# Patient Record
Sex: MALE | Race: WHITE | Employment: OTHER | RURAL
[De-identification: names, ages, dates, MRNs, and addresses within clinical notes are randomized per-mention and may not be internally consistent; named-entity substitution may affect disease eponyms.]

---

## 2017-01-03 ENCOUNTER — CLINICAL SUPPORT (OUTPATIENT)
Dept: FAMILY MEDICINE CLINIC | Age: 77
End: 2017-01-03

## 2017-01-03 DIAGNOSIS — I49.9 CARDIAC ARRHYTHMIA, UNSPECIFIED CARDIAC ARRHYTHMIA TYPE: Primary | ICD-10-CM

## 2017-01-03 LAB
INR BLD: 2
PT POC: 24.9 SEC
VALID INTERNAL CONTROL?: YES

## 2017-01-03 NOTE — PROGRESS NOTES
This is a 77y.o. year old male who is being seen today for protime. Any missed doses? NO  Any bleeding? NO  Current dose? 2.5mg qd    Results for orders placed or performed in visit on 01/03/17   AMB POC PT/INR   Result Value Ref Range    VALID INTERNAL CONTROL POC Yes     Prothrombin time (POC) 24.9 sec    INR POC 2.0

## 2017-01-03 NOTE — MR AVS SNAPSHOT
Visit Information Date & Time Provider Department Dept. Phone Encounter #  
 1/3/2017  9:45 AM [facility] [phone] [id number] Your Appointments 1/12/2017 10:00 AM  
Nurse Visit with [facility] ([address]) Appt Note: P.T./INR  
 [address] [place] [address]  
[phone]  
  
   
 [address] [facility] [address]  
  
    
 1/31/2017 10:30 AM  
ESTABLISHED PATIENT with Myah Woods MD  
[address] ([address]) Appt Note: 4 mth  
 [address] [facility] [address]  
[address] [facility] [address] [facility] [address] Upcoming Health Maintenance Date Due DTaP/Tdap/Td series (1 - Tdap) 1/19/1962 ZOSTER VACCINE AGE 60> 1/19/2001 GLAUCOMA SCREENING Q2Y 1/19/2006 Pneumococcal 65+ High/Highest Risk (1 of 2 - PCV13) 1/19/2006 INFLUENZA AGE 9 TO ADULT 8/1/2016 MEDICARE YEARLY EXAM 2/18/2017 Allergies as of 1/3/2017  Review Complete On: 1/3/2017 By: Oz Fearing Severity Noted Reaction Type Reactions Ketamine  10/15/2010    Other (comments)  
 confusion Current Immunizations  Never Reviewed No immunizations on file. Not reviewed this visit Vitals Smoking Status Former Smoker Preferred Pharmacy Pharmacy Name Phone [facility] [address] [place] [phone] Your Updated Medication List  
  
   
This list is accurate as of: 1/3/17 10:11 AM.  Always use your most recent med list.  
  
  
  
  
 amiodarone 200 mg tablet Commonly known as:  Gilberto Luna Take 1 Tab by mouth daily. atorvastatin 20 mg tablet Commonly known as:  LIPITOR Take 1 Tab by mouth daily. dilTIAZem 60 mg tablet Commonly known as:  CARDIZEM  
 Take 1 Tab by mouth two (2) times a day. furosemide 40 mg tablet Commonly known as:  LASIX TAKE ONE TABLET BY MOUTH ONCE DAILY HYDROcodone-acetaminophen 5-325 mg per tablet Commonly known as:  Juanetta Rasp Take 1 Tab by mouth every six (6) hours as needed for Pain. Max Daily Amount: 4 Tabs. levothyroxine 100 mcg tablet Commonly known as:  SYNTHROID Take 1 Tab by mouth Daily (before breakfast). losartan 25 mg tablet Commonly known as:  COZAAR Take 1 Tab by mouth daily. potassium chloride SR 10 mEq tablet Commonly known as:  KLOR-CON 10  
TAKE TWO TABLETS BY MOUTH TWICE DAILY predniSONE 10 mg tablet Commonly known as:  DELTASONE  
3 tabs bid for 2 days then 2 tabs bid for 3 days, then 1 tab bid for 3 days  
  
 sildenafil citrate 100 mg tablet Commonly known as:  VIAGRA Take 1 Tab by mouth as needed. traMADol 50 mg tablet Commonly known as:  ULTRAM  
TAKE ONE TABLET BY MOUTH EVERY 6 HOURS AS NEEDED FOR PAIN MAX  DAILY  AMOUNT  200  MG  (4  TABS)  
  
 warfarin 5 mg tablet Commonly known as:  COUMADIN Take as directed Introducing [facility] & HEALTH SERVICES! Inez Atkinson introduces [employer] patient portal. Now you can access parts of your medical record, email your doctor's office, and request medication refills online. 1. In your internet browser, go to [email or web address] 2. Click on the First Time User? Click Here link in the Sign In box. You will see the New Member Sign Up page. 3. Enter your [employer] Access Code exactly as it appears below. You will not need to use this code after youve completed the sign-up process. If you do not sign up before the expiration date, you must request a new code. · [employer] Access Code: [email or web address] Expires: 3/12/2017  9:59 AM 
 
4. Enter the last four digits of your Social Security Number (xxxx) and Date of Birth (mm/dd/yyyy) as indicated and click Submit.  You will be taken to the next sign-up page. 5. Create a [facility] ID. This will be your [facility] login ID and cannot be changed, so think of one that is secure and easy to remember. 6. Create a [facility] password. You can change your password at any time. 7. Enter your Password Reset Question and Answer. This can be used at a later time if you forget your password. 8. Enter your e-mail address. You will receive e-mail notification when new information is available in [address] 9. Click Sign Up. You can now view and download portions of your medical record. 10. Click the Download Summary menu link to download a portable copy of your medical information. If you have questions, please visit the Frequently Asked Questions section of the [facility] website. Remember, [facility] is NOT to be used for urgent needs. For medical emergencies, dial 911. Now available from your iPhone and Android! Please provide this summary of care documentation to your next provider. Your primary care clinician is listed as Geoff Hopper. If you have any questions after today's visit, please call [phone].

## 2017-01-31 ENCOUNTER — OFFICE VISIT (OUTPATIENT)
Dept: FAMILY MEDICINE CLINIC | Age: 77
End: 2017-01-31

## 2017-01-31 VITALS
HEIGHT: 70 IN | HEART RATE: 70 BPM | DIASTOLIC BLOOD PRESSURE: 54 MMHG | WEIGHT: 198.4 LBS | OXYGEN SATURATION: 95 % | BODY MASS INDEX: 28.4 KG/M2 | RESPIRATION RATE: 20 BRPM | SYSTOLIC BLOOD PRESSURE: 109 MMHG | TEMPERATURE: 98.8 F

## 2017-01-31 DIAGNOSIS — N18.30 CKD (CHRONIC KIDNEY DISEASE) STAGE 3, GFR 30-59 ML/MIN (HCC): ICD-10-CM

## 2017-01-31 DIAGNOSIS — I48.0 PAF (PAROXYSMAL ATRIAL FIBRILLATION) (HCC): ICD-10-CM

## 2017-01-31 DIAGNOSIS — E78.00 PURE HYPERCHOLESTEROLEMIA: ICD-10-CM

## 2017-01-31 DIAGNOSIS — I10 ESSENTIAL HYPERTENSION: ICD-10-CM

## 2017-01-31 DIAGNOSIS — I73.9 PVD (PERIPHERAL VASCULAR DISEASE) (HCC): ICD-10-CM

## 2017-01-31 DIAGNOSIS — E07.9 THYROID DISEASE: ICD-10-CM

## 2017-01-31 DIAGNOSIS — I49.9 VENTRICULAR ARRHYTHMIA: Primary | ICD-10-CM

## 2017-01-31 DIAGNOSIS — I49.9 CARDIAC ARRHYTHMIA, UNSPECIFIED CARDIAC ARRHYTHMIA TYPE: ICD-10-CM

## 2017-01-31 LAB
INR BLD: 14.8
PT POC: 1.2 SEC
VALID INTERNAL CONTROL?: YES

## 2017-01-31 RX ORDER — WARFARIN SODIUM 5 MG/1
TABLET ORAL
Qty: 90 TAB | Refills: 1
Start: 2017-01-31 | End: 2017-02-28 | Stop reason: SDUPTHER

## 2017-01-31 NOTE — PROGRESS NOTES
Subjective:  Trinity Raymundo presents for follow-up doing well no interval problems. No chest pain, no angina no shortness of breath. No orthopnea or PND. No dependent edema. No abdominal pain, change in bowel habits, no blood in stool or black stools. No urinary frequency, urgency, dysuria. No change in voiding pattern or stream, no significant nocturia. No problems with medications and is compliant. Seen recently by nephrology creatinine has improved no changes in medications  Follows with Dr. Lizzy Bateman has not seen him this year encouraged to make an appointment which he states he will do. Low back pain is improved ambulating without difficulty  Blood pressure has been well controlled normotensive on checks which tend to be infrequent      Problem list reviewed as part of this encounter. Past Medical History   Diagnosis Date    Arrhythmia      atrial fibrillation, cardioversion 2010    CKD (chronic kidney disease) stage 3, GFR 30-59 ml/min     Hyperlipemia     Hypertension     PVD (peripheral vascular disease) ([facility] [address]     Thyroid disease       Medication list reviewed and updated. Review of Systems -as per the above in previous documentation    Objective:  Visit Vitals    /54 (BP 1 Location: Left arm, BP Patient Position: Sitting)    Pulse 70    Temp 98.8 °F (37.1 °C) (Temporal)    Resp 20    Ht 5' 10\" (1.778 m)    Wt 198 lb 6.4 oz (90 kg)    SpO2 95%    BMI 28.47 kg/m2     Alert and oriented. No acute distress. HEENT Ears TMs are normal.  Canals are clear. Eyes pupils equal, round, react to light and accommodation. Extraocular movements intact. Nose patent. Mouth and throat is clear. Neck supple full range of motion no thyromegaly. No carotid bruits. No significant lymphadenopathy  Lungs clear to auscultation without wheezes, rales, or rhonchi. Heart  RRR,  S1 & S2 are normal intensity. No murmur; no gallop  Abdomen bowel sounds active.   No tenderness, guarding, rebound, masses, organomegaly. Back no CVA tenderness. Extremities without clubbing, cyanosis, or edema  Neuro HMF intact. Motor is 5 over 5 and symmetrical.    Results for orders placed or performed in visit on 17   AMB POC PT/INR   Result Value Ref Range    VALID INTERNAL CONTROL POC Yes     Prothrombin time (POC) 1.2 sec    INR POC 14.8          Assessment:  Encounter Diagnoses   Name Primary?  Ventricular arrhythmia Yes    PVD (peripheral vascular disease) ([facility] [address]     CKD (chronic kidney disease) stage 3, GFR 30-59 ml/min     Essential hypertension     Thyroid disease     Cardiac arrhythmia, unspecified cardiac arrhythmia type     Pure hypercholesterolemia     PAF (paroxysmal atrial fibrillation) ([facility] [address]            Plan:  See orders. Orders Placed This Encounter    AMB POC PT/INR    warfarin (COUMADIN) 5 mg tablet     Si/2 tab each day     Dispense:  90 Tab     Refill:  1    Coumadin adjustment made he is presently taking a half a tablet on  whole tablet on Saturday but none throughout the rest the week not sure how he arrived at this regimen, but will simplify it by taking one half tablet daily check INR in 2 weeks  Rest of medications will stay the same  Follow-up 4 months or as needed we will repeat lab at that time        There are no Patient Instructions on file for this visit.     (Please note that portions of this note were completed with a voice recognition program. Efforts were made to edit the dictations but occasionally words are mis-transcribed.)

## 2017-01-31 NOTE — MR AVS SNAPSHOT
Visit Information Date & Time Provider Department Dept. Phone Encounter #  
 1/31/2017 10:30 AM Mica D[facility] [phone] [id number] Follow-up Instructions Return in about 4 months (around 5/31/2017) for Follow up. Upcoming Health Maintenance Date Due DTaP/Tdap/Td series (1 - Tdap) 1/19/1962 ZOSTER VACCINE AGE 60> 1/19/2001 GLAUCOMA SCREENING Q2Y 1/19/2006 Pneumococcal 65+ High/Highest Risk (1 of 2 - PCV13) 1/19/2006 INFLUENZA AGE 9 TO ADULT 8/1/2016 MEDICARE YEARLY EXAM 2/18/2017 Allergies as of 1/31/2017  Review Complete On: 1/31/2017 By: Mica Duckworth MD  
  
 Severity Noted Reaction Type Reactions Ketamine  10/15/2010    Other (comments)  
 confusion Current Immunizations  Never Reviewed No immunizations on file. Not reviewed this visit You Were Diagnosed With   
  
 Codes Comments Ventricular arrhythmia    -  Primary ICD-10-CM: I49.9 ICD-9-CM: 427.9 PVD (peripheral vascular disease) ([facility] [address]     ICD-10-CM: I73.9 ICD-9-CM: 443.9 CKD (chronic kidney disease) stage 3, GFR 30-59 ml/min     ICD-10-CM: N18.3 ICD-9-CM: 400. 3 Essential hypertension     ICD-10-CM: I10 
ICD-9-CM: 401.9 Thyroid disease     ICD-10-CM: E07.9 ICD-9-CM: 246. 9 Cardiac arrhythmia, unspecified cardiac arrhythmia type     ICD-10-CM: I49.9 ICD-9-CM: 427.9 Pure hypercholesterolemia     ICD-10-CM: E78.00 ICD-9-CM: 272.0   
 PAF (paroxysmal atrial fibrillation) (HCC)     ICD-10-CM: I48.0 ICD-9-CM: 427.31 Vitals BP Pulse Temp Resp Height(growth percentile) 109/54 (BP 1 Location: Left arm, BP Patient Position: Sitting) 70 98.8 °F (37.1 °C) (Temporal) 20 5' 10\" (1.778 m) Weight(growth percentile) SpO2 BMI Smoking Status 198 lb 6.4 oz (90 kg) 95% 28.47 kg/m2 Former Smoker BMI and BSA Data Body Mass Index Body Surface Area  
 28.47 kg/m 2 2.11 m 2 Preferred Pharmacy Pharmacy Name Phone [facility] [address], [place] - [address] [phone] Your Updated Medication List  
  
   
This list is accurate as of: 1/31/17 10:59 AM.  Always use your most recent med list.  
  
  
  
  
 amiodarone 200 mg tablet Commonly known as:  Gilberto Luna Take 1 Tab by mouth daily. atorvastatin 20 mg tablet Commonly known as:  LIPITOR Take 1 Tab by mouth daily. dilTIAZem 60 mg tablet Commonly known as:  CARDIZEM Take 1 Tab by mouth two (2) times a day. furosemide 40 mg tablet Commonly known as:  LASIX TAKE ONE TABLET BY MOUTH ONCE DAILY  
  
 levothyroxine 100 mcg tablet Commonly known as:  SYNTHROID  
TAKE ONE TABLET BY MOUTH ONCE DAILY BEFORE  BREAKFAST  
  
 losartan 25 mg tablet Commonly known as:  COZAAR Take 1 Tab by mouth daily. potassium chloride SR 10 mEq tablet Commonly known as:  KLOR-CON 10  
TAKE TWO TABLETS BY MOUTH TWICE DAILY  
  
 sildenafil citrate 100 mg tablet Commonly known as:  VIAGRA Take 1 Tab by mouth as needed. traMADol 50 mg tablet Commonly known as:  ULTRAM  
TAKE ONE TABLET BY MOUTH EVERY 6 HOURS AS NEEDED FOR PAIN MAX  DAILY  AMOUNT  200  MG  (4  TABS)  
  
 warfarin 5 mg tablet Commonly known as:  COUMADIN  
1/2 tab each day We Performed the Following AMB POC PT/INR [45978 CPT(R)] Follow-up Instructions Return in about 4 months (around 5/31/2017) for Follow up. Introducing [facility]! Nino Ware introduces [employer] patient portal. Now you can access parts of your medical record, email your doctor's office, and request medication refills online. 1. In your internet browser, go to [email or web address] 2. Click on the First Time User? Click Here link in the Sign In box. You will see the New Member Sign Up page. 3. Enter your [employer] Access Code exactly as it appears below.  You will not need to use this code after youve completed the sign-up process. If you do not sign up before the expiration date, you must request a new code. · [employer] Access Code: [email or web address] Expires: 3/12/2017  9:59 AM 
 
4. Enter the last four digits of your Social Security Number (xxxx) and Date of Birth (mm/dd/yyyy) as indicated and click Submit. You will be taken to the next sign-up page. 5. Create a [employer] ID. This will be your [employer] login ID and cannot be changed, so think of one that is secure and easy to remember. 6. Create a [employer] password. You can change your password at any time. 7. Enter your Password Reset Question and Answer. This can be used at a later time if you forget your password. 8. Enter your e-mail address. You will receive e-mail notification when new information is available in [address] 9. Click Sign Up. You can now view and download portions of your medical record. 10. Click the Download Summary menu link to download a portable copy of your medical information. If you have questions, please visit the Frequently Asked Questions section of the [employer] website. Remember, [employer] is NOT to be used for urgent needs. For medical emergencies, dial 911. Now available from your iPhone and Android! Please provide this summary of care documentation to your next provider. Your primary care clinician is listed as Bonifacio Fernandez. If you have any questions after today's visit, please call [phone].

## 2017-01-31 NOTE — PROGRESS NOTES
Read with patient at the time of office visit Coumadin level was adjusted please see medications repeat INR 2 weeks

## 2017-02-28 ENCOUNTER — OFFICE VISIT (OUTPATIENT)
Dept: FAMILY MEDICINE CLINIC | Age: 77
End: 2017-02-28

## 2017-02-28 VITALS
TEMPERATURE: 98.9 F | RESPIRATION RATE: 20 BRPM | HEART RATE: 71 BPM | OXYGEN SATURATION: 96 % | HEIGHT: 70 IN | WEIGHT: 203.6 LBS | DIASTOLIC BLOOD PRESSURE: 80 MMHG | BODY MASS INDEX: 29.15 KG/M2 | SYSTOLIC BLOOD PRESSURE: 140 MMHG

## 2017-02-28 DIAGNOSIS — Z00.00 MEDICARE ANNUAL WELLNESS VISIT, SUBSEQUENT: Primary | ICD-10-CM

## 2017-02-28 DIAGNOSIS — Z79.01 ANTICOAGULANT LONG-TERM USE: ICD-10-CM

## 2017-02-28 DIAGNOSIS — I49.9 CARDIAC ARRHYTHMIA, UNSPECIFIED CARDIAC ARRHYTHMIA TYPE: ICD-10-CM

## 2017-02-28 DIAGNOSIS — I48.20 CHRONIC ATRIAL FIBRILLATION (HCC): ICD-10-CM

## 2017-02-28 LAB
INR BLD: 1.5
PT POC: 17.7 SEC
VALID INTERNAL CONTROL?: YES

## 2017-02-28 RX ORDER — WARFARIN SODIUM 5 MG/1
TABLET ORAL
Qty: 90 TAB | Refills: 1
Start: 2017-02-28 | End: 2017-09-18 | Stop reason: SDUPTHER

## 2017-02-28 NOTE — PROGRESS NOTES
Horacio Carrasquillo is a 69 y.o. male and presents for annual Medicare Wellness Visit. Problem List: Reviewed with patient and discussed risk factors. Patient Active Problem List   Diagnosis Code    Hypertension I10    Thyroid disease E07.9    Arrhythmia I49.9    Hyperlipemia E78.5    PVD (peripheral vascular disease) (HCC) I73.9    CKD (chronic kidney disease) stage 3, GFR 30-59 ml/min N18.3    Anticoagulant long-term use Z79.01       Current medical providers:  Patient Care Team:  Carlyn Harris NP as PCP - General (Nurse Practitioner)    PMH, , Medications/Allergies: reviewed, on chart. Male Alcohol Screening: On any occasion during the past 3 months, have you had more than 4 drinks containing alcohol? No    Do you average more than 14 drinks per week? No    ROS:  Constitutional: No fever, chills or weight loss  Respiratory: No cough, SOB   CV: No chest pain or Palpitations    Objective:  Visit Vitals    /80 (BP 1 Location: Left arm, BP Patient Position: Sitting)    Pulse 71    Temp 98.9 °F (37.2 °C) (Temporal)    Resp 20    Ht 5' 10\" (1.778 m)    Wt 203 lb 9.6 oz (92.4 kg)    SpO2 96%    BMI 29.21 kg/m2    Body mass index is 29.21 kg/(m^2). Assessment of cognitive impairment: Alert and oriented x 3    Depression Screen:   PHQ 2 / 9, over the last two weeks 2/28/2017   Little interest or pleasure in doing things Not at all   Feeling down, depressed or hopeless Not at all   Total Score PHQ 2 0       Fall Risk Assessment:    Fall Risk Assessment, last 12 mths 2/28/2017   Able to walk? Yes   Fall in past 12 months? Yes   Fall with injury? Yes   Number of falls in past 12 months 1   Fall Risk Score 2       Functional Ability:   Does the patient exhibit a steady gait? yes   How long did it take the patient to get up and walk from a sitting position?  3s   Is the patient self reliant?  (ie can do own laundry, meals, household chores)  yes     Does the patient handle his/her own medications? yes     Does the patient handle his/her own money? yes     Is the patients home safe (ie good lighting, handrails on stairs and bath, etc.)? yes     Did you notice or did patient express any hearing difficulties? no     Did you notice or did patient express any vision difficulties? no       Advance Care Planning:   Patient was offered the opportunity to discuss advance care planning:  yes     Does patient have an Advance Directive:  no   If no, did you provide information on Caring Connections? yes     Plan:      Orders Placed This Encounter    COLLECTION CAPILLARY BLOOD SPECIMEN    AMB POC PT/INR       Health Maintenance   Topic Date Due    GLAUCOMA SCREENING Q2Y  01/19/2006    Pneumococcal 65+ High/Highest Risk (2 of 2 - PPSV23) 04/25/2017    MEDICARE YEARLY EXAM  03/01/2018    DTaP/Tdap/Td series (2 - Td) 02/28/2027    ZOSTER VACCINE AGE 60>  Addressed    INFLUENZA AGE 9 TO ADULT  Addressed       *Patient verbalized understanding and agreement with the plan. A copy of the After Visit Summary with personalized health plan was given to the patient today.

## 2017-02-28 NOTE — PROGRESS NOTES
Follow up for anticoagulation. Indication: Irregular heat beat. Current medication:  warfarin 2.5 mg daily. Current symptoms: none  Current control agent: amiodarone  Current anticoagulant: warfarin2.5 mg daily.   History of bleeding problems: NO  Results for orders placed or performed in visit on 02/28/17   AMB POC PT/INR   Result Value Ref Range    VALID INTERNAL CONTROL POC Yes     Prothrombin time (POC) 17.7 sec    INR POC 1.5      A/P:  Anticoag/Afib, not in goal  Change warfarin to 5mg Mon and Thurs, 2.5 mg all other days  Recheck 1mo

## 2017-02-28 NOTE — MR AVS SNAPSHOT
Visit Information Date & Time Provider Department Dept. Phone Encounter #  
 2/28/2017 10:10 AM MD Marco Yo [phone] [id number] Follow-up Instructions Return in about 4 weeks (around 3/28/2017). Your Appointments 5/30/2017 11:00 AM  
ESTABLISHED PATIENT with [facility] Appt Note: 4 mo F/U  
 [address] [address]  
[address] [facility] [address] [facility] [address] Upcoming Health Maintenance Date Due  
 GLAUCOMA SCREENING Q2Y 1/19/2006 Pneumococcal 65+ High/Highest Risk (2 of 2 - PPSV23) 4/25/2017 MEDICARE YEARLY EXAM 3/1/2018 DTaP/Tdap/Td series (2 - Td) 2/28/2027 Allergies as of 2/28/2017  Review Complete On: 2/28/2017 By: Anshul Powers LPN Severity Noted Reaction Type Reactions Ketamine  10/15/2010    Other (comments)  
 confusion Current Immunizations  Never Reviewed No immunizations on file. Not reviewed this visit You Were Diagnosed With   
  
 Codes Comments Medicare annual wellness visit, subsequent    -  Primary ICD-10-CM: Z00.00 ICD-9-CM: V70.0 Cardiac arrhythmia, unspecified cardiac arrhythmia type     ICD-10-CM: I49.9 ICD-9-CM: 427.9 Chronic atrial fibrillation (HCC)     ICD-10-CM: J47.6 ICD-9-CM: 427.31 Anticoagulant long-term use     ICD-10-CM: Z79.01 
ICD-9-CM: V58.61 Vitals BP  
  
  
  
  
  
 140/80 (BP 1 Location: Left arm, BP Patient Position: Sitting) BMI and BSA Data Body Mass Index Body Surface Area  
 29.21 kg/m 2 2.14 m 2 Preferred Pharmacy Pharmacy Name Phone [facility] [place] [address] [place] [id number] [phone] Your Updated Medication List  
  
   
This list is accurate as of: 2/28/17 11:06 AM.  Always use your most recent med list.  
  
 amiodarone 200 mg tablet Commonly known as:  Kathy Mems Take 1 Tab by mouth daily. atorvastatin 20 mg tablet Commonly known as:  LIPITOR Take 1 Tab by mouth daily. dilTIAZem 60 mg tablet Commonly known as:  CARDIZEM Take 1 Tab by mouth two (2) times a day. furosemide 40 mg tablet Commonly known as:  LASIX TAKE ONE TABLET BY MOUTH ONCE DAILY  
  
 levothyroxine 100 mcg tablet Commonly known as:  SYNTHROID  
TAKE ONE TABLET BY MOUTH ONCE DAILY BEFORE  BREAKFAST  
  
 losartan 25 mg tablet Commonly known as:  COZAAR Take 1 Tab by mouth daily. potassium chloride SR 10 mEq tablet Commonly known as:  KLOR-CON 10  
TAKE TWO TABLETS BY MOUTH TWICE DAILY  
  
 sildenafil citrate 100 mg tablet Commonly known as:  VIAGRA Take 1 Tab by mouth as needed. warfarin 5 mg tablet Commonly known as:  COUMADIN  
1 tab Mon and Thurs, 1/2 tab all other days. We Performed the Following AMB POC PT/INR [51246 CPT(R)] COLLECTION CAPILLARY BLOOD SPECIMEN [15582 CPT(R)] Follow-up Instructions Return in about 4 weeks (around 3/28/2017). Patient Instructions Change warfarin to 5mg pills, whole pill Mon and Thurs, half pill all others Introducing [facility] & HEALTH SERVICES! [facility] introduces [employer] patient portal. Now you can access parts of your medical record, email your doctor's office, and request medication refills online. 1. In your internet browser, go to [email or web address] 2. Click on the First Time User? Click Here link in the Sign In box. You will see the New Member Sign Up page. 3. Enter your [employer] Access Code exactly as it appears below. You will not need to use this code after youve completed the sign-up process. If you do not sign up before the expiration date, you must request a new code. · [employer] Access Code: [email or web address] Expires: 3/12/2017  9:59 AM 
 
 4. Enter the last four digits of your Social Security Number (xxxx) and Date of Birth (mm/dd/yyyy) as indicated and click Submit. You will be taken to the next sign-up page. 5. Create a Imagin[employer] ID. This will be your [employer] login ID and cannot be changed, so think of one that is secure and easy to remember. 6. Create a [employer] password. You can change your password at any time. 7. Enter your Password Reset Question and Answer. This can be used at a later time if you forget your password. 8. Enter your e-mail address. You will receive e-mail notification when new information is available in [address] 9. Click Sign Up. You can now view and download portions of your medical record. 10. Click the Download Summary menu link to download a portable copy of your medical information. If you have questions, please visit the Frequently Asked Questions section of the [employer] website. Remember, [employer] is NOT to be used for urgent needs. For medical emergencies, dial 911. Now available from your iPhone and Android! Please provide this summary of care documentation to your next provider. Your primary care clinician is listed as Raz Bob. If you have any questions after today's visit, please call [phone].

## 2017-03-28 ENCOUNTER — OFFICE VISIT (OUTPATIENT)
Dept: FAMILY MEDICINE CLINIC | Age: 77
End: 2017-03-28

## 2017-03-28 VITALS
RESPIRATION RATE: 22 BRPM | HEIGHT: 70 IN | HEART RATE: 64 BPM | DIASTOLIC BLOOD PRESSURE: 70 MMHG | WEIGHT: 204.2 LBS | BODY MASS INDEX: 29.23 KG/M2 | SYSTOLIC BLOOD PRESSURE: 128 MMHG | OXYGEN SATURATION: 93 % | TEMPERATURE: 98.2 F

## 2017-03-28 DIAGNOSIS — J98.4 PULMONARY SCARRING: ICD-10-CM

## 2017-03-28 DIAGNOSIS — Z79.01 ANTICOAGULANT LONG-TERM USE: Primary | ICD-10-CM

## 2017-03-28 DIAGNOSIS — I48.20 CHRONIC ATRIAL FIBRILLATION (HCC): ICD-10-CM

## 2017-03-28 LAB
INR BLD: 1.7 (ref 2–3)
PT POC: 20.2 SEC (ref 10.4–14)
VALID INTERNAL CONTROL?: YES

## 2017-03-28 RX ORDER — FLUTICASONE PROPIONATE AND SALMETEROL 250; 50 UG/1; UG/1
1 POWDER RESPIRATORY (INHALATION) EVERY 12 HOURS
Qty: 2 INHALER | Refills: 0 | Status: SHIPPED | COMMUNITY
Start: 2017-03-28 | End: 2017-05-30

## 2017-03-28 NOTE — MR AVS SNAPSHOT
Visit Information Date & Time Provider Department Dept. Phone Encounter #  
 3/28/2017 10:10 AM Marco Gross Amelia [phone] [id number] Follow-up Instructions Return in about 4 weeks (around 4/25/2017). Follow-up and Disposition History Your Appointments 4/27/2017 11:00 AM  
ESTABLISHED PATIENT with Carl Jacobs, SHARRON [address] ([address]) Appt Note: 1 MTH PTINR; .  
 [address] [address]  
[address] [facility] [address] [address] 5/30/2017 11:00 AM  
ESTABLISHED PATIENT with Carl Jacobs, SHARRON  
[address] ([address]) Appt Note: 4 mo F/U  
 [address] [employer] [address]  
[phone] Upcoming Health Maintenance Date Due  
 GLAUCOMA SCREENING Q2Y 1/19/2006 Pneumococcal 65+ High/Highest Risk (2 of 2 - PPSV23) 4/25/2017 MEDICARE YEARLY EXAM 3/1/2018 DTaP/Tdap/Td series (2 - Td) 2/28/2027 Allergies as of 3/28/2017  Review Complete On: 3/28/2017 By: Pete Liz MD  
  
 Severity Noted Reaction Type Reactions Ketamine  10/15/2010    Other (comments)  
 confusion Current Immunizations  Never Reviewed No immunizations on file. Not reviewed this visit You Were Diagnosed With   
  
 Codes Comments Anticoagulant long-term use    -  Primary ICD-10-CM: Z79.01 
ICD-9-CM: V58.61 Chronic atrial fibrillation (HCC)     ICD-10-CM: E23.5 ICD-9-CM: 427.31 Pulmonary scarring (HCC)     ICD-10-CM: J98.4 ICD-9-CM: 518.89 Vitals BP Pulse Temp Resp Height(growth percentile) 128/70 (BP 1 Location: Left arm, BP Patient Position: Sitting) 64 98.2 °F (36.8 °C) (Temporal) 22 5' 10\" (1.778 m) Weight(growth percentile) SpO2 BMI Smoking Status 204 lb 3.2 oz (92.6 kg) 93% 29.3 kg/m2 Former Smoker Vitals History BMI and BSA Data Body Mass Index Body Surface Area  
 29.3 kg/m 2 2.14 m 2 Preferred Pharmacy Pharmacy Name Phone [facility] [place] [address], [place] - [address] [phone] Your Updated Medication List  
  
   
This list is accurate as of: 3/28/17 11:25 AM.  Always use your most recent med list.  
  
  
  
  
 amiodarone 200 mg tablet Commonly known as:  Alfred [place] Take 1 Tab by mouth daily. atorvastatin 20 mg tablet Commonly known as:  LIPITOR Take 1 Tab by mouth daily. dilTIAZem 60 mg tablet Commonly known as:  CARDIZEM Take 1 Tab by mouth two (2) times a day. fluticasone-salmeterol 250-50 mcg/dose diskus inhaler Commonly known as:  ADVAIR Take 1 Puff by inhalation every twelve (12) hours. furosemide 40 mg tablet Commonly known as:  LASIX TAKE ONE TABLET BY MOUTH ONCE DAILY  
  
 levothyroxine 100 mcg tablet Commonly known as:  SYNTHROID  
TAKE ONE TABLET BY MOUTH ONCE DAILY BEFORE  BREAKFAST  
  
 losartan 25 mg tablet Commonly known as:  COZAAR Take 1 Tab by mouth daily. potassium chloride SR 10 mEq tablet Commonly known as:  KLOR-CON 10  
TAKE TWO TABLETS BY MOUTH TWICE DAILY  
  
 sildenafil citrate 100 mg tablet Commonly known as:  VIAGRA Take 1 Tab by mouth as needed. warfarin 5 mg tablet Commonly known as:  COUMADIN  
1 tab Mon and Thurs, 1/2 tab all other days. We Performed the Following AMB POC PT/INR [53784 CPT(R)] COLLECTION CAPILLARY BLOOD SPECIMEN [79374 CPT(R)] Follow-up Instructions Return in about 4 weeks (around 4/25/2017). Patient Instructions Warfarin 5mg pills,  Half pill (2.5 mg) x4d and whole pill x3d (5 mg)- M-W-F. Patient Instructions History Introducing [facility] HE[facility] SERVICES!    
 Essence Haas introduces [employer] patient portal. Now you can access parts of your medical record, email your doctor's office, and request medication refills online. 1. In your internet browser, go to [email or web address] 2. Click on the First Time User? Click Here link in the Sign In box. You will see the New Member Sign Up page. 3. Enter your TradeSync Access Code exactly as it appears below. You will not need to use this code after youve completed the sign-up process. If you do not sign up before the expiration date, you must request a new code. · TradeSync Access Code: [email or web address] Expires: 6/26/2017 11:25 AM 
 
4. Enter the last four digits of your Social Security Number (xxxx) and Date of Birth (mm/dd/yyyy) as indicated and click Submit. You will be taken to the next sign-up page. 5. Create a TradeSync ID. This will be your TradeSync login ID and cannot be changed, so think of one that is secure and easy to remember. 6. Create a TradeSync password. You can change your password at any time. 7. Enter your Password Reset Question and Answer. This can be used at a later time if you forget your password. 8. Enter your e-mail address. You will receive e-mail notification when new information is available in [address] 9. Click Sign Up. You can now view and download portions of your medical record. 10. Click the Download Summary menu link to download a portable copy of your medical information. If you have questions, please visit the Frequently Asked Questions section of the TradeSync website. Remember, TradeSync is NOT to be used for urgent needs. For medical emergencies, dial 911. Now available from your iPhone and Android! Please provide this summary of care documentation to your next provider. Your primary care clinician is listed as Darrion Craig. If you have any questions after today's visit, please call [phone].

## 2017-03-28 NOTE — PROGRESS NOTES
Kadie Newell is a 69 y.o. male presenting for/with:    Anticoagulation      HPI:    Follow up for anticoagulation. Indication: Atrial fibrillation  Current medication:  Warfarin 5mg pills,  Half pill (2.5 mg) x5d and whole pill monday and thurs (5 mg)  Current symptoms: dyspnea  Current control agent: amiodarone  Current anticoagulant: Warfarin   History of bleeding problems: NO  Results for orders placed or performed in visit on 03/28/17   AMB POC PT/INR   Result Value Ref Range    VALID INTERNAL CONTROL POC Yes     Prothrombin time (POC) 20.2 (A) 10.4 - 14 sec    INR POC 1.7 (A) 2 - 3     PMH, SH, Medications/Allergies: reviewed, on chart.     ROS:  Constitutional: No fever, chills or weight loss  Respiratory: No cough, SOB   CV: No chest pain or Palpitations    Visit Vitals    /70 (BP 1 Location: Left arm, BP Patient Position: Sitting)    Pulse 64    Temp 98.2 °F (36.8 °C) (Temporal)    Resp 22    Ht 5' 10\" (1.778 m)    Wt 204 lb 3.2 oz (92.6 kg)    SpO2 93%    BMI 29.3 kg/m2     Wt Readings from Last 3 Encounters:   03/28/17 204 lb 3.2 oz (92.6 kg)   02/28/17 203 lb 9.6 oz (92.4 kg)   01/31/17 198 lb 6.4 oz (90 kg)     Physical Examination: General appearance - alert, well appearing, and in no distress  Mental status - alert, oriented to person, place, and time  Eyes - pupils equal and reactive, extraocular eye movements intact  ENT - bilateral external ears and nose normal. Normal lips  Neck - supple, no significant adenopathy, no thyromegaly or mass  Lymphatics - no palpable lymphadenopathy, no hepatosplenomegaly  Chest - L upper lung field with markedly diminished breath sounds on auscultation, no wheezes, rales or rhonchi, symmetric air entry  Heart - normal rate, regular rhythm, normal S1, S2, no murmurs, rubs, clicks or gallops  Extremities - peripheral pulses normal, no pedal edema, no clubbing or cyanosis    A/P:  Afib/anticoag, not in goal  Warfarin 5mg pills,  Half pill (2.5 mg) x4d and whole pill x3d (5 mg)    Cough and abn breath sounds  Had xrays at [facility] last mo or so, get those. Will give sample for advair 250/50 1pf BID PRN.     F/U 1mo

## 2017-04-27 ENCOUNTER — OFFICE VISIT (OUTPATIENT)
Dept: FAMILY MEDICINE CLINIC | Age: 77
End: 2017-04-27

## 2017-04-27 VITALS
BODY MASS INDEX: 29.63 KG/M2 | TEMPERATURE: 98.4 F | WEIGHT: 207 LBS | SYSTOLIC BLOOD PRESSURE: 136 MMHG | HEIGHT: 70 IN | OXYGEN SATURATION: 94 % | HEART RATE: 67 BPM | DIASTOLIC BLOOD PRESSURE: 60 MMHG | RESPIRATION RATE: 22 BRPM

## 2017-04-27 DIAGNOSIS — Z79.01 ANTICOAGULANT LONG-TERM USE: Primary | ICD-10-CM

## 2017-04-27 LAB
INR BLD: 1.8
PT POC: 21.2 SEC
VALID INTERNAL CONTROL?: YES

## 2017-04-27 RX ORDER — AMLODIPINE BESYLATE 5 MG/1
TABLET ORAL
COMMUNITY
Start: 2017-03-21 | End: 2017-06-16 | Stop reason: SDUPTHER

## 2017-04-27 RX ORDER — LOSARTAN POTASSIUM 100 MG/1
TABLET ORAL
COMMUNITY
Start: 2017-01-25 | End: 2017-04-27 | Stop reason: SDUPTHER

## 2017-04-27 RX ORDER — LOSARTAN POTASSIUM 100 MG/1
100 TABLET ORAL DAILY
Qty: 90 TAB | Refills: 3 | Status: SHIPPED | OUTPATIENT
Start: 2017-04-27 | End: 2018-04-23 | Stop reason: SDUPTHER

## 2017-04-27 RX ORDER — LOSARTAN POTASSIUM 25 MG/1
25 TABLET ORAL DAILY
Qty: 30 TAB | Refills: 11 | OUTPATIENT
Start: 2017-04-27

## 2017-04-27 NOTE — PROGRESS NOTES
Kim Helm is a 70 y/o male. Retired over 30 years. Enjoys visiting his son in [place], [place]. Anticoagulation. Dx:  Atrial fibrillation. Current symptoms: none  Current control agent: none  Current anticoagulant: warfarin5 mg on Monday,Weds, Thursday and Fiday and 2.5 mg Tuesday, Saturday and Sunday. History of bleeding problems: none  Lab Results   Component Value Date/Time    INR 1.6 10/04/2010 04:33 PM    INR 1.4 11/14/2009 05:10 AM    INR 1.4 11/13/2009 04:30 AM    INR (POC) 2.9 10/15/2010 07:37 AM    INR POC 1.8 04/27/2017 11:13 AM    INR POC 1.7 03/28/2017 10:25 AM    INR POC 1.5 02/28/2017 10:12 AM    Prothrombin time 16.3 10/04/2010 04:33 PM    Prothrombin time 13.8 11/14/2009 05:10 AM    Prothrombin time 13.8 11/13/2009 04:30 AM   Recommended 1 week f/u. Patient declined stated \"See you in a month. \"    Ashok FIERRO

## 2017-04-27 NOTE — MR AVS SNAPSHOT
Visit Information Date & Time Provider Department Dept. Phone Encounter #  
 4/27/2017 11:00 AM Demetrio Yun NP [facility] [address] [facility] [phone] [id number] Your Appointments 5/30/2017 10:00 AM  
ESTABLISHED PATIENT with Demetrio Yun NP  
[address] ([facility]) Appt Note: 4 mo F/U; OV for P.T./INR and 4 mo F/U  
 [address] [address]  
[address] [facility] [address] [address] Upcoming Health Maintenance Date Due  
 GLAUCOMA SCREENING Q2Y 1/19/2006 Pneumococcal 65+ Low/Medium Risk (1 of 2 - PCV13) 1/19/2006 MEDICARE YEARLY EXAM 3/1/2018 DTaP/Tdap/Td series (2 - Td) 2/28/2027 Allergies as of 4/27/2017  Review Complete On: 3/28/2017 By: Royer Reyes MD  
  
 Severity Noted Reaction Type Reactions Ketamine  10/15/2010    Other (comments)  
 confusion Current Immunizations  Never Reviewed No immunizations on file. Not reviewed this visit You Were Diagnosed With   
  
 Codes Comments Anticoagulant long-term use    -  Primary ICD-10-CM: Z79.01 
ICD-9-CM: V58.61 Vitals BP Pulse Temp Resp Height(growth percentile) 136/60 (BP 1 Location: Left arm, BP Patient Position: Sitting) 67 98.4 °F (36.9 °C) (Temporal) 22 5' 10\" (1.778 m) Weight(growth percentile) SpO2 BMI Smoking Status 207 lb (93.9 kg) 94% 29.7 kg/m2 Former Smoker BMI and BSA Data Body Mass Index Body Surface Area  
 29.7 kg/m 2 2.15 m 2 Preferred Pharmacy Pharmacy Name Phone [facility] [place] - [address] [phone] Your Updated Medication List  
  
   
This list is accurate as of: 4/27/17 11:52 AM.  Always use your most recent med list.  
  
  
  
  
 amiodarone 200 mg tablet Commonly known as:  Ana Chowdhury Take 1 Tab by mouth daily. atorvastatin 20 mg tablet Commonly known as:  LIPITOR Take 1 Tab by mouth daily. dilTIAZem 60 mg tablet Commonly known as:  CARDIZEM Take 1 Tab by mouth two (2) times a day. fluticasone-salmeterol 250-50 mcg/dose diskus inhaler Commonly known as:  ADVAIR Take 1 Puff by inhalation every twelve (12) hours. furosemide 40 mg tablet Commonly known as:  LASIX TAKE ONE TABLET BY MOUTH ONCE DAILY  
  
 levothyroxine 100 mcg tablet Commonly known as:  SYNTHROID  
TAKE ONE TABLET BY MOUTH ONCE DAILY BEFORE  BREAKFAST  
  
 losartan 25 mg tablet Commonly known as:  COZAAR Take 1 Tab by mouth daily. potassium chloride SR 10 mEq tablet Commonly known as:  KLOR-CON 10  
TAKE TWO TABLETS BY MOUTH TWICE DAILY  
  
 sildenafil citrate 100 mg tablet Commonly known as:  VIAGRA Take 1 Tab by mouth as needed. warfarin 5 mg tablet Commonly known as:  COUMADIN  
1 tab Mon and Thurs, 1/2 tab all other days. We Performed the Following AMB POC PT/INR [86166 CPT(R)] COLLECTION CAPILLARY BLOOD SPECIMEN [60151 CPT(R)] Introducing [facility]! Cha Carrington introduces [employer] patient portal. Now you can access parts of your medical record, email your doctor's office, and request medication refills online. 1. In your internet browser, go to [email or web address] 2. Click on the First Time User? Click Here link in the Sign In box. You will see the New Member Sign Up page. 3. Enter your [employer] Access Code exactly as it appears below. You will not need to use this code after youve completed the sign-up process. If you do not sign up before the expiration date, you must request a new code. · [employer] Access Code: [email or web address] Expires: 6/26/2017 11:25 AM 
 
4. Enter the last four digits of your Social Security Number (xxxx) and Date of Birth (mm/dd/yyyy) as indicated and click Submit. You will be taken to the next sign-up page. 5. Create a Sonavation ID. This will be your Sonavation login ID and cannot be changed, so think of one that is secure and easy to remember. 6. Create a Sonavation password. You can change your password at any time. 7. Enter your Password Reset Question and Answer. This can be used at a later time if you forget your password. 8. Enter your e-mail address. You will receive e-mail notification when new information is available in [address] 9. Click Sign Up. You can now view and download portions of your medical record. 10. Click the Download Summary menu link to download a portable copy of your medical information. If you have questions, please visit the Frequently Asked Questions section of the Sonavation website. Remember, Sonavation is NOT to be used for urgent needs. For medical emergencies, dial 911. Now available from your iPhone and Android! Please provide this summary of care documentation to your next provider. Your primary care clinician is listed as Melva Huerta. If you have any questions after today's visit, please call [phone].

## 2017-05-02 RX ORDER — ATORVASTATIN CALCIUM 20 MG/1
20 TABLET, FILM COATED ORAL DAILY
Qty: 90 TAB | Refills: 3 | Status: SHIPPED | OUTPATIENT
Start: 2017-05-02 | End: 2018-04-23 | Stop reason: SDUPTHER

## 2017-05-30 ENCOUNTER — OFFICE VISIT (OUTPATIENT)
Dept: FAMILY MEDICINE CLINIC | Age: 77
End: 2017-05-30

## 2017-05-30 VITALS
TEMPERATURE: 97.6 F | WEIGHT: 205 LBS | DIASTOLIC BLOOD PRESSURE: 66 MMHG | RESPIRATION RATE: 40 BRPM | HEIGHT: 70 IN | HEART RATE: 68 BPM | OXYGEN SATURATION: 98 % | BODY MASS INDEX: 29.35 KG/M2 | SYSTOLIC BLOOD PRESSURE: 104 MMHG

## 2017-05-30 DIAGNOSIS — I10 ESSENTIAL HYPERTENSION: ICD-10-CM

## 2017-05-30 DIAGNOSIS — E07.9 THYROID DISEASE: ICD-10-CM

## 2017-05-30 DIAGNOSIS — Z00.00 HEALTH CARE MAINTENANCE: ICD-10-CM

## 2017-05-30 DIAGNOSIS — Z79.01 ANTICOAGULANT LONG-TERM USE: Primary | ICD-10-CM

## 2017-05-30 DIAGNOSIS — E78.00 PURE HYPERCHOLESTEROLEMIA: ICD-10-CM

## 2017-05-30 LAB
INR BLD: 2.9
PT POC: 34.2 SECONDS
VALID INTERNAL CONTROL?: YES

## 2017-05-30 NOTE — MR AVS SNAPSHOT
Visit Information Date & Time Provider Department Dept. Phone Encounter #  
 5/30/2017 10:00 AM Marine Li NP [address] [phone] [id number] Follow-up Instructions Return in about 1 month (around 6/30/2017). Upcoming Health Maintenance Date Due  
 GLAUCOMA SCREENING Q2Y 1/19/2006 INFLUENZA AGE 9 TO ADULT 8/1/2017 MEDICARE YEARLY EXAM 3/1/2018 Pneumococcal 65+ Low/Medium Risk (2 of 2 - PPSV23) 5/30/2018 DTaP/Tdap/Td series (2 - Td) 2/28/2027 Allergies as of 5/30/2017  Review Complete On: 5/30/2017 By: Tosha Schmid RN Severity Noted Reaction Type Reactions Ketamine  10/15/2010    Other (comments)  
 confusion Current Immunizations  Never Reviewed No immunizations on file. Not reviewed this visit You Were Diagnosed With   
  
 Codes Comments Anticoagulant long-term use    -  Primary ICD-10-CM: Z79.01 
ICD-9-CM: V58.61 Vitals BP Pulse Temp Resp Height(growth percentile) 104/66 (BP 1 Location: Left arm, BP Patient Position: Sitting) 68 97.6 °F (36.4 °C) (Temporal) (!) 40 5' 10\" (1.778 m) Weight(growth percentile) SpO2 BMI Smoking Status 205 lb (93 kg) 98% 29.41 kg/m2 Former Smoker BMI and BSA Data Body Mass Index Body Surface Area  
 29.41 kg/m 2 2.14 m 2 Preferred Pharmacy Pharmacy Name Phone [facility] [place] [facility] [address] [phone] Your Updated Medication List  
  
   
This list is accurate as of: 5/30/17 10:54 AM.  Always use your most recent med list.  
  
  
  
  
 amiodarone 200 mg tablet Commonly known as:  Marissa [place]arnett Take 1 Tab by mouth daily. amLODIPine 5 mg tablet Commonly known as:  NORVASC  
  
 atorvastatin 20 mg tablet Commonly known as:  LIPITOR Take 1 Tab by mouth daily. dilTIAZem 60 mg tablet Commonly known as:  CARDIZEM Take 1 Tab by mouth two (2) times a day. furosemide 40 mg tablet Commonly known as:  LASIX TAKE ONE TABLET BY MOUTH ONCE DAILY  
  
 levothyroxine 100 mcg tablet Commonly known as:  SYNTHROID  
TAKE ONE TABLET BY MOUTH ONCE DAILY BEFORE  BREAKFAST  
  
 losartan 100 mg tablet Commonly known as:  COZAAR Take 1 Tab by mouth daily. potassium chloride SR 10 mEq tablet Commonly known as:  KLOR-CON 10  
TAKE TWO TABLETS BY MOUTH TWICE DAILY  
  
 sildenafil citrate 100 mg tablet Commonly known as:  VIAGRA Take 1 Tab by mouth as needed. warfarin 5 mg tablet Commonly known as:  COUMADIN  
1 tab Mon and Thurs, 1/2 tab all other days. We Performed the Following AMB POC PT/INR [64918 CPT(R)] COLLECTION CAPILLARY BLOOD SPECIMEN [15885 CPT(R)] Follow-up Instructions Return in about 1 month (around 6/30/2017). Introducing [facility]! [facility] introduces [employer] patient portal. Now you can access parts of your medical record, email your doctor's office, and request medication refills online. 1. In your internet browser, go to [email or web address] 2. Click on the First Time User? Click Here link in the Sign In box. You will see the New Member Sign Up page. 3. Enter your Naldo Access Code exactly as it appears below. You will not need to use this code after youve completed the sign-up process. If you do not sign up before the expiration date, you must request a new code. · [employer]do Access Code: [email or web address] Expires: 6/26/2017 11:25 AM 
 
4. Enter the last four digits of your Social Security Number (xxxx) and Date of Birth (mm/dd/yyyy) as indicated and click Submit. You will be taken to the next sign-up page. 5. Create a Naldo ID. This will be your Naldo login ID and cannot be changed, so think of one that is secure and easy to remember. 6. Create a [facility] password. You can change your password at any time. 7. Enter your Password Reset Question and Answer. This can be used at a later time if you forget your password. 8. Enter your e-mail address. You will receive e-mail notification when new information is available in [address] 9. Click Sign Up. You can now view and download portions of your medical record. 10. Click the Download Summary menu link to download a portable copy of your medical information. If you have questions, please visit the Frequently Asked Questions section of the GoFormz website. Remember, [employer]z is NOT to be used for urgent needs. For medical emergencies, dial 911. Now available from your iPhone and Android! Please provide this summary of care documentation to your next provider. Your primary care clinician is listed as Rachel Dailey. If you have any questions after today's visit, please call [phone].

## 2017-05-31 NOTE — PROGRESS NOTES
Dionisio Aragon is a retired . Presents today for anticoagulant long term management. Anticoagulation. Dx:  Atrial fibrillation. Current symptoms: none  Current control agent: amiodarone  Current anticoagulant: warfarin5 mg 5 days a week and 2.5 mg  2 days per week,  History of bleeding problems: none  Lab Results   Component Value Date/Time    INR 1.6 10/04/2010 04:33 PM    INR 1.4 11/14/2009 05:10 AM    INR 1.4 11/13/2009 04:30 AM    INR (POC) 2.9 10/15/2010 07:37 AM    INR POC 2.9 05/30/2017 10:05 AM    INR POC 1.8 04/27/2017 11:13 AM    INR POC 1.7 03/28/2017 10:25 AM    Prothrombin time 16.3 10/04/2010 04:33 PM    Prothrombin time 13.8 11/14/2009 05:10 AM    Prothrombin time 13.8 11/13/2009 04:30 AM   RTO in 1 month for physical and labs.   Adina FIERRO

## 2017-06-01 RX ORDER — FUROSEMIDE 40 MG/1
TABLET ORAL
Qty: 90 TAB | Refills: 1 | Status: SHIPPED | OUTPATIENT
Start: 2017-06-01 | End: 2018-03-01 | Stop reason: SDUPTHER

## 2017-06-16 RX ORDER — AMLODIPINE BESYLATE 5 MG/1
5 TABLET ORAL DAILY
Qty: 90 TAB | Refills: 1 | Status: SHIPPED | OUTPATIENT
Start: 2017-06-16 | End: 2017-10-17 | Stop reason: ALTCHOICE

## 2017-07-03 ENCOUNTER — OFFICE VISIT (OUTPATIENT)
Dept: FAMILY MEDICINE CLINIC | Age: 77
End: 2017-07-03

## 2017-07-03 VITALS
HEART RATE: 73 BPM | HEIGHT: 70 IN | RESPIRATION RATE: 18 BRPM | OXYGEN SATURATION: 95 % | DIASTOLIC BLOOD PRESSURE: 60 MMHG | BODY MASS INDEX: 29.75 KG/M2 | WEIGHT: 207.8 LBS | TEMPERATURE: 97.4 F | SYSTOLIC BLOOD PRESSURE: 130 MMHG

## 2017-07-03 DIAGNOSIS — I10 ESSENTIAL HYPERTENSION: ICD-10-CM

## 2017-07-03 DIAGNOSIS — Z00.00 HEALTH CARE MAINTENANCE: ICD-10-CM

## 2017-07-03 DIAGNOSIS — Z79.01 ANTICOAGULANT LONG-TERM USE: Primary | ICD-10-CM

## 2017-07-03 DIAGNOSIS — N18.30 CKD (CHRONIC KIDNEY DISEASE) STAGE 3, GFR 30-59 ML/MIN (HCC): ICD-10-CM

## 2017-07-03 DIAGNOSIS — E07.9 THYROID DISEASE: ICD-10-CM

## 2017-07-03 DIAGNOSIS — E78.00 PURE HYPERCHOLESTEROLEMIA: ICD-10-CM

## 2017-07-03 LAB
INR BLD: 2.5
PT POC: 29.5 SECONDS
VALID INTERNAL CONTROL?: YES

## 2017-07-03 NOTE — MR AVS SNAPSHOT
Visit Information Date & Time Provider Department Dept. Phone Encounter #  
 7/3/2017 10:30 AM Shiva Arana  [place] [phone] [id number] Upcoming Health Maintenance Date Due  
 GLAUCOMA SCREENING Q2Y 1/19/2006 INFLUENZA AGE 9 TO ADULT 8/1/2017 MEDICARE YEARLY EXAM 3/1/2018 Pneumococcal 65+ Low/Medium Risk (2 of 2 - PPSV23) 5/30/2018 DTaP/Tdap/Td series (2 - Td) 2/28/2027 Allergies as of 7/3/2017  Review Complete On: 7/3/2017 By: Shiva Arana NP Severity Noted Reaction Type Reactions Ketamine  10/15/2010    Other (comments)  
 confusion Current Immunizations  Never Reviewed No immunizations on file. Not reviewed this visit You Were Diagnosed With   
  
 Codes Comments Anticoagulant long-term use    -  Primary ICD-10-CM: Z79.01 
ICD-9-CM: V58.61 Essential hypertension     ICD-10-CM: I10 
ICD-9-CM: 401.9 Thyroid disease     ICD-10-CM: E07.9 ICD-9-CM: 246.9 Pure hypercholesterolemia     ICD-10-CM: E78.00 ICD-9-CM: 272.0 Health care maintenance     ICD-10-CM: Z00.00 ICD-9-CM: V70.0 CKD (chronic kidney disease) stage 3, GFR 30-59 ml/min     ICD-10-CM: N18.3 ICD-9-CM: 866. 3 Vitals BP Pulse Temp Resp Height(growth percentile) Weight(growth percentile) 130/60 73 97.4 °F (36.3 °C) (Oral) 18 5' 10\" (1.778 m) 207 lb 12.8 oz (94.3 kg) SpO2 BMI Smoking Status 95% 29.82 kg/m2 Former Smoker BMI and BSA Data Body Mass Index Body Surface Area  
 29.82 kg/m 2 2.16 m 2 Preferred Pharmacy Pharmacy Name Phone [facility] [place] [address] [place] - [address] [phone] Your Updated Medication List  
  
   
This list is accurate as of: 7/3/17 11:29 AM.  Always use your most recent med list.  
  
  
  
  
 amiodarone 200 mg tablet Commonly known as:  [place] Take 1 Tab by mouth daily. amLODIPine 5 mg tablet Commonly known as:  [place] Take 1 Tab by mouth daily. atorvastatin 20 mg tablet Commonly known as:  LIPITOR Take 1 Tab by mouth daily. dilTIAZem 60 mg tablet Commonly known as:  CARDIZEM Take 1 Tab by mouth two (2) times a day. furosemide 40 mg tablet Commonly known as:  LASIX TAKE ONE TABLET BY MOUTH ONCE DAILY  
  
 levothyroxine 100 mcg tablet Commonly known as:  SYNTHROID  
TAKE ONE TABLET BY MOUTH ONCE DAILY BEFORE  BREAKFAST  
  
 losartan 100 mg tablet Commonly known as:  COZAAR Take 1 Tab by mouth daily. potassium chloride SR 10 mEq tablet Commonly known as:  KLOR-CON 10  
TAKE TWO TABLETS BY MOUTH TWICE DAILY  
  
 sildenafil citrate 100 mg tablet Commonly known as:  VIAGRA Take 1 Tab by mouth as needed. warfarin 5 mg tablet Commonly known as:  COUMADIN  
1 tab Mon and Thurs, 1/2 tab all other days. We Performed the Following AMB POC PT/INR [16330 CPT(R)] CBC WITH AUTOMATED DIFF [08088 CPT(R)] COLLECTION CAPILLARY BLOOD SPECIMEN [20299 CPT(R)] COLLECTION VENOUS BLOOD,VENIPUNCTURE [id number] CPT(R)] LIPID PANEL [87839 CPT(R)] METABOLIC PANEL, COMPREHENSIVE [74532 CPT(R)] PSA SCREENING (SCREENING) [ HCPCS] TSH 3RD GENERATION [28266 CPT(R)] Introducing [facility]! [facility] introduces [employer] patient portal. Now you can access parts of your medical record, email your doctor's office, and request medication refills online. 1. In your internet browser, go to [email or web address] 2. Click on the First Time User? Click Here link in the Sign In box. You will see the New Member Sign Up page. 3. Enter your E[employer] Net Access Code exactly as it appears below. You will not need to use this code after youve completed the sign-up process. If you do not sign up before the expiration date, you must request a new code.  
 
· [employer] Access Code: [email or web address] 
 Expires: 10/1/2017 11:29 AM 
 
4. Enter the last four digits of your Social Security Number (xxxx) and Date of Birth (mm/dd/yyyy) as indicated and click Submit. You will be taken to the next sign-up page. 5. Create a UserEvents ID. This will be your UserEvents login ID and cannot be changed, so think of one that is secure and easy to remember. 6. Create a UserEvents password. You can change your password at any time. 7. Enter your Password Reset Question and Answer. This can be used at a later time if you forget your password. 8. Enter your e-mail address. You will receive e-mail notification when new information is available in [address] 9. Click Sign Up. You can now view and download portions of your medical record. 10. Click the Download Summary menu link to download a portable copy of your medical information. If you have questions, please visit the Frequently Asked Questions section of the UserEvents website. Remember, UserEvents is NOT to be used for urgent needs. For medical emergencies, dial 911. Now available from your iPhone and Android! Please provide this summary of care documentation to your next provider. Your primary care clinician is listed as Jalen Mane. If you have any questions after today's visit, please call [phone].

## 2017-07-03 NOTE — PROGRESS NOTES
Subjective:     Jose Jones is a 69 y.o. male who presents today with the following:  Chief Complaint   Patient presents with    Coagulation disorder    Follow-up     Anticoagulation. Dx: Atrial fibrillation. Current symptoms: none  Current control agent: none  Current anticoagulant: warfarin5 mg on mon and thurs and 2.5 mg on the other days  History of bleeding problems: none  Lab Results   Component Value Date/Time    INR 1.6 10/04/2010 04:33 PM    INR 1.4 11/14/2009 05:10 AM    INR 1.4 11/13/2009 04:30 AM    INR (POC) 2.9 10/15/2010 07:37 AM    INR POC 2.5 07/03/2017 10:55 AM    INR POC 2.9 05/30/2017 10:05 AM    INR POC 1.8 04/27/2017 11:13 AM    Prothrombin time 16.3 10/04/2010 04:33 PM    Prothrombin time 13.8 11/14/2009 05:10 AM    Prothrombin time 13.8 11/13/2009 04:30 AM     COMPLIANT WITH MEDICATION:   HTN; Denies chest pain, dyspnea, palpitations, headache and blurred vision. Blood pressure normotensive. ROS:  Gen: denies fever, chills, fatigue, weight loss, weight gain  HEENT:denies blurry vision, nasal congestion, sore throat  Resp: denies dypsnea, cough, wheezing  CV: denies chest pain radiating to the jaws or arms, palpitations, lower extremity edema  Abd: denies nausea, vomiting, diarrhea, constipation  Neuro: denies numbness/tingling  Endo: denies polyuria, polydipsia, heat/cold intolerance  Heme: no lymphadenopathy    Allergies   Allergen Reactions    Ketamine Other (comments)     confusion         Current Outpatient Prescriptions:     amLODIPine (NORVASC) 5 mg tablet, Take 1 Tab by mouth daily. , Disp: 90 Tab, Rfl: 1    furosemide (LASIX) 40 mg tablet, TAKE ONE TABLET BY MOUTH ONCE DAILY, Disp: 90 Tab, Rfl: 1    atorvastatin (LIPITOR) 20 mg tablet, Take 1 Tab by mouth daily. , Disp: 90 Tab, Rfl: 3    levothyroxine (SYNTHROID) 100 mcg tablet, TAKE ONE TABLET BY MOUTH ONCE DAILY BEFORE  BREAKFAST, Disp: 90 Tab, Rfl: 1    potassium chloride SR (KLOR-CON 10) 10 mEq tablet, TAKE TWO TABLETS BY MOUTH TWICE DAILY, Disp: 120 Tab, Rfl: 5    diltiazem (CARDIZEM) 60 mg tablet, Take 1 Tab by mouth two (2) times a day., Disp: 180 Tab, Rfl: 1    amiodarone (PACERONE) 200 mg tablet, Take 1 Tab by mouth daily. , Disp: 90 Tab, Rfl: 1    sildenafil citrate (VIAGRA) 100 mg tablet, Take 1 Tab by mouth as needed. , Disp: 5 Tab, Rfl: 2    losartan (COZAAR) 100 mg tablet, Take 1 Tab by mouth daily. , Disp: 90 Tab, Rfl: 3    warfarin (COUMADIN) 5 mg tablet, 1 tab Mon and Thurs, 1/2 tab all other days. , Disp: 80 Tab, Rfl: 1    Past Medical History:   Diagnosis Date    Arrhythmia     atrial fibrillation, cardioversion 2010    CKD (chronic kidney disease) stage 3, GFR 30-59 ml/min     Hyperlipemia     Hypertension     PVD [facility]peripheral vascular disease) ([facility] [address]     Thyroid disease        Past Surgical History:   Procedure Laterality Date    CHEST SURGERY PROCEDURE UNLISTED      lung surgery age 17    HX COLONOSCOPY  2010    WNL SIS 10 y    HX HERNIA REPAIR      VASCULAR SURGERY PROCEDURE UNLIST      bypass numerous surgeries both legs       History   Smoking Status    Former Smoker    Quit date: 3/15/2007   Smokeless Tobacco    Never Used       Social History     Social History    Marital status:      Spouse name: N/A    Number of children: N/A    Years of education: N/A     Social History Main Topics    Smoking status: Former Smoker     Quit date: 3/15/2007    Smokeless tobacco: Never Used    Alcohol use No      Comment: former drinker/beer    Drug use: None    Sexual activity: Not Asked     Other Topics Concern     Service No    Blood Transfusions Yes    Caffeine Concern No    Occupational Exposure No    Hobby Hazards No    Sleep Concern Yes    Stress Concern No    Weight Concern No    Special Diet No    Back Care Yes    Exercise Yes    Seat Belt No    Self-Exams Yes     Social History Narrative       Family History   Problem Relation Age of Onset    Cancer Mother  Cancer Brother          Objective:     Visit Vitals    /60    Pulse 73    Temp 97.4 °F (36.3 °C) (Oral)    Resp 18    Ht 5' 10\" (1.778 m)    Wt 207 lb 12.8 oz (94.3 kg)    SpO2 95%    BMI 29.82 kg/m2     Body mass index is 29.82 kg/(m^2). General: Alert and oriented. No acute distress. Well nourished  HEENT :  Eyes: pupils equal, round, react to light and accommodation. Extra ocular movements intact. Nose: patent. Mouth and throat is clear. Neck:supple full range of motion no thyromegaly. Trachea midline, No carotid bruits. No significant lymphadenopathy  Lungs[de-identified] clear to auscultation without wheezes, rales, or rhonchi. Heart :RRR, S1 & S2 are normal intensity. No murmur; no gallop  Abdomen: bowel sounds active. No tenderness, guarding, rebound, masses, hepatic or spleen enlargement  Back: no CVA tenderness. Extremities: without clubbing, cyanosis, or edema  Pulses: radial and femoral pulses are normal  Neuro: HMF intact. Cranial nerves II through XII grossly normal.  Motor: is 5 over 5 and symmetrical.   Deep tendon reflexes: +2 equal    Results for orders placed or performed in visit on 07/03/17   AMB POC PT/INR   Result Value Ref Range    VALID INTERNAL CONTROL POC Yes     Prothrombin time (POC) 29.5 seconds    INR POC 2.5        Results for orders placed or performed in visit on 07/03/17   AMB POC PT/INR   Result Value Ref Range    VALID INTERNAL CONTROL POC Yes     Prothrombin time (POC) 29.5 seconds    INR POC 2.5        Assessment/ Plan:     Shea Palafox was seen today for coagulation disorder and follow-up.     Diagnoses and all orders for this visit:    Anticoagulant long-term use  -     AMB POC PT/INR  -     COLLECTION CAPILLARY BLOOD SPECIMEN    Essential hypertension  -     CBC WITH AUTOMATED DIFF  -     LIPID PANEL  -     TSH 3RD GENERATION  -     METABOLIC PANEL, COMPREHENSIVE  -     COLLECTION VENOUS BLOOD,VENIPUNCTURE  -     PSA - SCREENING ()    Thyroid disease  -     TSH 3RD GENERATION    Pure hypercholesterolemia  -     CBC WITH AUTOMATED DIFF  -     LIPID PANEL  -     TSH 3RD GENERATION  -     METABOLIC PANEL, COMPREHENSIVE  -     COLLECTION VENOUS BLOOD,VENIPUNCTURE  -     PSA - SCREENING ()    Health care maintenance  -     TSH 3RD GENERATION  -     PSA - SCREENING ()    CKD (chronic kidney disease) stage 3, GFR 30-59 ml/min         1. Anticoagulant long-term use    2. Essential hypertension    3. Thyroid disease    4. Pure hypercholesterolemia    5. Health care maintenance    6. CKD (chronic kidney disease) stage 3, GFR 30-59 ml/min        Orders Placed This Encounter    COLLECTION CAPILLARY BLOOD SPECIMEN    COLLECTION VENOUS BLOOD,VENIPUNCTURE    CBC WITH AUTOMATED DIFF    LIPID PANEL    TSH 3RD GENERATION    METABOLIC PANEL, COMPREHENSIVE    PSA - SCREENING ()    AMB POC PT/INR     RTO in 1 month or sooner as needed. Verbal and written instructions (see AVS) provided.  Patient expresses understanding of diagnosis and treatment plan.     Elvis Francis, THEODORAC

## 2017-07-03 NOTE — PROGRESS NOTES
Old injury from a fall r upper arm a month ago. Scabs intact no redness or edema. Hx of tick bite on lower lip. Reviewed tick born diseses and precautions.      Saskia Metcalf NP-C

## 2017-07-04 LAB
ALBUMIN SERPL-MCNC: 4 G/DL (ref 3.5–4.8)
ALBUMIN/GLOB SERPL: 1.8 {RATIO} (ref 1.2–2.2)
ALP SERPL-CCNC: 93 IU/L (ref 39–117)
ALT SERPL-CCNC: 14 IU/L (ref 0–44)
AST SERPL-CCNC: 23 IU/L (ref 0–40)
BASOPHILS # BLD AUTO: 0 X10E3/UL (ref 0–0.2)
BASOPHILS NFR BLD AUTO: 0 %
BILIRUB SERPL-MCNC: 0.4 MG/DL (ref 0–1.2)
BUN SERPL-MCNC: 34 MG/DL (ref 8–27)
BUN/CREAT SERPL: 13 (ref 10–24)
CALCIUM SERPL-MCNC: 8.6 MG/DL (ref 8.6–10.2)
CHLORIDE SERPL-SCNC: 102 MMOL/L (ref 96–106)
CHOLEST SERPL-MCNC: 167 MG/DL (ref 100–199)
CO2 SERPL-SCNC: 21 MMOL/L (ref 18–29)
CREAT SERPL-MCNC: 2.55 MG/DL (ref 0.76–1.27)
EOSINOPHIL # BLD AUTO: 0.2 X10E3/UL (ref 0–0.4)
EOSINOPHIL NFR BLD AUTO: 2 %
ERYTHROCYTE [DISTWIDTH] IN BLOOD BY AUTOMATED COUNT: 14.1 % (ref 12.3–15.4)
GLOBULIN SER CALC-MCNC: 2.2 G/DL (ref 1.5–4.5)
GLUCOSE SERPL-MCNC: 128 MG/DL (ref 65–99)
HCT VFR BLD AUTO: 36.6 % (ref 37.5–51)
HDLC SERPL-MCNC: 43 MG/DL
HGB BLD-MCNC: 11.7 G/DL (ref 12.6–17.7)
IMM GRANULOCYTES # BLD: 0 X10E3/UL (ref 0–0.1)
IMM GRANULOCYTES NFR BLD: 0 %
INTERPRETATION: NORMAL
LDLC SERPL CALC-MCNC: 89 MG/DL (ref 0–99)
LYMPHOCYTES # BLD AUTO: 1.5 X10E3/UL (ref 0.7–3.1)
LYMPHOCYTES NFR BLD AUTO: 20 %
MCH RBC QN AUTO: 29.8 PG (ref 26.6–33)
MCHC RBC AUTO-ENTMCNC: 32 G/DL (ref 31.5–35.7)
MCV RBC AUTO: 93 FL (ref 79–97)
MONOCYTES # BLD AUTO: 0.8 X10E3/UL (ref 0.1–0.9)
MONOCYTES NFR BLD AUTO: 11 %
NEUTROPHILS # BLD AUTO: 5.2 X10E3/UL (ref 1.4–7)
NEUTROPHILS NFR BLD AUTO: 67 %
PLATELET # BLD AUTO: 212 X10E3/UL (ref 150–379)
POTASSIUM SERPL-SCNC: 5.1 MMOL/L (ref 3.5–5.2)
PROT SERPL-MCNC: 6.2 G/DL (ref 6–8.5)
PSA SERPL-MCNC: 0.8 NG/ML (ref 0–4)
RBC # BLD AUTO: 3.93 X10E6/UL (ref 4.14–5.8)
SODIUM SERPL-SCNC: 138 MMOL/L (ref 134–144)
TRIGL SERPL-MCNC: 174 MG/DL (ref 0–149)
TSH SERPL DL<=0.005 MIU/L-ACNC: 2.12 UIU/ML (ref 0.45–4.5)
VLDLC SERPL CALC-MCNC: 35 MG/DL (ref 5–40)
WBC # BLD AUTO: 7.8 X10E3/UL (ref 3.4–10.8)

## 2017-07-05 NOTE — PROGRESS NOTES
PSA WNL   Thyroid level WNL no changes  CKD noted in metabolic panel followed by Dr. Camacho Em nephrologist liver function good. Glucose level climbing. CBC anemia mild continue to monitor  Lipid panel WNL except triglycerides are elevated. A healthy eating plan  will lower your risk for heart disease and other health conditions.    A healthy eating plan:  \" Emphasizes vegetables, fruits, whole grains, and fat-free or low-fat dairy products  \" Includes lean meats, poultry, fish, beans, eggs, and nuts  \" Limits saturated and trans fats, sodium, and added sugars  \" Controls portion sizes    TSH WNL no changes

## 2017-07-24 RX ORDER — AMIODARONE HYDROCHLORIDE 200 MG/1
200 TABLET ORAL DAILY
Qty: 90 TAB | Refills: 1 | Status: SHIPPED | OUTPATIENT
Start: 2017-07-24 | End: 2017-10-13 | Stop reason: SDUPTHER

## 2017-08-03 RX ORDER — LEVOTHYROXINE SODIUM 100 UG/1
TABLET ORAL
Qty: 90 TAB | Refills: 1 | Status: SHIPPED | OUTPATIENT
Start: 2017-08-03 | End: 2018-01-23 | Stop reason: SDUPTHER

## 2017-08-04 ENCOUNTER — OFFICE VISIT (OUTPATIENT)
Dept: FAMILY MEDICINE CLINIC | Age: 77
End: 2017-08-04

## 2017-08-04 VITALS
DIASTOLIC BLOOD PRESSURE: 70 MMHG | SYSTOLIC BLOOD PRESSURE: 167 MMHG | HEIGHT: 70 IN | TEMPERATURE: 95.9 F | HEART RATE: 69 BPM | WEIGHT: 205 LBS | RESPIRATION RATE: 18 BRPM | OXYGEN SATURATION: 96 % | BODY MASS INDEX: 29.35 KG/M2

## 2017-08-04 DIAGNOSIS — M25.471 ANKLE EDEMA, BILATERAL: ICD-10-CM

## 2017-08-04 DIAGNOSIS — I10 ESSENTIAL HYPERTENSION: ICD-10-CM

## 2017-08-04 DIAGNOSIS — M25.472 ANKLE EDEMA, BILATERAL: ICD-10-CM

## 2017-08-04 DIAGNOSIS — Z79.01 ANTICOAGULANT LONG-TERM USE: Primary | ICD-10-CM

## 2017-08-04 LAB
INR BLD: 2.5
PT POC: 30.5 SECONDS
VALID INTERNAL CONTROL?: YES

## 2017-08-04 NOTE — PROGRESS NOTES
Subjective:     Aleksey Dahl is a 69 y.o. male who presents today with the following:  Chief Complaint   Patient presents with   [address] [place] [address] presents for anticoagulation medication evaluation. Ankle Edema: legs and ankles swell while on his feet. Improves with elevation. Discussed trying compression stockings 8  Hours a day. HTN: Denies chest pain, dyspnea, palpitations, HA, blurred vision. Usually normotensive forgot to take his BP medication this morning. Anticoagulation. Dx: Atrial fibrillation. Current symptoms: none  Current control agent: amiodarone  Current anticoagulant: warfarin1 tab Mondays and Thursday 1/2 tab rest of the days  History of bleeding problems: none  Lab Results   Component Value Date/Time    INR 1.6 10/04/2010 04:33 PM    INR 1.4 11/14/2009 05:10 AM    INR 1.4 11/13/2009 04:30 AM    INR (POC) 2.9 10/15/2010 07:37 AM    INR POC 2.5 08/04/2017 11:06 AM    INR POC 2.5 07/03/2017 10:55 AM    INR POC 2.9 05/30/2017 10:05 AM    Prothrombin time 16.3 10/04/2010 04:33 PM    Prothrombin time 13.8 11/14/2009 05:10 AM    Prothrombin time 13.8 11/13/2009 04:30 AM          COMPLIANT WITH MEDICATION:         ROS:  Gen: denies fever, chills, fatigue, weight loss, weight gain  HEENT:denies blurry vision, nasal congestion, sore throat  Resp: denies dypsnea, cough, wheezing  CV: denies chest pain radiating to the jaws or arms, palpitations, lower extremity edema  Abd: denies nausea, vomiting, diarrhea, constipation  Neuro: denies numbness/tingling  Endo: denies polyuria, polydipsia, heat/cold intolerance  Heme: no lymphadenopathy    Allergies   Allergen Reactions    Ketamine Other (comments)     confusion         Current Outpatient Prescriptions:     levothyroxine (SYNTHROID) 100 mcg tablet, TAKE ONE TABLET BY MOUTH ONCE DAILY BEFORE  BREAKFAST, Disp: 90 Tab, Rfl: 1    amiodarone (PACERONE) 200 mg tablet, Take 1 Tab by mouth daily. , Disp: 90 Tab, Rfl: 1    amLODIPine (NORVASC) 5 mg tablet, Take 1 Tab by mouth daily. , Disp: 90 Tab, Rfl: 1    furosemide (LASIX) 40 mg tablet, TAKE ONE TABLET BY MOUTH ONCE DAILY, Disp: 90 Tab, Rfl: 1    atorvastatin (LIPITOR) 20 mg tablet, Take 1 Tab by mouth daily. , Disp: 90 Tab, Rfl: 3    losartan (COZAAR) 100 mg tablet, Take 1 Tab by mouth daily. , Disp: 90 Tab, Rfl: 3    warfarin (COUMADIN) 5 mg tablet, 1 tab Mon and Thurs, 1/2 tab all other days. , Disp: 90 Tab, Rfl: 1    potassium chloride SR (KLOR-CON 10) 10 mEq tablet, TAKE TWO TABLETS BY MOUTH TWICE DAILY, Disp: 120 Tab, Rfl: 5    diltiazem (CARDIZEM) 60 mg tablet, Take 1 Tab by mouth two (2) times a day., Disp: 180 Tab, Rfl: 1    sildenafil citrate (VIAGRA) 100 mg tablet, Take 1 Tab by mouth as needed. , Disp: 5 Tab, Rfl: 2    Past Medical History:   Diagnosis Date    Arrhythmia     atrial fibrillation, cardioversion 2010    CKD (chronic kidney disease) stage 3, GFR 30-59 ml/min     Hyperlipemia     Hypertension     [facility]VD [facility]per[facility] disease) ([facility] [address]     Thyroid disease        Past Surgical History:   Procedure Laterality Date    CHEST SURGERY PROCEDURE UNLISTED      lung surgery age 17    HX COLONOSCOPY  2010    WNL SIS 10 y    HX HERNIA REPAIR      VASCULAR SURGERY PROCEDURE UNLIST      bypass numerous surgeries both legs       History   Smoking Status    Former Smoker    Quit date: 3/15/2007   Smokeless Tobacco    Never Used       Social History     Social History    Marital status:      Spouse name: N/A    Number of children: N/A    Years of education: N/A     Social History Main Topics    Smoking status: Former Smoker     Quit date: 3/15/2007    Smokeless tobacco: Never Used    Alcohol use No      Comment: former drinker/beer    Drug use: None    Sexual activity: Not Asked     Other Topics Concern     Service No    Blood Transfusions Yes    Caffeine Concern No    Occupational Exposure No    Hobby Hazards No    Sleep Concern Yes    Stress Concern No    Weight Concern No    Special Diet No    Back Care Yes    Exercise Yes    Seat Belt No    Self-Exams Yes     Social History Narrative       Family History   Problem Relation Age of Onset    Cancer Mother     Cancer Brother          Objective:     Visit Vitals    /70 (BP 1 Location: Left arm, BP Patient Position: Sitting)    Pulse 69    Temp 95.9 °F (35.5 °C)    Resp 18    Ht 5' 10\" (1.778 m)    Wt 205 lb (93 kg)    SpO2 96%    BMI 29.41 kg/m2     Body mass index is 29.41 kg/(m^2). General: Alert and oriented. No acute distress. Well nourished  HEENT :    Eyes: pupils equal, round, react to light and accommodation. Extra ocular movements intact. Nose: patent. Mouth and throat is clear. Neck:supple full range of motion no thyromegaly. Trachea midline, No carotid bruits. No significant lymphadenopathy  Lungs[de-identified] clear to auscultation without wheezes, rales, or rhonchi. Heart :RRR, S1 & S2 are normal intensity. No murmur; no gallop      Results for orders placed or performed in visit on 08/04/17   AMB POC PT/INR   Result Value Ref Range    VALID INTERNAL CONTROL POC Yes     Prothrombin time (POC) 30.5 seconds    INR POC 2.5        Results for orders placed or performed in visit on 08/04/17   AMB POC PT/INR   Result Value Ref Range    VALID INTERNAL CONTROL POC Yes     Prothrombin time (POC) 30.5 seconds    INR POC 2.5        Assessment/ Plan:     Diagnoses and all orders for this visit:    1. Anticoagulant long-term use  -     AMB POC PT/INR  -     COLLECTION CAPILLARY BLOOD SPECIMEN    2. Ankle edema, bilateral         1. Anticoagulant long-term use    2. Ankle edema, bilateral        Orders Placed This Encounter    COLLECTION CAPILLARY BLOOD SPECIMEN    AMB POC PT/INR     RTO in 1 month for PT and INR or sooner if symptoms persit or worsen    Verbal and written instructions (see AVS) provided.  Patient expresses understanding of diagnosis and treatment plan.     [facility] THEODORA ShahC

## 2017-09-06 ENCOUNTER — OFFICE VISIT (OUTPATIENT)
Dept: FAMILY MEDICINE CLINIC | Age: 77
End: 2017-09-06

## 2017-09-06 VITALS
OXYGEN SATURATION: 97 % | RESPIRATION RATE: 24 BRPM | TEMPERATURE: 96.6 F | HEIGHT: 70 IN | BODY MASS INDEX: 29.29 KG/M2 | WEIGHT: 204.6 LBS | DIASTOLIC BLOOD PRESSURE: 60 MMHG | SYSTOLIC BLOOD PRESSURE: 134 MMHG | HEART RATE: 59 BPM

## 2017-09-06 DIAGNOSIS — Z79.01 ANTICOAGULANT LONG-TERM USE: Primary | ICD-10-CM

## 2017-09-06 DIAGNOSIS — I48.20 CHRONIC ATRIAL FIBRILLATION (HCC): ICD-10-CM

## 2017-09-06 LAB
INR BLD: 2.1
PT POC: 25.3 SECONDS
VALID INTERNAL CONTROL?: YES

## 2017-09-06 NOTE — PATIENT INSTRUCTIONS
Chronic Obstructive Pulmonary Disease (COPD): Care Instructions  Your Care Instructions    Chronic obstructive pulmonary disease (COPD) is a general term for a group of lung diseases, including emphysema and chronic bronchitis. People with COPD have decreased airflow in and out of the lungs, which makes it hard to breathe. The airways also can get clogged with thick mucus. Cigarette smoking is a major cause of COPD. Although there is no cure for COPD, you can slow its progress. Following your treatment plan and taking care of yourself can help you feel better and live longer. Follow-up care is a key part of your treatment and safety. Be sure to make and go to all appointments, and call your doctor if you are having problems. It's also a good idea to know your test results and keep a list of the medicines you take. How can you care for yourself at home? Staying healthy  · Do not smoke. This is the most important step you can take to prevent more damage to your lungs. If you need help quitting, talk to your doctor about stop-smoking programs and medicines. These can increase your chances of quitting for good. · Avoid colds and flu. Get a pneumococcal vaccine shot. If you have had one before, ask your doctor whether you need a second dose. Get the flu vaccine every fall. If you must be around people with colds or the flu, wash your hands often. · Avoid secondhand smoke, air pollution, and high altitudes. Also avoid cold, dry air and hot, humid air. Stay at home with your windows closed when air pollution is bad. Medicines and oxygen therapy  · Take your medicines exactly as prescribed. Call your doctor if you think you are having a problem with your medicine. · You may be taking medicines such as:  ¨ Bronchodilators. These help open your airways and make breathing easier. Bronchodilators are either short-acting (work for 6 to 9 hours) or long-acting (work for 24 hours).  You inhale most bronchodilators, so they start to act quickly. Always carry your quick-relief inhaler with you in case you need it while you are away from home. ¨ Corticosteroids (prednisone, budesonide). These reduce airway inflammation. They come in pill or inhaled form. You must take these medicines every day for them to work well. · A spacer may help you get more inhaled medicine to your lungs. Ask your doctor or pharmacist if a spacer is right for you. If it is, ask how to use it properly. · Do not take any vitamins, over-the-counter medicine, or herbal products without talking to your doctor first.  · If your doctor prescribed antibiotics, take them as directed. Do not stop taking them just because you feel better. You need to take the full course of antibiotics. · Oxygen therapy boosts the amount of oxygen in your blood and helps you breathe easier. Use the flow rate your doctor has recommended, and do not change it without talking to your doctor first.  Activity  · Get regular exercise. Walking is an easy way to get exercise. Start out slowly, and walk a little more each day. · Pay attention to your breathing. You are exercising too hard if you cannot talk while you are exercising. · Take short rest breaks when doing household chores and other activities. · Learn breathing methods--such as breathing through pursed lips--to help you become less short of breath. · If your doctor has not set you up with a pulmonary rehabilitation program, talk to him or her about whether rehab is right for you. Rehab includes exercise programs, education about your disease and how to manage it, help with diet and other changes, and emotional support. Diet  · Eat regular, healthy meals. Use bronchodilators about 1 hour before you eat to make it easier to eat. Eat several small meals instead of three large ones. Drink beverages at the end of the meal. Avoid foods that are hard to chew.   · Eat foods that contain protein so that you do not lose muscle mass.  · Talk with your doctor if you gain too much weight or if you lose weight without trying. Mental health  · Talk to your family, friends, or a therapist about your feelings. It is normal to feel frightened, angry, hopeless, helpless, and even guilty. Talking openly about bad feelings can help you cope. If these feelings last, talk to your doctor. When should you call for help? Call 911 anytime you think you may need emergency care. For example, call if:  · You have severe trouble breathing. Call your doctor now or seek immediate medical care if:  · You have new or worse trouble breathing. · You cough up blood. · You have a fever. Watch closely for changes in your health, and be sure to contact your doctor if:  · You cough more deeply or more often, especially if you notice more mucus or a change in the color of your mucus. · You have new or worse swelling in your legs or belly. · You are not getting better as expected. Where can you learn more? Go to http://harsh-timothy.info/. Jesusita Gill in the search box to learn more about \"Chronic Obstructive Pulmonary Disease (COPD): Care Instructions. \"  Current as of: March 25, 2017  Content Version: 11.3  © 4620-2700 [employer]. Care instructions adapted under license by [employer] (which disclaims liability or warranty for this information). If you have questions about a medical condition or this instruction, always ask your healthcare professional. [place] [address] any warranty or liability for your use of this information.

## 2017-09-06 NOTE — PROGRESS NOTES
Subjective:     Lanette Fleischer is a 69 y.o. male who presents today with the following:  Chief Complaint   Patient presents with    Anticoagulation       COMPLIANT WITH MEDICATION:   HTN; Denies chest pain, dyspnea, palpitations, headache and blurred vision. Blood pressure normotensive. ROS:  Gen: denies fever, chills, fatigue, weight loss, weight gain  HEENT:denies blurry vision, nasal congestion, sore throat  Resp: denies dypsnea, cough, wheezing  CV: denies chest pain radiating to the jaws or arms, palpitations, lower extremity edema  Abd: denies nausea, vomiting, diarrhea, constipation  Neuro: denies numbness/tingling  Endo: denies polyuria, polydipsia, heat/cold intolerance  Heme: no lymphadenopathy    Anticoagulation. Dx:  Atrial fibrillation. Current symptoms: none  Current control agent: none  Current anticoagulant: warfarin1 tablet  (5 mg) on Monday and Thrsdays and 1/2 tablet on all the other days. History of bleeding problems: none  Lab Results   Component Value Date/Time    INR 1.6 10/04/2010 04:33 PM    INR 1.4 11/14/2009 05:10 AM    INR 1.4 11/13/2009 04:30 AM    INR (POC) 2.9 10/15/2010 07:37 AM    INR POC 2.1 09/06/2017 10:00 AM    INR POC 2.5 08/04/2017 11:06 AM    INR POC 2.5 07/03/2017 10:55 AM    Prothrombin time 16.3 10/04/2010 04:33 PM    Prothrombin time 13.8 11/14/2009 05:10 AM    Prothrombin time 13.8 11/13/2009 04:30 AM       Allergies   Allergen Reactions    Ketamine Other (comments)     confusion         Current Outpatient Prescriptions:     levothyroxine (SYNTHROID) 100 mcg tablet, TAKE ONE TABLET BY MOUTH ONCE DAILY BEFORE  BREAKFAST, Disp: 90 Tab, Rfl: 1    amiodarone (PACERONE) 200 mg tablet, Take 1 Tab by mouth daily. , Disp: 90 Tab, Rfl: 1    amLODIPine (NORVASC) 5 mg tablet, Take 1 Tab by mouth daily. , Disp: 90 Tab, Rfl: 1    furosemide (LASIX) 40 mg tablet, TAKE ONE TABLET BY MOUTH ONCE DAILY, Disp: 90 Tab, Rfl: 1    atorvastatin (LIPITOR) 20 mg tablet, Take 1 Tab by mouth daily. , Disp: 90 Tab, Rfl: 3    losartan (COZAAR) 100 mg tablet, Take 1 Tab by mouth daily. , Disp: 90 Tab, Rfl: 3    warfarin (COUMADIN) 5 mg tablet, 1 tab Mon and Thurs, 1/2 tab all other days. , Disp: 90 Tab, Rfl: 1    potassium chloride SR (KLOR-CON 10) 10 mEq tablet, TAKE TWO TABLETS BY MOUTH TWICE DAILY, Disp: 120 Tab, Rfl: 5    diltiazem (CARDIZEM) 60 mg tablet, Take 1 Tab by mouth two (2) times a day., Disp: 180 Tab, Rfl: 1    sildenafil citrate (VIAGRA) 100 mg tablet, Take 1 Tab by mouth as needed. , Disp: 5 Tab, Rfl: 2    Past Medical History:   Diagnosis Date    Arrhythmia     atrial fibrillation, cardioversion 2010    CKD (chronic kidney disease) stage 3, GFR 30-59 ml/min     Hyperlipemia     Hypertension     PVD (peripheral vascular disease) ([facility] [address]     Thyroid disease        Past Surgical History:   Procedure Laterality Date    CHEST SURGERY PROCEDURE UNLISTED      lung surgery age 17    HX COLONOSCOPY  2010    WNL SIS 10 y    HX HERNIA REPAIR      VASCULAR SURGERY PROCEDURE UNLIST      bypass numerous surgeries both legs       History   Smoking Status    Former Smoker    Quit date: 3/15/2007   Smokeless Tobacco    Never Used       Social History     Social History    Marital status:      Spouse name: N/A    Number of children: N/A    Years of education: N/A     Social History Main Topics    Smoking status: Former Smoker     Quit date: 3/15/2007    Smokeless tobacco: Never Used    Alcohol use No      Comment: former drinker/beer    Drug use: None    Sexual activity: Not Asked     Other Topics Concern     Service No    Blood Transfusions Yes    Caffeine Concern No    Occupational Exposure No    Hobby Hazards No    Sleep Concern Yes    Stress Concern No    Weight Concern No    Special Diet No    Back Care Yes    Exercise Yes    Seat Belt No    Self-Exams Yes     Social History Narrative       Family History   Problem Relation Age of Onset    Cancer Mother     Cancer Brother          Objective:     Visit Vitals    /60 (BP 1 Location: Left arm, BP Patient Position: Sitting)    Pulse (!) 59    Temp 96.6 °F (35.9 °C) (Temporal)    Resp 24    Ht 5' 10\" (1.778 m)    Wt 204 lb 9.6 oz (92.8 kg)    SpO2 97%    BMI 29.36 kg/m2     Body mass index is 29.36 kg/(m^2). General: Alert and oriented. No acute distress. Well nourished  HEENT :  Eyes: pupils equal, round, react to light and accommodation. Extra ocular movements intact. Nose: patent. Mouth and throat is clear. Neck:supple full range of motion no thyromegaly. Trachea midline, No carotid bruits. No significant lymphadenopathy  Lungs[de-identified] clear to auscultation without wheezes, rales, or rhonchi. Heart :RRR, S1 & S2 are normal intensity. No murmur; no gallop      Results for orders placed or performed in visit on 09/06/17   AMB POC PT/INR   Result Value Ref Range    VALID INTERNAL CONTROL POC Yes     Prothrombin time (POC) 25.3 seconds    INR POC 2.1        Results for orders placed or performed in visit on 09/06/17   AMB POC PT/INR   Result Value Ref Range    VALID INTERNAL CONTROL POC Yes     Prothrombin time (POC) 25.3 seconds    INR POC 2.1        Assessment/ Plan:     Diagnoses and all orders for this visit:    1. Anticoagulant long-term use  -     COLLECTION CAPILLARY BLOOD SPECIMEN  -     AMB POC PT/INR    2. Chronic atrial fibrillation (HCC)  -     COLLECTION CAPILLARY BLOOD SPECIMEN  -     AMB POC PT/INR         1. Anticoagulant long-term use    2. Chronic atrial fibrillation ([address]        Orders Placed This Encounter    COLLECTION CAPILLARY BLOOD SPECIMEN    AMB POC PT/INR     RTO in 1 month for anticoagulation management or sooner. Verbal and written instructions (see AVS) provided.  Patient expresses understanding of diagnosis and treatment plan.     WENDY Guido

## 2017-09-06 NOTE — MR AVS SNAPSHOT
Visit Information Date & Time Provider Department Dept. Phone Encounter #  
 9/6/2017  9:30 AM Inocencio Gates NP [address] [phone] [id number] Upcoming Health Maintenance Date Due  
 GLAUCOMA SCREENING Q2Y 1/19/2006 MEDICARE YEARLY EXAM 3/1/2018 Pneumococcal 65+ Low/Medium Risk (2 of 2 - PPSV23) 5/30/2018 DTaP/Tdap/Td series (2 - Td) 2/28/2027 Allergies as of 9/6/2017  Review Complete On: 9/6/2017 By: Grace May RN Severity Noted Reaction Type Reactions Ketamine  10/15/2010    Other (comments)  
 confusion Current Immunizations  Never Reviewed No immunizations on file. Not reviewed this visit You Were Diagnosed With   
  
 Codes Comments Anticoagulant long-term use    -  Primary ICD-10-CM: Z79.01 
ICD-9-CM: V58.61 Chronic atrial fibrillation (HCC)     ICD-10-CM: U12.1 ICD-9-CM: 427.31 Vitals BP Pulse Temp Resp Height(growth percentile) 134/60 (BP 1 Location: Left arm, BP Patient Position: Sitting) (!) 59 96.6 °F (35.9 °C) (Temporal) 24 5' 10\" (1.778 m) Weight(growth percentile) SpO2 BMI Smoking Status 204 lb 9.6 oz (92.8 kg) 97% 29.36 kg/m2 Former Smoker Vitals History BMI and BSA Data Body Mass Index Body Surface Area  
 29.36 kg/m 2 2.14 m 2 Preferred Pharmacy Pharmacy Name Phone [facility] [place] [address] [place] - [address] [phone] Your Updated Medication List  
  
   
This list is accurate as of: 9/6/17 10:28 AM.  Always use your most recent med list.  
  
  
  
  
 amiodarone 200 mg tablet Commonly known as:  Lovena Wayne Take 1 Tab by mouth daily. amLODIPine 5 mg tablet Commonly known as:  Delcarlos Chinchilla Take 1 Tab by mouth daily. atorvastatin 20 mg tablet Commonly known as:  LIPITOR Take 1 Tab by mouth daily. dilTIAZem 60 mg tablet Commonly known as:  CARDIZEM  
 Take 1 Tab by mouth two (2) times a day. furosemide 40 mg tablet Commonly known as:  LASIX TAKE ONE TABLET BY MOUTH ONCE DAILY  
  
 levothyroxine 100 mcg tablet Commonly known as:  SYNTHROID  
TAKE ONE TABLET BY MOUTH ONCE DAILY BEFORE  BREAKFAST  
  
 losartan 100 mg tablet Commonly known as:  COZAAR Take 1 Tab by mouth daily. potassium chloride SR 10 mEq tablet Commonly known as:  KLOR-CON 10  
TAKE TWO TABLETS BY MOUTH TWICE DAILY  
  
 sildenafil citrate 100 mg tablet Commonly known as:  VIAGRA Take 1 Tab by mouth as needed. warfarin 5 mg tablet Commonly known as:  COUMADIN  
1 tab Mon and Thurs, 1/2 tab all other days. We Performed the Following AMB POC PT/INR [54682 CPT(R)] COLLECTION CAPILLARY BLOOD SPECIMEN [26017 CPT(R)] Patient Instructions Chronic Obstructive Pulmonary Disease (COPD): Care Instructions Your Care Instructions Chronic obstructive pulmonary disease (COPD) is a general term for a group of lung diseases, including emphysema and chronic bronchitis. People with COPD have decreased airflow in and out of the lungs, which makes it hard to breathe. The airways also can get clogged with thick mucus. Cigarette smoking is a major cause of COPD. Although there is no cure for COPD, you can slow its progress. Following your treatment plan and taking care of yourself can help you feel better and live longer. Follow-up care is a key part of your treatment and safety. Be sure to make and go to all appointments, and call your doctor if you are having problems. It's also a good idea to know your test results and keep a list of the medicines you take. How can you care for yourself at home? Staying healthy · Do not smoke. This is the most important step you can take to prevent more damage to your lungs. If you need help quitting, talk to your doctor about stop-smoking programs and medicines.  These can increase your chances of quitting for good. · Avoid colds and flu. Get a pneumococcal vaccine shot. If you have had one before, ask your doctor whether you need a second dose. Get the flu vaccine every fall. If you must be around people with colds or the flu, wash your hands often. · Avoid secondhand smoke, air pollution, and high altitudes. Also avoid cold, dry air and hot, humid air. Stay at home with your windows closed when air pollution is bad. Medicines and oxygen therapy · Take your medicines exactly as prescribed. Call your doctor if you think you are having a problem with your medicine. · You may be taking medicines such as: ¨ Bronchodilators. These help open your airways and make breathing easier. Bronchodilators are either short-acting (work for 6 to 9 hours) or long-acting (work for 24 hours). You inhale most bronchodilators, so they start to act quickly. Always carry your quick-relief inhaler with you in case you need it while you are away from home. ¨ Corticosteroids (prednisone, budesonide). These reduce airway inflammation. They come in pill or inhaled form. You must take these medicines every day for them to work well. · A spacer may help you get more inhaled medicine to your lungs. Ask your doctor or pharmacist if a spacer is right for you. If it is, ask how to use it properly. · Do not take any vitamins, over-the-counter medicine, or herbal products without talking to your doctor first. 
· If your doctor prescribed antibiotics, take them as directed. Do not stop taking them just because you feel better. You need to take the full course of antibiotics. · Oxygen therapy boosts the amount of oxygen in your blood and helps you breathe easier. Use the flow rate your doctor has recommended, and do not change it without talking to your doctor first. 
Activity · Get regular exercise. Walking is an easy way to get exercise. Start out slowly, and walk a little more each day. · Pay attention to your breathing. You are exercising too hard if you cannot talk while you are exercising. · Take short rest breaks when doing household chores and other activities. · Learn breathing methodssuch as breathing through pursed lipsto help you become less short of breath. · If your doctor has not set you up with a pulmonary rehabilitation program, talk to him or her about whether rehab is right for you. Rehab includes exercise programs, education about your disease and how to manage it, help with diet and other changes, and emotional support. Diet · Eat regular, healthy meals. Use bronchodilators about 1 hour before you eat to make it easier to eat. Eat several small meals instead of three large ones. Drink beverages at the end of the meal. Avoid foods that are hard to chew. · Eat foods that contain protein so that you do not lose muscle mass. · Talk with your doctor if you gain too much weight or if you lose weight without trying. Mental health · Talk to your family, friends, or a therapist about your feelings. It is normal to feel frightened, angry, hopeless, helpless, and even guilty. Talking openly about bad feelings can help you cope. If these feelings last, talk to your doctor. When should you call for help? Call 911 anytime you think you may need emergency care. For example, call if: 
· You have severe trouble breathing. Call your doctor now or seek immediate medical care if: 
· You have new or worse trouble breathing. · You cough up blood. · You have a fever. Watch closely for changes in your health, and be sure to contact your doctor if: 
· You cough more deeply or more often, especially if you notice more mucus or a change in the color of your mucus. · You have new or worse swelling in your legs or belly. · You are not getting better as expected. Where can you learn more? Go to http://harsh-timothy.info/. Chirag Nolasco in the search box to learn more about \"Chronic Obstructive Pulmonary Disease (COPD): Care Instructions. \" Current as of: March 25, 2017 Content Version: 11.3 © 5593-4417 [employer]. Care instructions adapted under license by [employer] (which disclaims liability or warranty for this information). If you have questions about a medical condition or this instruction, always ask your healthcare professional. [place] [address] any warranty or liability for your use of this information. Introducing [facility] & HEALTH SERVICES! [facility] introduces TrueView patient portal. Now you can access parts of your medical record, email your doctor's office, and request medication refills online. 1. In your internet browser, go to [email or web address] 2. Click on the First Time User? Click Here link in the Sign In box. You will see the New Member Sign Up page. 3. Enter your TrueView Access Code exactly as it appears below. You will not need to use this code after youve completed the sign-up process. If you do not sign up before the expiration date, you must request a new code. · TrueView Access Code: [email or web address] Expires: 10/1/2017 11:29 AM 
 
4. Enter the last four digits of your Social Security Number (xxxx) and Date of Birth (mm/dd/yyyy) as indicated and click Submit. You will be taken to the next sign-up page. 5. Create a TrueView ID. This will be your TrueView login ID and cannot be changed, so think of one that is secure and easy to remember. 6. Create a TrueView password. You can change your password at any time. 7. Enter your Password Reset Question and Answer. This can be used at a later time if you forget your password. 8. Enter your e-mail address. You will receive e-mail notification when new information is available in [address] 9. Click Sign Up. You can now view and download portions of your medical record. 10. Click the Download Summary menu link to download a portable copy of your medical information. If you have questions, please visit the Frequently Asked Questions section of the [employer] website. Remember, [employer] is NOT to be used for urgent needs. For medical emergencies, dial 911. Now available from your iPhone and Android! Please provide this summary of care documentation to your next provider. Your primary care clinician is listed as Kin Nina. If you have any questions after today's visit, please call [phone].

## 2017-09-18 DIAGNOSIS — I48.20 CHRONIC ATRIAL FIBRILLATION (HCC): ICD-10-CM

## 2017-09-18 DIAGNOSIS — Z79.01 ANTICOAGULANT LONG-TERM USE: ICD-10-CM

## 2017-09-18 RX ORDER — WARFARIN SODIUM 5 MG/1
TABLET ORAL
Qty: 90 TAB | Refills: 1 | Status: SHIPPED | OUTPATIENT
Start: 2017-09-18 | End: 2018-04-23 | Stop reason: SDUPTHER

## 2017-09-18 RX ORDER — POTASSIUM CHLORIDE 750 MG/1
TABLET, FILM COATED, EXTENDED RELEASE ORAL
Qty: 120 TAB | Refills: 5 | Status: SHIPPED | OUTPATIENT
Start: 2017-09-18 | End: 2018-03-02 | Stop reason: SDUPTHER

## 2017-09-18 RX ORDER — DILTIAZEM HYDROCHLORIDE 60 MG/1
60 TABLET, FILM COATED ORAL 2 TIMES DAILY
Qty: 180 TAB | Refills: 1 | Status: SHIPPED | OUTPATIENT
Start: 2017-09-18 | End: 2018-04-23 | Stop reason: SDUPTHER

## 2017-10-11 ENCOUNTER — OFFICE VISIT (OUTPATIENT)
Dept: FAMILY MEDICINE CLINIC | Age: 77
End: 2017-10-11

## 2017-10-11 VITALS
SYSTOLIC BLOOD PRESSURE: 130 MMHG | BODY MASS INDEX: 28.92 KG/M2 | HEIGHT: 70 IN | TEMPERATURE: 97.2 F | DIASTOLIC BLOOD PRESSURE: 78 MMHG | RESPIRATION RATE: 28 BRPM | WEIGHT: 202 LBS | HEART RATE: 78 BPM

## 2017-10-11 DIAGNOSIS — Z79.01 ANTICOAGULANT LONG-TERM USE: Primary | ICD-10-CM

## 2017-10-11 PROBLEM — M47.816 SPONDYLOSIS OF LUMBAR REGION WITHOUT MYELOPATHY OR RADICULOPATHY: Status: ACTIVE | Noted: 2017-10-11

## 2017-10-11 LAB
INR BLD: 2.9
PT POC: 34.3 SECONDS
VALID INTERNAL CONTROL?: YES

## 2017-10-11 NOTE — MR AVS SNAPSHOT
Visit Information Date & Time Provider Department Dept. Phone Encounter #  
 10/11/2017 10:00 AM Edgardo Cope  [place] [phone] [id number] Your Appointments 11/10/2017 11:30 AM  
ESTABLISHED PATIENT with Edgardo Cope NP  
[address] [facility] Appt Note: OV for P.T./INR  
 [address] [address]  
[address] [facility] [address] [address] Upcoming Health Maintenance Date Due  
 GLAUCOMA SCREENING Q2Y 1/19/2006 MEDICARE YEARLY EXAM 3/1/2018 Pneumococcal 65+ Low/Medium Risk (2 of 2 - PPSV23) 5/30/2018 DTaP/Tdap/Td series (2 - Td) 2/28/2027 Allergies as of 10/11/2017  Review Complete On: 10/11/2017 By: Neville Hernandez RN Severity Noted Reaction Type Reactions Ketamine  10/15/2010    Other (comments)  
 confusion Current Immunizations  Never Reviewed No immunizations on file. Not reviewed this visit You Were Diagnosed With   
  
 Codes Comments Anticoagulant long-term use    -  Primary ICD-10-CM: Z79.01 
ICD-9-CM: V58.61 Vitals BP Pulse Temp Resp  
 130/78 (BP 1 Location: Left arm, BP Patient Position: Sitting) 78 97.2 °F (36.2 °C) (Temporal) 28 Height(growth percentile) Weight(growth percentile) BMI Smoking Status 5' 10\" (1.778 m) 202 lb (91.6 kg) 28.98 kg/m2 Former Smoker BMI and BSA Data Body Mass Index Body Surface Area  
 28.98 kg/m 2 2.13 m 2 Preferred Pharmacy Pharmacy Name Phone [facility] [place] [facility] [address] [phone] Your Updated Medication List  
  
   
This list is accurate as of: 10/11/17 10:53 AM.  Always use your most recent med list.  
  
  
  
  
 amiodarone 200 mg tablet Commonly known as:  James James Take 1 Tab by mouth daily. amLODIPine 5 mg tablet Commonly known as:  Darrel Velasco Take 1 Tab by mouth daily. atorvastatin 20 mg tablet Commonly known as:  LIPITOR Take 1 Tab by mouth daily. dilTIAZem 60 mg tablet Commonly known as:  CARDIZEM Take 1 Tab by mouth two (2) times a day. furosemide 40 mg tablet Commonly known as:  LASIX TAKE ONE TABLET BY MOUTH ONCE DAILY  
  
 levothyroxine 100 mcg tablet Commonly known as:  SYNTHROID  
TAKE ONE TABLET BY MOUTH ONCE DAILY BEFORE  BREAKFAST  
  
 losartan 100 mg tablet Commonly known as:  COZAAR Take 1 Tab by mouth daily. potassium chloride SR 10 mEq tablet Commonly known as:  KLOR-CON 10  
TAKE TWO TABLETS BY MOUTH TWICE DAILY  
  
 sildenafil citrate 100 mg tablet Commonly known as:  VIAGRA Take 1 Tab by mouth as needed. warfarin 5 mg tablet Commonly known as:  COUMADIN  
1 tab Mon and Thurs, 1/2 tab all other days. We Performed the Following AMB POC PT/INR [54423 CPT(R)] COLLECTION CAPILLARY BLOOD SPECIMEN [86043 CPT(R)] Introducing [facility] & HEALTH SERVICES! Dior Jiménez introduces [employer] patient portal. Now you can access parts of your medical record, email your doctor's office, and request medication refills online. 1. In your internet browser, go to [email or web address] 2. Click on the First Time User? Click Here link in the Sign In box. You will see the New Member Sign Up page. 3. Enter your Affine Access Code exactly as it appears below. You will not need to use this code after youve completed the sign-up process. If you do not sign up before the expiration date, you must request a new code. · Affine Access Code: [email or web address] Expires: 1/9/2018 10:53 AM 
 
4. Enter the last four digits of your Social Security Number (xxxx) and Date of Birth (mm/dd/yyyy) as indicated and click Submit. You will be taken to the next sign-up page. 5. Create a Affine ID.  This will be your Affine login ID and cannot be changed, so think of one that is secure and easy to remember. 6. Create a M[employer]Arte password. You can change your password at any time. 7. Enter your Password Reset Question and Answer. This can be used at a later time if you forget your password. 8. Enter your e-mail address. You will receive e-mail notification when new information is available in [address] 9. Click Sign Up. You can now view and download portions of your medical record. 10. Click the Download Summary menu link to download a portable copy of your medical information. If you have questions, please visit the Frequently Asked Questions section of the [employer] website. Remember, [employer] is NOT to be used for urgent needs. For medical emergencies, dial 911. Now available from your iPhone and Android! Please provide this summary of care documentation to your next provider. Your primary care clinician is listed as Lianna Campos. If you have any questions after today's visit, please call [phone].

## 2017-10-12 NOTE — PROGRESS NOTES
Subjective:     Phuc Mccall is a 69 y.o. male who presents today with the following:  Chief Complaint   Patient presents with   [address] [place] [address] presents for anticoagulation management     Requests Handicap placard : Unable to walk 200 feet without stopping to rest. 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 COMPLIANT WITH MEDICATION:   HTN; Denies chest pain, dyspnea, palpitations, headache and blurred vision. Blood pressure normotensive. ROS:  Gen: denies fever, chills, fatigue, weight loss, weight gain  HEENT:denies blurry vision, nasal congestion, sore throat  Resp: denies dypsnea, cough, wheezing  CV: denies chest pain radiating to the jaws or arms, palpitations, lower extremity edema  Abd: denies nausea, vomiting, diarrhea, constipation  Neuro: denies numbness/tingling  Endo: denies polyuria, polydipsia, heat/cold intolerance  Heme: no lymphadenopathy    Allergies   Allergen Reactions    Ketamine Other (comments)     confusion         Current Outpatient Prescriptions:     potassium chloride SR (KLOR-CON 10) 10 mEq tablet, TAKE TWO TABLETS BY MOUTH TWICE DAILY, Disp: 120 Tab, Rfl: 5    dilTIAZem (CARDIZEM) 60 mg tablet, Take 1 Tab by mouth two (2) times a day., Disp: 180 Tab, Rfl: 1    warfarin (COUMADIN) 5 mg tablet, 1 tab Mon and Thurs, 1/2 tab all other days. , Disp: 90 Tab, Rfl: 1    levothyroxine (SYNTHROID) 100 mcg tablet, TAKE ONE TABLET BY MOUTH ONCE DAILY BEFORE  BREAKFAST, Disp: 90 Tab, Rfl: 1    amiodarone (PACERONE) 200 mg tablet, Take 1 Tab by mouth daily. , Disp: 90 Tab, Rfl: 1    amLODIPine (NORVASC) 5 mg tablet, Take 1 Tab by mouth daily. , Disp: 90 Tab, Rfl: 1    furosemide (LASIX) 40 mg tablet, TAKE ONE TABLET BY MOUTH ONCE DAILY, Disp: 90 Tab, Rfl: 1    atorvastatin (LIPITOR) 20 mg tablet, Take 1 Tab by mouth daily. , Disp: 90 Tab, Rfl: 3    losartan (COZAAR) 100 mg tablet, Take 1 Tab by mouth daily. , Disp: 90 Tab, Rfl: 3    sildenafil citrate (VIAGRA) 100 mg tablet, Take 1 Tab by mouth as needed. , Disp: 5 Tab, Rfl: 2    Past Medical History:   Diagnosis Date    Arrhythmia     atrial fibrillation, cardioversion 2010    CKD (chronic kidney disease) stage 3, GFR 30-59 ml/min     Hyperlipemia     Hypertension     PVD [facility]peripheral vascular disease) ([facility] [address]     Thyroid disease        Past Surgical History:   Procedure Laterality Date    CHEST SURGERY PROCEDURE UNLISTED      lung surgery age 17    HX COLONOSCOPY  2010    WNL SIS 10 y    HX HERNIA REPAIR      VASCULAR SURGERY PROCEDURE UNLIST      bypass numerous surgeries both legs       History   Smoking Status    Former Smoker    Quit date: 3/15/2007   Smokeless Tobacco    Never Used       Social History     Social History    Marital status:      Spouse name: N/A    Number of children: N/A    Years of education: N/A     Social History Main Topics    Smoking status: Former Smoker     Quit date: 3/15/2007    Smokeless tobacco: Never Used    Alcohol use No      Comment: former drinker/beer    Drug use: None    Sexual activity: Not Asked     Other Topics Concern     Service No    Blood Transfusions Yes    Caffeine Concern No    Occupational Exposure No    Hobby Hazards No    Sleep Concern Yes    Stress Concern No    Weight Concern No    Special Diet No    Back Care Yes    Exercise Yes    Seat Belt No    Self-Exams Yes     Social History Narrative       Family History   Problem Relation Age of Onset    Cancer Mother     Cancer Brother          Objective:     Visit Vitals    /78 (BP 1 Location: Left arm, BP Patient Position: Sitting)    Pulse 78    Temp 97.2 °F (36.2 °C) (Temporal)    Resp 28    Ht 5' 10\" (1.778 m)    Wt 202 lb (91.6 kg)    BMI 28.98 kg/m2     Body mass index is 28.98 kg/(m^2). General: Alert and oriented. No acute distress. Well nourished  HEENT :  Ears:TMs are normal. Canals are clear. Eyes: pupils equal, round, react to light and accommodation. Extra ocular movements intact. Nose: patent. Mouth and throat is clear. Neck:supple full range of motion no thyromegaly. Trachea midline, No carotid bruits. No significant lymphadenopathy  Lungs[de-identified] clear to auscultation without wheezes, rales, or rhonchi. Heart :RRR, S1 & S2 are normal intensity. No murmur; no gallop  Abdomen: bowel sounds active. No tenderness, guarding, rebound, masses, hepatic or spleen enlargement  Back: no CVA tenderness. Extremities: without clubbing, cyanosis, or edema  Pulses: radial and femoral pulses are normal  Neuro: HMF intact. Cranial nerves II through XII grossly normal.  Motor: is 5 over 5 and symmetrical.   Deep tendon reflexes: +2 equal    Results for orders placed or performed in visit on 10/11/17   AMB POC PT/INR   Result Value Ref Range    VALID INTERNAL CONTROL POC Yes     Prothrombin time (POC) 34.3 seconds    INR POC 2.9        Results for orders placed or performed in visit on 10/11/17   AMB POC PT/INR   Result Value Ref Range    VALID INTERNAL CONTROL POC Yes     Prothrombin time (POC) 34.3 seconds    INR POC 2.9        Assessment/ Plan:     Diagnoses and all orders for this visit:    1. Anticoagulant long-term use  -     COLLECTION CAPILLARY BLOOD SPECIMEN  -     AMB POC PT/INR         1. Anticoagulant long-term use        Orders Placed This Encounter    COLLECTION CAPILLARY BLOOD SPECIMEN    AMB POC PT/INR         Verbal and written instructions (see AVS) provided.  Patient expresses understanding of diagnosis and treatment plan.     Follow-up Disposition: Not on File      WENDY Diamond

## 2017-10-13 RX ORDER — AMIODARONE HYDROCHLORIDE 200 MG/1
200 TABLET ORAL DAILY
Qty: 90 TAB | Refills: 0 | Status: SHIPPED | OUTPATIENT
Start: 2017-10-13 | End: 2018-01-23 | Stop reason: SDUPTHER

## 2017-10-13 NOTE — TELEPHONE ENCOUNTER
Spoke with patient and he is taking diltiazem 1 tablet daily and also taking amlodipine 1 tablet daily, but he said he should be taking the 2 diltiazem and not the amlodipine. While talking to him he say he needed a refill on amiodarone.

## 2017-10-17 RX ORDER — AMLODIPINE BESYLATE 5 MG/1
5 TABLET ORAL DAILY
Qty: 90 TAB | Refills: 1 | OUTPATIENT
Start: 2017-10-17

## 2017-11-10 ENCOUNTER — OFFICE VISIT (OUTPATIENT)
Dept: FAMILY MEDICINE CLINIC | Age: 77
End: 2017-11-10

## 2017-11-10 VITALS
TEMPERATURE: 97.9 F | BODY MASS INDEX: 29.41 KG/M2 | WEIGHT: 205 LBS | DIASTOLIC BLOOD PRESSURE: 62 MMHG | OXYGEN SATURATION: 94 % | SYSTOLIC BLOOD PRESSURE: 138 MMHG | HEART RATE: 65 BPM | RESPIRATION RATE: 24 BRPM

## 2017-11-10 DIAGNOSIS — Z79.01 ANTICOAGULANT LONG-TERM USE: Primary | ICD-10-CM

## 2017-11-10 LAB
INR BLD: 32.2
PT POC: 2.7 SECONDS
VALID INTERNAL CONTROL?: YES

## 2017-11-10 NOTE — MR AVS SNAPSHOT
Visit Information Date & Time Provider Department Dept. Phone Encounter #  
 11/10/2017 11:30 AM Sue Williamson NP [place] [id number] [facility] [phone] [id number] Upcoming Health Maintenance Date Due  
 GLAUCOMA SCREENING Q2Y 1/19/2006 MEDICARE YEARLY EXAM 3/1/2018 Pneumococcal 65+ Low/Medium Risk (2 of 2 - PPSV23) 5/30/2018 DTaP/Tdap/Td series (2 - Td) 2/28/2027 Allergies as of 11/10/2017  Review Complete On: 11/10/2017 By: Promise Gutierrez LPN Severity Noted Reaction Type Reactions Ketamine  10/15/2010    Other (comments)  
 confusion Current Immunizations  Never Reviewed Name Date Influenza Vaccine 9/28/2012 12:00 AM, 10/5/2011 12:00 AM, 10/3/2011 12:00 AM, 10/27/2010 12:00 AM  
  
 Not reviewed this visit You Were Diagnosed With   
  
 Codes Comments Anticoagulant long-term use    -  Primary ICD-10-CM: Z79.01 
ICD-9-CM: V58.61 Vitals BP Pulse Temp Resp Weight(growth percentile) SpO2  
 138/62 (BP 1 Location: Right arm, BP Patient Position: Sitting) 65 97.9 °F (36.6 °C) (Oral) 24 205 lb (93 kg) 94% BMI Smoking Status 29.41 kg/m2 Former Smoker BMI and BSA Data Body Mass Index Body Surface Area  
 29.41 kg/m 2 2.14 m 2 Preferred Pharmacy Pharmacy Name Phone [facility] [place] [address], [facility] [phone] Your Updated Medication List  
  
   
This list is accurate as of: 11/10/17 12:01 PM.  Always use your most recent med list.  
  
  
  
  
 amiodarone 200 mg tablet Commonly known as:  Temo Boeck Take 1 Tab by mouth daily. atorvastatin 20 mg tablet Commonly known as:  LIPITOR Take 1 Tab by mouth daily. dilTIAZem 60 mg tablet Commonly known as:  CARDIZEM Take 1 Tab by mouth two (2) times a day. furosemide 40 mg tablet Commonly known as:  LASIX TAKE ONE TABLET BY MOUTH ONCE DAILY levothyroxine 100 mcg tablet Commonly known as:  SYNTHROID  
TAKE ONE TABLET BY MOUTH ONCE DAILY BEFORE  BREAKFAST  
  
 losartan 100 mg tablet Commonly known as:  COZAAR Take 1 Tab by mouth daily. potassium chloride SR 10 mEq tablet Commonly known as:  KLOR-CON 10  
TAKE TWO TABLETS BY MOUTH TWICE DAILY  
  
 sildenafil citrate 100 mg tablet Commonly known as:  VIAGRA Take 1 Tab by mouth as needed. warfarin 5 mg tablet Commonly known as:  COUMADIN  
1 tab Mon and Thurs, 1/2 tab all other days. We Performed the Following AMB POC PT/INR [19421 CPT(R)] Introducing [facility] & HEALTH SERVICES! Micha Davis introduces [employer] patient portal. Now you can access parts of your medical record, email your doctor's office, and request medication refills online. 1. In your internet browser, go to [email or web address] 2. Click on the First Time User? Click Here link in the Sign In box. You will see the New Member Sign Up page. 3. Enter your [employer] Access Code exactly as it appears below. You will not need to use this code after youve completed the sign-up process. If you do not sign up before the expiration date, you must request a new code. · [employer] Access Code: [email or web address] Expires: 1/9/2018  9:53 AM 
 
4. Enter the last four digits of your Social Security Number (xxxx) and Date of Birth (mm/dd/yyyy) as indicated and click Submit. You will be taken to the next sign-up page. 5. Create a [employer] ID. This will be your [employer] login ID and cannot be changed, so think of one that is secure and easy to remember. 6. Create a [employer] password. You can change your password at any time. 7. Enter your Password Reset Question and Answer. This can be used at a later time if you forget your password. 8. Enter your e-mail address. You will receive e-mail notification when new information is available in [address] 9. Click Sign Up. You can now view and download portions of your medical record. 10. Click the Download Summary menu link to download a portable copy of your medical information. If you have questions, please visit the Frequently Asked Questions section of the [employer] website. Remember, [employer] is NOT to be used for urgent needs. For medical emergencies, dial 911. Now available from your iPhone and Android! Please provide this summary of care documentation to your next provider. Your primary care clinician is listed as Eric Sanchez. If you have any questions after today's visit, please call [phone].

## 2017-12-11 ENCOUNTER — OFFICE VISIT (OUTPATIENT)
Dept: FAMILY MEDICINE CLINIC | Age: 77
End: 2017-12-11

## 2017-12-11 VITALS
RESPIRATION RATE: 34 BRPM | WEIGHT: 206 LBS | SYSTOLIC BLOOD PRESSURE: 112 MMHG | HEIGHT: 70 IN | HEART RATE: 88 BPM | TEMPERATURE: 97.1 F | DIASTOLIC BLOOD PRESSURE: 70 MMHG | BODY MASS INDEX: 29.49 KG/M2

## 2017-12-11 DIAGNOSIS — I48.20 CHRONIC ATRIAL FIBRILLATION (HCC): ICD-10-CM

## 2017-12-11 DIAGNOSIS — Z79.01 ANTICOAGULANT LONG-TERM USE: Primary | ICD-10-CM

## 2017-12-11 LAB
INR BLD: 3.4
PT POC: 40.8 SECONDS
VALID INTERNAL CONTROL?: YES

## 2017-12-11 NOTE — PROGRESS NOTES
Subjective:     Shwetha Fortune is a 69 y.o. male who presents today with the following:  Chief Complaint   Patient presents with   [address] [place] [address] presents for anticoagulation management. Doing well no interval concerns. COMPLIANT WITH MEDICATION:         ROS:  Gen: denies fever, chills, fatigue, weight loss, weight gain  HEENT:denies blurry vision, nasal congestion, sore throat  Resp: denies dypsnea, cough, wheezing  CV: denies chest pain radiating to the jaws or arms, palpitations, lower extremity edema  Abd: denies nausea, vomiting, diarrhea, constipation  Neuro: denies numbness/tingling  Endo: denies polyuria, polydipsia, heat/cold intolerance  Heme: no lymphadenopathy    Anticoagulation. Dx: Atrial fibrillation. Current symptoms: none  Current control agent: amiodarone  Current anticoagulant: warfarinhold x 2 days then restart monday , wed, thur and Friday 5 mg then Tues Sat and Sunday  2.5 mg Start with Weds dose. History of bleeding problems: none  Lab Results   Component Value Date/Time    INR 1.6 10/04/2010 04:33 PM    INR 1.4 11/14/2009 05:10 AM    INR 1.4 11/13/2009 04:30 AM    INR (POC) 2.9 10/15/2010 07:37 AM    INR POC 3.4 12/11/2017 10:30 AM    INR POC 32.2 11/10/2017 11:55 AM    INR POC 2.9 10/11/2017 10:10 AM    Prothrombin time 16.3 10/04/2010 04:33 PM    Prothrombin time 13.8 11/14/2009 05:10 AM    Prothrombin time 13.8 11/13/2009 04:30 AM       Allergies   Allergen Reactions    Ketamine Other (comments)     confusion         Current Outpatient Prescriptions:     amiodarone (PACERONE) 200 mg tablet, Take 1 Tab by mouth daily. , Disp: 90 Tab, Rfl: 0    potassium chloride SR (KLOR-CON 10) 10 mEq tablet, TAKE TWO TABLETS BY MOUTH TWICE DAILY, Disp: 120 Tab, Rfl: 5    dilTIAZem (CARDIZEM) 60 mg tablet, Take 1 Tab by mouth two (2) times a day., Disp: 180 Tab, Rfl: 1    warfarin (COUMADIN) 5 mg tablet, 1 tab Mon and Thurs, 1/2 tab all other days. , Disp: 90 Tab, Rfl: 1   levothyroxine (SYNTHROID) 100 mcg tablet, TAKE ONE TABLET BY MOUTH ONCE DAILY BEFORE  BREAKFAST, Disp: 90 Tab, Rfl: 1    furosemide (LASIX) 40 mg tablet, TAKE ONE TABLET BY MOUTH ONCE DAILY, Disp: 90 Tab, Rfl: 1    atorvastatin (LIPITOR) 20 mg tablet, Take 1 Tab by mouth daily. , Disp: 90 Tab, Rfl: 3    losartan (COZAAR) 100 mg tablet, Take 1 Tab by mouth daily. , Disp: 90 Tab, Rfl: 3    sildenafil citrate (VIAGRA) 100 mg tablet, Take 1 Tab by mouth as needed. , Disp: 5 Tab, Rfl: 2    Past Medical History:   Diagnosis Date    Arrhythmia     atrial fibrillation, cardioversion 2010    CKD (chronic kidney disease) stage 3, GFR 30-59 ml/min     Hyperlipemia     Hypertension     [facility]     Thyroid disease        Past Surgical History:   Procedure Laterality Date    CHEST SURGERY PROCEDURE UNLISTED      lung surgery age 17    HX COLONOSCOPY  2010    WNL SIS 10 y    HX HERNIA REPAIR      VASCULAR SURGERY PROCEDURE UNLIST      bypass numerous surgeries both legs       History   Smoking Status    Former Smoker    Quit date: 3/15/2007   Smokeless Tobacco    Never Used       Social History     Social History    Marital status:      Spouse name: N/A    Number of children: N/A    Years of education: N/A     Social History Main Topics    Smoking status: Former Smoker     Quit date: 3/15/2007    Smokeless tobacco: Never Used    Alcohol use No      Comment: former drinker/beer    Drug use: None    Sexual activity: Not Asked     Other Topics Concern     Service No    Blood Transfusions Yes    Caffeine Concern No    Occupational Exposure No    Hobby Hazards No    Sleep Concern Yes    Stress Concern No    Weight Concern No    Special Diet No    Back Care Yes    Exercise Yes    Seat Belt No    Self-Exams Yes     Social History Narrative       Family History   Problem Relation Age of Onset    Cancer Mother     Cancer Brother          Objective: Visit Vitals    /70 (BP 1 Location: Left arm, BP Patient Position: Sitting)    Pulse 88    Temp 97.1 °F (36.2 °C) (Temporal)    Resp (!) 34    Ht 5' 10\" (1.778 m)    Wt 206 lb (93.4 kg)    BMI 29.56 kg/m2     Body mass index is 29.56 kg/(m^2). General: Alert and oriented. No acute distress. Well nourished  HEENT :    Eyes: pupils equal, round, react to light and accommodation. Nose: patent. Mouth and throat is clear. Neck:supple full range of motion no thyromegaly. Trachea midline, No carotid bruits. No significant lymphadenopathy  Lungs[de-identified] clear to auscultation without wheezes, rales, or rhonchi. Heart :RRR, S1 & S2 are normal intensity. No murmur; no gallop      Results for orders placed or performed in visit on 12/11/17   AMB POC PT/INR   Result Value Ref Range    VALID INTERNAL CONTROL POC Yes     Prothrombin time (POC) 40.8 seconds    INR POC 3.4        Results for orders placed or performed in visit on 12/11/17   AMB POC PT/INR   Result Value Ref Range    VALID INTERNAL CONTROL POC Yes     Prothrombin time (POC) 40.8 seconds    INR POC 3.4        Assessment/ Plan:     Diagnoses and all orders for this visit:    1. Anticoagulant long-term use  -     COLLECTION CAPILLARY BLOOD SPECIMEN  -     AMB POC PT/INR    2. Chronic atrial fibrillation (HCC)  -     COLLECTION CAPILLARY BLOOD SPECIMEN  -     AMB POC PT/INR    3. BMI 29.0-29.9,adult         1. Anticoagulant long-term use    2. Chronic atrial fibrillation (HCC)    3. BMI 29.0-29.9,adult        Orders Placed This Encounter    COLLECTION CAPILLARY BLOOD SPECIMEN    AMB POC PT/INR         Verbal and written instructions (see AVS) provided.  Patient expresses understanding of diagnosis and treatment plan. Follow-up Disposition:  Return in about 2 weeks (around 12/25/2017).       WENDY You

## 2017-12-11 NOTE — MR AVS SNAPSHOT
[facility] 
 
 
 [address] [place]  
[phone] Patient: Valente Silva MRN: [id number] TDR:[id number] Visit Information Date & Time Provider Department Dept. Phone Encounter #  
 12/11/2017 10:30 AM Marcos Frank  [place] [phone] [id number] Upcoming Health Maintenance Date Due  
 GLAUCOMA SCREENING Q2Y 1/19/2006 MEDICARE YEARLY EXAM 3/1/2018 Pneumococcal 65+ Low/Medium Risk (2 of 2 - PPSV23) 5/30/2018 DTaP/Tdap/Td series (2 - Td) 2/28/2027 Allergies as of 12/11/2017  Review Complete On: 12/11/2017 By: Luly Venegas RN Severity Noted Reaction Type Reactions Ketamine  10/15/2010    Other (comments)  
 confusion Current Immunizations  Never Reviewed Name Date Influenza Vaccine 9/28/2012 12:00 AM, 10/5/2011 12:00 AM, 10/3/2011 12:00 AM, 10/27/2010 12:00 AM  
  
 Not reviewed this visit You Were Diagnosed With   
  
 Codes Comments Anticoagulant long-term use    -  Primary ICD-10-CM: Z79.01 
ICD-9-CM: V58.61 Chronic atrial fibrillation (HCC)     ICD-10-CM: B07.4 ICD-9-CM: 427.31   
 BMI 29.0-29.9,adult     ICD-10-CM: B92.02 ICD-9-CM: V85.25 Vitals BP Pulse Temp Resp  
 112/70 (BP 1 Location: Left arm, BP Patient Position: Sitting) 88 97.1 °F (36.2 °C) (Temporal) (!) 34 Height(growth percentile) Weight(growth percentile) BMI Smoking Status 5' 10\" (1.778 m) 206 lb (93.4 kg) 29.56 kg/m2 Former Smoker BMI and BSA Data Body Mass Index Body Surface Area  
 29.56 kg/m 2 2.15 m 2 Preferred Pharmacy Pharmacy Name Phone [facility] [place] [facility] [address] [phone] Your Updated Medication List  
  
   
This list is accurate as of: 12/11/17 10:54 AM.  Always use your most recent med list.  
  
  
  
  
 amiodarone 200 mg tablet Commonly known as:  Arnold Heady  
 Take 1 Tab by mouth daily. atorvastatin 20 mg tablet Commonly known as:  LIPITOR Take 1 Tab by mouth daily. dilTIAZem 60 mg tablet Commonly known as:  CARDIZEM Take 1 Tab by mouth two (2) times a day. furosemide 40 mg tablet Commonly known as:  LASIX TAKE ONE TABLET BY MOUTH ONCE DAILY  
  
 levothyroxine 100 mcg tablet Commonly known as:  SYNTHROID  
TAKE ONE TABLET BY MOUTH ONCE DAILY BEFORE  BREAKFAST  
  
 losartan 100 mg tablet Commonly known as:  COZAAR Take 1 Tab by mouth daily. potassium chloride SR 10 mEq tablet Commonly known as:  KLOR-CON 10  
TAKE TWO TABLETS BY MOUTH TWICE DAILY  
  
 sildenafil citrate 100 mg tablet Commonly known as:  VIAGRA Take 1 Tab by mouth as needed. warfarin 5 mg tablet Commonly known as:  COUMADIN  
1 tab Mon and Thurs, 1/2 tab all other days. We Performed the Following AMB POC PT/INR [75755 CPT(R)] COLLECTION CAPILLARY BLOOD SPECIMEN [21135 CPT(R)] Introducing [facility] & HEALTH SERVICES! [facility] introduces [employer] patient portal. Now you can access parts of your medical record, email your doctor's office, and request medication refills online. 1. In your internet browser, go to [email or web address] 2. Click on the First Time User? Click Here link in the Sign In box. You will see the New Member Sign Up page. 3. Enter your [employer] Access Code exactly as it appears below. You will not need to use this code after youve completed the sign-up process. If you do not sign up before the expiration date, you must request a new code. · [employer] Access Code: [email or web address] Expires: 1/9/2018  9:53 AM 
 
4. Enter the last four digits of your Social Security Number (xxxx) and Date of Birth (mm/dd/yyyy) as indicated and click Submit. You will be taken to the next sign-up page. 5. Create a [employer] ID.  This will be your [employer] login ID and cannot be changed, so think of one that is secure and easy to remember. 6. Create a [employer] password. You can change your password at any time. 7. Enter your Password Reset Question and Answer. This can be used at a later time if you forget your password. 8. Enter your e-mail address. You will receive e-mail notification when new information is available in [address] 9. Click Sign Up. You can now view and download portions of your medical record. 10. Click the Download Summary menu link to download a portable copy of your medical information. If you have questions, please visit the Frequently Asked Questions section of the [employer] website. Remember, [employer] is NOT to be used for urgent needs. For medical emergencies, dial 911. Now available from your iPhone and Android! Please provide this summary of care documentation to your next provider. Your primary care clinician is listed as Andriy Miranda. If you have any questions after today's visit, please call [phone].

## 2017-12-29 ENCOUNTER — OFFICE VISIT (OUTPATIENT)
Dept: FAMILY MEDICINE CLINIC | Age: 77
End: 2017-12-29

## 2017-12-29 VITALS
BODY MASS INDEX: 29.03 KG/M2 | DIASTOLIC BLOOD PRESSURE: 72 MMHG | WEIGHT: 202.8 LBS | RESPIRATION RATE: 24 BRPM | HEIGHT: 70 IN | OXYGEN SATURATION: 97 % | TEMPERATURE: 97 F | HEART RATE: 67 BPM | SYSTOLIC BLOOD PRESSURE: 112 MMHG

## 2017-12-29 DIAGNOSIS — Z79.01 ANTICOAGULANT LONG-TERM USE: Primary | ICD-10-CM

## 2017-12-29 LAB
INR BLD: 2.9
PT POC: 35.2 SECONDS
VALID INTERNAL CONTROL?: YES

## 2017-12-29 NOTE — PROGRESS NOTES
Subjective:     Sam Mello is a 69 y.o. male who presents today with the following:  Chief Complaint   Patient presents with    Anticoagulation       COMPLIANT WITH MEDICATION:     Anticoagulation. Dx: arhythmia   Current symptoms: none  Current control agent: amiodarone  Current anticoagulant: warfarin 5 mg on Monday, Wed, Thursday and Friday. 2.5 mg on SAt  History of bleeding problems: none  Lab Results   Component Value Date/Time    INR 1.6 10/04/2010 04:33 PM    INR 1.4 11/14/2009 05:10 AM    INR 1.4 11/13/2009 04:30 AM    INR (POC) 2.9 10/15/2010 07:37 AM    INR POC 2.9 12/29/2017 09:00 AM    INR POC 3.4 12/11/2017 10:30 AM    INR POC 32.2 11/10/2017 11:55 AM    Prothrombin time 16.3 10/04/2010 04:33 PM    Prothrombin time 13.8 11/14/2009 05:10 AM    Prothrombin time 13.8 11/13/2009 04:30 AM           ROS:  Gen: denies fever, chills, fatigue, weight loss, weight gain  HEENT:denies blurry vision, nasal congestion, sore throat  Resp: denies dypsnea, cough, wheezing  CV: denies chest pain radiating to the jaws or arms, palpitations, lower extremity edema  Abd: denies nausea, vomiting, diarrhea, constipation  Neuro: denies numbness/tingling  Endo: denies polyuria, polydipsia, heat/cold intolerance  Heme: no lymphadenopathy    Allergies   Allergen Reactions    Ketamine Other (comments)     confusion         Current Outpatient Prescriptions:     amiodarone (PACERONE) 200 mg tablet, Take 1 Tab by mouth daily. , Disp: 90 Tab, Rfl: 0    potassium chloride SR (KLOR-CON 10) 10 mEq tablet, TAKE TWO TABLETS BY MOUTH TWICE DAILY, Disp: 120 Tab, Rfl: 5    dilTIAZem (CARDIZEM) 60 mg tablet, Take 1 Tab by mouth two (2) times a day., Disp: 180 Tab, Rfl: 1    warfarin (COUMADIN) 5 mg tablet, 1 tab Mon and Thurs, 1/2 tab all other days. , Disp: 90 Tab, Rfl: 1    levothyroxine (SYNTHROID) 100 mcg tablet, TAKE ONE TABLET BY MOUTH ONCE DAILY BEFORE  BREAKFAST, Disp: 90 Tab, Rfl: 1    furosemide (LASIX) 40 mg tablet, TAKE ONE TABLET BY MOUTH ONCE DAILY, Disp: 90 Tab, Rfl: 1    atorvastatin (LIPITOR) 20 mg tablet, Take 1 Tab by mouth daily. , Disp: 90 Tab, Rfl: 3    losartan (COZAAR) 100 mg tablet, Take 1 Tab by mouth daily. , Disp: 90 Tab, Rfl: 3    sildenafil citrate (VIAGRA) 100 mg tablet, Take 1 Tab by mouth as needed. , Disp: 5 Tab, Rfl: 2    Past Medical History:   Diagnosis Date    Arrhythmia     atrial fibrillation, cardioversion 2010    CKD (chronic kidney disease) stage 3, GFR 30-59 ml/min     Hyperlipemia     Hypertension     PVD [facility]peripheral vascular disease) ([facility] [address]     Thyroid disease        Past Surgical History:   Procedure Laterality Date    CHEST SURGERY PROCEDURE UNLISTED      lung surgery age 17    HX COLONOSCOPY  2010    WNL SIS 10 y    HX HERNIA REPAIR      VASCULAR SURGERY PROCEDURE UNLIST      bypass numerous surgeries both legs       History   Smoking Status    Former Smoker    Quit date: 3/15/2007   Smokeless Tobacco    Never Used       Social History     Social History    Marital status:      Spouse name: N/A    Number of children: N/A    Years of education: N/A     Social History Main Topics    Smoking status: Former Smoker     Quit date: 3/15/2007    Smokeless tobacco: Never Used    Alcohol use No      Comment: former drinker/beer    Drug use: None    Sexual activity: Not Asked     Other Topics Concern     Service No    Blood Transfusions Yes    Caffeine Concern No    Occupational Exposure No    Hobby Hazards No    Sleep Concern Yes    Stress Concern No    Weight Concern No    Special Diet No    Back Care Yes    Exercise Yes    Seat Belt No    Self-Exams Yes     Social History Narrative       Family History   Problem Relation Age of Onset    Cancer Mother     Cancer Brother          Objective:     Visit Vitals    /72 (BP 1 Location: Left arm, BP Patient Position: Sitting)    Pulse 67    Temp 97 °F (36.1 °C) (Temporal)    Resp 24    Ht 5' 10\" (1.778 m)    Wt 202 lb 12.8 oz (92 kg)    SpO2 97%    BMI 29.1 kg/m2     Body mass index is 29.1 kg/(m^2). General: Alert and oriented. No acute distress. Well nourished  HENT :   Eyes: pupils equal, round, react to light and accommodation. .   Nose: patent. Mouth and throat is clear. Neck:supple full range of motion no thyromegaly. Trachea midline, No significant lymphadenopathy  Lungs[de-identified] clear to auscultation without wheezes, rales, or rhonchi. Heart :RRR, S1 & S2 are normal intensity. No murmur; no gallop  Extremities: without clubbing, cyanosis, or edema  Pulses: radial and femoral pulses are normal  Neuro: HMF intact. Cranial nerves II through XII grossly normal.        Results for orders placed or performed in visit on 12/29/17   AMB POC PT/INR   Result Value Ref Range    VALID INTERNAL CONTROL POC Yes     Prothrombin time (POC) 35.2 seconds    INR POC 2.9        Results for orders placed or performed in visit on 12/29/17   AMB POC PT/INR   Result Value Ref Range    VALID INTERNAL CONTROL POC Yes     Prothrombin time (POC) 35.2 seconds    INR POC 2.9        Assessment/ Plan:     Diagnoses and all orders for this visit:    1. Anticoagulant long-term use  -     COLLECTION CAPILLARY BLOOD SPECIMEN  -     AMB POC PT/INR         1. Anticoagulant long-term use        Orders Placed This Encounter    COLLECTION CAPILLARY BLOOD SPECIMEN    AMB POC PT/INR         Verbal and written instructions (see AVS) provided.  Patient expresses understanding of diagnosis and treatment plan. Follow-up Disposition:  Return in about 1 month (around 1/29/2018).       WENDY Berman

## 2017-12-29 NOTE — MR AVS SNAPSHOT
Visit Information Date & Time Provider Department Dept. Phone Encounter #  
 12/29/2017  9:00 AM Medhat Tena NP [address] [phone] [id number] Upcoming Health Maintenance Date Due  
 GLAUCOMA SCREENING Q2Y 1/19/2006 MEDICARE YEARLY EXAM 3/1/2018 Pneumococcal 65+ Low/Medium Risk (2 of 2 - PPSV23) 5/30/2018 DTaP/Tdap/Td series (2 - Td) 2/28/2027 Allergies as of 12/29/2017  Review Complete On: 12/11/2017 By: Medhat Tena NP Severity Noted Reaction Type Reactions Ketamine  10/15/2010    Other (comments)  
 confusion Current Immunizations  Never Reviewed Name Date Influenza Vaccine 9/28/2012 12:00 AM, 10/5/2011 12:00 AM, 10/3/2011 12:00 AM, 10/27/2010 12:00 AM  
  
 Not reviewed this visit You Were Diagnosed With   
  
 Codes Comments Anticoagulant long-term use    -  Primary ICD-10-CM: Z79.01 
ICD-9-CM: V58.61 Vitals BP Pulse Temp Resp Height(growth percentile) Weight(growth percentile) 112/72 (BP 1 Location: Left arm, BP Patient Position: Sitting) 67 97 °F (36.1 °C) (Temporal) 24 5' 10\" (1.778 m) 202 lb 12.8 oz (92 kg) SpO2 BMI Smoking Status 97% 29.1 kg/m2 Former Smoker BMI and BSA Data Body Mass Index Body Surface Area  
 29.1 kg/m 2 2.13 m 2 Preferred Pharmacy Pharmacy Name Phone [facility] [place] [facility] [address] [phone] Your Updated Medication List  
  
   
This list is accurate as of: 12/29/17  9:22 AM.  Always use your most recent med list.  
  
  
  
  
 amiodarone 200 mg tablet Commonly known as:  Motta Prado Take 1 Tab by mouth daily. atorvastatin 20 mg tablet Commonly known as:  LIPITOR Take 1 Tab by mouth daily. dilTIAZem 60 mg tablet Commonly known as:  CARDIZEM Take 1 Tab by mouth two (2) times a day. furosemide 40 mg tablet Commonly known as:  LASIX TAKE ONE TABLET BY MOUTH ONCE DAILY  
  
 levothyroxine 100 mcg tablet Commonly known as:  SYNTHROID  
TAKE ONE TABLET BY MOUTH ONCE DAILY BEFORE  BREAKFAST  
  
 losartan 100 mg tablet Commonly known as:  COZAAR Take 1 Tab by mouth daily. potassium chloride SR 10 mEq tablet Commonly known as:  KLOR-CON 10  
TAKE TWO TABLETS BY MOUTH TWICE DAILY  
  
 sildenafil citrate 100 mg tablet Commonly known as:  VIAGRA Take 1 Tab by mouth as needed. warfarin 5 mg tablet Commonly known as:  COUMADIN  
1 tab Mon and Thurs, 1/2 tab all other days. We Performed the Following AMB POC PT/INR [56736 CPT(R)] COLLECTION CAPILLARY BLOOD SPECIMEN [83176 CPT(R)] Introducing [facility] [place] [facility]! [facility] introduces [employer] patient portal. Now you can access parts of your medical record, email your doctor's office, and request medication refills online. 1. In your internet browser, go to [email or web address] 2. Click on the First Time User? Click Here link in the Sign In box. You will see the New Member Sign Up page. 3. Enter your [employer] Access Code exactly as it appears below. You will not need to use this code after youve completed the sign-up process. If you do not sign up before the expiration date, you must request a new code. · [employer] Access Code: [email or web address] Expires: 1/9/2018  9:53 AM 
 
4. Enter the last four digits of your Social Security Number (xxxx) and Date of Birth (mm/dd/yyyy) as indicated and click Submit. You will be taken to the next sign-up page. 5. Create a [employer] ID. This will be your [employer] login ID and cannot be changed, so think of one that is secure and easy to remember. 6. Create a [employer] password. You can change your password at any time. 7. Enter your Password Reset Question and Answer. This can be used at a later time if you forget your password. 8. Enter your e-mail address. You will receive e-mail notification when new information is available in [address] 9. Click Sign Up. You can now view and download portions of your medical record. 10. Click the Download Summary menu link to download a portable copy of your medical information. If you have questions, please visit the Frequently Asked Questions section of the [employer] website. Remember, [employer] is NOT to be used for urgent needs. For medical emergencies, dial 911. Now available from your iPhone and Android! Please provide this summary of care documentation to your next provider. Your primary care clinician is listed as Sue Williamson. If you have any questions after today's visit, please call [phone].

## 2018-01-24 RX ORDER — AMIODARONE HYDROCHLORIDE 200 MG/1
TABLET ORAL
Qty: 90 TAB | Refills: 0 | Status: SHIPPED | OUTPATIENT
Start: 2018-01-24 | End: 2019-01-21 | Stop reason: SDUPTHER

## 2018-01-24 RX ORDER — LEVOTHYROXINE SODIUM 100 UG/1
TABLET ORAL
Qty: 90 TAB | Refills: 1 | Status: SHIPPED | OUTPATIENT
Start: 2018-01-24 | End: 2018-07-25 | Stop reason: SDUPTHER

## 2018-01-26 ENCOUNTER — OFFICE VISIT (OUTPATIENT)
Dept: FAMILY MEDICINE CLINIC | Age: 78
End: 2018-01-26

## 2018-01-26 VITALS
RESPIRATION RATE: 20 BRPM | OXYGEN SATURATION: 96 % | WEIGHT: 202.2 LBS | HEART RATE: 55 BPM | BODY MASS INDEX: 28.95 KG/M2 | TEMPERATURE: 97.2 F | SYSTOLIC BLOOD PRESSURE: 130 MMHG | DIASTOLIC BLOOD PRESSURE: 58 MMHG | HEIGHT: 70 IN

## 2018-01-26 DIAGNOSIS — Z79.01 ANTICOAGULANT LONG-TERM USE: Primary | ICD-10-CM

## 2018-01-26 LAB
INR BLD: 2.4
PT POC: 28.7 SECONDS
VALID INTERNAL CONTROL?: YES

## 2018-01-26 NOTE — MR AVS SNAPSHOT
[address] [place] [address] [employer] [address] 
[phone] Patient: Kanika Ramirez MRN: [id number] DALLIN:7/39/2068 Visit Information Date & Time Provider Department Dept. Phone Encounter #  
 1/26/2018 11:00 AM Hector Abernathy NP [facility] [address] [facility] [phone] [id number] Upcoming Health Maintenance Date Due  
 GLAUCOMA SCREENING Q2Y 1/19/2006 MEDICARE YEARLY EXAM 3/1/2018 Pneumococcal 65+ Low/Medium Risk (2 of 2 - PPSV23) 5/30/2018 DTaP/Tdap/Td series (2 - Td) 2/28/2027 Allergies as of 1/26/2018  Review Complete On: 1/26/2018 By: Tika Napoles LPN Severity Noted Reaction Type Reactions Ketamine  10/15/2010    Other (comments)  
 confusion Current Immunizations  Never Reviewed Name Date Influenza Vaccine 9/28/2012 12:00 AM, 10/5/2011 12:00 AM, 10/3/2011 12:00 AM, 10/27/2010 12:00 AM  
  
 Not reviewed this visit You Were Diagnosed With   
  
 Codes Comments Anticoagulant long-term use    -  Primary ICD-10-CM: Z79.01 
ICD-9-CM: V58.61 Vitals BP Pulse Temp Resp Height(growth percentile) 130/58 (BP 1 Location: Left arm, BP Patient Position: Sitting) (!) 55 97.2 °F (36.2 °C) (Temporal) 20 5' 10\" (1.778 m) Weight(growth percentile) SpO2 BMI Smoking Status 202 lb 3.2 oz (91.7 kg) 96% 29.01 kg/m2 Former Smoker BMI and BSA Data Body Mass Index Body Surface Area  
 29.01 kg/m 2 2.13 m 2 Preferred Pharmacy Pharmacy Name Phone [address] [phone] Your Updated Medication List  
  
   
This list is accurate as of: 1/26/18 11:05 AM.  Always use your most recent med list.  
  
  
  
  
 amiodarone 200 mg tablet Commonly known as:  CORDARONE  
TAKE ONE TABLET BY MOUTH ONCE DAILY NEEDS  TO  SCHEDULE  APPOINTMENT  FOR  FURTHER  REFILLS  
  
 atorvastatin 20 mg tablet Commonly known as:  LIPITOR Take 1 Tab by mouth daily. dilTIAZem 60 mg tablet Commonly known as:  CARDIZEM Take 1 Tab by mouth two (2) times a day. furosemide 40 mg tablet Commonly known as:  LASIX TAKE ONE TABLET BY MOUTH ONCE DAILY  
  
 levothyroxine 100 mcg tablet Commonly known as:  SYNTHROID  
TAKE ONE TABLET BY MOUTH ONCE DAILY BEFORE  BREAKFAST  
  
 losartan 100 mg tablet Commonly known as:  COZAAR Take 1 Tab by mouth daily. potassium chloride SR 10 mEq tablet Commonly known as:  KLOR-CON 10  
TAKE TWO TABLETS BY MOUTH TWICE DAILY  
  
 sildenafil citrate 100 mg tablet Commonly known as:  VIAGRA Take 1 Tab by mouth as needed. warfarin 5 mg tablet Commonly known as:  COUMADIN  
1 tab Mon and Thurs, 1/2 tab all other days. We Performed the Following AMB POC PT/INR [29765 CPT(R)] COLLECTION CAPILLARY BLOOD SPECIMEN [30364 CPT(R)] Introducing [place] [facility]! Cecy Kirkland introduces [employer] patient portal. Now you can access parts of your medical record, email your doctor's office, and request medication refills online. 1. In your internet browser, go to [email or web address] 2. Click on the First Time User? Click Here link in the Sign In box. You will see the New Member Sign Up page. 3. Enter your [employer] Access Code exactly as it appears below. You will not need to use this code after youve completed the sign-up process. If you do not sign up before the expiration date, you must request a new code. · [employer] Access Code: [email or web address] Expires: 4/26/2018 11:05 AM 
 
4. Enter the last four digits of your Social Security Number (xxxx) and Date of Birth (mm/dd/yyyy) as indicated and click Submit. You will be taken to the next sign-up page. 5. Create a [employer] ID. This will be your [employer] login ID and cannot be changed, so think of one that is secure and easy to remember. 6. Create a [employer] password. You can change your password at any time. 7. Enter your Password Reset Question and Answer. This can be used at a later time if you forget your password. 8. Enter your e-mail address. You will receive e-mail notification when new information is available in [address] 9. Click Sign Up. You can now view and download portions of your medical record. 10. Click the Download Summary menu link to download a portable copy of your medical information. If you have questions, please visit the Frequently Asked Questions section of the [employer] website. Remember, [employer] is NOT to be used for urgent needs. For medical emergencies, dial 911. Now available from your iPhone and Android! Please provide this summary of care documentation to your next provider. Your primary care clinician is listed as Aleena Emery. If you have any questions after today's visit, please call [phone].

## 2018-01-26 NOTE — PROGRESS NOTES
Follow up for anticoagulation. Indication: Atrial fibrillation  Current medication:  warfarin0. Current symptoms: none  Current control agent: amiodarone  Current anticoagulant: warfarin5 mg 1 pill monday wednesday thursdat friday 1/2 all other days.   History of bleeding problems: NO  Results for orders placed or performed in visit on 12/29/17   AMB POC PT/INR   Result Value Ref Range    VALID INTERNAL CONTROL POC Yes     Prothrombin time (POC) 35.2 seconds    INR POC 2.9

## 2018-01-26 NOTE — PROGRESS NOTES
Subjective:     May Faith is a 69 y.o. male who presents today with the following:  Chief Complaint   Patient presents with    Anticoagulation       COMPLIANT WITH MEDICATION:       ROS:  Gen: denies fever, chills, fatigue, weight loss, weight gain  HEENT:denies blurry vision, nasal congestion, sore throat  Resp: denies dypsnea, cough, wheezing  CV: denies chest pain radiating to the jaws or arms, palpitations, lower extremity edema  Abd: denies nausea, vomiting, diarrhea, constipation  Neuro: denies numbness/tingling  Endo: denies polyuria, polydipsia, heat/cold intolerance  Heme: no lymphadenopathy    Anticoagulation. Dx: Atrial fibrillation. Current symptoms: none  Current control agent: amiodarone  Current anticoagulant: warfarin5 mg on [place],through Friday and 1/2 on all the other days.   History of bleeding problems: none  Lab Results   Component Value Date/Time    INR 1.6 10/04/2010 04:33 PM    INR 1.4 11/14/2009 05:10 AM    INR 1.4 11/13/2009 04:30 AM    INR (POC) 2.9 10/15/2010 07:37 AM    INR POC 2.4 01/26/2018 10:58 AM    INR POC 2.9 12/29/2017 09:00 AM    INR POC 3.4 12/11/2017 10:30 AM    Prothrombin time 16.3 10/04/2010 04:33 PM    Prothrombin time 13.8 11/14/2009 05:10 AM    Prothrombin time 13.8 11/13/2009 04:30 AM       Allergies   Allergen Reactions    Ketamine Other (comments)     confusion         Current Outpatient Prescriptions:     levothyroxine (SYNTHROID) 100 mcg tablet, TAKE ONE TABLET BY MOUTH ONCE DAILY BEFORE  BREAKFAST, Disp: 90 Tab, Rfl: 1    amiodarone (CORDARONE) 200 mg tablet, TAKE ONE TABLET BY MOUTH ONCE DAILY NEEDS  TO  SCHEDULE  APPOINTMENT  FOR  FURTHER  REFILLS, Disp: 90 Tab, Rfl: 0    potassium chloride SR (KLOR-CON 10) 10 mEq tablet, TAKE TWO TABLETS BY MOUTH TWICE DAILY, Disp: 120 Tab, Rfl: 5    dilTIAZem (CARDIZEM) 60 mg tablet, Take 1 Tab by mouth two (2) times a day., Disp: 180 Tab, Rfl: 1    warfarin (COUMADIN) 5 mg tablet, 1 tab Mon and Thurs, 1/2 tab all other days. , Disp: 90 Tab, Rfl: 1    furosemide (LASIX) 40 mg tablet, TAKE ONE TABLET BY MOUTH ONCE DAILY, Disp: 90 Tab, Rfl: 1    atorvastatin (LIPITOR) 20 mg tablet, Take 1 Tab by mouth daily. , Disp: 90 Tab, Rfl: 3    losartan (COZAAR) 100 mg tablet, Take 1 Tab by mouth daily. , Disp: 90 Tab, Rfl: 3    sildenafil citrate (VIAGRA) 100 mg tablet, Take 1 Tab by mouth as needed. , Disp: 5 Tab, Rfl: 2    Past Medical History:   Diagnosis Date    Arrhythmia     atrial fibrillation, cardioversion 2010    CKD (chronic kidney disease) stage 3, GFR 30-59 ml/min     Hyperlipemia     Hypertension     [facility]) ([facility] [address]     Thyroid disease        Past Surgical History:   Procedure Laterality Date    CHEST SURGERY PROCEDURE UNLISTED      lung surgery age 17    HX COLONOSCOPY  2010    WNL SIS 10 y    HX HERNIA REPAIR      VASCULAR SURGERY PROCEDURE UNLIST      bypass numerous surgeries both legs       History   Smoking Status    Former Smoker    Quit date: 3/15/2007   Smokeless Tobacco    Never Used       Social History     Social History    Marital status:      Spouse name: N/A    Number of children: N/A    Years of education: N/A     Social History Main Topics    Smoking status: Former Smoker     Quit date: 3/15/2007    Smokeless tobacco: Never Used    Alcohol use No      Comment: former drinker/beer    Drug use: None    Sexual activity: Not Asked     Other Topics Concern     Service No    Blood Transfusions Yes    Caffeine Concern No    Occupational Exposure No    Hobby Hazards No    Sleep Concern Yes    Stress Concern No    Weight Concern No    Special Diet No    Back Care Yes    Exercise Yes    Seat Belt No    Self-Exams Yes     Social History Narrative       Family History   Problem Relation Age of Onset    Cancer Mother     Cancer Brother          Objective:     Visit Vitals    /58 (BP 1 Location: Left arm, BP Patient Position: Sitting)    Pulse (!) 55    Temp 97.2 °F (36.2 °C) (Temporal)    Resp 20    Ht 5' 10\" (1.778 m)    Wt 202 lb 3.2 oz (91.7 kg)    SpO2 96%    BMI 29.01 kg/m2     Body mass index is 29.01 kg/(m^2). General: Alert and oriented. No acute distress. Well nourished  HEENT :  Eyes: pupils equal, round, react to light and accommodation. Nose: patent. Mouth and throat is clear. Neck:supple full range of motion no thyromegaly. Trachea midline, No carotid bruits. No significant lymphadenopathy  Lungs[de-identified] clear to auscultation without wheezes, rales, or rhonchi. Heart :RRR, S1 & S2 are normal intensity. No murmur; no gallop  Extremities: without clubbing, cyanosis, or edema  Pulses: radial and femoral pulses are normal  Neuro: HMF intact. Cranial nerves II through XII grossly normal.    Results for orders placed or performed in visit on 01/26/18   AMB POC PT/INR   Result Value Ref Range    VALID INTERNAL CONTROL POC Yes     Prothrombin time (POC) 28.7 seconds    INR POC 2.4        Results for orders placed or performed in visit on 01/26/18   AMB POC PT/INR   Result Value Ref Range    VALID INTERNAL CONTROL POC Yes     Prothrombin time (POC) 28.7 seconds    INR POC 2.4        Assessment/ Plan:     Diagnoses and all orders for this visit:    1. Anticoagulant long-term use  -     AMB POC PT/INR  -     COLLECTION CAPILLARY BLOOD SPECIMEN         1. Anticoagulant long-term use        Orders Placed This Encounter    COLLECTION CAPILLARY BLOOD SPECIMEN    AMB POC PT/INR         Verbal and written instructions (see AVS) provided.  Patient expresses understanding of diagnosis and treatment plan. Follow-up Disposition:  Return in about 1 month (around 2/26/2018).       WENDY Cross

## 2018-03-01 RX ORDER — FUROSEMIDE 40 MG/1
TABLET ORAL
Qty: 90 TAB | Refills: 1 | Status: SHIPPED | OUTPATIENT
Start: 2018-03-01 | End: 2018-08-27 | Stop reason: SDUPTHER

## 2018-03-02 ENCOUNTER — OFFICE VISIT (OUTPATIENT)
Dept: FAMILY MEDICINE CLINIC | Age: 78
End: 2018-03-02

## 2018-03-02 DIAGNOSIS — Z79.01 ANTICOAGULANT LONG-TERM USE: Primary | ICD-10-CM

## 2018-03-02 RX ORDER — POTASSIUM CHLORIDE 750 MG/1
TABLET, FILM COATED, EXTENDED RELEASE ORAL
Qty: 180 TAB | Refills: 1 | Status: SHIPPED | OUTPATIENT
Start: 2018-03-02 | End: 2018-07-03 | Stop reason: SDUPTHER

## 2018-03-02 NOTE — MR AVS SNAPSHOT
[address]  
[phone] Patient: Keya Norton MRN: [id number] Visit Information Date & Time Provider Department Dept. Phone Encounter #  
 3/2/2018  [address] [place] Philip, NP [facility] 13[address] [facility] [phone] [id number] Follow-up Instructions Return in about 1 month (around 4/2/2018). Upcoming Health Maintenance Date Due  
 GLAUCOMA SCREENING Q2Y 1/19/2006 MEDICARE YEARLY EXAM 3/1/2018 Pneumococcal 65+ Low/Medium Risk (2 of 2 - PPSV23) 5/30/2018 DTaP/Tdap/Td series (2 - Td) 2/28/2027 Allergies as of 3/2/2018  Review Complete On: 3/2/2018 By: Mariaelena Pavon NP Severity Noted Reaction Type Reactions Ketamine  10/15/2010    Other (comments)  
 confusion Current Immunizations  Never Reviewed Name Date Influenza Vaccine 9/28/2012 12:00 AM, 10/5/2011 12:00 AM, 10/3/2011 12:00 AM, 10/27/2010 12:00 AM  
  
 Not reviewed this visit You Were Diagnosed With   
  
 Codes Comments Anticoagulant long-term use    -  Primary ICD-10-CM: Z79.01 
ICD-9-CM: V58.61 Vitals Smoking Status Former Smoker Preferred Pharmacy Pharmacy Name Phone [address] [phone] Your Updated Medication List  
  
   
This list is accurate as of 3/2/18 11:59 PM.  Always use your most recent med list.  
  
  
  
  
 amiodarone 200 mg tablet Commonly known as:  CORDARONE  
TAKE ONE TABLET BY MOUTH ONCE DAILY NEEDS  TO  SCHEDULE  APPOINTMENT  FOR  FURTHER  REFILLS  
  
 atorvastatin 20 mg tablet Commonly known as:  LIPITOR Take 1 Tab by mouth daily. dilTIAZem 60 mg tablet Commonly known as:  CARDIZEM Take 1 Tab by mouth two (2) times a day. furosemide 40 mg tablet Commonly known as:  LASIX TAKE ONE TABLET BY MOUTH ONCE DAILY levothyroxine 100 mcg tablet Commonly known as:  SYNTHROID  
TAKE ONE TABLET BY MOUTH ONCE DAILY BEFORE  BREAKFAST  
  
 losartan 100 mg tablet Commonly known as:  COZAAR Take 1 Tab by mouth daily. potassium chloride SR 10 mEq tablet Commonly known as:  KLOR-CON 10  
TAKE TWO TABLETS BY MOUTH TWICE DAILY  
  
 sildenafil citrate 100 mg tablet Commonly known as:  VIAGRA Take 1 Tab by mouth as needed. warfarin 5 mg tablet Commonly known as:  COUMADIN  
1 tab Mon and Thurs, 1/2 tab all other days. Prescriptions Sent to Pharmacy Refills  
 potassium chloride SR (KLOR-CON 10) 10 mEq tablet 1 Sig: TAKE TWO TABLETS BY MOUTH TWICE DAILY Class: Normal  
 Pharmacy: [facility] [address] Ph #: [phone] Follow-up Instructions Return in about 1 month (around 4/2/2018). Introducing [facility] & [facility] SERVICES! [employer] introduces Live Shuttle patient portal. Now you can access parts of your medical record, email your doctor's office, and request medication refills online. 1. In your internet browser, go to [email or web address] 2. Click on the First Time User? Click Here link in the Sign In box. You will see the New Member Sign Up page. 3. Enter your Live Shuttle Access Code exactly as it appears below. You will not need to use this code after youve completed the sign-up process. If you do not sign up before the expiration date, you must request a new code. · Live Shuttle Access Code: [email or web address] Expires: 4/26/2018 11:05 AM 
 
4. Enter the last four digits of your Social Security Number (xxxx) and Date of Birth (mm/dd/yyyy) as indicated and click Submit. You will be taken to the next sign-up page. 5. Create a BioMedFlext ID. This will be your Live Shuttle login ID and cannot be changed, so think of one that is secure and easy to remember. 6. Create a BioMedFlext password. You can change your password at any time. 7. Enter your Password Reset Question and Answer. This can be used at a later time if you forget your password. 8. Enter your e-mail address. You will receive e-mail notification when new information is available in [address] 9. Click Sign Up. You can now view and download portions of your medical record. 10. Click the Download Summary menu link to download a portable copy of your medical information. If you have questions, please visit the Frequently Asked Questions section of the Netlist website. Remember, Netlist is NOT to be used for urgent needs. For medical emergencies, dial 911. Now available from your iPhone and Android! Please provide this summary of care documentation to your next provider. Your primary care clinician is listed as Brigida Taveras. If you have any questions after today's visit, please call [phone].

## 2018-03-02 NOTE — PROGRESS NOTES
Subjective:     Yuliya Alcaraz is a 69 y.o. male who presents today with the following:  No chief complaint on file. 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 Dx:  Atrial fibrillation.    Current symptoms: none  Current control agent: amiodarone  Current anticoagulant: warfarin 5mg on mondays and Thursdays 2.5 mg on the other days  History of bleeding problems: none  Lab Results   Component Value Date/Time    INR 1.6 (H) 10/04/2010 04:33 PM    INR 1.4 (H) 11/14/2009 05:10 AM    INR 1.4 (H) 11/13/2009 04:30 AM    INR (POC) 2.9 (H) 10/15/2010 07:37 AM    INR POC 2.4 01/26/2018 10:58 AM    INR POC 2.9 12/29/2017 09:00 AM    INR POC 3.4 12/11/2017 10:30 AM    Prothrombin time 16.3 (H) 10/04/2010 04:33 PM    Prothrombin time 13.8 (H) 11/14/2009 05:10 AM    Prothrombin time 13.8 (H) 11/13/2009 04:30 AM       COMPLIANT WITH MEDICATION:         ROS:  Gen: denies fever, chills, fatigue, weight loss, weight gain  HEENT:denies blurry vision, nasal congestion, sore throat  Resp: denies dypsnea, cough, wheezing  CV: denies chest pain radiating to the jaws or arms, palpitations, lower extremity edema  Abd: denies nausea, vomiting, diarrhea, constipation  Neuro: denies numbness/tingling  Endo: denies polyuria, polydipsia, heat/cold intolerance  Heme: no lymphadenopathy    Allergies   Allergen Reactions    Ketamine Other (comments)     confusion         Current Outpatient Prescriptions:     potassium chloride SR (KLOR-CON 10) 10 mEq tablet, TAKE TWO TABLETS BY MOUTH TWICE DAILY, Disp: 180 Tab, Rfl: 1    furosemide (LASIX) 40 mg tablet, TAKE ONE TABLET BY MOUTH ONCE DAILY, Disp: 90 Tab, Rfl: 1    levothyroxine (SYNTHROID) 100 mcg tablet, TAKE ONE TABLET BY MOUTH ONCE DAILY BEFORE  BREAKFAST, Disp: 90 Tab, Rfl: 1    amiodarone (CORDARONE) 200 mg tablet, TAKE ONE TABLET BY MOUTH ONCE DAILY NEEDS  TO  SCHEDULE  APPOINTMENT  FOR  FURTHER  REFILLS, Disp: 90 Tab, Rfl: 0    dilTIAZem (CARDIZEM) 60 mg tablet, Take 1 Tab by mouth two (2) times a day., Disp: 180 Tab, Rfl: 1    warfarin (COUMADIN) 5 mg tablet, 1 tab Mon and Thurs, 1/2 tab all other days. , Disp: 90 Tab, Rfl: 1    atorvastatin (LIPITOR) 20 mg tablet, Take 1 Tab by mouth daily. , Disp: 90 Tab, Rfl: 3    losartan (COZAAR) 100 mg tablet, Take 1 Tab by mouth daily. , Disp: 90 Tab, Rfl: 3    sildenafil citrate (VIAGRA) 100 mg tablet, Take 1 Tab by mouth as needed. , Disp: 5 Tab, Rfl: 2    Past Medical History:   Diagnosis Date    Arrhythmia     atrial fibrillation, cardioversion 2010    CKD (chronic kidney disease) stage 3, GFR 30-59 ml/min     Hyperlipemia     Hypertension     PVD (peripheral vascular disease) ([facility] [address]     Thyroid disease        Past Surgical History:   Procedure Laterality Date    CHEST SURGERY PROCEDURE UNLISTED      lung surgery age 17    HX COLONOSCOPY  2010    WNL SIS 10 y    HX HERNIA REPAIR      VASCULAR SURGERY PROCEDURE UNLIST      bypass numerous surgeries both legs       History   Smoking Status    Former Smoker    Quit date: 3/15/2007   Smokeless Tobacco    Never Used       Social History     Social History    Marital status:      Spouse name: N/A    Number of children: N/A    Years of education: N/A     Social History Main Topics    Smoking status: Former Smoker     Quit date: 3/15/2007    Smokeless tobacco: Never Used    Alcohol use No      Comment: former drinker/beer    Drug use: Not on file    Sexual activity: Not on file     Other Topics Concern     Service No    Blood Transfusions Yes    Caffeine Concern No    Occupational Exposure No    Hobby Hazards No    Sleep Concern Yes    Stress Concern No    Weight Concern No    Special Diet No    Back Care Yes    Exercise Yes    Seat Belt No    Self-Exams Yes     Social History Narrative       Family History   Problem Relation Age of Onset    Cancer Mother     Cancer Brother          Objective: There were no vitals taken for this visit. There is no height or weight on file to calculate BMI. General: Alert and oriented. No acute distress. Well nourished  HEENT :  Eyes: pupils equal, round, react to light and accommodation. Nose: patent. Mouth and throat is clear. Neck:supple full range of motion no thyromegaly. Trachea midline, No carotid bruits. No significant lymphadenopathy  Lungs[de-identified] clear to auscultation without wheezes, rales, or rhonchi. Heart :RRR, S1 & S2 are normal intensity. No murmur; no gallop  Extremities: without clubbing, cyanosis, or edema  Pulses: radial and femoral pulses are normal  Neuro: HMF intact. Cranial nerves II through XII grossly normal.    Results for orders placed or performed in visit on 01/26/18   AMB POC PT/INR   Result Value Ref Range    VALID INTERNAL CONTROL POC Yes     Prothrombin time (POC) 28.7 seconds    INR POC 2.4        No results found for this visit on 03/02/18. Assessment/ Plan:     1. Anticoagulant long-term use        Orders Placed This Encounter    potassium chloride SR (KLOR-CON 10) 10 mEq tablet     Sig: TAKE TWO TABLETS BY MOUTH TWICE DAILY     Dispense:  180 Tab     Refill:  1         Verbal and written instructions (see AVS) provided.  Patient expresses understanding of diagnosis and treatment plan. Health Maintenance Due   Topic Date Due    GLAUCOMA SCREENING Q2Y  01/19/2006    MEDICARE YEARLY EXAM  03/01/2018         Follow-up Disposition:  Return in about 1 month (around 4/2/2018).       Mortimer Fall, FNP-C

## 2018-03-05 VITALS
TEMPERATURE: 97.8 F | SYSTOLIC BLOOD PRESSURE: 170 MMHG | HEART RATE: 69 BPM | RESPIRATION RATE: 18 BRPM | BODY MASS INDEX: 28.92 KG/M2 | DIASTOLIC BLOOD PRESSURE: 74 MMHG | WEIGHT: 202 LBS | HEIGHT: 70 IN | OXYGEN SATURATION: 93 %

## 2018-03-05 LAB
INR BLD: 3.1 (ref 2–3)
PT POC: 37.1 SECONDS (ref 10.4–14)
VALID INTERNAL CONTROL?: YES

## 2018-03-05 NOTE — PROGRESS NOTES
Indication: Atrial Fibrillation  Current dose:  Coumadin 2.5mg daily, except 5mg Mon, Wed, Thurs & Fri. Missed Coumadin Doses:  None  Medication Changes:  no  Dietary Changes:  no    Symptoms: taking coumadin appropriately without any bleeding    Results for orders placed or performed in visit on 03/02/18   AMB POC PT/INR   Result Value Ref Range    VALID INTERNAL CONTROL POC Yes     Prothrombin time (POC) 37.1 (A) 10.4 - 14 seconds    INR POC 3.1 (A) 2 - 3     .

## 2018-03-07 NOTE — ACP (ADVANCE CARE PLANNING)
Discussed importance of advanced medical directives with patient. Patient is capable of making decisions.     Enid Scott NP-C

## 2018-04-06 ENCOUNTER — OFFICE VISIT (OUTPATIENT)
Dept: FAMILY MEDICINE CLINIC | Age: 78
End: 2018-04-06

## 2018-04-06 VITALS
TEMPERATURE: 97.5 F | WEIGHT: 202 LBS | OXYGEN SATURATION: 94 % | HEIGHT: 70 IN | SYSTOLIC BLOOD PRESSURE: 110 MMHG | BODY MASS INDEX: 28.92 KG/M2 | RESPIRATION RATE: 16 BRPM | DIASTOLIC BLOOD PRESSURE: 70 MMHG | HEART RATE: 59 BPM

## 2018-04-06 DIAGNOSIS — Z79.01 ANTICOAGULANT LONG-TERM USE: Primary | ICD-10-CM

## 2018-04-06 DIAGNOSIS — I10 ESSENTIAL HYPERTENSION: ICD-10-CM

## 2018-04-06 DIAGNOSIS — Z00.00 ENCOUNTER FOR MEDICARE ANNUAL WELLNESS EXAM: ICD-10-CM

## 2018-04-06 LAB
INR BLD: 3.5
PT POC: 42.5 SECONDS
VALID INTERNAL CONTROL?: YES

## 2018-04-06 NOTE — PROGRESS NOTES
This is the Subsequent Medicare Annual Wellness Exam, performed 12 months or more after the Initial AWV or the last Subsequent AWV    I have reviewed the patient's medical history in detail and updated the computerized patient record. History     Past Medical History:   Diagnosis Date    Arrhythmia     atrial fibrillation, cardioversion 2010    CKD (chronic kidney disease) stage 3, GFR 30-59 ml/min     Hyperlipemia     Hypertension     PVD (peripheral vascular disease) ([facility] [address]     Thyroid disease       Past Surgical History:   Procedure Laterality Date    CHEST SURGERY PROCEDURE UNLISTED      lung surgery age 17    HX COLONOSCOPY  2010    WNL SIS 10 y    HX HERNIA REPAIR      VASCULAR SURGERY PROCEDURE UNLIST      bypass numerous surgeries both legs     Current Outpatient Prescriptions   Medication Sig Dispense Refill    potassium chloride SR (KLOR-CON 10) 10 mEq tablet TAKE TWO TABLETS BY MOUTH TWICE DAILY 180 Tab 1    furosemide (LASIX) 40 mg tablet TAKE ONE TABLET BY MOUTH ONCE DAILY 90 Tab 1    levothyroxine (SYNTHROID) 100 mcg tablet TAKE ONE TABLET BY MOUTH ONCE DAILY BEFORE  BREAKFAST 90 Tab 1    amiodarone (CORDARONE) 200 mg tablet TAKE ONE TABLET BY MOUTH ONCE DAILY NEEDS  TO  SCHEDULE  APPOINTMENT  FOR  FURTHER  REFILLS 90 Tab 0    dilTIAZem (CARDIZEM) 60 mg tablet Take 1 Tab by mouth two (2) times a day. 180 Tab 1    warfarin (COUMADIN) 5 mg tablet 1 tab Mon and Thurs, 1/2 tab all other days. 90 Tab 1    atorvastatin (LIPITOR) 20 mg tablet Take 1 Tab by mouth daily. 90 Tab 3    losartan (COZAAR) 100 mg tablet Take 1 Tab by mouth daily.  90 Tab 3     Allergies   Allergen Reactions    Ketamine Other (comments)     confusion     Family History   Problem Relation Age of Onset    Cancer Mother     Cancer Brother      Social History   Substance Use Topics    Smoking status: Former Smoker     Quit date: 3/15/2007    Smokeless tobacco: Never Used    Alcohol use Yes      Comment: former drinker- beer   social     Patient Active Problem List   Diagnosis Code    Hypertension I10    Thyroid disease E07.9    Arrhythmia I49.9    Hyperlipemia E78.5    [facility]VD [facility]per[facility]) ([facility]) I73.9    CKD (chronic kidney disease) stage 3, GFR 30-59 ml/min N18.3    Anticoagulant long-term use Z79.01    Pulmonary scarring 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 Functional Ability and Level of Safety:   Hearing Loss  Hearing is good. Activities of Daily Living  The home contains: handrails  Patient does total self care    Fall Risk  Fall Risk Assessment, last 12 mths 4/6/2018   Able to walk? Yes   Fall in past 12 months? No   Fall with injury? -   Number of falls in past 12 months -   Fall Risk Score -       Abuse Screen  Patient is not abused    Cognitive Screening   Evaluation of Cognitive Function:  Has your family/caregiver stated any concerns about your memory: no  Normal    Patient Care Team   Patient Care Team:  Elizabeth Singleton NP as PCP - General (Nurse Practitioner)    Assessment/Plan   Education and counseling provided:  Are appropriate based on today's review and evaluation    Diagnoses and all orders for this visit:    1.  Anticoagulant long-term use  -     AMB POC PT/INR  -     COLLECTION CAPILLARY BLOOD SPECIMEN        Health Maintenance Due   Topic Date Due    GLAUCOMA SCREENING Q2Y  01/19/2006    MEDICARE YEARLY EXAM  03/14/2018   RTO in 1 month or sooner as needed    Justyna FIERRO

## 2018-04-06 NOTE — MR AVS SNAPSHOT
[address] [facility] 
 
 
 [address] 
[phone] Patient: Shey Beard MRN: [id number] Visit Information Date & Time Provider Department Dept. Phone Encounter #  
 4/6/2018 11:00 AM Ayaz Pham NP [facility] 13[address] [facility] [phone] [id number] Your Appointments 5/4/2018  9:15 AM  
ESTABLISHED PATIENT with Ayaz Pham NP  
[address] [place] ([facility]) Appt Note: OV for P.T./INR  
 [address] [address]  
[address] [facility] [employer] [address] [address] Upcoming Health Maintenance Date Due  
 GLAUCOMA SCREENING Q2Y 1/19/2006 MEDICARE YEARLY EXAM 3/14/2018 Pneumococcal 65+ Low/Medium Risk (2 of 2 - PPSV23) 5/30/2018 DTaP/Tdap/Td series (2 - Td) 2/28/2027 Allergies as of 4/6/2018  Review Complete On: 4/6/2018 By: Wagner Win LPN Severity Noted Reaction Type Reactions Ketamine  10/15/2010    Other (comments)  
 confusion Current Immunizations  Never Reviewed Name Date Influenza Vaccine 9/28/2012 12:00 AM, 10/5/2011 12:00 AM, 10/3/2011 12:00 AM, 10/27/2010 12:00 AM  
  
 Not reviewed this visit You Were Diagnosed With   
  
 Codes Comments Anticoagulant long-term use    -  Primary ICD-10-CM: Z79.01 
ICD-9-CM: V58.61 Vitals BP Pulse Temp Resp Height(growth percentile) 110/70 (BP 1 Location: Left arm, BP Patient Position: Sitting) (!) 59 97.5 °F (36.4 °C) (Temporal) 16 5' 10\" (1.778 m) Weight(growth percentile) SpO2 BMI Smoking Status 202 lb (91.6 kg) 94% 28.98 kg/m2 Former Smoker BMI and BSA Data Body Mass Index Body Surface Area  
 28.98 kg/m 2 2.13 m 2 Preferred Pharmacy Pharmacy Name Phone [address] [phone] Your Updated Medication List  
  
 This list is accurate as of 4/6/18 11:26 AM.  Always use your most recent med list.  
  
  
  
  
 amiodarone 200 mg tablet Commonly known as:  CORDARONE  
TAKE ONE TABLET BY MOUTH ONCE DAILY NEEDS  TO  SCHEDULE  APPOINTMENT  FOR  FURTHER  REFILLS  
  
 atorvastatin 20 mg tablet Commonly known as:  LIPITOR Take 1 Tab by mouth daily. dilTIAZem 60 mg tablet Commonly known as:  CARDIZEM Take 1 Tab by mouth two (2) times a day. furosemide 40 mg tablet Commonly known as:  LASIX TAKE ONE TABLET BY MOUTH ONCE DAILY  
  
 levothyroxine 100 mcg tablet Commonly known as:  SYNTHROID  
TAKE ONE TABLET BY MOUTH ONCE DAILY BEFORE  BREAKFAST  
  
 losartan 100 mg tablet Commonly known as:  COZAAR Take 1 Tab by mouth daily. potassium chloride SR 10 mEq tablet Commonly known as:  KLOR-CON 10  
TAKE TWO TABLETS BY MOUTH TWICE DAILY  
  
 warfarin 5 mg tablet Commonly known as:  COUMADIN  
1 tab Mon and Thurs, 1/2 tab all other days. We Performed the Following AMB POC PT/INR [00733 CPT(R)] COLLECTION CAPILLARY BLOOD SPECIMEN [13328 CPT(R)] Introducing [facility]! [facility] introduces ahoyDoc patient portal. Now you can access parts of your medical record, email your doctor's office, and request medication refills online. 1. In your internet browser, go to [email or web address] 2. Click on the First Time User? Click Here link in the Sign In box. You will see the New Member Sign Up page. 3. Enter your ahoyDoc Access Code exactly as it appears below. You will not need to use this code after youve completed the sign-up process. If you do not sign up before the expiration date, you must request a new code. · ahoyDoc Access Code: [email or web address] Expires: 4/26/2018 12:05 PM 
 
4. Enter the last four digits of your Social Security Number (xxxx) and Date of Birth (mm/dd/yyyy) as indicated and click Submit.  You will be taken to the next sign-up page. 5. Create a Medsurant Monitoring ID. This will be your Medsurant Monitoring login ID and cannot be changed, so think of one that is secure and easy to remember. 6. Create a Medsurant Monitoring password. You can change your password at any time. 7. Enter your Password Reset Question and Answer. This can be used at a later time if you forget your password. 8. Enter your e-mail address. You will receive e-mail notification when new information is available in [address] 9. Click Sign Up. You can now view and download portions of your medical record. 10. Click the Download Summary menu link to download a portable copy of your medical information. If you have questions, please visit the Frequently Asked Questions section of the Medsurant Monitoring website. Remember, Medsurant Monitoring is NOT to be used for urgent needs. For medical emergencies, dial 911. Now available from your iPhone and Android! Please provide this summary of care documentation to your next provider. Your primary care clinician is listed as Ayaz Pham. If you have any questions after today's visit, please call [phone].

## 2018-04-06 NOTE — PROGRESS NOTES
1. Have you been to the ER, urgent care clinic since your last visit? Hospitalized since your last visit? No    2. Have you seen or consulted any other health care providers outside of the [address] [place] [address] since your last visit? Include any pap smears or colon screening.  No

## 2018-04-06 NOTE — PROGRESS NOTES
Subjective:     Kiley Sender is a 69 y.o. male who presents today with the following:  Chief Complaint   Patient presents with    Annual Wellness Visit    Anticoagulation       Patient Active Problem List   Diagnosis Code    Hypertension I10    Thyroid disease E07.9    Arrhythmia I49.9    Hyperlipemia E78.5    PVD (peripheral vascular disease) ([facility]) I73.9    CKD (chronic kidney disease) stage 3, GFR 30-59 ml/min N18.3    Anticoagulant long-term use Z79.01    Pulmonary scarring J98.4    Spondylosis of lumbar region without myelopathy or radiculopathy M47.816         COMPLIANT WITH MEDICATION:   HTN; Denies chest pain, dyspnea, palpitations, headache and blurred vision. Blood pressure normotensive. Anticoagulation. Dx:  Atrial fibrillation.    Current symptoms: none  Current control agent: amiodarone  Current anticoagulant: warfarin5 mg monday -Friday and 2.5 mg on the weekend sat/sunday  History of bleeding problems: none  Lab Results   Component Value Date/Time    INR 1.6 (H) 10/04/2010 04:33 PM    INR 1.4 (H) 11/14/2009 05:10 AM    INR 1.4 (H) 11/13/2009 04:30 AM    INR (POC) 2.9 (H) 10/15/2010 07:37 AM    INR POC 3.5 04/06/2018 11:07 AM    INR POC 3.1 (A) 03/05/2018 08:30 AM    INR POC 2.4 01/26/2018 10:58 AM    Prothrombin time 16.3 (H) 10/04/2010 04:33 PM    Prothrombin time 13.8 (H) 11/14/2009 05:10 AM    Prothrombin time 13.8 (H) 11/13/2009 04:30 AM         ROS:  Gen: denies fever, chills, fatigue, weight loss, weight gain  HEENT:denies blurry vision, nasal congestion, sore throat  Resp: denies dypsnea, cough, wheezing  CV: denies chest pain radiating to the jaws or arms, palpitations, lower extremity edema  Abd: denies nausea, vomiting, diarrhea, constipation  Neuro: denies numbness/tingling  Endo: denies polyuria, polydipsia, heat/cold intolerance  Heme: no lymphadenopathy    Allergies   Allergen Reactions    Ketamine Other (comments)     confusion         Current Outpatient Prescriptions:   potassium chloride SR (KLOR-CON 10) 10 mEq tablet, TAKE TWO TABLETS BY MOUTH TWICE DAILY, Disp: 180 Tab, Rfl: 1    furosemide (LASIX) 40 mg tablet, TAKE ONE TABLET BY MOUTH ONCE DAILY, Disp: 90 Tab, Rfl: 1    levothyroxine (SYNTHROID) 100 mcg tablet, TAKE ONE TABLET BY MOUTH ONCE DAILY BEFORE  BREAKFAST, Disp: 90 Tab, Rfl: 1    amiodarone (CORDARONE) 200 mg tablet, TAKE ONE TABLET BY MOUTH ONCE DAILY NEEDS  TO  SCHEDULE  APPOINTMENT  FOR  FURTHER  REFILLS, Disp: 90 Tab, Rfl: 0    dilTIAZem (CARDIZEM) 60 mg tablet, Take 1 Tab by mouth two (2) times a day., Disp: 180 Tab, Rfl: 1    warfarin (COUMADIN) 5 mg tablet, 1 tab Mon and Thurs, 1/2 tab all other days. , Disp: 90 Tab, Rfl: 1    atorvastatin (LIPITOR) 20 mg tablet, Take 1 Tab by mouth daily. , Disp: 90 Tab, Rfl: 3    losartan (COZAAR) 100 mg tablet, Take 1 Tab by mouth daily. , Disp: 80 Tab, Rfl: 3    Past Medical History:   Diagnosis Date    Arrhythmia     atrial fibrillation, cardioversion 2010    CKD (chronic kidney disease) stage 3, GFR 30-59 ml/min     Hyperlipemia     Hypertension     PVD [facility]peripheral vascular disease) ([facility]     Thyroid disease        Past Surgical History:   Procedure Laterality Date    CHEST SURGERY PROCEDURE UNLISTED      lung surgery age 17    HX COLONOSCOPY  2010    WNL SIS 10 y    HX HERNIA REPAIR      VASCULAR SURGERY PROCEDURE UNLIST      bypass numerous surgeries both legs       History   Smoking Status    Former Smoker    Quit date: 3/15/2007   Smokeless Tobacco    Never Used       Social History     Social History    Marital status:      Spouse name: N/A    Number of children: N/A    Years of education: N/A     Social History Main Topics    Smoking status: Former Smoker     Quit date: 3/15/2007    Smokeless tobacco: Never Used    Alcohol use Yes      Comment: former drinker- beer   social    Drug use: None    Sexual activity: Not Asked     Other Topics Concern     Service No    Blood Transfusions Yes    Caffeine Concern No    Occupational Exposure No    Hobby Hazards No    Sleep Concern Yes    Stress Concern No    Weight Concern No    Special Diet No    Back Care Yes    Exercise Yes    Seat Belt No    Self-Exams Yes     Social History Narrative       Family History   Problem Relation Age of Onset    Cancer Mother     Cancer Brother          Objective:     Visit Vitals    /70 (BP 1 Location: Left arm, BP Patient Position: Sitting)    Pulse (!) 59    Temp 97.5 °F (36.4 °C) (Temporal)    Resp 16    Ht 5' 10\" (1.778 m)    Wt 202 lb (91.6 kg)    SpO2 94%    BMI 28.98 kg/m2     Body mass index is 28.98 kg/(m^2). General: Alert and oriented. No acute distress. Well nourished  HEENT :  Ears:TMs are normal. Canals are clear. Eyes: pupils equal, round, react to light and accommodation. Extra ocular movements intact. Nose: patent. Mouth and throat is clear. Neck:supple full range of motion no thyromegaly. Trachea midline, No carotid bruits. No significant lymphadenopathy  Lungs[de-identified] clear to auscultation without wheezes, rales, or rhonchi. Heart :RRR, S1 & S2 are normal intensity. No murmur; no gallop  Abdomen: bowel sounds active. No tenderness, guarding, rebound, masses, hepatic or spleen enlargement  Back: no CVA tenderness. Extremities: without clubbing, cyanosis, or edema  Pulses: radial and femoral pulses are normal  Neuro: HMF intact. Cranial nerves II through XII grossly normal.  Motor: is 5 over 5 and symmetrical.   Deep tendon reflexes: +2 equal    Results for orders placed or performed in visit on 04/06/18   AMB POC PT/INR   Result Value Ref Range    VALID INTERNAL CONTROL POC Yes     Prothrombin time (POC) 42.5 seconds    INR POC 3.5        Results for orders placed or performed in visit on 04/06/18   AMB POC PT/INR   Result Value Ref Range    VALID INTERNAL CONTROL POC Yes     Prothrombin time (POC) 42.5 seconds    INR POC 3.5        Assessment/ Plan:     1. Anticoagulant long-term use    - AMB POC PT/INR  - COLLECTION CAPILLARY BLOOD SPECIMEN      Orders Placed This Encounter    COLLECTION CAPILLARY BLOOD SPECIMEN    AMB POC PT/INR         Verbal and written instructions (see AVS) provided.  Patient expresses understanding of diagnosis and treatment plan. Health Maintenance Due   Topic Date Due    GLAUCOMA SCREENING Q2Y  01/19/2006    MEDICARE YEARLY EXAM  03/14/2018         Follow-up Disposition:  Return in about 1 month (around 5/6/2018).       THEODORA GoodmanC

## 2018-04-06 NOTE — PROGRESS NOTES
Indication: Atrial Fibrillation  Current dose:  Coumadin 5 mg M/W/Th., Fri and 1/2 tab Tues. , Sat & Sun.  Missed Coumadin Doses:  None  Medication Changes:  no  Dietary Changes:  no    Symptoms: taking coumadin appropriately without any bleeding.     Results for orders placed or performed in visit on 04/06/18   AMB POC PT/INR   Result Value Ref Range    VALID INTERNAL CONTROL POC Yes     Prothrombin time (POC) 42.5 seconds    INR POC 3.5

## 2018-04-23 DIAGNOSIS — I48.20 CHRONIC ATRIAL FIBRILLATION (HCC): ICD-10-CM

## 2018-04-23 DIAGNOSIS — Z79.01 ANTICOAGULANT LONG-TERM USE: ICD-10-CM

## 2018-04-23 RX ORDER — LOSARTAN POTASSIUM 100 MG/1
TABLET ORAL
Qty: 90 TAB | Refills: 3 | Status: SHIPPED | OUTPATIENT
Start: 2018-04-23 | End: 2019-04-09 | Stop reason: SDUPTHER

## 2018-04-23 RX ORDER — ATORVASTATIN CALCIUM 20 MG/1
TABLET, FILM COATED ORAL
Qty: 90 TAB | Refills: 3 | Status: SHIPPED | OUTPATIENT
Start: 2018-04-23 | End: 2019-04-09 | Stop reason: SDUPTHER

## 2018-04-23 RX ORDER — WARFARIN SODIUM 5 MG/1
TABLET ORAL
Qty: 90 TAB | Refills: 1 | Status: SHIPPED | OUTPATIENT
Start: 2018-04-23 | End: 2019-01-21 | Stop reason: SDUPTHER

## 2018-04-23 RX ORDER — DILTIAZEM HYDROCHLORIDE 60 MG/1
TABLET, FILM COATED ORAL
Qty: 180 TAB | Refills: 1 | Status: SHIPPED | OUTPATIENT
Start: 2018-04-23 | End: 2018-11-05 | Stop reason: SDUPTHER

## 2018-05-03 ENCOUNTER — OFFICE VISIT (OUTPATIENT)
Dept: FAMILY MEDICINE CLINIC | Age: 78
End: 2018-05-03

## 2018-05-03 VITALS
WEIGHT: 202 LBS | DIASTOLIC BLOOD PRESSURE: 72 MMHG | BODY MASS INDEX: 28.92 KG/M2 | HEIGHT: 70 IN | HEART RATE: 67 BPM | SYSTOLIC BLOOD PRESSURE: 136 MMHG | TEMPERATURE: 97.7 F | OXYGEN SATURATION: 94 %

## 2018-05-03 NOTE — PROGRESS NOTES
Subjective:     Nereida De Jesus is a 69 y.o. male who presents today with the following:  Chief Complaint   Patient presents with    Labs     PT/INR check up       Patient Active Problem List   Diagnosis Code    Hypertension I10    Thyroid disease E07.9    Arrhythmia I49.9    Hyperlipemia E78.5    PVD [facility]per[facility] vascular [facility]) ([facility]) I73.9    CKD (chronic kidney disease) stage 3, GFR 30-59 ml/min N18.3    Anticoagulant long-term use Z79.01    Pulmonary scarring J98.4    Spondylosis of lumbar region without myelopathy or radiculopathy M47.816         COMPLIANT WITH MEDICATION:   HTN; Denies chest pain, dyspnea, palpitations, headache and blurred vision. Blood pressure normotensive. Anticoagulation. Dx: Atrial fibrillation. Current symptoms: none  Current control agent: amiodarone  Current anticoagulant: warfarin5 mg on Monday and Thursday and 2.5 mg on all the other days.   History of bleeding problems: none  Lab Results   Component Value Date/Time    INR 1.6 (H) 10/04/2010 04:33 PM    INR 1.4 (H) 11/14/2009 05:10 AM    INR 1.4 (H) 11/13/2009 04:30 AM    INR (POC) 2.9 (H) 10/15/2010 07:37 AM    INR POC 3.5 04/06/2018 11:07 AM    INR POC 3.1 (A) 03/05/2018 08:30 AM    INR POC 2.4 01/26/2018 10:58 AM    Prothrombin time 16.3 (H) 10/04/2010 04:33 PM    Prothrombin time 13.8 (H) 11/14/2009 05:10 AM    Prothrombin time 13.8 (H) 11/13/2009 04:30 AM         ROS:  Gen: denies fever, chills, fatigue, weight loss, weight gain  HEENT:denies blurry vision, nasal congestion, sore throat  Resp: denies dypsnea, cough, wheezing  CV: denies chest pain radiating to the jaws or arms, palpitations, lower extremity edema  Abd: denies nausea, vomiting, diarrhea, constipation  Neuro: denies numbness/tingling  Endo: denies polyuria, polydipsia, heat/cold intolerance  Heme: no lymphadenopathy    Allergies   Allergen Reactions    Ketamine Other (comments)     confusion         Current Outpatient Prescriptions:     dilTIAZem (CARDIZEM) 60 mg tablet, TAKE ONE TABLET BY MOUTH TWICE DAILY, Disp: 180 Tab, Rfl: 1    losartan (COZAAR) 100 mg tablet, TAKE ONE TABLET BY MOUTH ONCE DAILY, Disp: 90 Tab, Rfl: 3    warfarin (COUMADIN) 5 mg tablet, TAKE 1 TABLET BY MOUTH ON MONDAY AND THURSDAY, AND TAKE 1/2 TAB ALL OTHER DAYS, Disp: 90 Tab, Rfl: 1    atorvastatin (LIPITOR) 20 mg tablet, TAKE ONE TABLET BY MOUTH ONCE DAILY, Disp: 90 Tab, Rfl: 3    potassium chloride SR (KLOR-CON 10) 10 mEq tablet, TAKE TWO TABLETS BY MOUTH TWICE DAILY, Disp: 180 Tab, Rfl: 1    furosemide (LASIX) 40 mg tablet, TAKE ONE TABLET BY MOUTH ONCE DAILY, Disp: 90 Tab, Rfl: 1    levothyroxine (SYNTHROID) 100 mcg tablet, TAKE ONE TABLET BY MOUTH ONCE DAILY BEFORE  BREAKFAST, Disp: 90 Tab, Rfl: 1    amiodarone (CORDARONE) 200 mg tablet, TAKE ONE TABLET BY MOUTH ONCE DAILY NEEDS  TO  SCHEDULE  APPOINTMENT  FOR  FURTHER  REFILLS, Disp: 90 Tab, Rfl: 0    Past Medical History:   Diagnosis Date    Arrhythmia     atrial fibrillation, cardioversion 2010    CKD (chronic kidney disease) stage 3, GFR 30-59 ml/min     Hyperlipemia     Hypertension     PVD (peripheral vascular disease) (HCC)     Thyroid disease        Past Surgical History:   Procedure Laterality Date    CHEST SURGERY PROCEDURE UNLISTED      lung surgery age 17   [facility] HX COLONOSCOPY  2010    WNL SIS 10 y    HX HERNIA REPAIR      VASCULAR SURGERY PROCEDURE UNLIST      bypass numerous surgeries both legs       History   Smoking Status    Former Smoker    Quit date: 3/15/2007   Smokeless Tobacco    Never Used       Social History     Social History    Marital status:      Spouse name: N/A    Number of children: N/A    Years of education: N/A     Social History Main Topics    Smoking status: Former Smoker     Quit date: 3/15/2007    Smokeless tobacco: Never Used    Alcohol use Yes      Comment: former drinker- beer   social    Drug use: None    Sexual activity: Not Asked     Other Topics Concern   Service No    Blood Transfusions Yes    Caffeine Concern No    Occupational Exposure No    Hobby Hazards No    Sleep Concern Yes    Stress Concern No    Weight Concern No    Special Diet No    Back Care Yes    Exercise Yes    Seat Belt No    Self-Exams Yes     Social History Narrative       Family History   Problem Relation Age of Onset    Cancer Mother     Cancer Brother          Objective:     Visit Vitals    /72 (BP 1 Location: Right arm, BP Patient Position: Sitting)    Pulse 67    Temp 97.7 °F (36.5 °C) (Oral)    Ht 5' 10\" (1.778 m)    Wt 202 lb (91.6 kg)    SpO2 94%    BMI 28.98 kg/m2     Body mass index is 28.98 kg/(m^2). General: Alert and oriented. No acute distress. Well nourished  HEENT :  Eyes: pupils equal, round, react to light and accommodation. Nose: patent. Mouth and throat is clear. Neck:supple full range of motion no thyromegaly. Trachea midline, No carotid bruits. No significant lymphadenopathy  Lungs[de-identified] clear to auscultation without wheezes, rales, or rhonchi. Heart :RRR, S1 & S2 are normal intensity. No murmur; no gallop  Extremities: without clubbing, cyanosis, or edema  Pulses: radial and femoral pulses are normal  Neuro: HMF intact. Cranial nerves II through XII grossly normal.    Results for orders placed or performed in visit on 04/06/18   AMB POC PT/INR   Result Value Ref Range    VALID INTERNAL CONTROL POC Yes     Prothrombin time (POC) 42.5 seconds    INR POC 3.5        No results found for this visit on 05/03/18. Assessment/ Plan:     1. BMI 28.0-28.9,adult  Discussed the patient's BMI with him. The BMI follow up plan is as follows:     dietary management education, guidance, and counseling  encourage exercise  monitor weight  prescribed dietary intake    An After Visit Summary was printed and given to the patient. No orders of the defined types were placed in this encounter.         Verbal and written instructions (see AVS) provided.  Patient expresses understanding of diagnosis and treatment plan. Health Maintenance Due   Topic Date Due    GLAUCOMA SCREENING Q2Y  01/19/2006         Follow-up Disposition:  Return in about 1 month (around 6/3/2018). or sooner as needed.       Nicolas Chiu, FNP-C

## 2018-05-03 NOTE — MR AVS SNAPSHOT
[address] [place] [address] [place] [address] [employer]  
[phone] Patient: Leopoldo Balder MRN: [id number] Visit Information Date & Time Provider Department Dept. Phone Encounter #  
 5/3/2018 10:30 AM Chon Hewitt NP [facility] [phone] [id number] Upcoming Health Maintenance Date Due  
 GLAUCOMA SCREENING Q2Y 1/19/2006 Pneumococcal 65+ Low/Medium Risk (2 of 2 - PPSV23) 5/30/2018 Influenza Age 5 to Adult 8/1/2018 MEDICARE YEARLY EXAM 4/7/2019 DTaP/Tdap/Td series (2 - Td) 2/28/2027 Allergies as of 5/3/2018  Review Complete On: 5/3/2018 By: Jonne Baumgarten Severity Noted Reaction Type Reactions Ketamine  10/15/2010    Other (comments)  
 confusion Current Immunizations  Never Reviewed Name Date Influenza Vaccine 9/28/2012 12:00 AM, 10/5/2011 12:00 AM, 10/3/2011 12:00 AM, 10/27/2010 12:00 AM  
  
 Not reviewed this visit You Were Diagnosed With   
  
 Codes Comments BMI 28.0-28.9,adult    -  Primary ICD-10-CM: T29.85 
ICD-9-CM: V85.24 Vitals BP Pulse Temp Height(growth percentile) Weight(growth percentile) SpO2  
 136/72 (BP 1 Location: Right arm, BP Patient Position: Sitting) 67 97.7 °F (36.5 °C) (Oral) 5' 10\" (1.778 m) 202 lb (91.6 kg) 94% BMI Smoking Status 28.98 kg/m2 Former Smoker BMI and BSA Data Body Mass Index Body Surface Area  
 28.98 kg/m 2 2.13 m 2 Preferred Pharmacy Pharmacy Name Phone [facility] [phone] Your Updated Medication List  
  
   
This list is accurate as of 5/3/18 10:55 AM.  Always use your most recent med list.  
  
  
  
  
 amiodarone 200 mg tablet Commonly known as:  CORDARONE  
TAKE ONE TABLET BY MOUTH ONCE DAILY NEEDS  TO  SCHEDULE  APPOINTMENT  FOR  FURTHER  REFILLS  
  
 atorvastatin 20 mg tablet Commonly known as:  LIPITOR  
TAKE ONE TABLET BY MOUTH ONCE DAILY  
  
 dilTIAZem 60 mg tablet Commonly known as:  CARDIZEM  
TAKE ONE TABLET BY MOUTH TWICE DAILY  
  
 furosemide 40 mg tablet Commonly known as:  LASIX TAKE ONE TABLET BY MOUTH ONCE DAILY  
  
 levothyroxine 100 mcg tablet Commonly known as:  SYNTHROID  
TAKE ONE TABLET BY MOUTH ONCE DAILY BEFORE  BREAKFAST  
  
 losartan 100 mg tablet Commonly known as:  COZAAR  
TAKE ONE TABLET BY MOUTH ONCE DAILY potassium chloride SR 10 mEq tablet Commonly known as:  KLOR-CON 10  
TAKE TWO TABLETS BY MOUTH TWICE DAILY  
  
 warfarin 5 mg tablet Commonly known as:  COUMADIN  
TAKE 1 TABLET BY MOUTH ON MONDAY AND THURSDAY, AND TAKE 1/2 TAB ALL OTHER DAYS Introducing [facility]! [facility] introduces [employer] patient portal. Now you can access parts of your medical record, email your doctor's office, and request medication refills online. 1. In your internet browser, go to [email or web address] Course 2. Click on the First Time User? Click Here link in the Sign In box. You will see the New Member Sign Up page. 3. Enter your [employer] Access Code exactly as it appears below. You will not need to use this code after youve completed the sign-up process. If you do not sign up before the expiration date, you must request a new code. · [employer] Access Code: [email or web address] Expires: 8/1/2018 10:44 AM 
 
4. Enter the last four digits of your Social Security Number (xxxx) and Date of Birth (mm/dd/yyyy) as indicated and click Submit. You will be taken to the next sign-up page. 5. Create a [employer] ID. This will be your [employer] login ID and cannot be changed, so think of one that is secure and easy to remember. 6. Create a [employer] password. You can change your password at any time. 7. Enter your Password Reset Question and Answer. This can be used at a later time if you forget your password. 8. Enter your e-mail address. You will receive e-mail notification when new information is available in [address] 9. Click Sign Up. You can now view and download portions of your medical record. 10. Click the Download Summary menu link to download a portable copy of your medical information. If you have questions, please visit the Frequently Asked Questions section of the [employer] website. Remember, [employer] is NOT to be used for urgent needs. For medical emergencies, dial 911. Now available from your iPhone and Android! Please provide this summary of care documentation to your next provider. Your primary care clinician is listed as Inocencio Gates. If you have any questions after today's visit, please call [phone].

## 2018-05-03 NOTE — PROGRESS NOTES
1. Have you been to the ER, urgent care clinic since your last visit? Hospitalized since your last visit? No    2. Have you seen or consulted any other health care providers outside of the [address] [place] [address] since your last visit? Include any pap smears or colon screening.  No

## 2018-05-03 NOTE — ACP (ADVANCE CARE PLANNING)
Discussed importance of advanced medical directives with patient. Patient is capable of making decisions.   Carolyn SANDHUC

## 2018-06-15 ENCOUNTER — OFFICE VISIT (OUTPATIENT)
Dept: FAMILY MEDICINE CLINIC | Age: 78
End: 2018-06-15

## 2018-06-15 VITALS
OXYGEN SATURATION: 95 % | SYSTOLIC BLOOD PRESSURE: 156 MMHG | HEIGHT: 70 IN | WEIGHT: 203 LBS | BODY MASS INDEX: 29.06 KG/M2 | RESPIRATION RATE: 30 BRPM | DIASTOLIC BLOOD PRESSURE: 70 MMHG | TEMPERATURE: 97.6 F | HEART RATE: 64 BPM

## 2018-06-15 DIAGNOSIS — I49.9 CARDIAC ARRHYTHMIA, UNSPECIFIED CARDIAC ARRHYTHMIA TYPE: ICD-10-CM

## 2018-06-15 DIAGNOSIS — Z79.01 ANTICOAGULANT LONG-TERM USE: Primary | ICD-10-CM

## 2018-06-15 LAB
INR BLD: 2
PT POC: 24 SECONDS
VALID INTERNAL CONTROL?: YES

## 2018-06-15 NOTE — PROGRESS NOTES
Subjective:     Sisi Hyde is a 69 y.o. male who presents today with the following:  Chief Complaint   Patient presents with    Anticoagulation       Patient Active Problem List   Diagnosis Code    Hypertension I10    Thyroid disease E07.9    Arrhythmia I49.9    Hyperlipemia E78.5    PVD (peripheral vascular disease) ([facility]CC) I73.9    CKD (chronic kidney disease) stage 3, GFR 30-59 ml/min N18.3    Anticoagulant long-term use Z79.01    Pulmonary scarring J98.4    Spondylosis of lumbar region without myelopathy or radiculopathy M47.816         COMPLIANT WITH MEDICATION:   HTN; Denies chest pain, dyspnea, palpitations, headache and blurred vision. Blood pressure systolic elevated today.       ROS:  Gen: denies fever, chills, fatigue, weight loss, weight gain  HEENT:denies blurry vision, nasal congestion, sore throat  Resp: denies dypsnea, cough, wheezing  CV: denies chest pain radiating to the jaws or arms, palpitations, lower extremity edema  Abd: denies nausea, vomiting, diarrhea, constipation  Neuro: denies numbness/tingling  Endo: denies polyuria, polydipsia, heat/cold intolerance  Heme: no lymphadenopathy    Allergies   Allergen Reactions    Ketamine Other (comments)     confusion         Current Outpatient Prescriptions:     dilTIAZem (CARDIZEM) 60 mg tablet, TAKE ONE TABLET BY MOUTH TWICE DAILY, Disp: 180 Tab, Rfl: 1    losartan (COZAAR) 100 mg tablet, TAKE ONE TABLET BY MOUTH ONCE DAILY, Disp: 90 Tab, Rfl: 3    atorvastatin (LIPITOR) 20 mg tablet, TAKE ONE TABLET BY MOUTH ONCE DAILY, Disp: 90 Tab, Rfl: 3    potassium chloride SR (KLOR-CON 10) 10 mEq tablet, TAKE TWO TABLETS BY MOUTH TWICE DAILY, Disp: 180 Tab, Rfl: 1    furosemide (LASIX) 40 mg tablet, TAKE ONE TABLET BY MOUTH ONCE DAILY, Disp: 90 Tab, Rfl: 1    levothyroxine (SYNTHROID) 100 mcg tablet, TAKE ONE TABLET BY MOUTH ONCE DAILY BEFORE  BREAKFAST, Disp: 90 Tab, Rfl: 1    amiodarone (CORDARONE) 200 mg tablet, TAKE ONE TABLET BY MOUTH ONCE DAILY NEEDS  TO  SCHEDULE  APPOINTMENT  FOR  FURTHER  REFILLS, Disp: 90 Tab, Rfl: 0    warfarin (COUMADIN) 5 mg tablet, TAKE 1 TABLET BY MOUTH ON MONDAY AND THURSDAY, AND TAKE 1/2 TAB ALL OTHER DAYS (Patient taking differently: 5 mg tab ,takes 3 tabs Wed, Thur, and Fri, and 1/2 tab all other days), Disp: 90 Tab, Rfl: 1    Past Medical History:   Diagnosis Date    Arrhythmia     atrial fibrillation, cardioversion 2010    CKD (chronic kidney disease) stage 3, GFR 30-59 ml/min     Hyperlipemia     Hypertension     [facility]     Thyroid disease        Past Surgical History:   Procedure Laterality Date    CHEST SURGERY PROCEDURE UNLISTED      lung surgery age 17   [facility] HX COLONOSCOPY  2010    WNL SIS 10 y    HX HERNIA REPAIR      VASCULAR SURGERY PROCEDURE UNLIST      bypass numerous surgeries both legs       History   Smoking Status    Former Smoker    Quit date: 3/15/2007   Smokeless Tobacco    Never Used       Social History     Social History    Marital status:      Spouse name: N/A    Number of children: N/A    Years of education: N/A     Social History Main Topics    Smoking status: Former Smoker     Quit date: 3/15/2007    Smokeless tobacco: Never Used    Alcohol use Yes      Comment: former drinker- beer   social    Drug use: None    Sexual activity: Not Asked     Other Topics Concern     Service No    Blood Transfusions Yes    Caffeine Concern No    Occupational Exposure No    Hobby Hazards No    Sleep Concern Yes    Stress Concern No    Weight Concern No    Special Diet No    Back Care Yes    Exercise Yes    Seat Belt No    Self-Exams Yes     Social History Narrative       Family History   Problem Relation Age of Onset    Cancer Mother     Cancer Brother          Objective:     Visit Vitals    /70 (BP 1 Location: Left arm, BP Patient Position: Sitting)    Pulse 64    Temp 97.6 °F (36.4 °C) (Temporal)    Resp 30    Ht 5' 10\" (1.778 m)    Wt 203 lb (92.1 kg)    SpO2 95%    BMI 29.13 kg/m2       Body mass index is 29.13 kg/(m^2). General: Alert and oriented. No acute distress. Well nourished  HEENT :  Eyes: pupils equal, round, react to light and accommodation. Nose: patent. Mouth and throat is clear. Neck:supple full range of motion no thyromegaly. Trachea midline, No carotid bruits. No significant lymphadenopathy  Lungs[de-identified] clear to auscultation without wheezes, rales, or rhonchi. Heart :RRR, S1 & S2 are normal intensity. No murmur; no gallop  Extremities: without clubbing, cyanosis, or edema  Pulses: radial and femoral pulses are normal  Neuro: HMF intact. Cranial nerves II through XII grossly normal.  Results for orders placed or performed in visit on 06/15/18   AMB POC PT/INR   Result Value Ref Range    VALID INTERNAL CONTROL POC Yes     Prothrombin time (POC) 24.0 seconds    INR POC 2.0        Results for orders placed or performed in visit on 06/15/18   AMB POC PT/INR   Result Value Ref Range    VALID INTERNAL CONTROL POC Yes     Prothrombin time (POC) 24.0 seconds    INR POC 2.0        Assessment/ Plan:     1. Anticoagulant long-term use    - COLLECTION CAPILLARY BLOOD SPECIMEN  - AMB POC PT/INR    2. Cardiac arrhythmia, unspecified cardiac arrhythmia type    - COLLECTION CAPILLARY BLOOD SPECIMEN  - AMB POC PT/INR      Orders Placed This Encounter    COLLECTION CAPILLARY BLOOD SPECIMEN    AMB POC PT/INR         Verbal and written instructions (see AVS) provided.  Patient expresses understanding of diagnosis and treatment plan. Health Maintenance Due   Topic Date Due    GLAUCOMA SCREENING Q2Y  01/19/2006    Pneumococcal 65+ Low/Medium Risk (2 of 2 - PPSV23) 05/30/2018         Follow-up Disposition:  Return in about 1 month (around 7/15/2018).       WENDY Verdin

## 2018-06-15 NOTE — ACP (ADVANCE CARE PLANNING)
Discussed importance of advanced medical directives with patient. Patient is capable of making decisions.   Clifford FIERRO

## 2018-06-15 NOTE — MR AVS SNAPSHOT
[address] [facility] 
 
 
 [address] 
[phone] Patient: Lanette Fleischer MRN: [id number] Visit Information Date & Time Provider Department Dept. Phone Encounter #  
 6/15/2018 10:00 AM SHARRON Thomas [facility] 13[address] [facility] [phone] [id number] Follow-up Instructions Return in about 1 month (around 7/15/2018). Follow-up and Disposition History Your Appointments 7/20/2018 10:00 AM  
ESTABLISHED PATIENT with SHARRON Thomas ([facility]) Appt Note: OV for P.T./INR  
 [address] [address]  
30[address] [facility] [address] [address] Upcoming Health Maintenance Date Due  
 GLAUCOMA SCREENING Q2Y 1/19/2006 Pneumococcal 65+ Low/Medium Risk (2 of 2 - PPSV23) 5/30/2018 Influenza Age 5 to Adult 8/1/2018 MEDICARE YEARLY EXAM 4/7/2019 DTaP/Tdap/Td series (2 - Td) 2/28/2027 Allergies as of 6/15/2018  Review Complete On: 6/15/2018 By: Elizabeth Singleton NP Severity Noted Reaction Type Reactions Ketamine  10/15/2010    Other (comments)  
 confusion Current Immunizations  Never Reviewed Name Date Influenza Vaccine 9/28/2012 12:00 AM, 10/5/2011 12:00 AM, 10/3/2011 12:00 AM, 10/27/2010 12:00 AM  
  
 Not reviewed this visit You Were Diagnosed With   
  
 Codes Comments Anticoagulant long-term use    -  Primary ICD-10-CM: Z79.01 
ICD-9-CM: V58.61 Cardiac arrhythmia, unspecified cardiac arrhythmia type     ICD-10-CM: I49.9 ICD-9-CM: 427.9 Vitals BP Pulse Temp Resp Height(growth percentile) 156/70 (BP 1 Location: Left arm, BP Patient Position: Sitting) 64 97.6 °F (36.4 °C) (Temporal) 30 5' 10\" (1.778 m) Weight(growth percentile) SpO2 BMI Smoking Status 203 lb (92.1 kg) 95% 29.13 kg/m2 Former Smoker BMI and BSA Data Body Mass Index Body Surface Area  
 29.13 kg/m 2 2.13 m 2 Preferred Pharmacy Pharmacy Name Phone [address] [phone] Your Updated Medication List  
  
   
This list is accurate as of 6/15/18 11:59 PM.  Always use your most recent med list.  
  
  
  
  
 amiodarone 200 mg tablet Commonly known as:  CORDARONE  
TAKE ONE TABLET BY MOUTH ONCE DAILY NEEDS  TO  SCHEDULE  APPOINTMENT  FOR  FURTHER  REFILLS  
  
 atorvastatin 20 mg tablet Commonly known as:  LIPITOR  
TAKE ONE TABLET BY MOUTH ONCE DAILY  
  
 dilTIAZem 60 mg tablet Commonly known as:  CARDIZEM  
TAKE ONE TABLET BY MOUTH TWICE DAILY  
  
 furosemide 40 mg tablet Commonly known as:  LASIX TAKE ONE TABLET BY MOUTH ONCE DAILY  
  
 levothyroxine 100 mcg tablet Commonly known as:  SYNTHROID  
TAKE ONE TABLET BY MOUTH ONCE DAILY BEFORE  BREAKFAST  
  
 losartan 100 mg tablet Commonly known as:  COZAAR  
TAKE ONE TABLET BY MOUTH ONCE DAILY potassium chloride SR 10 mEq tablet Commonly known as:  KLOR-CON 10  
TAKE TWO TABLETS BY MOUTH TWICE DAILY  
  
 warfarin 5 mg tablet Commonly known as:  COUMADIN  
TAKE 1 TABLET BY MOUTH ON MONDAY AND THURSDAY, AND TAKE 1/2 TAB ALL OTHER DAYS We Performed the Following AMB POC PT/INR [69640 CPT(R)] COLLECTION CAPILLARY BLOOD SPECIMEN [39253 CPT(R)] Follow-up Instructions Return in about 1 month (around 7/15/2018). Introducing [facility] & HEALTH SERVICES! Antonino Crystal introduces [employer] patient portal. Now you can access parts of your medical record, email your doctor's office, and request medication refills online. 1. In your internet browser, go to [email or web address] 2. Click on the First Time User? Click Here link in the Sign In box. You will see the New Member Sign Up page. 3. Enter your [employer] Access Code exactly as it appears below. You will not need to use this code after youve completed the sign-up process. If you do not sign up before the expiration date, you must request a new code. · [employer] Access Code: [email or web address] Expires: 8/1/2018 10:44 AM 
 
4. Enter the last four digits of your Social Security Number (xxxx) and Date of Birth (mm/dd/yyyy) as indicated and click Submit. You will be taken to the next sign-up page. 5. Create a [employer] ID. This will be your [employer] login ID and cannot be changed, so think of one that is secure and easy to remember. 6. Create a [employer] password. You can change your password at any time. 7. Enter your Password Reset Question and Answer. This can be used at a later time if you forget your password. 8. Enter your e-mail address. You will receive e-mail notification when new information is available in [address] 9. Click Sign Up. You can now view and download portions of your medical record. 10. Click the Download Summary menu link to download a portable copy of your medical information. If you have questions, please visit the Frequently Asked Questions section of the [employer] website. Remember, [employer] is NOT to be used for urgent needs. For medical emergencies, dial 911. Now available from your iPhone and Android! Please provide this summary of care documentation to your next provider. Your primary care clinician is listed as Geni Ba. If you have any questions after today's visit, please call [phone].

## 2018-06-15 NOTE — PROGRESS NOTES
1. Have you been to the ER, urgent care clinic since your last visit? Hospitalized since your last visit? No    2. Have you seen or consulted any other health care providers outside of the [address] [place] [address] since your last visit? Include any pap smears or colon screening.   NO

## 2018-07-04 RX ORDER — POTASSIUM CHLORIDE 750 MG/1
TABLET, FILM COATED, EXTENDED RELEASE ORAL
Qty: 180 TAB | Refills: 1 | Status: SHIPPED | OUTPATIENT
Start: 2018-07-04 | End: 2018-09-28 | Stop reason: SDUPTHER

## 2018-07-06 LAB — CREATININE, EXTERNAL: 2.49

## 2018-07-20 ENCOUNTER — OFFICE VISIT (OUTPATIENT)
Dept: FAMILY MEDICINE CLINIC | Age: 78
End: 2018-07-20

## 2018-07-20 VITALS
WEIGHT: 204 LBS | HEART RATE: 70 BPM | BODY MASS INDEX: 29.2 KG/M2 | TEMPERATURE: 96.7 F | OXYGEN SATURATION: 92 % | RESPIRATION RATE: 15 BRPM | HEIGHT: 70 IN | DIASTOLIC BLOOD PRESSURE: 80 MMHG | SYSTOLIC BLOOD PRESSURE: 134 MMHG

## 2018-07-20 DIAGNOSIS — Z79.01 ANTICOAGULANT LONG-TERM USE: Primary | ICD-10-CM

## 2018-07-20 LAB
INR BLD: 1.9 (ref 2–3)
PT POC: 23 SECONDS (ref 10.4–14)
VALID INTERNAL CONTROL?: YES

## 2018-07-20 NOTE — MR AVS SNAPSHOT
[address] [place] [address] 
[phone] Patient: Carlos Keene MRN: [id number] Visit Information Date & Time Provider Department Dept. Phone Encounter #  
 7/20/2018 10:00 AM Medhat Tena NP [facility] 1340 [facility] [phone] [id number] Upcoming Health Maintenance Date Due  
 GLAUCOMA SCREENING Q2Y 1/19/2006 Pneumococcal 65+ Low/Medium Risk (2 of 2 - PPSV23) 5/30/2018 Influenza Age 5 to Adult 8/1/2018 MEDICARE YEARLY EXAM 4/7/2019 DTaP/Tdap/Td series (2 - Td) 2/28/2027 Allergies as of 7/20/2018  Review Complete On: 7/20/2018 By: Kathrine Zamora LPN Severity Noted Reaction Type Reactions Ketamine  10/15/2010    Other (comments)  
 confusion Current Immunizations  Never Reviewed Name Date Influenza Vaccine 9/28/2012 12:00 AM, 10/5/2011 12:00 AM, 10/3/2011 12:00 AM, 10/27/2010 12:00 AM  
  
 Not reviewed this visit You Were Diagnosed With   
  
 Codes Comments Anticoagulant long-term use    -  Primary ICD-10-CM: Z79.01 
ICD-9-CM: V58.61 Vitals BP Pulse Temp Resp Height(growth percentile) 134/80 (BP 1 Location: Left arm, BP Patient Position: Sitting) 70 96.7 °F (35.9 °C) (Temporal) 15 5' 10\" (1.778 m) Weight(growth percentile) SpO2 BMI Smoking Status 204 lb (92.5 kg) 92% 29.27 kg/m2 Former Smoker BMI and BSA Data Body Mass Index Body Surface Area  
 29.27 kg/m 2 2.14 m 2 Preferred Pharmacy Pharmacy Name Phone [address] [phone] Your Updated Medication List  
  
   
This list is accurate as of 7/20/18 11:02 AM.  Always use your most recent med list.  
  
  
  
  
 amiodarone 200 mg tablet Commonly known as:  CORDARONE  
TAKE ONE TABLET BY MOUTH ONCE DAILY NEEDS  TO  SCHEDULE  APPOINTMENT  FOR  FURTHER  REFILLS atorvastatin 20 mg tablet Commonly known as:  LIPITOR  
TAKE ONE TABLET BY MOUTH ONCE DAILY  
  
 dilTIAZem 60 mg tablet Commonly known as:  CARDIZEM  
TAKE ONE TABLET BY MOUTH TWICE DAILY  
  
 furosemide 40 mg tablet Commonly known as:  LASIX TAKE ONE TABLET BY MOUTH ONCE DAILY  
  
 levothyroxine 100 mcg tablet Commonly known as:  SYNTHROID  
TAKE ONE TABLET BY MOUTH ONCE DAILY BEFORE  BREAKFAST  
  
 losartan 100 mg tablet Commonly known as:  COZAAR  
TAKE ONE TABLET BY MOUTH ONCE DAILY potassium chloride SR 10 mEq tablet Commonly known as:  KLOR-CON 10  
TAKE 2 TABLETS BY MOUTH TWICE DAILY  
  
 warfarin 5 mg tablet Commonly known as:  COUMADIN  
TAKE 1 TABLET BY MOUTH ON MONDAY AND THURSDAY, AND TAKE 1/2 TAB ALL OTHER DAYS We Performed the Following AMB POC PT/INR [51836 CPT(R)] COLLECTION CAPILLARY BLOOD SPECIMEN [72208 CPT(R)] Introducing [facility] & HE[facility]! [facility] introduces [employer] patient portal. Now you can access parts of your medical record, email your doctor's office, and request medication refills online. 1. In your internet browser, go to [email or web address] 2. Click on the First Time User? Click Here link in the Sign In box. You will see the New Member Sign Up page. 3. Enter your [employer] Access Code exactly as it appears below. You will not need to use this code after youve completed the sign-up process. If you do not sign up before the expiration date, you must request a new code. · [employer] Access Code: [email or web address] Expires: 8/1/2018 10:44 AM 
 
4. Enter the last four digits of your Social Security Number (xxxx) and Date of Birth (mm/dd/yyyy) as indicated and click Submit. You will be taken to the next sign-up page. 5. Create a [employer] ID. This will be your [employer] login ID and cannot be changed, so think of one that is secure and easy to remember. 6. Create a [employer] password. You can change your password at any time. 7. Enter your Password Reset Question and Answer. This can be used at a later time if you forget your password. 8. Enter your e-mail address. You will receive e-mail notification when new information is available in [address] 9. Click Sign Up. You can now view and download portions of your medical record. 10. Click the Download Summary menu link to download a portable copy of your medical information. If you have questions, please visit the Frequently Asked Questions section of the [employer] website. Remember, [employer] is NOT to be used for urgent needs. For medical emergencies, dial 911. Now available from your iPhone and Android! Please provide this summary of care documentation to your next provider. Your primary care clinician is listed as Olvin Francois. If you have any questions after today's visit, please call [phone].

## 2018-07-20 NOTE — PROGRESS NOTES
1. Have you been to the ER, urgent care clinic since your last visit? Hospitalized since your last visit? No    2. Have you seen or consulted any other health care providers outside of the [address] [place] [address] since your last visit? Include any pap smears or colon screening.  No

## 2018-07-23 NOTE — PROGRESS NOTES
Subjective:     Phuc Mccall is a 69 y.o. 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 Blood pressure normotensive. Anticoagulation. Dx: Atrial fibrillation.    Current symptoms: dyspnea  Current control agent: B-blocker  Current anticoagulant: warfarin5 mg on Weds, Thursday and Friday and half all the other days  History of bleeding problems: none  Lab Results   Component Value Date/Time    INR 1.6 (H) 10/04/2010 04:33 PM    INR 1.4 (H) 11/14/2009 05:10 AM    INR 1.4 (H) 11/13/2009 04:30 AM    INR (POC) 2.9 (H) 10/15/2010 07:37 AM    INR POC 1.9 (A) 07/20/2018 10:29 AM    INR POC 2.0 06/15/2018 10:10 AM    INR POC 3.5 04/06/2018 11:07 AM    Prothrombin time 16.3 (H) 10/04/2010 04:33 PM    Prothrombin time 13.8 (H) 11/14/2009 05:10 AM    Prothrombin time 13.8 (H) 11/13/2009 04:30 AM       ROS:  Gen: denies fever, chills, fatigue, weight loss, weight gain  HEENT:denies blurry vision, nasal congestion, sore throat  Resp: denies dypsnea, cough, wheezing  CV: denies chest pain radiating to the jaws or arms, palpitations, lower extremity edema  Abd: denies nausea, vomiting, diarrhea, constipation  Neuro: denies numbness/tingling  Endo: denies polyuria, polydipsia, heat/cold intolerance  Heme: no lymphadenopathy    Allergies   Allergen Reactions    Ketamine Other (comments)     confusion         Current Outpatient Prescriptions:     potassium chloride SR (KLOR-CON 10) 10 mEq tablet, TAKE 2 TABLETS BY MOUTH TWICE DAILY, Disp: 180 Tab, Rfl: 1    dilTIAZem (CARDIZEM) 60 mg tablet, TAKE ONE TABLET BY MOUTH TWICE DAILY, Disp: 180 Tab, Rfl: 1    losartan (COZAAR) 100 mg tablet, TAKE ONE TABLET BY MOUTH ONCE DAILY, Disp: 90 Tab, Rfl: 3    warfarin (COUMADIN) 5 mg tablet, TAKE 1 TABLET BY MOUTH ON MONDAY AND THURSDAY, AND TAKE 1/2 TAB ALL OTHER DAYS (Patient taking differently: 5 mg tab ,takes 3 tabs Wed, Thur, and Fri, and 1/2 tab all other days), Disp: 90 Tab, Rfl: 1    atorvastatin (LIPITOR) 20 mg tablet, TAKE ONE TABLET BY MOUTH ONCE DAILY, Disp: 90 Tab, Rfl: 3    furosemide (LASIX) 40 mg tablet, TAKE ONE TABLET BY MOUTH ONCE DAILY, Disp: 90 Tab, Rfl: 1    levothyroxine (SYNTHROID) 100 mcg tablet, TAKE ONE TABLET BY MOUTH ONCE DAILY BEFORE  BREAKFAST, Disp: 90 Tab, Rfl: 1    amiodarone (CORDARONE) 200 mg tablet, TAKE ONE TABLET BY MOUTH ONCE DAILY NEEDS  TO  SCHEDULE  APPOINTMENT  FOR  FURTHER  REFILLS, Disp: 90 Tab, Rfl: 0    Past Medical History:   Diagnosis Date    Arrhythmia     atrial fibrillation, cardioversion 2010    CKD (chronic kidney disease) stage 3, GFR 30-59 ml/min     Hyperlipemia     Hypertension     PVD (peripheral vascular disease) (HCC)     Thyroid disease        Past Surgical History:   Procedure Laterality Date    CHEST SURGERY PROCEDURE UNLISTED      lung surgery age 17   [facility] HX COLONOSCOPY  2010    WNL SIS 10 y    HX HERNIA REPAIR      VASCULAR SURGERY PROCEDURE UNLIST      bypass numerous surgeries both legs       History   Smoking Status    Former Smoker    Quit date: 3/15/2007   Smokeless Tobacco    Never Used       Social History     Social History    Marital status:      Spouse name: N/A    Number of children: N/A    Years of education: N/A     Social History Main Topics    Smoking status: Former Smoker     Quit date: 3/15/2007    Smokeless tobacco: Never Used    Alcohol use Yes      Comment: former drinker- beer   social    Drug use: None    Sexual activity: Not Asked     Other Topics Concern     Service No    Blood Transfusions Yes    Caffeine Concern No    Occupational Exposure No    Hobby Hazards No    Sleep Concern Yes    Stress Concern No    Weight Concern No    Special Diet No    Back Care Yes    Exercise Yes    Seat Belt No    Self-Exams Yes     Social History Narrative       Family History   Problem Relation Age of Onset    Cancer Mother     Cancer Brother          Objective:     Visit Vitals    /80 (BP 1 Location: Left arm, BP Patient Position: Sitting)    Pulse 70    Temp 96.7 °F (35.9 °C) (Temporal)    Resp 15    Ht 5' 10\" (1.778 m)    Wt 204 lb (92.5 kg)    SpO2 92%    BMI 29.27 kg/m2     Body mass index is 29.27 kg/(m^2). General: Alert and oriented. No acute distress. Well nourished  HEENT :  Eyes: pupils equal, round, react to light and accommodation. Nose: patent. Mouth and throat is clear. Neck:supple full range of motion no thyromegaly. Trachea midline, No carotid bruits. No significant lymphadenopathy  Lungs[de-identified] clear to auscultation without wheezes, rales, or rhonchi. Heart :RRR, S1 & S2 are normal intensity. No murmur; no gallop  Extremities: without clubbing, cyanosis, or edema  Pulses: radial and femoral pulses are normal  Neuro: HMF intact. Cranial nerves II through XII grossly normal.  Results for orders placed or performed in visit on 07/20/18   AMB POC PT/INR   Result Value Ref Range    VALID INTERNAL CONTROL POC Yes     Prothrombin time (POC) 23.0 (A) 10.4 - 14 seconds    INR POC 1.9 (A) 2 - 3       Results for orders placed or performed in visit on 07/20/18   AMB POC PT/INR   Result Value Ref Range    VALID INTERNAL CONTROL POC Yes     Prothrombin time (POC) 23.0 (A) 10.4 - 14 seconds    INR POC 1.9 (A) 2 - 3       Assessment/ Plan:     1.  Anticoagulant long-term use    - AMB POC PT/INR  - COLLECTION CAPILLARY BLOOD SPECIMEN      Orders Placed This Encounter    COLLECTION CAPILLARY BLOOD SPECIMEN    AMB POC PT/INR         Verbal and written instructions (see AVS) provided.  Patient expresses understanding of diagnosis and treatment plan. Health Maintenance Due   Topic Date Due    GLAUCOMA SCREENING Q2Y  01/19/2006    Pneumococcal 65+ Low/Medium Risk (2 of 2 - PPSV23) 05/30/2018         Follow-up Disposition:  Return in about 1 month (around 8/20/2018).       Darion Mcmahon, JOANP-C

## 2018-07-23 NOTE — ACP (ADVANCE CARE PLANNING)
Discussed importance of advanced medical directives with patient. Patient is capable of making decisions.   Norman Ruiz NP-C

## 2018-07-24 ENCOUNTER — TELEPHONE (OUTPATIENT)
Dept: FAMILY MEDICINE CLINIC | Age: 78
End: 2018-07-24

## 2018-07-25 DIAGNOSIS — K40.90 INTERNAL INGUINAL HERNIA: Primary | ICD-10-CM

## 2018-07-25 RX ORDER — LEVOTHYROXINE SODIUM 100 UG/1
TABLET ORAL
Qty: 90 TAB | Refills: 1 | Status: SHIPPED | OUTPATIENT
Start: 2018-07-25 | End: 2019-01-21 | Stop reason: SDUPTHER

## 2018-07-25 RX ORDER — AMIODARONE HYDROCHLORIDE 200 MG/1
TABLET ORAL
Qty: 90 TAB | Refills: 1 | Status: SHIPPED | OUTPATIENT
Start: 2018-07-25 | End: 2018-11-06 | Stop reason: SDUPTHER

## 2018-07-25 NOTE — TELEPHONE ENCOUNTER
Self reported inguinal hernia  Refused exam   Declined referral at time of the visit on 7/20/2018  Please have Shan Last set up an appointment with Dr.  Mi Becerril

## 2018-07-26 ENCOUNTER — TELEPHONE (OUTPATIENT)
Dept: FAMILY MEDICINE CLINIC | Age: 78
End: 2018-07-26

## 2018-07-26 NOTE — TELEPHONE ENCOUNTER
Shanta Nagel he is set up to see Dr. Tatyana Owusu on August 7th at 3:30pm, Can this pt wait another 2 weeks?

## 2018-07-26 NOTE — TELEPHONE ENCOUNTER
Pt changed his mind and did not want to drive all the way to [address] [place] [address]. Pt is fine with seeing Dr. Christian Kendrick on August 16th.

## 2018-08-16 ENCOUNTER — OFFICE VISIT (OUTPATIENT)
Dept: SURGERY | Age: 78
End: 2018-08-16

## 2018-08-16 VITALS
HEIGHT: 70 IN | BODY MASS INDEX: 28.39 KG/M2 | SYSTOLIC BLOOD PRESSURE: 169 MMHG | WEIGHT: 198.3 LBS | HEART RATE: 76 BPM | DIASTOLIC BLOOD PRESSURE: 82 MMHG

## 2018-08-16 DIAGNOSIS — R19.09 RIGHT GROIN MASS: Primary | ICD-10-CM

## 2018-08-17 ENCOUNTER — OFFICE VISIT (OUTPATIENT)
Dept: FAMILY MEDICINE CLINIC | Age: 78
End: 2018-08-17

## 2018-08-17 VITALS
SYSTOLIC BLOOD PRESSURE: 120 MMHG | DIASTOLIC BLOOD PRESSURE: 76 MMHG | OXYGEN SATURATION: 98 % | BODY MASS INDEX: 28.4 KG/M2 | WEIGHT: 198.4 LBS | TEMPERATURE: 97.6 F | HEART RATE: 72 BPM | HEIGHT: 70 IN

## 2018-08-17 DIAGNOSIS — Z79.01 ANTICOAGULANT LONG-TERM USE: Primary | ICD-10-CM

## 2018-08-17 LAB
INR BLD: 1.7
PT POC: 20.8 SECONDS
VALID INTERNAL CONTROL?: YES

## 2018-08-17 NOTE — PROGRESS NOTES
Subjective:     Tayo Ravi is a 69 y.o. male who presents today with the following:  Chief Complaint   Patient presents with    Medication Evaluation     PT/INR check    Saw Dr. Jevon El yesterday for / testicular mass. He states \"I have 3 balls instead of 2 and it hurts. \"  Ultrasound of scrotum and testes ordered by Dr. Jevon El. Has follow up appointment with Dr. Jevon El at the end of the month. Provided reassurance and support No other problems or concerns . Patient Active Problem List   Diagnosis Code    Hypertension I10    Thyroid disease E07.9    Arrhythmia I49.9    Hyperlipemia E78.5    PVD (peripheral vascular disease) (HCC) I73.9    CKD (chronic kidney disease) stage 3, GFR 30-59 ml/min N18.3    Anticoagulant long-term use Z79.01    Pulmonary scarring J98.4    Spondylosis of lumbar region without myelopathy or radiculopathy M47.816         COMPLIANT WITH MEDICATION:    Denies chest pain, dyspnea, palpitations, headache and blurred vision. Blood pressure normotensive. Anticoagulation. Dx:  Atrial fibrillation.    Current symptoms: none  Current control agent: amiodarone  Current anticoagulant: warfarin5 mg on monday and thursdays and 2.5 all the other days  History of bleeding problems: none  Lab Results   Component Value Date/Time    INR 1.6 (H) 10/04/2010 04:33 PM    INR 1.4 (H) 11/14/2009 05:10 AM    INR 1.4 (H) 11/13/2009 04:30 AM    INR (POC) 2.9 (H) 10/15/2010 07:37 AM    INR POC 1.7 08/17/2018 11:30 AM    INR POC 1.9 (A) 07/20/2018 10:29 AM    INR POC 2.0 06/15/2018 10:10 AM    Prothrombin time 16.3 (H) 10/04/2010 04:33 PM    Prothrombin time 13.8 (H) 11/14/2009 05:10 AM    Prothrombin time 13.8 (H) 11/13/2009 04:30 AM     Lab Results   Component Value Date/Time    INR 1.6 (H) 10/04/2010 04:33 PM    INR 1.4 (H) 11/14/2009 05:10 AM    INR 1.4 (H) 11/13/2009 04:30 AM    INR (POC) 2.9 (H) 10/15/2010 07:37 AM    INR POC 1.7 08/17/2018 11:30 AM    INR POC 1.9 (A) 07/20/2018 10:29 AM    INR POC 2.0 06/15/2018 10:10 AM    Prothrombin time 16.3 (H) 10/04/2010 04:33 PM    Prothrombin time 13.8 (H) 11/14/2009 05:10 AM    Prothrombin time 13.8 (H) 11/13/2009 04:30 AM         ROS:  Gen: denies fever, chills, fatigue, weight loss, weight gain  HEENT:denies blurry vision, nasal congestion, sore throat  Resp: denies dypsnea, cough, wheezing  CV: denies chest pain radiating to the jaws or arms, palpitations, lower extremity edema  Abd: denies nausea, vomiting, diarrhea, constipation  Neuro: denies numbness/tingling  Endo: denies polyuria, polydipsia, heat/cold intolerance  Heme: no lymphadenopathy    Allergies   Allergen Reactions    Ketamine Other (comments)     confusion         Current Outpatient Prescriptions:     levothyroxine (SYNTHROID) 100 mcg tablet, TAKE ONE TABLET BY MOUTH ONCE DAILY BEFORE  BREAKFAST, Disp: 90 Tab, Rfl: 1    amiodarone (CORDARONE) 200 mg tablet, TAKE ONE TABLET BY MOUTH ONCE DAILY, Disp: 90 Tab, Rfl: 1    potassium chloride SR (KLOR-CON 10) 10 mEq tablet, TAKE 2 TABLETS BY MOUTH TWICE DAILY, Disp: 180 Tab, Rfl: 1    dilTIAZem (CARDIZEM) 60 mg tablet, TAKE ONE TABLET BY MOUTH TWICE DAILY, Disp: 180 Tab, Rfl: 1    losartan (COZAAR) 100 mg tablet, TAKE ONE TABLET BY MOUTH ONCE DAILY, Disp: 90 Tab, Rfl: 3    warfarin (COUMADIN) 5 mg tablet, TAKE 1 TABLET BY MOUTH ON MONDAY AND THURSDAY, AND TAKE 1/2 TAB ALL OTHER DAYS (Patient taking differently: 5 mg tab ,takes 3 tabs Wed, Thur, and Fri, and 1/2 tab all other days), Disp: 90 Tab, Rfl: 1    atorvastatin (LIPITOR) 20 mg tablet, TAKE ONE TABLET BY MOUTH ONCE DAILY, Disp: 90 Tab, Rfl: 3    furosemide (LASIX) 40 mg tablet, TAKE ONE TABLET BY MOUTH ONCE DAILY, Disp: 90 Tab, Rfl: 1    amiodarone (CORDARONE) 200 mg tablet, TAKE ONE TABLET BY MOUTH ONCE DAILY NEEDS  TO  SCHEDULE  APPOINTMENT  FOR  FURTHER  REFILLS, Disp: 90 Tab, Rfl: 0    Past Medical History:   Diagnosis Date    Arrhythmia     atrial fibrillation, cardioversion 2010    CKD (chronic kidney disease) stage 3, GFR 30-59 ml/min     Hyperlipemia     Hypertension     PVD [facility]peripheral vascular disease) ([facility] [address]     Thyroid disease        Past Surgical History:   Procedure Laterality Date    CHEST SURGERY PROCEDURE UNLISTED      lung surgery age 17    HX COLONOSCOPY  2010    WNL SIS 10 y    HX HERNIA REPAIR      VASCULAR SURGERY PROCEDURE UNLIST      bypass numerous surgeries both legs       History   Smoking Status    Former Smoker    Quit date: 3/15/2007   Smokeless Tobacco    Never Used       Social History     Social History    Marital status:      Spouse name: N/A    Number of children: N/A    Years of education: N/A     Social History Main Topics    Smoking status: Former Smoker     Quit date: 3/15/2007    Smokeless tobacco: Never Used    Alcohol use Yes      Comment: former drinker- beer   social    Drug use: None    Sexual activity: Not Asked     Other Topics Concern     Service No    Blood Transfusions Yes    Caffeine Concern No    Occupational Exposure No    Hobby Hazards No    Sleep Concern Yes    Stress Concern No    Weight Concern No    Special Diet No    Back Care Yes    Exercise Yes    Seat Belt No    Self-Exams Yes     Social History Narrative       Family History   Problem Relation Age of Onset    Cancer Mother     Cancer Brother          Objective:     Visit Vitals    /76 (BP 1 Location: Right arm, BP Patient Position: Sitting)    Pulse 72    Temp 97.6 °F (36.4 °C) (Oral)    Ht 5' 10\" (1.778 m)    Wt 198 lb 6.4 oz (90 kg)    SpO2 98%    BMI 28.47 kg/m2     Body mass index is 28.47 kg/(m^2). Alert and oriented. No acute distress. Well nourished  HEENT :  Eyes: pupils equal, round, react to light and accommodation. Nose: patent. Mouth and throat is clear. Neck:supple full range of motion no thyromegaly. Trachea midline, No carotid bruits.  No significant lymphadenopathy  Lungs[de-identified] clear to auscultation without wheezes, rales, or rhonchi. Heart :RRR, S1 & S2 are normal intensity. No murmur; no gallop  Extremities: without clubbing, cyanosis, or edema  Pulses: radial and femoral pulses are normal  Neuro: HMF intact. Cranial nerves II through XII grossly normal.    Results for orders placed or performed in visit on 08/17/18   AMB POC PT/INR   Result Value Ref Range    VALID INTERNAL CONTROL POC Yes     Prothrombin time (POC) 20.8 seconds    INR POC 1.7        Results for orders placed or performed in visit on 08/17/18   AMB POC PT/INR   Result Value Ref Range    VALID INTERNAL CONTROL POC Yes     Prothrombin time (POC) 20.8 seconds    INR POC 1.7        Assessment/ Plan:     1. Anticoagulant long-term use  - AMB POC PT/INR  - COLLECTION CAPILLARY BLOOD SPECIMEN    2. BMI 28.0-28.9,adult  Discussed the patient's BMI with him. The BMI follow up plan is as follows:     dietary management education, guidance, and counseling  encourage exercise  monitor weight  prescribed dietary intake    An After Visit Summary was printed and given to the patient. Orders Placed This Encounter    COLLECTION CAPILLARY BLOOD SPECIMEN    AMB POC PT/INR         Verbal and written instructions (see AVS) provided.  Patient expresses understanding of diagnosis and treatment plan.     Health Maintenance Due   Topic Date Due    GLAUCOMA SCREENING Q2Y  01/19/2006    Pneumococcal 65+ Low/Medium Risk (2 of 2 - PPSV23) 05/30/2018    Influenza Age 5 to Adult  08/01/2018         Follow-up Disposition: Not on File      WENDY Booker

## 2018-08-17 NOTE — MR AVS SNAPSHOT
[address] [place] [address] [address]  
[phone] Patient: Ai Huertas MRN: [id number] VQD:[id number] Visit Information Date & Time Provider Department Dept. Phone Encounter #  
 8/17/2018 11:15 AM SHARRON Pepper [facility] [address] [facility] [phone] [id number] Your Appointments 8/30/2018  9:00 AM  
ESTABLISHED PATIENT with MD HEENA Banuelos [address] ([facility]) Appt Note: 2WK F/U - RESULTS [facility], [address] [facility], [address] Upcoming Health Maintenance Date Due  
 GLAUCOMA SCREENING Q2Y 1/19/2006 Pneumococcal 65+ Low/Medium Risk (2 of 2 - PPSV23) 5/30/2018 Influenza Age 5 to Adult 8/1/2018 MEDICARE YEARLY EXAM 4/7/2019 DTaP/Tdap/Td series (2 - Td) 2/28/2027 Allergies as of 8/17/2018  Review Complete On: 8/17/2018 By: Constance Carvajal Severity Noted Reaction Type Reactions Ketamine  10/15/2010    Other (comments)  
 confusion Current Immunizations  Never Reviewed Name Date Influenza Vaccine 9/28/2012 12:00 AM, 10/5/2011 12:00 AM, 10/3/2011 12:00 AM, 10/27/2010 12:00 AM  
  
 Not reviewed this visit You Were Diagnosed With   
  
 Codes Comments Anticoagulant long-term use    -  Primary ICD-10-CM: Z79.01 
ICD-9-CM: V58.61   
 BMI 28.0-28.9,adult     ICD-10-CM: K00.31 
ICD-9-CM: V85.24 Vitals BP Pulse Temp Height(growth percentile) Weight(growth percentile) SpO2  
 120/76 (BP 1 Location: Right arm, BP Patient Position: Sitting) 72 97.6 °F (36.4 °C) (Oral) 5' 10\" (1.778 m) 198 lb 6.4 oz (90 kg) 98% BMI Smoking Status 28.47 kg/m2 Former Smoker BMI and BSA Data Body Mass Index Body Surface Area  
 28.47 kg/m 2 2.11 m 2 Preferred Pharmacy Pharmacy Name Phone [phone] [id number] [address] [phone] Your Updated Medication List  
  
   
This list is accurate as of 8/17/18 11:45 AM.  Always use your most recent med list.  
  
  
  
  
 * amiodarone 200 mg tablet Commonly known as:  CORDARONE  
TAKE ONE TABLET BY MOUTH ONCE DAILY NEEDS  TO  SCHEDULE  APPOINTMENT  FOR  FURTHER  REFILLS  
  
 * amiodarone 200 mg tablet Commonly known as:  CORDARONE  
TAKE ONE TABLET BY MOUTH ONCE DAILY  
  
 atorvastatin 20 mg tablet Commonly known as:  LIPITOR  
TAKE ONE TABLET BY MOUTH ONCE DAILY  
  
 dilTIAZem 60 mg tablet Commonly known as:  CARDIZEM  
TAKE ONE TABLET BY MOUTH TWICE DAILY  
  
 furosemide 40 mg tablet Commonly known as:  LASIX TAKE ONE TABLET BY MOUTH ONCE DAILY  
  
 levothyroxine 100 mcg tablet Commonly known as:  SYNTHROID  
TAKE ONE TABLET BY MOUTH ONCE DAILY BEFORE  BREAKFAST  
  
 losartan 100 mg tablet Commonly known as:  COZAAR  
TAKE ONE TABLET BY MOUTH ONCE DAILY potassium chloride SR 10 mEq tablet Commonly known as:  KLOR-CON 10  
TAKE 2 TABLETS BY MOUTH TWICE DAILY  
  
 warfarin 5 mg tablet Commonly known as:  COUMADIN  
TAKE 1 TABLET BY MOUTH ON MONDAY AND THURSDAY, AND TAKE 1/2 TAB ALL OTHER DAYS * Notice: This list has 2 medication(s) that are the same as other medications prescribed for you. Read the directions carefully, and ask your doctor or other care provider to review them with you. We Performed the Following AMB POC PT/INR [23640 CPT(R)] COLLECTION CAPILLARY BLOOD SPECIMEN [02140 CPT(R)] Introducing [facility]! [facility] introduces [employer] patient portal. Now you can access parts of your medical record, email your doctor's office, and request medication refills online. 1. In your internet browser, go to [email or web address] 2. Click on the First Time User? Click Here link in the Sign In box.  You will see the New Member Sign Up page. 3. Enter your [employer] Access Code exactly as it appears below. You will not need to use this code after youve completed the sign-up process. If you do not sign up before the expiration date, you must request a new code. · [employer] Access Code: [email or web address] Expires: 11/14/2018  2:23 PM 
 
4. Enter the last four digits of your Social Security Number (xxxx) and Date of Birth (mm/dd/yyyy) as indicated and click Submit. You will be taken to the next sign-up page. 5. Create a [employer] ID. This will be your [employer] login ID and cannot be changed, so think of one that is secure and easy to remember. 6. Create a [employer] password. You can change your password at any time. 7. Enter your Password Reset Question and Answer. This can be used at a later time if you forget your password. 8. Enter your e-mail address. You will receive e-mail notification when new information is available in [address] 9. Click Sign Up. You can now view and download portions of your medical record. 10. Click the Download Summary menu link to download a portable copy of your medical information. If you have questions, please visit the Frequently Asked Questions section of the [employer] website. Remember, [employer] is NOT to be used for urgent needs. For medical emergencies, dial 911. Now available from your iPhone and Android! Please provide this summary of care documentation to your next provider. Your primary care clinician is listed as Jason Dunne. If you have any questions after today's visit, please call [phone].

## 2018-08-17 NOTE — ACP (ADVANCE CARE PLANNING)
Discussed importance of advanced medical directives with patient. Patient is capable of making decisions.   Shannan Stanley NP-C

## 2018-08-21 ENCOUNTER — TELEPHONE (OUTPATIENT)
Dept: FAMILY MEDICINE CLINIC | Age: 78
End: 2018-08-21

## 2018-08-21 NOTE — TELEPHONE ENCOUNTER
SW Mr. Adalid Cruz, I was not sure if he needed an appointment and that's why Gabriele Osorio had told him to call back this morning or was it just to SW her. He stated very agitated that No he did not need an appointment and that Gabriele Osorio should know why he is calling. Message routed to Gabriele Osorio to call patient.

## 2018-08-27 RX ORDER — FUROSEMIDE 40 MG/1
TABLET ORAL
Qty: 90 TAB | Refills: 1 | Status: SHIPPED | OUTPATIENT
Start: 2018-08-27 | End: 2019-03-04 | Stop reason: SDUPTHER

## 2018-08-30 ENCOUNTER — OFFICE VISIT (OUTPATIENT)
Dept: SURGERY | Age: 78
End: 2018-08-30

## 2018-08-30 VITALS
SYSTOLIC BLOOD PRESSURE: 145 MMHG | HEART RATE: 71 BPM | HEIGHT: 70 IN | BODY MASS INDEX: 28.35 KG/M2 | DIASTOLIC BLOOD PRESSURE: 74 MMHG | WEIGHT: 198 LBS

## 2018-08-30 DIAGNOSIS — R19.09 RIGHT GROIN MASS: Primary | ICD-10-CM

## 2018-08-31 NOTE — PROGRESS NOTES
Ultrasound showed 3.2 x 2.3 x 2.8 hypoechoic mass and percutaneous sampling was suggested. Explained FNA to patient. ETOH prep  21 ga needle to aspirate 4 ml of cloudy white fluid  Slides made, 3 in fixative and 3 air dried and rest of fluid sent in fixative. Mass disappeared. RTO one week for pathology.

## 2018-09-06 ENCOUNTER — OFFICE VISIT (OUTPATIENT)
Dept: SURGERY | Age: 78
End: 2018-09-06

## 2018-09-06 VITALS
DIASTOLIC BLOOD PRESSURE: 71 MMHG | BODY MASS INDEX: 28.35 KG/M2 | HEIGHT: 70 IN | HEART RATE: 73 BPM | WEIGHT: 198 LBS | SYSTOLIC BLOOD PRESSURE: 162 MMHG

## 2018-09-06 DIAGNOSIS — I89.8 LYMPHATIC CYST: ICD-10-CM

## 2018-09-06 DIAGNOSIS — R19.09 RIGHT GROIN MASS: Primary | ICD-10-CM

## 2018-09-07 NOTE — PROGRESS NOTES
No c/o  Less groin discomfort after aspiration of fluid    Pathology just showed amorphous fluid with no viable cells. I have no idea what this was, could have been a lymph node that autodigested. EXAM:   still a small residual ball that is distinct and firm, much smaller, about 2 cm in diameter in the right lower medial groin, medial to the cord structures. IMP:  Obviously since it almost disappeared with aspiration, it is not cancerous. The fluid was cloudy and white so it could have been a liquified structure? ??lymph nodes are most likely to be in the area.     PLAN:  Will just re exam in 6 months, March  If larger will reaspirate  If smaller or gone, then good

## 2018-09-21 PROBLEM — I48.20 CHRONIC ATRIAL FIBRILLATION (HCC): Status: ACTIVE | Noted: 2018-09-21

## 2018-09-28 RX ORDER — POTASSIUM CHLORIDE 750 MG/1
TABLET, FILM COATED, EXTENDED RELEASE ORAL
Qty: 180 TAB | Refills: 1 | Status: SHIPPED | OUTPATIENT
Start: 2018-09-28 | End: 2019-01-02 | Stop reason: SDUPTHER

## 2018-11-06 RX ORDER — DILTIAZEM HYDROCHLORIDE 60 MG/1
TABLET, FILM COATED ORAL
Qty: 180 TAB | Refills: 1 | Status: SHIPPED | OUTPATIENT
Start: 2018-11-06 | End: 2019-04-09 | Stop reason: SDUPTHER

## 2019-01-02 RX ORDER — POTASSIUM CHLORIDE 750 MG/1
TABLET, FILM COATED, EXTENDED RELEASE ORAL
Qty: 180 TAB | Refills: 1 | Status: SHIPPED | OUTPATIENT
Start: 2019-01-02 | End: 2019-04-09 | Stop reason: SDUPTHER

## 2019-01-21 DIAGNOSIS — I48.20 CHRONIC ATRIAL FIBRILLATION (HCC): ICD-10-CM

## 2019-01-21 DIAGNOSIS — Z79.01 ANTICOAGULANT LONG-TERM USE: ICD-10-CM

## 2019-01-21 RX ORDER — LEVOTHYROXINE SODIUM 100 UG/1
TABLET ORAL
Qty: 90 TAB | Refills: 1 | Status: SHIPPED | OUTPATIENT
Start: 2019-01-21 | End: 2019-07-10 | Stop reason: SDUPTHER

## 2019-01-21 RX ORDER — AMIODARONE HYDROCHLORIDE 200 MG/1
TABLET ORAL
Qty: 90 TAB | Refills: 1 | Status: SHIPPED | OUTPATIENT
Start: 2019-01-21 | End: 2019-07-10 | Stop reason: SDUPTHER

## 2019-01-21 RX ORDER — WARFARIN SODIUM 5 MG/1
TABLET ORAL
Qty: 90 TAB | Refills: 1 | Status: SHIPPED | OUTPATIENT
Start: 2019-01-21 | End: 2019-07-30

## 2019-03-04 RX ORDER — FUROSEMIDE 40 MG/1
TABLET ORAL
Qty: 90 TAB | Refills: 1 | Status: SHIPPED | OUTPATIENT
Start: 2019-03-04 | End: 2019-11-17 | Stop reason: SDUPTHER

## 2019-03-05 PROBLEM — M67.452 GANGLION CYST OF LEFT GROIN: Status: ACTIVE | Noted: 2019-03-05

## 2019-03-05 PROBLEM — N18.4 CKD (CHRONIC KIDNEY DISEASE) STAGE 4, GFR 15-29 ML/MIN (HCC): Status: ACTIVE | Noted: 2019-03-05

## 2019-03-05 PROBLEM — N18.5 CKD (CHRONIC KIDNEY DISEASE) STAGE 5, GFR LESS THAN 15 ML/MIN (HCC): Status: ACTIVE | Noted: 2019-03-05

## 2019-03-25 ENCOUNTER — OFFICE VISIT (OUTPATIENT)
Dept: SURGERY | Age: 79
End: 2019-03-25

## 2019-03-26 ENCOUNTER — OFFICE VISIT (OUTPATIENT)
Dept: SURGERY | Age: 79
End: 2019-03-26

## 2019-03-26 VITALS
DIASTOLIC BLOOD PRESSURE: 58 MMHG | SYSTOLIC BLOOD PRESSURE: 142 MMHG | HEIGHT: 70 IN | WEIGHT: 195 LBS | HEART RATE: 68 BPM | BODY MASS INDEX: 27.92 KG/M2

## 2019-03-26 DIAGNOSIS — R19.09 RIGHT GROIN MASS: Primary | ICD-10-CM

## 2019-03-28 NOTE — PROGRESS NOTES
CC:  Right inguinal mass    HPI  Rosette Morales is a 67 y.o. male who I saw in August, 2018 for a knot in his right groin. [facility] Ul[facility] revealed a cystic mass. We aspirated it then and it got a lot smaller. The cytopathology just showed debris and it was thought to be a disintegrating lymph node. It was not bothering him initially but now it is pulling and is uncomfortable. There has been no change in the size that he has noted. He had a RIH repair about 9 years ago. ROS   As outlined above    PE  Visit Vitals  /58 (BP 1 Location: Left arm, BP Patient Position: Sitting)   Pulse 68   Ht 5' 10\" (1.778 m)   Wt 195 lb (88.5 kg)   BMI 27.98 kg/m²       Right groin with a round mass that is in the inguinal area where we aspirated the mass. It is very defined and semimobile and larger than it was last time I felt it. It feels like it is back to the original size before aspiration. Explained need for repeat FNA to patient or removal or repeat ultrasound. After discussion we decided on a repeat aspiration. ETOH prep  21 ga needle to aspirate 2 ml of cloudy white, slightly bloody fluid  Slides made, 3 in alcohol and rest of fluid sent in fixative. Mass much smaller. IMP  Right groin mass, unknown etiology    PLAN  RTO for pathology.

## 2019-04-09 RX ORDER — ATORVASTATIN CALCIUM 20 MG/1
TABLET, FILM COATED ORAL
Qty: 90 TAB | Refills: 3 | Status: SHIPPED | OUTPATIENT
Start: 2019-04-09 | End: 2020-06-09

## 2019-04-09 RX ORDER — POTASSIUM CHLORIDE 750 MG/1
TABLET, FILM COATED, EXTENDED RELEASE ORAL
Qty: 180 TAB | Refills: 1 | Status: SHIPPED | OUTPATIENT
Start: 2019-04-09 | End: 2019-07-10 | Stop reason: SDUPTHER

## 2019-04-09 RX ORDER — LOSARTAN POTASSIUM 100 MG/1
TABLET ORAL
Qty: 90 TAB | Refills: 3 | Status: SHIPPED | OUTPATIENT
Start: 2019-04-09 | End: 2020-05-11

## 2019-04-09 RX ORDER — DILTIAZEM HYDROCHLORIDE 60 MG/1
TABLET, FILM COATED ORAL
Qty: 180 TAB | Refills: 1 | Status: SHIPPED | OUTPATIENT
Start: 2019-04-09 | End: 2019-10-28 | Stop reason: SDUPTHER

## 2019-04-23 ENCOUNTER — OFFICE VISIT (OUTPATIENT)
Dept: SURGERY | Age: 79
End: 2019-04-23

## 2019-04-23 VITALS
BODY MASS INDEX: 27.69 KG/M2 | DIASTOLIC BLOOD PRESSURE: 68 MMHG | HEIGHT: 70 IN | SYSTOLIC BLOOD PRESSURE: 159 MMHG | WEIGHT: 193.4 LBS | HEART RATE: 68 BPM

## 2019-04-23 DIAGNOSIS — R19.09 RIGHT GROIN MASS: Primary | ICD-10-CM

## 2019-04-23 RX ORDER — SODIUM CHLORIDE, SODIUM LACTATE, POTASSIUM CHLORIDE, CALCIUM CHLORIDE 600; 310; 30; 20 MG/100ML; MG/100ML; MG/100ML; MG/100ML
200 INJECTION, SOLUTION INTRAVENOUS CONTINUOUS
Status: CANCELLED | OUTPATIENT
Start: 2019-04-23 | End: 2019-04-24

## 2019-04-23 RX ORDER — AMPICILLIN AND SULBACTAM 2; 1 G/1; G/1
3 INJECTION, POWDER, FOR SOLUTION INTRAMUSCULAR; INTRAVENOUS
Status: CANCELLED | OUTPATIENT
Start: 2019-04-23

## 2019-04-23 NOTE — PROGRESS NOTES
[address] [facility], [address]  Phone: [phone] Fax: [phone]                                           HISTORY AND PHYSICAL EXAM    NAME: Trinity Raymundo  : 1940    Chief Complaint:   Knot in groin    History: Trinity Raymundo is a 67 y.o. male   who I saw in  for a knot in his right groin. [facility] Ultrasound revealed a cystic mass. We aspirated it then and it got a lot smaller. The cytopathology just showed debris and it was thought to be a disintegrating lymph node. It was not bothering him initially but now it is pulling and is uncomfortable. When he was seen on 19 it was back to its original size. It was aspirated again and again decreased in size and cytopathology sent. It showed about the same thing but the atypical cells made it a little more suspicious. He thinks there is another knot there now. He had a RIH repair about 9 years ago.         Past Medical History:   Diagnosis Date    Arrhythmia     atrial fibrillation, cardioversion     CKD (chronic kidney disease) stage 3, GFR 30-59 ml/min (HCC)     Hyperlipemia     Hypertension     PVD (peripheral vascular disease) ([address]     Thyroid disease      Past Surgical History:   Procedure Laterality Date    CHEST SURGERY PROCEDURE UNLISTED      lung surgery age 17    HX COLONOSCOPY  2010    WNL SIS 10 y    HX HERNIA REPAIR      VASCULAR SURGERY PROCEDURE UNLIST      bypass numerous surgeries both legs        Current Outpatient Medications   Medication Sig Dispense Refill    potassium chloride SR (KLOR-CON 10) 10 mEq tablet TAKE 2 TABLETS BY MOUTH TWICE DAILY 180 Tab 1    losartan (COZAAR) 100 mg tablet TAKE 1 TABLET BY MOUTH ONCE DAILY 90 Tab 3    atorvastatin (LIPITOR) 20 mg tablet TAKE 1 TABLET BY MOUTH ONCE DAILY 90 Tab 3    dilTIAZem (CARDIZEM) 60 mg tablet TAKE 1 TABLET BY MOUTH TWICE DAILY 180 Tab 1    furosemide (LASIX) 40 mg tablet TAKE 1 TABLET BY MOUTH ONCE DAILY 90 Tab 1    warfarin (COUMADIN) 5 mg tablet TAKE 1 TABLET BY MOUTH ONCE DAILY ON  MONDAY  AND  THURSDAY,  AND  TAKE  1/2  TAB  ALL  OTHER  DAYS (Patient taking differently: 5 mg WED, THUR, FRI and SAT and 1/2 tab all other days (2.5 mg)) 90 Tab 1    amiodarone (CORDARONE) 200 mg tablet TAKE 1 TABLET BY MOUTH ONCE DAILY 90 Tab 1    levothyroxine (SYNTHROID) 100 mcg tablet TAKE 1 TABLET BY MOUTH ONCE DAILY BEFORE  BREAKFAST 90 Tab 1     Allergies   Allergen Reactions    Ketamine Other (comments)     confusion     Social History     Socioeconomic History    Marital status:      Spouse name: Not on file    Number of children: Not on file    Years of education: Not on file    Highest education level: Not on file   Occupational History    Not on file   Social Needs    Financial resource strain: Not on file    Food insecurity:     Worry: Not on file     Inability: Not on file    Transportation needs:     Medical: Not on file     Non-medical: Not on file   Tobacco Use    Smoking status: Former Smoker     Last attempt to quit: 3/15/2007     Years since quittin.1    Smokeless tobacco: Never Used   Substance and Sexual Activity    Alcohol use: Yes     Comment: former drinker- beer   social    Drug use: Not on file    Sexual activity: Not on file   Lifestyle    Physical activity:     Days per week: Not on file     Minutes per session: Not on file    Stress: Not on file   Relationships    Social connections:     Talks on phone: Not on file     Gets together: Not on file     Attends Baptism service: Not on file     Active member of club or organization: Not on file     Attends meetings of clubs or organizations: Not on file     Relationship status: Not on file    Intimate partner violence:     Fear of current or ex partner: Not on file     Emotionally abused: Not on file     Physically abused: Not on file     Forced sexual activity: Not on file   Other Topics Concern     Service No    Blood Transfusions Yes  Caffeine Concern No    Occupational Exposure No    Hobby Hazards No    Sleep Concern Yes    Stress Concern No    Weight Concern No    Special Diet No    Back Care Yes    Exercise Yes    Bike Helmet Not Asked    Seat Belt No    Self-Exams Yes   Social History Narrative    Not on file     Family History   Problem Relation Age of Onset    Cancer Mother     Cancer Brother        Patient Active Problem List   Diagnosis Code    Hypertension I10    Thyroid disease E07.9    Arrhythmia I49.9    Hyperlipemia E78.5    PVD (peripheral vascular disease) ([facility]) I73.9    CKD (chronic kidney disease) stage 3, GFR 30-59 ml/min ([facility]) N18.3    Anticoagulant long-term use Z79.01    Pulmonary scarring J98.4    Spondylosis of lumbar region without myelopathy or radiculopathy M47.816    Chronic atrial fibrillation ([facility]) I48.2    CKD (chronic kidney disease) stage 4, GFR 15-29 ml/min ([facility]) N18.4    CKD (chronic kidney disease) stage 5, GFR less than 15 ml/min ([facility]) N18.5    Ganglion cyst of left groin M67.48       Review of Systems:    Review of Systems   Constitutional: Negative for chills, fever, malaise/fatigue and weight loss. HENT: Negative for congestion, ear discharge, ear pain, hearing loss, nosebleeds, sore throat and tinnitus. Eyes: Negative for blurred vision, double vision, pain, discharge and redness. Respiratory: Negative for cough, sputum production, shortness of breath and wheezing. Cardiovascular: Positive for leg swelling. Negative for chest pain and palpitations. Gastrointestinal: Negative for abdominal pain, blood in stool, constipation, diarrhea, heartburn, melena, nausea and vomiting. Genitourinary: Negative for frequency, hematuria and urgency. Musculoskeletal: Negative for back pain, joint pain and neck pain. Skin: Negative for itching and rash. Neurological: Negative for dizziness, tremors, focal weakness, seizures, weakness and headaches.    Endo/Heme/Allergies: Bruises/bleeds easily. Psychiatric/Behavioral: Negative for depression and memory loss. The patient is not nervous/anxious and does not have insomnia. Physical Exam:  Visit Vitals  /68 (BP 1 Location: Left arm, BP Patient Position: Sitting)   Pulse 68   Ht 5' 10\" (1.778 m)   Wt 193 lb 6.4 oz (87.7 kg)   BMI 27.75 kg/m²       Physical Exam   Constitutional: He is oriented to person, place, and time. He appears well-developed and well-nourished. No distress. HENT:   Head: Normocephalic and atraumatic. Right Ear: External ear normal.   Left Ear: External ear normal.   Nose: Nose normal.   Mouth/Throat: Oropharynx is clear and moist. No oropharyngeal exudate. Eyes: Pupils are equal, round, and reactive to light. EOM are normal. Right eye exhibits no discharge. Left eye exhibits no discharge. No scleral icterus. Neck: Neck supple. No tracheal deviation present. No thyromegaly present. Cardiovascular: Normal rate, regular rhythm and normal heart sounds. Exam reveals no friction rub. No murmur heard. Pulmonary/Chest: Effort normal and breath sounds normal. No respiratory distress. He has no wheezes. He has no rales. Abdominal: Soft. He exhibits no distension and no mass. There is no tenderness. Genitourinary:   Genitourinary Comments: Right groin in area of cord structures there is a 3-4 cm firm semi mobile mass. There is only one felt at this point in time. Musculoskeletal: Normal range of motion. Lymphadenopathy:     He has no cervical adenopathy. Neurological: He is alert and oriented to person, place, and time. Skin: Skin is warm and dry. No rash noted. No erythema. Psychiatric: He has a normal mood and affect.  His behavior is normal. Judgment and thought content normal.         Impression:  Right groin mass  I did not feel a second mass    Plan:  Removal of right groin mass under GA on 1/7/70  Risks and complications were explained to patient and they voiced understanding.     Chano Hunter M.D.

## 2019-05-08 PROBLEM — R19.09 RIGHT GROIN MASS: Status: RESOLVED | Noted: 2019-05-08 | Resolved: 2019-05-08

## 2019-05-08 PROBLEM — R19.09 RIGHT GROIN MASS: Status: ACTIVE | Noted: 2019-05-08

## 2019-05-16 ENCOUNTER — OFFICE VISIT (OUTPATIENT)
Dept: SURGERY | Age: 79
End: 2019-05-16

## 2019-05-16 VITALS
HEART RATE: 67 BPM | BODY MASS INDEX: 27.19 KG/M2 | WEIGHT: 189.9 LBS | HEIGHT: 70 IN | DIASTOLIC BLOOD PRESSURE: 59 MMHG | SYSTOLIC BLOOD PRESSURE: 171 MMHG

## 2019-05-16 DIAGNOSIS — Z98.890 S/P SKIN AND SUBCUTANEOUS TISSUE SURGERY: Primary | ICD-10-CM

## 2019-05-16 NOTE — PROGRESS NOTES
Patient returns for follow up of removal of groin mass  pathlogy is pending    No c/o  Soreness gone  Appetite good  Bowels okay  Thinks he has a hernia with \"guts in his sac\"      EXAM:  Wound looks good  No signs of infection  Staples removed and wound steristripped with benzoin  No evidence of right hernia    RTO 2 weeks for pathology

## 2019-05-30 ENCOUNTER — DOCUMENTATION ONLY (OUTPATIENT)
Dept: SURGERY | Age: 79
End: 2019-05-30

## 2019-05-30 ENCOUNTER — OFFICE VISIT (OUTPATIENT)
Dept: SURGERY | Age: 79
End: 2019-05-30

## 2019-05-30 VITALS
TEMPERATURE: 98.3 F | OXYGEN SATURATION: 93 % | WEIGHT: 191.25 LBS | RESPIRATION RATE: 24 BRPM | DIASTOLIC BLOOD PRESSURE: 68 MMHG | HEIGHT: 70 IN | SYSTOLIC BLOOD PRESSURE: 154 MMHG | HEART RATE: 67 BPM | BODY MASS INDEX: 27.38 KG/M2

## 2019-05-30 DIAGNOSIS — C83.35 DIFFUSE LARGE B-CELL LYMPHOMA OF LYMPH NODES OF INGUINAL REGION (HCC): ICD-10-CM

## 2019-05-30 DIAGNOSIS — C85.90 LYMPHOMA, UNSPECIFIED BODY REGION, UNSPECIFIED LYMPHOMA TYPE (HCC): Primary | ICD-10-CM

## 2019-05-30 NOTE — PROGRESS NOTES
1. Have you been to the ER, urgent care clinic since your last visit? Hospitalized since your last visit? No    2. Have you seen or consulted any other health care providers outside of the [address] [place] [address] since your last visit? Include any pap smears or colon screening.  No

## 2019-05-30 NOTE — PROGRESS NOTES
PATIENT CALLED AND HE STATED THAT HE DOES NOT WANT TO DO ANYTHING FURTHER AT THIS POINT, HE DOES NOT WANT TO SEE THE ONCOLOGIST AND DOES NOT WANT A PORT. HE IS \"JUST GOING TO LET IT GO\".

## 2019-05-30 NOTE — PROGRESS NOTES
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 Rylee Denson and patient. He suggested PET scan and then chemotherapy. He asked if I would put in a port. Patient wanted to know what would happen if he did nothing. This is the same question he asked me preop and then agreed with the plan for removal.    I told him he needed to see the oncologist and ask his questions and once he was well informed he could make a decision. We put him on for a port but if he decides not to do something, he needs to let me know to cancel it.

## 2019-06-20 ENCOUNTER — OFFICE VISIT (OUTPATIENT)
Dept: ONCOLOGY | Age: 79
End: 2019-06-20

## 2019-06-20 VITALS
HEART RATE: 62 BPM | DIASTOLIC BLOOD PRESSURE: 69 MMHG | HEIGHT: 70 IN | OXYGEN SATURATION: 93 % | TEMPERATURE: 97.8 F | BODY MASS INDEX: 27.49 KG/M2 | SYSTOLIC BLOOD PRESSURE: 162 MMHG | RESPIRATION RATE: 20 BRPM | WEIGHT: 192 LBS

## 2019-06-20 DIAGNOSIS — C83.30 DIFFUSE LARGE B-CELL LYMPHOMA, UNSPECIFIED BODY REGION (HCC): Primary | ICD-10-CM

## 2019-06-20 DIAGNOSIS — C85.18 B-CELL LYMPHOMA OF LYMPH NODES OF MULTIPLE SITES (HCC): ICD-10-CM

## 2019-06-20 DIAGNOSIS — I10 ESSENTIAL (PRIMARY) HYPERTENSION: ICD-10-CM

## 2019-06-20 NOTE — PATIENT INSTRUCTIONS
Learning About High Blood Pressure What is high blood pressure? Blood pressure is a measure of how hard the blood pushes against the walls of your arteries. It's normal for blood pressure to go up and down throughout the day, but if it stays up, you have high blood pressure. Another name for high blood pressure is hypertension. Two numbers tell you your blood pressure. The first number is the systolic pressure. It shows how hard the blood pushes when your heart is pumping. The second number is the diastolic pressure. It shows how hard the blood pushes between heartbeats, when your heart is relaxed and filling with blood. Your doctor will give you a goal for your blood pressure. Your goal will be based on your health and your age. High blood pressure (hypertension) means that the top number stays high, or the bottom number stays high, or both. High blood pressure increases the risk of stroke, heart attack, and other problems. You and your doctor will talk about your risks of these problems based on your blood pressure. What happens when you have high blood pressure? · Blood flows through your arteries with too much force. Over time, this damages the walls of your arteries. But you can't feel it. High blood pressure usually doesn't cause symptoms. · Fat and calcium start to build up in your arteries. This buildup is called plaque. Plaque makes your arteries narrower and stiffer. Blood can't flow through them as easily. · This lack of good blood flow starts to damage some of the organs in your body. This can lead to problems such as coronary artery disease and heart attack, heart failure, stroke, kidney failure, and eye damage. How can you prevent high blood pressure? · Stay at a healthy weight. · Try to limit how much sodium you eat to less than 2,300 milligrams (mg) a day. If you limit your sodium to 1,500 mg a day, you can lower your blood pressure even more. ? Buy foods that are labeled \"unsalted,\" \"sodium-free,\" or \"low-sodium. \" Foods labeled \"reduced-sodium\" and \"light sodium\" may still have too much sodium. ? Flavor your food with garlic, lemon juice, onion, vinegar, herbs, and spices instead of salt. Do not use soy sauce, steak sauce, onion salt, garlic salt, mustard, or ketchup on your food. ? Use less salt (or none) when recipes call for it. You can often use half the salt a recipe calls for without losing flavor. · Be physically active. Get at least 30 minutes of exercise on most days of the week. Walking is a good choice. You also may want to do other activities, such as running, swimming, cycling, or playing tennis or team sports. · Limit alcohol to 2 drinks a day for men and 1 drink a day for women. · Eat plenty of fruits, vegetables, and low-fat dairy products. Eat less saturated and total fats. How is high blood pressure treated? · Your doctor will suggest making lifestyle changes. For example, your doctor may ask you to eat healthy foods, quit smoking, lose extra weight, and be more active. · If lifestyle changes don't help enough, your doctor may recommend that you take medicine. · When blood pressure is very high, medicines are needed to lower it. Follow-up care is a key part of your treatment and safety. Be sure to make and go to all appointments, and call your doctor if you are having problems. It's also a good idea to know your test results and keep a list of the medicines you take. Where can you learn more? Go to http://harsh-timothy.info/. Enter P501 in the search box to learn more about \"Learning About High Blood Pressure. \" Current as of: July 22, 2018 Content Version: 11.9 © 6783-3492 [employer]. Care instructions adapted under license by [employer] (which disclaims liability or warranty for this information).  If you have questions about a medical condition or this instruction, always ask your healthcare professional. [address] any warranty or liability for your use of this information. Low Sodium Diet (2,000 Milligram): Care Instructions Your Care Instructions Too much sodium causes your body to hold on to extra water. This can raise your blood pressure and force your heart and kidneys to work harder. In very serious cases, this could cause you to be put in the hospital. It might even be life-threatening. By limiting sodium, you will feel better and lower your risk of serious problems. The most common source of sodium is salt. People get most of the salt in their diet from canned, prepared, and packaged foods. Fast food and restaurant meals also are very high in sodium. Your doctor will probably limit your sodium to less than 2,000 milligrams (mg) a day. This limit counts all the sodium in prepared and packaged foods and any salt you add to your food. Follow-up care is a key part of your treatment and safety. Be sure to make and go to all appointments, and call your doctor if you are having problems. It's also a good idea to know your test results and keep a list of the medicines you take. How can you care for yourself at home? Read food labels · Read labels on cans and food packages. The labels tell you how much sodium is in each serving. Make sure that you look at the serving size. If you eat more than the serving size, you have eaten more sodium. · Food labels also tell you the Percent Daily Value for sodium. Choose products with low Percent Daily Values for sodium. · Be aware that sodium can come in forms other than salt, including monosodium glutamate (MSG), sodium citrate, and sodium bicarbonate (baking soda). MSG is often added to Asian food. When you eat out, you can sometimes ask for food without MSG or added salt. Buy low-sodium foods · Buy foods that are labeled \"unsalted\" (no salt added), \"sodium-free\" (less than 5 mg of sodium per serving), or \"low-sodium\" (less than 140 mg of sodium per serving). Foods labeled \"reduced-sodium\" and \"light sodium\" may still have too much sodium. Be sure to read the label to see how much sodium you are getting. · Buy fresh vegetables, or frozen vegetables without added sauces. Buy low-sodium versions of canned vegetables, soups, and other canned goods. Prepare low-sodium meals · Cut back on the amount of salt you use in cooking. This will help you adjust to the taste. Do not add salt after cooking. One teaspoon of salt has about 2,300 mg of sodium. · Take the salt shaker off the table. · Flavor your food with garlic, lemon juice, onion, vinegar, herbs, and spices. Do not use soy sauce, lite soy sauce, steak sauce, onion salt, garlic salt, celery salt, mustard, or ketchup on your food. · Use low-sodium salad dressings, sauces, and ketchup. Or make your own salad dressings and sauces without adding salt. · Use less salt (or none) when recipes call for it. You can often use half the salt a recipe calls for without losing flavor. Other foods such as rice, pasta, and grains do not need added salt. · Rinse canned vegetables, and cook them in fresh water. This removes somebut not allof the salt. · Avoid water that is naturally high in sodium or that has been treated with water softeners, which add sodium. Call your local water company to find out the sodium content of your water supply. If you buy bottled water, read the label and choose a sodium-free brand. Avoid high-sodium foods · Avoid eating: 
? Smoked, cured, salted, and canned meat, fish, and poultry. ? Ham, conti, hot dogs, and luncheon meats. ? Regular, hard, and processed cheese and regular peanut butter. ? Crackers with salted tops, and other salted snack foods such as pretzels, chips, and salted popcorn. ? Frozen prepared meals, unless labeled low-sodium. ? Canned and dried soups, broths, and bouillon, unless labeled sodium-free or low-sodium. ? Canned vegetables, unless labeled sodium-free or low-sodium. ? [place] fries, pizza, tacos, and other fast foods. ? Pickles, olives, ketchup, and other condiments, especially soy sauce, unless labeled sodium-free or low-sodium. Where can you learn more? Go to http://harsh-timothy.info/. Enter Z952 in the search box to learn more about \"Low Sodium Diet (2,000 Milligram): Care Instructions. \" Current as of: March 28, 2018 Content Version: 11.9 © 6595-9184 [employer]. Care instructions adapted under license by [employer] (which disclaims liability or warranty for this information). If you have questions about a medical condition or this instruction, always ask your healthcare professional. [place] [address] any warranty or liability for your use of this information.

## 2019-06-20 NOTE — PROGRESS NOTES
Reason for Visit:   Daria Cisneros is a 67 y.o. male who is seen for new DLBCL    Treatment History:   Dx: Diffuse Large B Cell Lymphoma--EBV +, Right Groin Node--    History of Present Illness:   Mr Christiano Blanco is here after biopsy of right groin lymph node has returned positive for diffuse large B Lymphoma--EBV positive. No other sites of swelling or new lumps or bumps. No problems with wt loss, no f/c/s. No problems with dyspnea, no pain. Health is in normal state otherwise.       Past Medical History:   Diagnosis Date    Arrhythmia     atrial fibrillation, cardioversion     CKD (chronic kidney disease) stage 3, GFR 30-59 ml/min (HCC)     Hyperlipemia     Hypertension     [facility]VD [facility]per[facility] vascular disease) ([facility] [address]     Thyroid disease       Past Surgical History:   Procedure Laterality Date    CHEST SURGERY PROCEDURE UNLISTED Left     lung surgery age 17, lobectomy    HX COLONOSCOPY      WNL SIS 10 y    HX HERNIA REPAIR Bilateral     Inguinal    HX OTHER SURGICAL  2019    groin lymph gland excision    VASCULAR SURGERY PROCEDURE UNLIST      bypass numerous surgeries both legs      Social History     Tobacco Use    Smoking status: Former Smoker     Packs/day: 1.00     Years: 54.00     Pack years: 54.00     Last attempt to quit: 3/15/2007     Years since quittin.2    Smokeless tobacco: Never Used   Substance Use Topics    Alcohol use: Yes     Comment: former drinker- beer   social      Family History   Problem Relation Age of Onset    Cancer Mother         lung    Cancer Brother 66        lung    Cancer Maternal Uncle         cancer unknown     Current Outpatient Medications   Medication Sig    potassium chloride SR (KLOR-CON 10) 10 mEq tablet TAKE 2 TABLETS BY MOUTH TWICE DAILY    losartan (COZAAR) 100 mg tablet TAKE 1 TABLET BY MOUTH ONCE DAILY    atorvastatin (LIPITOR) 20 mg tablet TAKE 1 TABLET BY MOUTH ONCE DAILY    dilTIAZem (CARDIZEM) 60 mg tablet TAKE 1 TABLET BY MOUTH TWICE DAILY (Patient taking differently: TAKE 1 TABLET BY MOUTH twice daily)    furosemide (LASIX) 40 mg tablet TAKE 1 TABLET BY MOUTH ONCE DAILY    warfarin (COUMADIN) 5 mg tablet TAKE 1 TABLET BY MOUTH ONCE DAILY ON  MONDAY  AND  THURSDAY,  AND  TAKE  1/2  TAB  ALL  OTHER  DAYS (Patient taking differently: 5 mg WED, THUR, FRI and SAT and 1/2 tab all other days (2.5 mg))    amiodarone (CORDARONE) 200 mg tablet TAKE 1 TABLET BY MOUTH ONCE DAILY    levothyroxine (SYNTHROID) 100 mcg tablet TAKE 1 TABLET BY MOUTH ONCE DAILY BEFORE  BREAKFAST     No current facility-administered medications for this visit. Allergies   Allergen Reactions    Ketamine Other (comments)     confusion        Review of Systems: A complete review of systems was obtained, negative except as described above. Physical Exam:     Visit Vitals  Pulse 62   Temp 97.8 °F (36.6 °C) (Oral)   Resp 20   Ht 5' 10\" (1.778 m)   Wt 192 lb (87.1 kg)   SpO2 93%   BMI 27.55 kg/m²     ECOG PS: 2  General: No distress  Eyes: PERRLA, anicteric sclerae  HENT: Atraumatic, OP clear  Neck: Supple  Lymphatic: No cervical, supraclavicular, or inguinal adenopathy  Respiratory: CTAB, normal respiratory effort  CV: Normal rate, regular rhythm, no murmurs, no peripheral edema  GI: Soft, nontender, nondistended, no masses, no hepatomegaly, no splenomegaly  MS: Normal gait and station. Digits without clubbing or cyanosis. Skin: No rashes, ecchymoses, or petechiae. Normal temperature, turgor, and texture. Psych: Alert, oriented, appropriate affect, normal judgment/insight    Results:     Lab Results   Component Value Date/Time    WBC 6.4 03/05/2019 12:24 PM    HGB 11.7 (L) 03/05/2019 12:24 PM    HCT 36.9 (L) 03/05/2019 12:24 PM    PLATELET 434 05/23/1070 12:24 PM    MCV 96 03/05/2019 12:24 PM    ABS.  NEUTROPHILS 4.2 03/05/2019 12:24 PM     Lab Results   Component Value Date/Time    Sodium 143 03/05/2019 12:24 PM    Potassium 5.6 (H) 03/05/2019 12:24 PM Chloride 104 03/05/2019 12:24 PM    CO2 26 03/05/2019 12:24 PM    Glucose 115 (H) 03/05/2019 12:24 PM    BUN 32 (H) 03/05/2019 12:24 PM    Creatinine 2.66 (H) 03/05/2019 12:24 PM    GFR est AA 25 (L) 03/05/2019 12:24 PM    GFR est non-AA 22 (L) 03/05/2019 12:24 PM    Calcium 8.9 03/05/2019 12:24 PM     Lab Results   Component Value Date/Time    Bilirubin, total 0.3 03/05/2019 12:24 PM    ALT (SGPT) 20 03/05/2019 12:24 PM    AST (SGOT) 21 03/05/2019 12:24 PM    Alk. phosphatase 90 03/05/2019 12:24 PM    Protein, total 6.2 03/05/2019 12:24 PM    Albumin 3.7 03/05/2019 12:24 PM    Globulin 3.3 02/12/2010 02:00 PM       Right Groin Lymph Node excision 5/8/2019  Lymph node, right groin, excision:   Atypical B-cell proliferation with extensive necrosis compatible with large B-cell lymphoma, activated B cell type (see comment). Comment   The histologic section has a lymph node with extensive necrosis and scattered residual islands of medium to large atypical lymphocytes with brisk mitotic activity. The cells are positive for CD45, CD20, MUM1, and BCL2 and negative for cytokeratin AE1/AE3, CD10, BCL6, and cyclinD1. The Ki-67 proliferation index is greater than 90%. Scattered CD3/CD5 positive T cells are present. The differential diagnosis includes diffuse large B-cell lymphoma, NOS and High-Grade B-Cell Lymphoma (double hit). Additional immunohistochemical and molecular studies are pending for further classification. FISH  Interpretation:   Z(41;64): Not Detected   BCL6 rearrangement: Not Detected   MYC rearrangement: Not Detected  MYC amplification: Not Detected (SEE BELOW)   And abnormal signal pattern was observed with the MYC break-apart probes in 34% of analyzed nuclei. This represents an abnormal result suggestive of gains of MYC or chromosome 8, but not MYC amplification. Pathology Addendum  Immunohistochemical stain for MYC is positive. In situ hybridization for July-Barr viral RNA is positive.  All stains have satisfactory controls. Addendum Comment   EBV positivity and the associated extensive geographic necrosis support the diagnosis of EBV-Positive Diffuse Large B-Cell Lymphoma, NOS       Assessment:   1) DLBCL--EBV+  2) A-Fib      Plan:   1) Get Staging PET, will need Port, and ECHO.   Treatment based on results  2) On warfarin--will need to be held prior to port    Signed By: Patricia Alegre MD

## 2019-06-20 NOTE — PROGRESS NOTES
Daria Cisneros is a 67 y.o. male new patient referred by [facility], patient had a biopsy done of his Lymph node R groin on 5/8/19. He is not sure why he is here today.

## 2019-06-21 ENCOUNTER — TELEPHONE (OUTPATIENT)
Dept: ONCOLOGY | Age: 79
End: 2019-06-21

## 2019-06-21 NOTE — TELEPHONE ENCOUNTER
[facility] she tried to schedule apt with patient for imaging, patient refused to schedule.

## 2019-06-28 NOTE — TELEPHONE ENCOUNTER
Patient returned call. HIPAA verified. Patient does not want to have PET until after he speaks with Dr Julieta Rodriguez. He does not like to do anything about anything that is not bothering him. He states he will keep his 7/11 appointment to talk with Dr Julieta Rodriguez and decide at that time. Dr Julieta Rodriguez aware.

## 2019-06-28 NOTE — TELEPHONE ENCOUNTER
Called the patient on home phone, he answered but hung up the phone after I started speaking. Called patients home phone again, VM pickedup, left VM with call back information.

## 2019-07-10 RX ORDER — POTASSIUM CHLORIDE 750 MG/1
TABLET, FILM COATED, EXTENDED RELEASE ORAL
Qty: 180 TAB | Refills: 1 | Status: SHIPPED | OUTPATIENT
Start: 2019-07-10 | End: 2021-02-11

## 2019-07-10 RX ORDER — AMIODARONE HYDROCHLORIDE 200 MG/1
TABLET ORAL
Qty: 90 TAB | Refills: 1 | Status: SHIPPED | OUTPATIENT
Start: 2019-07-10 | End: 2020-02-20

## 2019-07-10 RX ORDER — LEVOTHYROXINE SODIUM 100 UG/1
TABLET ORAL
Qty: 90 TAB | Refills: 1 | Status: SHIPPED | OUTPATIENT
Start: 2019-07-10 | End: 2020-02-20

## 2019-07-11 ENCOUNTER — OFFICE VISIT (OUTPATIENT)
Dept: ONCOLOGY | Age: 79
End: 2019-07-11

## 2019-07-11 VITALS
HEIGHT: 70 IN | SYSTOLIC BLOOD PRESSURE: 161 MMHG | OXYGEN SATURATION: 90 % | TEMPERATURE: 98.2 F | RESPIRATION RATE: 19 BRPM | HEART RATE: 65 BPM | WEIGHT: 193.8 LBS | DIASTOLIC BLOOD PRESSURE: 71 MMHG | BODY MASS INDEX: 27.75 KG/M2

## 2019-07-11 DIAGNOSIS — C83.30 DIFFUSE LARGE B-CELL LYMPHOMA, UNSPECIFIED BODY REGION (HCC): Primary | ICD-10-CM

## 2019-07-11 NOTE — PROGRESS NOTES
Reason for Visit:   Delfina Way is a 67 y.o. male who is seen for follow up of new DLBCL    Treatment History:   Dx: Diffuse Large B Cell Lymphoma--EBV +, Right Groin Node--awaiting final staging     History of Present Illness:   Didn't have PET, Port, or [address]. Says we didn't discuss why he needed them.      Past Medical History:   Diagnosis Date    Arrhythmia     atrial fibrillation, cardioversion     CKD (chronic kidney disease) stage 3, GFR 30-59 ml/min (HCC)     Hyperlipemia     Hypertension     [facility]) ([facility] [address]     Thyroid disease       Past Surgical History:   Procedure Laterality Date    CHEST SURGERY PROCEDURE UNLISTED Left     lung surgery age 17, lobectomy    HX COLONOSCOPY      WNL SIS 10 y    HX HERNIA REPAIR Bilateral     Inguinal    HX OTHER SURGICAL  2019    groin lymph gland excision    VASCULAR SURGERY PROCEDURE UNLIST      bypass numerous surgeries both legs      Social History     Tobacco Use    Smoking status: Former Smoker     Packs/day: 1.00     Years: 54.00     Pack years: 54.00     Last attempt to quit: 3/15/2007     Years since quittin.3    Smokeless tobacco: Never Used   Substance Use Topics    Alcohol use: Yes     Comment: former drinker- beer   social      Family History   Problem Relation Age of Onset    Cancer Mother         lung    Cancer Brother 66        lung    Cancer Maternal Uncle         cancer unknown     Current Outpatient Medications   Medication Sig    amiodarone (CORDARONE) 200 mg tablet TAKE 1 TABLET BY MOUTH ONCE DAILY    KLOR-CON 10 10 mEq tablet TAKE 2 TABLETS BY MOUTH TWICE DAILY    levothyroxine (SYNTHROID) 100 mcg tablet TAKE 1 TABLET BY MOUTH ONCE DAILY BEFORE  BREAKFAST    losartan (COZAAR) 100 mg tablet TAKE 1 TABLET BY MOUTH ONCE DAILY    atorvastatin (LIPITOR) 20 mg tablet TAKE 1 TABLET BY MOUTH ONCE DAILY    dilTIAZem (CARDIZEM) 60 mg tablet TAKE 1 TABLET BY MOUTH TWICE DAILY (Patient taking differently: TAKE 1 TABLET BY MOUTH twice daily)    furosemide (LASIX) 40 mg tablet TAKE 1 TABLET BY MOUTH ONCE DAILY    warfarin (COUMADIN) 5 mg tablet TAKE 1 TABLET BY MOUTH ONCE DAILY ON  MONDAY  AND  THURSDAY,  AND  TAKE  1/2  TAB  ALL  OTHER  DAYS (Patient taking differently: 5 mg WED, THUR, FRI and SAT and 1/2 tab all other days (2.5 mg))     No current facility-administered medications for this visit. Allergies   Allergen Reactions    Ketamine Other (comments)     confusion        Review of Systems: A complete review of systems was obtained, negative except as described above. Physical Exam:   There were no vitals taken for this visit. ECOG PS: 0  General: No distress  Eyes: PERRLA, anicteric sclerae  HENT: Atraumatic, OP clear  Neck: Supple  Lymphatic: No cervical, supraclavicular, or inguinal adenopathy  Respiratory: CTAB, normal respiratory effort  CV: Normal rate, regular rhythm, no murmurs, no peripheral edema  GI: Soft, nontender, nondistended, no masses, no hepatomegaly, no splenomegaly  MS: Normal gait and station. Digits without clubbing or cyanosis. Skin: No rashes, ecchymoses, or petechiae. Normal temperature, turgor, and texture. Psych: Alert, oriented, appropriate affect, normal judgment/insight    Results:     Lab Results   Component Value Date/Time    WBC 7.9 06/20/2019 02:09 PM    HGB 12.1 06/20/2019 02:09 PM    HCT 38.0 06/20/2019 02:09 PM    PLATELET 899 73/40/7070 02:09 PM    MCV 97.7 06/20/2019 02:09 PM    ABS.  NEUTROPHILS 6.1 06/20/2019 02:09 PM     Lab Results   Component Value Date/Time    Sodium 136 06/20/2019 02:09 PM    Potassium 5.2 (H) 06/20/2019 02:09 PM    Chloride 100 06/20/2019 02:09 PM    CO2 28 06/20/2019 02:09 PM    Glucose 102 (H) 06/20/2019 02:09 PM    BUN 30 (H) 06/20/2019 02:09 PM    Creatinine 2.58 (H) 06/20/2019 02:09 PM    GFR est AA 29 (L) 06/20/2019 02:09 PM    GFR est non-AA 24 (L) 06/20/2019 02:09 PM    Calcium 8.4 (L) 06/20/2019 02:09 PM Lab Results   Component Value Date/Time    Bilirubin, total 0.3 06/20/2019 02:09 PM    ALT (SGPT) 26 06/20/2019 02:09 PM    AST (SGOT) 20 06/20/2019 02:09 PM    Alk. phosphatase 89 06/20/2019 02:09 PM    Protein, total 6.3 (L) 06/20/2019 02:09 PM    Albumin 3.3 (L) 06/20/2019 02:09 PM    Globulin 3.0 06/20/2019 02:09 PM       Right Groin Lymph Node excision 5/8/2019  Lymph node, right groin, excision:   Atypical B-cell proliferation with extensive necrosis compatible with large B-cell lymphoma, activated B cell type (see comment). Comment   The histologic section has a lymph node with extensive necrosis and scattered residual islands of medium to large atypical lymphocytes with brisk mitotic activity. The cells are positive for CD45, CD20, MUM1, and BCL2 and negative for cytokeratin AE1/AE3, CD10, BCL6, and cyclinD1. The Ki-67 proliferation index is greater than 90%. Scattered CD3/CD5 positive T cells are present. The differential diagnosis includes diffuse large B-cell lymphoma, NOS and High-Grade B-Cell Lymphoma (double hit). Additional immunohistochemical and molecular studies are pending for further classification.      FISH  Interpretation:   E(47;12): Not Detected   BCL6 rearrangement: Not Detected   MYC rearrangement: Not Detected  MYC amplification: Not Detected (SEE BELOW)   And abnormal signal pattern was observed with the MYC break-apart probes in 34% of analyzed nuclei. This represents an abnormal result suggestive of gains of MYC or chromosome 8, but not MYC amplification.      Pathology Addendum  Immunohistochemical stain for MYC is positive. In situ hybridization for July-Barr viral RNA is positive. All stains have satisfactory controls. Addendum Comment   EBV positivity and the associated extensive geographic necrosis support the diagnosis of EBV-Positive Diffuse Large B-Cell Lymphoma, NOS     Assessment:   1) DLBCL--EBV+  2) A-Fib    Plan:   1) Get Staging PET, will need Port, and ECHO. Treatment based on results.   Wrote this all down for him so he wouldn't forget  2) On warfarin--will need to be held prior to port      Signed By: Akira Myles MD

## 2019-07-11 NOTE — PROGRESS NOTES
Alyse Ferrari is a 67 y.o. male denies pain. Patient did not have his PET scan, he wants to talk with Dr Blondie Cockayne.

## 2019-07-22 ENCOUNTER — TELEPHONE (OUTPATIENT)
Dept: ONCOLOGY | Age: 79
End: 2019-07-22

## 2019-07-22 NOTE — TELEPHONE ENCOUNTER
Received a call from [facility] concerning this patient, he did not show up for his PET today. She has attempted to call his with out success. Called Patient and  Salvatore Tidwellkenneth left , No HIPAA disclosed. Received a call back from [employer], HIPAA verified. She stated patient has fallen many times, but refuses to go to the hospital.  Encouraged for patient to go to ED, to be checked. Patient needs to have PET before next OV with Dr Shira Robison. He also has an appointment with Dr Burleigh Leventhal.  He has already had his ECHO. She stated he may not want to do the PET. She will talk with patient to encourage him to have PET and keep appointments.

## 2019-07-29 PROBLEM — S42.301A CLOSED RIGHT HUMERAL FRACTURE: Status: ACTIVE | Noted: 2019-07-29

## 2019-07-29 PROBLEM — Z86.2 HISTORY OF ANEMIA DUE TO CHRONIC KIDNEY DISEASE: Chronic | Status: ACTIVE | Noted: 2019-07-29

## 2019-07-29 PROBLEM — T45.511A COUMADIN TOXICITY, ACCIDENTAL OR UNINTENTIONAL, INITIAL ENCOUNTER: Status: ACTIVE | Noted: 2019-07-29

## 2019-07-29 PROBLEM — S22.32XA LEFT RIB FRACTURE: Status: ACTIVE | Noted: 2019-07-29

## 2019-07-29 PROBLEM — N18.9 HISTORY OF ANEMIA DUE TO CHRONIC KIDNEY DISEASE: Chronic | Status: ACTIVE | Noted: 2019-07-29

## 2019-07-30 ENCOUNTER — HOME HEALTH ADMISSION (OUTPATIENT)
Dept: HOME HEALTH SERVICES | Facility: HOME HEALTH | Age: 79
End: 2019-07-30

## 2019-08-13 ENCOUNTER — TELEPHONE (OUTPATIENT)
Dept: ONCOLOGY | Age: 79
End: 2019-08-13

## 2019-08-13 NOTE — TELEPHONE ENCOUNTER
Called pat several times to cancel apt until test have been done. No answer & v/m full. Called [place] & left a v/m asking to call to office.

## 2019-10-11 PROBLEM — I10 ESSENTIAL HYPERTENSION: Status: ACTIVE | Noted: 2019-08-05

## 2019-10-11 PROBLEM — E03.9 HYPOTHYROID: Status: ACTIVE | Noted: 2019-08-05

## 2019-10-11 PROBLEM — N18.30 STAGE 3 CHRONIC KIDNEY DISEASE (HCC): Status: ACTIVE | Noted: 2019-08-05

## 2019-10-11 PROBLEM — S42.201D CLOSED FRACTURE OF PROXIMAL END OF RIGHT HUMERUS WITH ROUTINE HEALING: Status: ACTIVE | Noted: 2019-08-07

## 2019-10-11 PROBLEM — S42.201A CLOSED FRACTURE OF RIGHT PROXIMAL HUMERUS: Status: ACTIVE | Noted: 2019-07-31

## 2019-10-11 PROBLEM — I50.32 DIASTOLIC CHF, CHRONIC (HCC): Status: ACTIVE | Noted: 2019-10-11

## 2019-11-17 RX ORDER — FUROSEMIDE 40 MG/1
TABLET ORAL
Qty: 90 TAB | Refills: 1 | Status: SHIPPED | OUTPATIENT
Start: 2019-11-17 | End: 2020-03-17

## 2019-11-17 RX ORDER — WARFARIN 1 MG/1
TABLET ORAL
Qty: 90 TAB | Refills: 1 | Status: SHIPPED | OUTPATIENT
Start: 2019-11-17 | End: 2019-12-20 | Stop reason: SDUPTHER

## 2020-02-11 ENCOUNTER — DOCUMENTATION ONLY (OUTPATIENT)
Dept: SURGERY | Age: 80
End: 2020-02-11

## 2020-02-11 PROBLEM — C83.35 DIFFUSE LARGE B-CELL LYMPHOMA OF LYMPH NODES OF INGUINAL REGION (HCC): Status: ACTIVE | Noted: 2020-02-11

## 2020-02-11 NOTE — PROGRESS NOTES
Ms Graysonmichelle Lebron:    I was reviewing old charts to see if patients had done what they were advised to do when I came across [facility].  42    Mr. 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 He did see the oncologist but failed to do the PET scan and did not return for follow up or for port placement. It appears he missed several appointments and then broke his humerus which has occupied a lot of his time. I noted that this diagnosis was not in his problem list and so I added it. It does appear that he is seeing you. Perhaps you can get him back on track to deal with his lymphoma diagnosis. He is a bit of a curmudgeon and tends to do what he wants. Thank you.

## 2020-02-20 RX ORDER — AMIODARONE HYDROCHLORIDE 200 MG/1
TABLET ORAL
Qty: 90 TAB | Refills: 0 | Status: SHIPPED | OUTPATIENT
Start: 2020-02-20 | End: 2020-06-18

## 2020-02-20 RX ORDER — LEVOTHYROXINE SODIUM 100 UG/1
TABLET ORAL
Qty: 90 TAB | Refills: 0 | Status: SHIPPED | OUTPATIENT
Start: 2020-02-20 | End: 2020-06-25

## 2020-02-21 ENCOUNTER — OFFICE VISIT (OUTPATIENT)
Dept: ONCOLOGY | Age: 80
End: 2020-02-21

## 2020-02-21 VITALS
SYSTOLIC BLOOD PRESSURE: 138 MMHG | BODY MASS INDEX: 25.25 KG/M2 | RESPIRATION RATE: 16 BRPM | WEIGHT: 176.4 LBS | HEART RATE: 66 BPM | HEIGHT: 70 IN | OXYGEN SATURATION: 95 % | DIASTOLIC BLOOD PRESSURE: 71 MMHG | TEMPERATURE: 97.7 F

## 2020-02-21 DIAGNOSIS — G89.3 NEOPLASM RELATED PAIN: Primary | ICD-10-CM

## 2020-02-21 DIAGNOSIS — C83.30 DIFFUSE LARGE B-CELL LYMPHOMA, UNSPECIFIED BODY REGION (HCC): ICD-10-CM

## 2020-02-21 DIAGNOSIS — C83.35 DIFFUSE LARGE B-CELL LYMPHOMA, LYMPH NODES OF INGUINAL REGION AND LOWER LIMB (HCC): ICD-10-CM

## 2020-02-21 RX ORDER — TRAMADOL HYDROCHLORIDE 50 MG/1
50 TABLET ORAL
Qty: 30 TAB | Refills: 0 | Status: SHIPPED | OUTPATIENT
Start: 2020-02-21 | End: 2020-02-29

## 2020-02-21 NOTE — PROGRESS NOTES
Reason for Visit:   Eugenio Uribe is a 79 y.o. male who is seen for follow up of new DLBCL     Treatment History:   Dx: Diffuse Large B Cell Lymphoma--EBV +, Right Groin Node--awaiting final staging     History of Present Illness:   Back after long hiatus. Needs port and pet, understands and willing to have procedures done. Some pain in his upper abd that is worse with lying. Better with sitting up. No problems otherwise. Tylenol doesn't help pain--its moderate to severe--would like something stronger.     Past Medical History:   Diagnosis Date    Arrhythmia     atrial fibrillation, cardioversion     CKD (chronic kidney disease) stage 3, GFR 30-59 ml/min (HCC)     Hyperlipemia     Hypertension     PVD [facility]peripheral vascular disease) ([facility] [address]     Thyroid disease       Past Surgical History:   Procedure Laterality Date    CHEST SURGERY PROCEDURE UNLISTED Left     lung surgery age 17, lobectomy    HX COLONOSCOPY      WNL SIS 10 y    HX HERNIA REPAIR Bilateral     Inguinal    HX ORTHOPAEDIC      right shoulder fracture repair    HX OTHER SURGICAL  2019    groin lymph gland excision    VASCULAR SURGERY PROCEDURE UNLIST      bypass numerous surgeries both legs      Social History     Tobacco Use    Smoking status: Former Smoker     Packs/day: 1.00     Years: 54.00     Pack years: 54.00     Last attempt to quit: 3/15/2007     Years since quittin.9    Smokeless tobacco: Never Used   Substance Use Topics    Alcohol use: Yes     Comment: former drinker- beer   social      Family History   Problem Relation Age of Onset    Cancer Mother         lung    Cancer Brother 66        lung    Cancer Maternal Uncle         cancer unknown     Current Outpatient Medications   Medication Sig    levothyroxine (SYNTHROID) 100 mcg tablet TAKE 1 TABLET BY MOUTH ONCE DAILY BEFORE BREAKFAST    amiodarone (CORDARONE) 200 mg tablet TAKE 1 TABLET BY MOUTH ONCE DAILY    polyethylene glycol (MIRALAX) 17 gram/dose powder Take 17 g by mouth daily. 1 tablespoon with 8 oz of water daily    warfarin (COUMADIN) 1 mg tablet 1mg tablets for adjustment of INR    warfarin (COUMADIN) 3 mg tablet Take 1 Tab by mouth daily.  amoxicillin (AMOXIL) 500 mg capsule Take 1 Cap by mouth three (3) times daily.  furosemide (LASIX) 40 mg tablet TAKE 1 TABLET BY MOUTH ONCE DAILY    dilTIAZem (CARDIZEM) 60 mg tablet TAKE 1 TABLET BY MOUTH twice daily    omeprazole (PRILOSEC) 40 mg capsule Take 1 Cap by mouth daily.  KLOR-CON 10 10 mEq tablet TAKE 2 TABLETS BY MOUTH TWICE DAILY    losartan (COZAAR) 100 mg tablet TAKE 1 TABLET BY MOUTH ONCE DAILY    atorvastatin (LIPITOR) 20 mg tablet TAKE 1 TABLET BY MOUTH ONCE DAILY     No current facility-administered medications for this visit. Allergies   Allergen Reactions    Ketamine Other (comments)     confusion        Review of Systems: A complete review of systems was obtained, negative except as described above. Physical Exam:   There were no vitals taken for this visit. ECOG PS: 1  General: No distress  Eyes: PERRLA, anicteric sclerae  HENT: Atraumatic, OP clear  Neck: Supple  Lymphatic: No cervical, supraclavicular, or inguinal adenopathy  Respiratory: CTAB, normal respiratory effort  CV: Normal rate, regular rhythm, no murmurs, no peripheral edema  GI: Soft, nontender, nondistended, no masses, no hepatomegaly, no splenomegaly  MS: Normal gait and station. Digits without clubbing or cyanosis. Skin: No rashes, ecchymoses, or petechiae. Normal temperature, turgor, and texture. Psych: Alert, oriented, appropriate affect, normal judgment/insight    Results:     Lab Results   Component Value Date/Time    WBC 8.5 02/17/2020 01:01 PM    HGB 10.4 (L) 02/17/2020 01:01 PM    HCT 33.4 (L) 02/17/2020 01:01 PM    PLATELET 227 03/46/0842 01:01 PM    MCV 97.9 02/17/2020 01:01 PM    ABS.  NEUTROPHILS 6.6 02/17/2020 01:01 PM     Lab Results   Component Value Date/Time    Sodium 139 02/17/2020 01:01 PM    Potassium 4.5 02/17/2020 01:01 PM    Chloride 105 02/17/2020 01:01 PM    CO2 29 02/17/2020 01:01 PM    Glucose 161 (H) 02/17/2020 01:01 PM    BUN 41 (H) 02/17/2020 01:01 PM    Creatinine 2.90 (H) 02/17/2020 01:01 PM    GFR est AA 26 (L) 02/17/2020 01:01 PM    GFR est non-AA 21 (L) 02/17/2020 01:01 PM    Calcium 8.5 02/17/2020 01:01 PM     Lab Results   Component Value Date/Time    Bilirubin, total 0.4 02/17/2020 01:01 PM    ALT (SGPT) 16 02/17/2020 01:01 PM    AST (SGOT) 21 02/17/2020 01:01 PM    Alk. phosphatase 87 02/17/2020 01:01 PM    Protein, total 6.2 (L) 02/17/2020 01:01 PM    Albumin 3.2 (L) 02/17/2020 01:01 PM    Globulin 3.0 02/17/2020 01:01 PM       ECHO 7/19/2020  EF 56-60%    CT Chest 2/17/2020  IMPRESSION:  1. Presence of a focal nodular appearing density in the anteromedial aspect of  the right midlung. This may represent fibrotic scarring. Presence of a calcified  granuloma in the posterior aspect of the left lung base. No other focal  pulmonary nodules/mass lesions identified. No definite evidence of mediastinal  or hilar lymphadenopathy. 2. Presence of moderate coronary artery calcification. 3. Evidence of biapical fibrotic scarring (left greater than right). CT A/P 2/17/2020  IMPRESSION:  1. Presence of a focal nodular appearing density in the anteromedial aspect of  the right midlung. A follow-up examination may prove useful. Presence of a  calcified granuloma in the posterior aspect of the left lung base. 2. Normal sized, somewhat unusually shaped liver with evidence of markedly  increased attenuation. This may represent hemochromatosis. Clinical correlation  is indicated. Presence of a few calcified granulomata within the liver. 3. Evidence of cholelithiasis. 4. Normal appearance of the pancreas. 5. Normal sized spleen. 6. Presence of two small, nonobstructing calculi within the right kidney. Presence of multiple cysts involving both kidneys.  Suboptimal distention of the  urinary bladder. Normal appearance of the prostate gland. Right Groin Lymph Node excision 5/8/2019  Lymph node, right groin, excision:   Atypical B-cell proliferation with extensive necrosis compatible with large B-cell lymphoma, activated B cell type (see comment). Comment   The histologic section has a lymph node with extensive necrosis and scattered residual islands of medium to large atypical lymphocytes with brisk mitotic activity. The cells are positive for CD45, CD20, MUM1, and BCL2 and negative for cytokeratin AE1/AE3, CD10, BCL6, and cyclinD1. The Ki-67 proliferation index is greater than 90%. Scattered CD3/CD5 positive T cells are present. The differential diagnosis includes diffuse large B-cell lymphoma, NOS and High-Grade B-Cell Lymphoma (double hit). Additional immunohistochemical and molecular studies are pending for further classification.      FISH  Interpretation:   P(14;16): Not Detected   BCL6 rearrangement: Not Detected   MYC rearrangement: Not Detected  MYC amplification: Not Detected (SEE BELOW)   And abnormal signal pattern was observed with the MYC break-apart probes in 34% of analyzed nuclei. This represents an abnormal result suggestive of gains of MYC or chromosome 8, but not MYC amplification.      Pathology Addendum  Immunohistochemical stain for MYC is positive. In situ hybridization for July-Barr viral RNA is positive. All stains have satisfactory controls. Addendum Comment   EBV positivity and the associated extensive geographic necrosis support the diagnosis of EBV-Positive Diffuse Large B-Cell Lymphoma, NOS      Assessment:   1) DLBCL--EBV+  2) A-Fib  3) Abd Pain/Neoplasm Pain     Plan:   1) Get Staging PET, will need Port. Treatment based on results. Wrote this all down for him so he wouldn't forget  2) On warfarin--will need to be held prior to port  3) See if has disease on PET, treat with tramadol prn, discussed OIC.       Signed By: Kelly Qureshi, MD

## 2020-02-21 NOTE — PROGRESS NOTES
Phuc Mccall is a 79 y.o. male here for follow up DLBCL, patient was seen in ED on Monday. C/O Abdominal pain when he goes laying down to sitting or standing his abdominal muscle hurt. Denies additional complaints.

## 2020-03-02 ENCOUNTER — HOSPITAL ENCOUNTER (OUTPATIENT)
Dept: PET IMAGING | Age: 80
Discharge: HOME | End: 2020-03-02
Attending: INTERNAL MEDICINE
Payer: MEDICARE

## 2020-03-02 VITALS — HEIGHT: 69 IN | WEIGHT: 179 LBS | BODY MASS INDEX: 26.51 KG/M2

## 2020-03-02 DIAGNOSIS — C83.35 DIFFUSE LARGE B-CELL LYMPHOMA, LYMPH NODES OF INGUINAL REGION AND LOWER LIMB (HCC): ICD-10-CM

## 2020-03-02 DIAGNOSIS — C83.30 DIFFUSE LARGE B-CELL LYMPHOMA, UNSPECIFIED BODY REGION (HCC): ICD-10-CM

## 2020-03-02 PROCEDURE — A9552 F18 FDG: HCPCS

## 2020-03-02 RX ORDER — SODIUM CHLORIDE 0.9 % (FLUSH) 0.9 %
10 SYRINGE (ML) INJECTION
Status: COMPLETED | OUTPATIENT
Start: 2020-03-02 | End: 2020-03-02

## 2020-03-02 RX ADMIN — Medication 10 ML: at 12:00

## 2020-03-05 ENCOUNTER — HOSPITAL ENCOUNTER (OUTPATIENT)
Dept: INTERVENTIONAL RADIOLOGY/VASCULAR | Age: 80
Discharge: HOME | End: 2020-03-05
Attending: INTERNAL MEDICINE | Admitting: INTERNAL MEDICINE
Payer: MEDICARE

## 2020-03-05 VITALS
DIASTOLIC BLOOD PRESSURE: 58 MMHG | HEART RATE: 58 BPM | TEMPERATURE: 97.6 F | OXYGEN SATURATION: 98 % | RESPIRATION RATE: 18 BRPM | SYSTOLIC BLOOD PRESSURE: 127 MMHG | BODY MASS INDEX: 24.34 KG/M2 | WEIGHT: 170 LBS | HEIGHT: 70 IN

## 2020-03-05 DIAGNOSIS — C83.30 DIFFUSE LARGE B-CELL LYMPHOMA, UNSPECIFIED BODY REGION (HCC): ICD-10-CM

## 2020-03-05 LAB — INR BLD: 1.2 (ref 0.9–1.2)

## 2020-03-05 PROCEDURE — 74011000250 HC RX REV CODE- 250: Performed by: STUDENT IN AN ORGANIZED HEALTH CARE EDUCATION/TRAINING PROGRAM

## 2020-03-05 PROCEDURE — 36561 INSERT TUNNELED CV CATH: CPT

## 2020-03-05 PROCEDURE — 74011250636 HC RX REV CODE- 250/636: Performed by: STUDENT IN AN ORGANIZED HEALTH CARE EDUCATION/TRAINING PROGRAM

## 2020-03-05 PROCEDURE — 77030010507 HC ADH SKN DERMBND J&J -B

## 2020-03-05 PROCEDURE — 85610 PROTHROMBIN TIME: CPT

## 2020-03-05 PROCEDURE — C1788 PORT, INDWELLING, IMP: HCPCS

## 2020-03-05 PROCEDURE — 77030031139 HC SUT VCRL2 J&J -A

## 2020-03-05 PROCEDURE — 76937 US GUIDE VASCULAR ACCESS: CPT

## 2020-03-05 PROCEDURE — C1892 INTRO/SHEATH,FIXED,PEEL-AWAY: HCPCS

## 2020-03-05 RX ORDER — LIDOCAINE HYDROCHLORIDE 20 MG/ML
20 INJECTION, SOLUTION INFILTRATION; PERINEURAL ONCE
Status: COMPLETED | OUTPATIENT
Start: 2020-03-05 | End: 2020-03-05

## 2020-03-05 RX ORDER — SODIUM CHLORIDE 9 MG/ML
25 INJECTION, SOLUTION INTRAVENOUS CONTINUOUS
Status: DISCONTINUED | OUTPATIENT
Start: 2020-03-05 | End: 2020-03-09 | Stop reason: HOSPADM

## 2020-03-05 RX ORDER — MIDAZOLAM HYDROCHLORIDE 1 MG/ML
5 INJECTION, SOLUTION INTRAMUSCULAR; INTRAVENOUS
Status: DISCONTINUED | OUTPATIENT
Start: 2020-03-05 | End: 2020-03-09 | Stop reason: HOSPADM

## 2020-03-05 RX ORDER — LIDOCAINE HYDROCHLORIDE AND EPINEPHRINE 10; 10 MG/ML; UG/ML
20 INJECTION, SOLUTION INFILTRATION; PERINEURAL ONCE
Status: COMPLETED | OUTPATIENT
Start: 2020-03-05 | End: 2020-03-05

## 2020-03-05 RX ORDER — HEPARIN SODIUM 200 [USP'U]/100ML
400 INJECTION, SOLUTION INTRAVENOUS ONCE
Status: DISPENSED | OUTPATIENT
Start: 2020-03-05 | End: 2020-03-05

## 2020-03-05 RX ORDER — HEPARIN 100 UNIT/ML
300 SYRINGE INTRAVENOUS ONCE
Status: COMPLETED | OUTPATIENT
Start: 2020-03-05 | End: 2020-03-05

## 2020-03-05 RX ORDER — FENTANYL CITRATE 50 UG/ML
100 INJECTION, SOLUTION INTRAMUSCULAR; INTRAVENOUS
Status: DISCONTINUED | OUTPATIENT
Start: 2020-03-05 | End: 2020-03-09 | Stop reason: HOSPADM

## 2020-03-05 RX ADMIN — MIDAZOLAM 1 MG: 1 INJECTION INTRAMUSCULAR; INTRAVENOUS at 11:01

## 2020-03-05 RX ADMIN — LIDOCAINE HYDROCHLORIDE AND EPINEPHRINE 200 MG: 10; 10 INJECTION, SOLUTION INFILTRATION; PERINEURAL at 11:18

## 2020-03-05 RX ADMIN — SODIUM CHLORIDE 25 ML/HR: 900 INJECTION, SOLUTION INTRAVENOUS at 10:54

## 2020-03-05 RX ADMIN — MIDAZOLAM 1 MG: 1 INJECTION INTRAMUSCULAR; INTRAVENOUS at 11:09

## 2020-03-05 RX ADMIN — FENTANYL CITRATE 25 MCG: 50 INJECTION, SOLUTION INTRAMUSCULAR; INTRAVENOUS at 11:10

## 2020-03-05 RX ADMIN — FENTANYL CITRATE 50 MCG: 50 INJECTION, SOLUTION INTRAMUSCULAR; INTRAVENOUS at 11:04

## 2020-03-05 RX ADMIN — WATER 2 G: 1 INJECTION INTRAMUSCULAR; INTRAVENOUS; SUBCUTANEOUS at 10:54

## 2020-03-05 RX ADMIN — LIDOCAINE HYDROCHLORIDE 20 MG: 20 INJECTION, SOLUTION INFILTRATION; PERINEURAL at 11:14

## 2020-03-05 RX ADMIN — SODIUM CHLORIDE, PRESERVATIVE FREE 300 UNITS: 5 INJECTION INTRAVENOUS at 11:18

## 2020-03-05 RX ADMIN — MIDAZOLAM 1 MG: 1 INJECTION INTRAMUSCULAR; INTRAVENOUS at 11:05

## 2020-03-05 NOTE — DISCHARGE INSTRUCTIONS
[facility]  Angiography Department      Radiologist:   Robert Cha    Date:     3/5/20    Portacath Discharge Instructions      Watch for signs of infection:    1. Redness,   2. Fever, chills,   3. Increased pain, and/or drainage from the site. If this occurs, call your physician at once. Return next week for a [employer] check:       Do not register. Proceed to the Radiology Waiting Area and let the  know you are here for a \"PORT site check\". If you have an appointment with a provider or at the infusion center in the upcoming week,  they may check your site and change the dressing for you. Keep your dressing clean and dry. Leave the dressing in place until seen here next week. Continue your previous diet and restart your regularly prescribed medications. You may take Tylenol, as directed on the label, for pain if needed. Avoid ibuprofen (Advil, Motrin) and aspirin as they may cause you to bleed. Because you received sedation, you are not to drive or sign any legal documents for the next 24 hours. Do not lift anything heavier than 5 pounds with the affected arm and avoid pushing and pulling movements for several days. If you have any questions or concerns, please call our radiology department at [phone].

## 2020-03-05 NOTE — H&P
Radiology History and Physical    Patient: Evelia Hobbs 79 y.o. male       Chief Complaint: No chief complaint on file.       History of Present Illness: Port placement for chemotherapy    History:    Past Medical History:   Diagnosis Date    Arrhythmia     atrial fibrillation, cardioversion     CKD (chronic kidney disease) stage 3, GFR 30-59 ml/min (HCC)     Hyperlipemia     Hypertension     PVD (peripheral vascular disease) ([facility] [address]     Thyroid disease      Family History   Problem Relation Age of Onset    Cancer Mother         lung    Cancer Brother 66        lung    Cancer Maternal Uncle         cancer unknown     Social History     Socioeconomic History    Marital status:      Spouse name: Not on file    Number of children: Not on file    Years of education: Not on file    Highest education level: Not on file   Occupational History    Not on file   Social Needs    Financial resource strain: Not on file    Food insecurity:     Worry: Not on file     Inability: Not on file    Transportation needs:     Medical: Not on file     Non-medical: Not on file   Tobacco Use    Smoking status: Former Smoker     Packs/day: 1.00     Years: 54.00     Pack years: 54.00     Last attempt to quit: 3/15/2007     Years since quittin.9    Smokeless tobacco: Never Used   Substance and Sexual Activity    Alcohol use: Yes     Comment: former drinker- beer   social    Drug use: Never    Sexual activity: Not on file   Lifestyle    Physical activity:     Days per week: Not on file     Minutes per session: Not on file    Stress: Not on file   Relationships    Social connections:     Talks on phone: Not on file     Gets together: Not on file     Attends Christianity service: Not on file     Active member of club or organization: Not on file     Attends meetings of clubs or organizations: Not on file     Relationship status: Not on file    Intimate partner violence:     Fear of current or ex partner: Not on file     Emotionally abused: Not on file     Physically abused: Not on file     Forced sexual activity: Not on file   Other Topics Concern     Service No    Blood Transfusions Yes    Caffeine Concern No    Occupational Exposure No    Hobby Hazards No    Sleep Concern Yes    Stress Concern No    Weight Concern No    Special Diet No    Back Care Yes    Exercise Yes    Bike Helmet Not Asked    Seat Belt No    Self-Exams Yes   Social History Narrative    Not on file       Allergies: Allergies   Allergen Reactions    Ketamine Other (comments)     confusion       Current Medications:  Current Outpatient Medications   Medication Sig    amoxicillin-clavulanate (AUGMENTIN) 500-125 mg per tablet Take 1 Tab by mouth every twelve (12) hours for 10 days. Indications: wound    levothyroxine (SYNTHROID) 100 mcg tablet TAKE 1 TABLET BY MOUTH ONCE DAILY BEFORE BREAKFAST    amiodarone (CORDARONE) 200 mg tablet TAKE 1 TABLET BY MOUTH ONCE DAILY    polyethylene glycol (MIRALAX) 17 gram/dose powder Take 17 g by mouth daily. 1 tablespoon with 8 oz of water daily    warfarin (COUMADIN) 1 mg tablet 1mg tablets for adjustment of INR    warfarin (COUMADIN) 3 mg tablet Take 1 Tab by mouth daily.  furosemide (LASIX) 40 mg tablet TAKE 1 TABLET BY MOUTH ONCE DAILY    dilTIAZem (CARDIZEM) 60 mg tablet TAKE 1 TABLET BY MOUTH twice daily    omeprazole (PRILOSEC) 40 mg capsule Take 1 Cap by mouth daily.     KLOR-CON 10 10 mEq tablet TAKE 2 TABLETS BY MOUTH TWICE DAILY    losartan (COZAAR) 100 mg tablet TAKE 1 TABLET BY MOUTH ONCE DAILY    atorvastatin (LIPITOR) 20 mg tablet TAKE 1 TABLET BY MOUTH ONCE DAILY     Current Facility-Administered Medications   Medication Dose Route Frequency    heparin (porcine) pf 300 Units  300 Units InterCATHeter ONCE    heparinized saline 2 units/mL infusion 800 Units  400 mL Irrigation ONCE    lidocaine (XYLOCAINE) 20 mg/mL (2 %) injection 400 mg  20 mL SubCUTAneous ONCE  lidocaine-EPINEPHrine (XYLOCAINE) 1 %-1:100,000 injection 200 mg  20 mL IntraDERMal ONCE        Physical Exam:  Blood pressure 159/61, pulse 64, temperature 97.6 °F (36.4 °C), resp. rate 17, SpO2 96 %. GENERAL: alert, cooperative, no distress, appears stated age  LUNG: clear to auscultation bilaterally  HEART: regular rate and rhythm  ABD: Non tender, non distended. Alerts:    Hospital Problems  Date Reviewed: 3/2/2020    None          Laboratory:      Recent Labs     03/05/20  1003   INR 1.2*         Plan of Care/Planned Procedure:  Risks, benefits, and alternatives reviewed with patient and he agrees to proceed with the procedure. Deemed appropriate or moderate sedation with versed and fentanyl.       Malgorzata Cintron MD

## 2020-03-05 NOTE — ROUTINE PROCESS
1000- Pt in for port placement. Dr Elizabeth Hager in to consult pt. Pt aware of risks and benefits and wishes to proceed. Consent obtained. 1155- Pt to recovery and jaime PO.  VSS. Tabby D/I. To right chest.  Discharge instructions reviewed with pt and family. Pt verbalized understanding.

## 2020-03-13 ENCOUNTER — TELEPHONE (OUTPATIENT)
Dept: ONCOLOGY | Age: 80
End: 2020-03-13

## 2020-03-13 NOTE — TELEPHONE ENCOUNTER
Received fax from [facility] stating specimen received does not have adequate tumor to preform testing. I called [facility] emmanuel to danilo, there are other specimen blocks/tissue available, please contact lab at [facility] to have this sent. She will follow up with getting additional specimen for testing.

## 2020-03-16 NOTE — TELEPHONE ENCOUNTER
Receivd a call Odin Martinez with [facility], they have checked with [facility] there is no additional tumor specimens available from the Lymph node. No CARIS testing can be done at this time.

## 2020-03-17 RX ORDER — FUROSEMIDE 40 MG/1
TABLET ORAL
Qty: 90 TAB | Refills: 0 | Status: SHIPPED | OUTPATIENT
Start: 2020-03-17 | End: 2020-08-17

## 2020-03-17 NOTE — PROGRESS NOTES
Osmani Malloy is a 79 y.o. male here for follow up DLBCL. Caris testing sent, not enough viable tissue available. Denies new lumps or bumps, denies complaints.       Last;  PET 3/2/2020  CT Chest 2/17/2020  CT A/P 2/17/2020

## 2020-03-18 ENCOUNTER — TELEPHONE (OUTPATIENT)
Dept: ONCOLOGY | Age: 80
End: 2020-03-18

## 2020-03-19 ENCOUNTER — OFFICE VISIT (OUTPATIENT)
Dept: ONCOLOGY | Age: 80
End: 2020-03-19

## 2020-03-19 VITALS
BODY MASS INDEX: 25.57 KG/M2 | TEMPERATURE: 97.7 F | RESPIRATION RATE: 18 BRPM | HEART RATE: 64 BPM | HEIGHT: 70 IN | OXYGEN SATURATION: 97 % | SYSTOLIC BLOOD PRESSURE: 159 MMHG | WEIGHT: 178.6 LBS | DIASTOLIC BLOOD PRESSURE: 72 MMHG

## 2020-03-19 DIAGNOSIS — B27.09 EPSTEIN-BARR VIRUS (EBV)-POSITIVE DIFFUSE LARGE B-CELL LYMPHOMA OF ELDERLY (HCC): Primary | ICD-10-CM

## 2020-03-19 DIAGNOSIS — C83.30 EPSTEIN-BARR VIRUS (EBV)-POSITIVE DIFFUSE LARGE B-CELL LYMPHOMA OF ELDERLY (HCC): Primary | ICD-10-CM

## 2020-03-19 NOTE — PROGRESS NOTES
Reason for Visit:   Olivia Brambila a 79 y. o. male who is seen for follow up of new DLBCL--EBV positive     Treatment History:   Dx: Diffuse Large B Cell Lymphoma--EBV +, Right Groin Node--PET on 3/4/2020 did not show activity. PACO 1, Stage I  Tx: Bone Marrow Biopsy followed by RCHOP x 3 then ISRT. May need to change proposed RCHOP based on comorbidities. Goal: Cure    History of Present Illness:   Continues to do well, no problems with pain/dyspnea/f/c/sweats.       Past Medical History:   Diagnosis Date    Arrhythmia     atrial fibrillation, cardioversion     CKD (chronic kidney disease) stage 3, GFR 30-59 ml/min (HCC)     Hyperlipemia     Hypertension     PVD (peripheral vascular disease) ([facility] [address]     Thyroid disease       Past Surgical History:   Procedure Laterality Date    CHEST SURGERY PROCEDURE UNLISTED Left     lung surgery age 17, lobectomy    HX COLONOSCOPY      WNL SIS 10 y    HX HERNIA REPAIR Bilateral     Inguinal    HX ORTHOPAEDIC      right shoulder fracture repair    HX OTHER SURGICAL  2019    groin lymph gland excision    IR INSERT TUNL CVC W PORT OVER 5 YEARS  3/5/2020    VASCULAR SURGERY PROCEDURE UNLIST      bypass numerous surgeries both legs      Social History     Tobacco Use    Smoking status: Former Smoker     Packs/day: 1.00     Years: 54.00     Pack years: 54.00     Last attempt to quit: 3/15/2007     Years since quittin.0    Smokeless tobacco: Never Used   Substance Use Topics    Alcohol use: Yes     Comment: former drinker- beer   social      Family History   Problem Relation Age of Onset    Cancer Mother         lung    Cancer Brother 66        lung    Cancer Maternal Uncle         cancer unknown     Current Outpatient Medications   Medication Sig    dilTIAZem (CARDIZEM) 60 mg tablet Take 1 tablet by mouth twice daily    furosemide (LASIX) 40 mg tablet Take 1 tablet by mouth once daily    levothyroxine (SYNTHROID) 100 mcg tablet TAKE 1 TABLET BY MOUTH ONCE DAILY BEFORE BREAKFAST    amiodarone (CORDARONE) 200 mg tablet TAKE 1 TABLET BY MOUTH ONCE DAILY    polyethylene glycol (MIRALAX) 17 gram/dose powder Take 17 g by mouth daily. 1 tablespoon with 8 oz of water daily    warfarin (COUMADIN) 1 mg tablet 1mg tablets for adjustment of INR    warfarin (COUMADIN) 3 mg tablet Take 1 Tab by mouth daily.  omeprazole (PRILOSEC) 40 mg capsule Take 1 Cap by mouth daily.  KLOR-CON 10 10 mEq tablet TAKE 2 TABLETS BY MOUTH TWICE DAILY    losartan (COZAAR) 100 mg tablet TAKE 1 TABLET BY MOUTH ONCE DAILY    atorvastatin (LIPITOR) 20 mg tablet TAKE 1 TABLET BY MOUTH ONCE DAILY     No current facility-administered medications for this visit. Allergies   Allergen Reactions    Ketamine Other (comments)     confusion        Review of Systems: A complete review of systems was obtained, negative except as described above. Physical Exam:   There were no vitals taken for this visit. ECOG PS: 1  General: No distress  Eyes: PERRLA, anicteric sclerae  HENT: Atraumatic, OP clear  Neck: Supple  Lymphatic: No cervical, supraclavicular, or inguinal adenopathy  Respiratory: CTAB, normal respiratory effort  CV: Normal rate, regular rhythm, no murmurs, no peripheral edema  GI: Soft, nontender, nondistended, no masses, no hepatomegaly, no splenomegaly  MS: Normal gait and station. Digits without clubbing or cyanosis. Skin: No rashes, ecchymoses, or petechiae. Normal temperature, turgor, and texture. Psych: Alert, oriented, appropriate affect, normal judgment/insight    Results:     Lab Results   Component Value Date/Time    WBC 8.5 02/17/2020 01:01 PM    HGB 10.4 (L) 02/17/2020 01:01 PM    HCT 33.4 (L) 02/17/2020 01:01 PM    PLATELET 195 46/05/8215 01:01 PM    MCV 97.9 02/17/2020 01:01 PM    ABS.  NEUTROPHILS 6.6 02/17/2020 01:01 PM     Lab Results   Component Value Date/Time    Sodium 139 02/17/2020 01:01 PM    Potassium 4.5 02/17/2020 01:01 PM    Chloride 105 02/17/2020 01:01 PM    CO2 29 02/17/2020 01:01 PM    Glucose 161 (H) 02/17/2020 01:01 PM    BUN 41 (H) 02/17/2020 01:01 PM    Creatinine 2.90 (H) 02/17/2020 01:01 PM    GFR est AA 26 (L) 02/17/2020 01:01 PM    GFR est non-AA 21 (L) 02/17/2020 01:01 PM    Calcium 8.5 02/17/2020 01:01 PM     Lab Results   Component Value Date/Time    Bilirubin, total 0.4 02/17/2020 01:01 PM    ALT (SGPT) 16 02/17/2020 01:01 PM    AST (SGOT) 21 02/17/2020 01:01 PM    Alk. phosphatase 87 02/17/2020 01:01 PM    Protein, total 6.2 (L) 02/17/2020 01:01 PM    Albumin 3.2 (L) 02/17/2020 01:01 PM    Globulin 3.0 02/17/2020 01:01 PM       ECHO 7/19/2020  EF 56-60%     CT Chest 2/17/2020  IMPRESSION:  1. Presence of a focal nodular appearing density in the anteromedial aspect of  the right midlung. This may represent fibrotic scarring. Presence of a calcified  granuloma in the posterior aspect of the left lung base. No other focal  pulmonary nodules/mass lesions identified. No definite evidence of mediastinal  or hilar lymphadenopathy. 2. Presence of moderate coronary artery calcification. 3. Evidence of biapical fibrotic scarring (left greater than right).     CT A/P 2/17/2020  IMPRESSION:  1. Presence of a focal nodular appearing density in the anteromedial aspect of  the right midlung. A follow-up examination may prove useful. Presence of a  calcified granuloma in the posterior aspect of the left lung base. 2. Normal sized, somewhat unusually shaped liver with evidence of markedly  increased attenuation. This may represent hemochromatosis. Clinical correlation  is indicated. Presence of a few calcified granulomata within the liver. 3. Evidence of cholelithiasis. 4. Normal appearance of the pancreas. 5. Normal sized spleen. 6. Presence of two small, nonobstructing calculi within the right kidney. Presence of multiple cysts involving both kidneys. Suboptimal distention of the  urinary bladder.  Normal appearance of the prostate gland.    PET 3/2/2020  IMPRESSION:   1. No adenopathy is identified. 2. Liver is increased in density and correlation would be helpful. There is suspicion of a tiny gallstone. There are changes of emphysema. There is mild focal increased metabolic activity in the posterior iliac bony  cortex bilaterally without a definite CT correlate and is of uncertain  significance but could be related to gluteal muscular attachment on the cortex     Right Groin Lymph Node excision 5/8/2019  Lymph node, right groin, excision:   Atypical B-cell proliferation with extensive necrosis compatible with large B-cell lymphoma, activated B cell type (see comment). Comment   The histologic section has a lymph node with extensive necrosis and scattered residual islands of medium to large atypical lymphocytes with brisk mitotic activity. The cells are positive for CD45, CD20, MUM1, and BCL2 and negative for cytokeratin AE1/AE3, CD10, BCL6, and cyclinD1. The Ki-67 proliferation index is greater than 90%. Scattered CD3/CD5 positive T cells are present. The differential diagnosis includes diffuse large B-cell lymphoma, NOS and High-Grade B-Cell Lymphoma (double hit). Additional immunohistochemical and molecular studies are pending for further classification.      FISH  Interpretation:   N(51;23): Not Detected   BCL6 rearrangement: Not Detected   MYC rearrangement: Not Detected  MYC amplification: Not Detected (SEE BELOW)   And abnormal signal pattern was observed with the MYC break-apart probes in 34% of analyzed nuclei. This represents an abnormal result suggestive of gains of MYC or chromosome 8, but not MYC amplification.      Pathology Addendum  Immunohistochemical stain for MYC is positive. In situ hybridization for July-Barr viral RNA is positive. All stains have satisfactory controls.    Addendum Comment   EBV positivity and the associated extensive geographic necrosis support the diagnosis of EBV-Positive Diffuse Large B-Cell Lymphoma, NOS      Assessment:   1) DLBCL--EBV+  2) A-Fib  3) Abd Pain/Neoplasm Pain  4) CKD     Plan:   1) Thus far appears Stage I EBV positive DLBCL--will get bone marrow biopsy to ensure correct diagnosis--discuss chemo/treatment after. 2) On warfarin--will need to be held prior to port  3) See if has disease on PET, treat with tramadol prn, discussed OIC.   4) Follow, no dose adjustments needed for RCHOP from renal perspective         Signed By: Jorie Burkitt, MD

## 2020-03-20 ENCOUNTER — TELEPHONE (OUTPATIENT)
Dept: ONCOLOGY | Age: 80
End: 2020-03-20

## 2020-03-20 NOTE — TELEPHONE ENCOUNTER
HIPAA verified. Patient called he wants to postpone his bone marrow biopsy due to the COVID19 that is going around. He will have later after things calm down.

## 2020-03-27 ENCOUNTER — HOSPITAL ENCOUNTER (OUTPATIENT)
Dept: CT IMAGING | Age: 80
Discharge: HOME | End: 2020-03-27
Attending: INTERNAL MEDICINE
Payer: MEDICARE

## 2020-03-27 VITALS
HEART RATE: 64 BPM | RESPIRATION RATE: 12 BRPM | TEMPERATURE: 98.2 F | SYSTOLIC BLOOD PRESSURE: 155 MMHG | WEIGHT: 172 LBS | OXYGEN SATURATION: 96 % | DIASTOLIC BLOOD PRESSURE: 58 MMHG | HEIGHT: 70 IN | BODY MASS INDEX: 24.62 KG/M2

## 2020-03-27 DIAGNOSIS — B27.09 EPSTEIN-BARR VIRUS (EBV)-POSITIVE DIFFUSE LARGE B-CELL LYMPHOMA OF ELDERLY (HCC): ICD-10-CM

## 2020-03-27 DIAGNOSIS — C83.30 EPSTEIN-BARR VIRUS (EBV)-POSITIVE DIFFUSE LARGE B-CELL LYMPHOMA OF ELDERLY (HCC): ICD-10-CM

## 2020-03-27 LAB
BASOPHILS # BLD: 0 K/UL (ref 0–0.1)
BASOPHILS NFR BLD: 0 % (ref 0–1)
DIFFERENTIAL METHOD BLD: ABNORMAL
EOSINOPHIL # BLD: 0.1 K/UL (ref 0–0.4)
EOSINOPHIL NFR BLD: 2 % (ref 0–7)
ERYTHROCYTE [DISTWIDTH] IN BLOOD BY AUTOMATED COUNT: 13.8 % (ref 11.5–14.5)
HCT VFR BLD AUTO: 35.6 % (ref 36.6–50.3)
HGB BLD-MCNC: 11.3 G/DL (ref 12.1–17)
IMM GRANULOCYTES # BLD AUTO: 0 K/UL (ref 0–0.04)
IMM GRANULOCYTES NFR BLD AUTO: 0 % (ref 0–0.5)
LYMPHOCYTES # BLD: 1.1 K/UL (ref 0.8–3.5)
LYMPHOCYTES NFR BLD: 16 % (ref 12–49)
MCH RBC QN AUTO: 30.9 PG (ref 26–34)
MCHC RBC AUTO-ENTMCNC: 31.7 G/DL (ref 30–36.5)
MCV RBC AUTO: 97.3 FL (ref 80–99)
MONOCYTES # BLD: 0.7 K/UL (ref 0–1)
MONOCYTES NFR BLD: 9 % (ref 5–13)
NEUTS SEG # BLD: 5.1 K/UL (ref 1.8–8)
NEUTS SEG NFR BLD: 73 % (ref 32–75)
NRBC # BLD: 0 K/UL (ref 0–0.01)
NRBC BLD-RTO: 0 PER 100 WBC
PLATELET # BLD AUTO: 177 K/UL (ref 150–400)
PMV BLD AUTO: 10.8 FL (ref 8.9–12.9)
RBC # BLD AUTO: 3.66 M/UL (ref 4.1–5.7)
WBC # BLD AUTO: 7.1 K/UL (ref 4.1–11.1)

## 2020-03-27 PROCEDURE — 88264 CHROMOSOME ANALYSIS 20-25: CPT

## 2020-03-27 PROCEDURE — 38221 DX BONE MARROW BIOPSIES: CPT

## 2020-03-27 PROCEDURE — 88185 FLOWCYTOMETRY/TC ADD-ON: CPT

## 2020-03-27 PROCEDURE — 74011250636 HC RX REV CODE- 250/636: Performed by: RADIOLOGY

## 2020-03-27 PROCEDURE — 88311 DECALCIFY TISSUE: CPT

## 2020-03-27 PROCEDURE — 77030014115

## 2020-03-27 PROCEDURE — 88237 TISSUE CULTURE BONE MARROW: CPT

## 2020-03-27 PROCEDURE — 88342 IMHCHEM/IMCYTCHM 1ST ANTB: CPT

## 2020-03-27 PROCEDURE — 77030028872 HC BN BIOP NDL ON CNTRL TY TELE -C

## 2020-03-27 PROCEDURE — 88184 FLOWCYTOMETRY/ TC 1 MARKER: CPT

## 2020-03-27 PROCEDURE — 36415 COLL VENOUS BLD VENIPUNCTURE: CPT

## 2020-03-27 PROCEDURE — 77030018781

## 2020-03-27 PROCEDURE — 85025 COMPLETE CBC W/AUTO DIFF WBC: CPT

## 2020-03-27 PROCEDURE — 88341 IMHCHEM/IMCYTCHM EA ADD ANTB: CPT

## 2020-03-27 PROCEDURE — 88305 TISSUE EXAM BY PATHOLOGIST: CPT

## 2020-03-27 PROCEDURE — 88313 SPECIAL STAINS GROUP 2: CPT

## 2020-03-27 RX ORDER — MIDAZOLAM HYDROCHLORIDE 1 MG/ML
5 INJECTION, SOLUTION INTRAMUSCULAR; INTRAVENOUS
Status: DISCONTINUED | OUTPATIENT
Start: 2020-03-27 | End: 2020-03-31 | Stop reason: HOSPADM

## 2020-03-27 RX ORDER — FENTANYL CITRATE 50 UG/ML
100 INJECTION, SOLUTION INTRAMUSCULAR; INTRAVENOUS
Status: DISCONTINUED | OUTPATIENT
Start: 2020-03-27 | End: 2020-03-31 | Stop reason: HOSPADM

## 2020-03-27 RX ADMIN — MIDAZOLAM 2 MG: 1 INJECTION INTRAMUSCULAR; INTRAVENOUS at 10:45

## 2020-03-27 RX ADMIN — FENTANYL CITRATE 25 MCG: 50 INJECTION INTRAMUSCULAR; INTRAVENOUS at 10:57

## 2020-03-27 RX ADMIN — FENTANYL CITRATE 50 MCG: 50 INJECTION INTRAMUSCULAR; INTRAVENOUS at 10:47

## 2020-03-27 RX ADMIN — MIDAZOLAM 1 MG: 1 INJECTION INTRAMUSCULAR; INTRAVENOUS at 10:55

## 2020-03-27 NOTE — DISCHARGE INSTRUCTIONS
[address] [facility]  Department of Interventional Radiology  [phone]    Radiologist: Dr Jun Hoffmann    Date: 03/27/2020         Bone Marrow Biopsy Discharge Instructions      Go home and rest  and restrict your activity the next 24 hours. You have been given sedating medications, so do not drive or drink alcohol today. Resume your previous diet and medications. You may shower in 24 hours. Do not soak or swim until the biopsy site has healed completely to minimize any risk of infection. Watch site for redness, drainage, pus, foul odor, increasing pain and fevers. Should this occur call you doctor immediatly. You may take Tylenol, as directed on the label, for pain or discomfort. Avoid Ibuprofen (Advil, Motrin etc.) and Aspirin today as they may increase your risk of bleeding. Be sure to follow up with your physician, and let him know how you are progressing. Your results will be sent to your physician as soon as they become available.       I

## 2020-03-27 NOTE — H&P
Radiology History and Physical    Patient: Reid Soto 79 y.o. male       Chief Complaint: No chief complaint on file.       History of Present Illness: ct guided bone marrow biopsy and aspirate    History:    Past Medical History:   Diagnosis Date    Arrhythmia     atrial fibrillation, cardioversion     CKD (chronic kidney disease) stage 3, GFR 30-59 ml/min (HCC)     Hyperlipemia     Hypertension     PVD [facility]peripheral vascular disease) ([facility] [address]     Thyroid disease      Family History   Problem Relation Age of Onset    Cancer Mother         lung    Cancer Brother 66        lung    Cancer Maternal Uncle         cancer unknown     Social History     Socioeconomic History    Marital status:      Spouse name: Not on file    Number of children: Not on file    Years of education: Not on file    Highest education level: Not on file   Occupational History    Not on file   Social Needs    Financial resource strain: Not on file    Food insecurity     Worry: Not on file     Inability: Not on file    Transportation needs     Medical: Not on file     Non-medical: Not on file   Tobacco Use    Smoking status: Former Smoker     Packs/day: 1.00     Years: 54.00     Pack years: 54.00     Last attempt to quit: 3/15/2007     Years since quittin.0    Smokeless tobacco: Never Used   Substance and Sexual Activity    Alcohol use: Yes     Comment: former drinker- beer   social    Drug use: Never    Sexual activity: Not on file   Lifestyle    Physical activity     Days per week: Not on file     Minutes per session: Not on file    Stress: Not on file   Relationships    Social connections     Talks on phone: Not on file     Gets together: Not on file     Attends Christian service: Not on file     Active member of club or organization: Not on file     Attends meetings of clubs or organizations: Not on file     Relationship status: Not on file    Intimate partner violence     Fear of current or ex partner: Not on file     Emotionally abused: Not on file     Physically abused: Not on file     Forced sexual activity: Not on file   Other Topics Concern     Service No    Blood Transfusions Yes    Caffeine Concern No    Occupational Exposure No    Hobby Hazards No    Sleep Concern Yes    Stress Concern No    Weight Concern No    Special Diet No    Back Care Yes    Exercise Yes    Bike Helmet Not Asked    Seat Belt No    Self-Exams Yes   Social History Narrative    Not on file       Allergies: Allergies   Allergen Reactions    Ketamine Other (comments)     confusion       Current Medications:  Current Outpatient Medications   Medication Sig    dilTIAZem (CARDIZEM) 60 mg tablet Take 1 tablet by mouth twice daily    furosemide (LASIX) 40 mg tablet Take 1 tablet by mouth once daily    levothyroxine (SYNTHROID) 100 mcg tablet TAKE 1 TABLET BY MOUTH ONCE DAILY BEFORE BREAKFAST    amiodarone (CORDARONE) 200 mg tablet TAKE 1 TABLET BY MOUTH ONCE DAILY    polyethylene glycol (MIRALAX) 17 gram/dose powder Take 17 g by mouth daily. 1 tablespoon with 8 oz of water daily    warfarin (COUMADIN) 1 mg tablet 1mg tablets for adjustment of INR    warfarin (COUMADIN) 3 mg tablet Take 1 Tab by mouth daily.  omeprazole (PRILOSEC) 40 mg capsule Take 1 Cap by mouth daily.  KLOR-CON 10 10 mEq tablet TAKE 2 TABLETS BY MOUTH TWICE DAILY    losartan (COZAAR) 100 mg tablet TAKE 1 TABLET BY MOUTH ONCE DAILY    atorvastatin (LIPITOR) 20 mg tablet TAKE 1 TABLET BY MOUTH ONCE DAILY     Current Facility-Administered Medications   Medication Dose Route Frequency    fentaNYL citrate (PF) injection 100 mcg  100 mcg IntraVENous Rad Multiple    midazolam (VERSED) injection 5 mg  5 mg IntraVENous Rad Multiple        Physical Exam:  Blood pressure 155/58, pulse 64, temperature 98.2 °F (36.8 °C), resp. rate 12, height 5' 10\" (1.778 m), weight 78 kg (172 lb), SpO2 96 %.   LUNG: clear to auscultation bilaterally, HEART: regular rate and rhythm      Alerts:    Hospital Problems  Date Reviewed: 3/19/2020    None          Laboratory:      Recent Labs     03/27/20  1020   HGB 11.3*   HCT 35.6*   WBC 7.1            Plan of Care/Planned Procedure:  Risks, benefits, and alternatives reviewed with patient and he agrees to proceed with the procedure.        Maryelizabeth Aschoff, MD

## 2020-04-02 NOTE — PROGRESS NOTES
HIPAA verified. Relayed message to patient, he still needs to come into office to see Dr Alie May. Patient thanked for call.

## 2020-04-21 ENCOUNTER — TELEPHONE (OUTPATIENT)
Dept: ONCOLOGY | Age: 80
End: 2020-04-21

## 2020-04-21 NOTE — PROGRESS NOTES
Ingris Sharif a 79 y. o. male here for follow up DLBCL.   Patient has port, needs to be flushed today. Patient denies complaints. Patient states he is a little depress since his wife  about 7 years ago, but  he states nothing to worry about. They had been  for about 55 years.  Port to be flushed today.     Last;  PET 3/2/2020  CT Chest 2020  CT A/P 2020  Bone Marrow biopsy 3/27/2020

## 2020-04-22 ENCOUNTER — OFFICE VISIT (OUTPATIENT)
Dept: ONCOLOGY | Age: 80
End: 2020-04-22

## 2020-04-22 VITALS
BODY MASS INDEX: 25.43 KG/M2 | HEART RATE: 61 BPM | TEMPERATURE: 97.7 F | WEIGHT: 177.6 LBS | HEIGHT: 70 IN | DIASTOLIC BLOOD PRESSURE: 74 MMHG | OXYGEN SATURATION: 95 % | RESPIRATION RATE: 18 BRPM | SYSTOLIC BLOOD PRESSURE: 149 MMHG

## 2020-04-22 DIAGNOSIS — C83.35 DIFFUSE LARGE B-CELL LYMPHOMA OF LYMPH NODES OF INGUINAL REGION (HCC): Primary | ICD-10-CM

## 2020-04-22 NOTE — PROGRESS NOTES
Reason for Visit:   Keerthi Letters a 17 H. o. male who is seen for follow up of new DLBCL--EBV positive     Treatment History:   Dx: Diffuse Large B Cell Lymphoma--EBV +, Right Groin Node--PET on 3/4/2020 did not show activity. Bone Marrow Biopsy did not show involvement. PACO 1, Stage I  Tx: R-mini-CHOP x 3 then ISRT. Goal: Cure    History of Present Illness:   Wants port out, doesn't want any chemotherapy or radiation, feels like its all removed and gone. Long discussion about his current situation and disease status. He was oriented and calm.     Past Medical History:   Diagnosis Date    Arrhythmia     atrial fibrillation, cardioversion     CKD (chronic kidney disease) stage 3, GFR 30-59 ml/min (HCC)     Hyperlipemia     Hypertension     PVD (peripheral vascular disease) ([facility] [address]     Thyroid disease       Past Surgical History:   Procedure Laterality Date    CHEST SURGERY PROCEDURE UNLISTED Left     lung surgery age 17, lobectomy    HX COLONOSCOPY      WNL SIS 10 y    HX HERNIA REPAIR Bilateral     Inguinal    HX ORTHOPAEDIC      right shoulder fracture repair    HX OTHER SURGICAL  2019    groin lymph gland excision    IR INSERT TUNL CVC W PORT OVER 5 YEARS  3/5/2020    VASCULAR SURGERY PROCEDURE UNLIST      bypass numerous surgeries both legs      Social History     Tobacco Use    Smoking status: Former Smoker     Packs/day: 1.00     Years: 54.00     Pack years: 54.00     Last attempt to quit: 3/15/2007     Years since quittin.1    Smokeless tobacco: Never Used   Substance Use Topics    Alcohol use: Yes     Comment: former drinker- beer   social      Family History   Problem Relation Age of Onset    Cancer Mother         lung    Cancer Brother 66        lung    Cancer Maternal Uncle         cancer unknown     Current Outpatient Medications   Medication Sig    dilTIAZem (CARDIZEM) 60 mg tablet Take 1 tablet by mouth twice daily    furosemide (LASIX) 40 mg tablet Take 1 tablet by mouth once daily    levothyroxine (SYNTHROID) 100 mcg tablet TAKE 1 TABLET BY MOUTH ONCE DAILY BEFORE BREAKFAST    amiodarone (CORDARONE) 200 mg tablet TAKE 1 TABLET BY MOUTH ONCE DAILY    polyethylene glycol (MIRALAX) 17 gram/dose powder Take 17 g by mouth daily. 1 tablespoon with 8 oz of water daily    warfarin (COUMADIN) 1 mg tablet 1mg tablets for adjustment of INR    warfarin (COUMADIN) 3 mg tablet Take 1 Tab by mouth daily.  omeprazole (PRILOSEC) 40 mg capsule Take 1 Cap by mouth daily.  KLOR-CON 10 10 mEq tablet TAKE 2 TABLETS BY MOUTH TWICE DAILY    losartan (COZAAR) 100 mg tablet TAKE 1 TABLET BY MOUTH ONCE DAILY    atorvastatin (LIPITOR) 20 mg tablet TAKE 1 TABLET BY MOUTH ONCE DAILY     No current facility-administered medications for this visit. Allergies   Allergen Reactions    Ketamine Other (comments)     confusion        Review of Systems: A complete review of systems was obtained, negative except as described above. Physical Exam:   There were no vitals taken for this visit. ECOG PS: 1-2  General: No distress  Eyes: PERRLA, anicteric sclerae  HENT: Atraumatic, OP clear  Neck: Supple  Lymphatic: No cervical, supraclavicular, or inguinal adenopathy  Respiratory: CTAB, normal respiratory effort  CV: Normal rate, regular rhythm, no murmurs, no peripheral edema  GI: Soft, nontender, nondistended, no masses, no hepatomegaly, no splenomegaly  MS: Normal gait and station. Digits without clubbing or cyanosis. Skin: No rashes, ecchymoses, or petechiae. Normal temperature, turgor, and texture. Psych: Alert, oriented, appropriate affect, normal judgment/insight    Results:     Lab Results   Component Value Date/Time    WBC 7.1 03/27/2020 10:20 AM    HGB 11.3 (L) 03/27/2020 10:20 AM    HCT 35.6 (L) 03/27/2020 10:20 AM    PLATELET 052 54/45/4619 10:20 AM    MCV 97.3 03/27/2020 10:20 AM    ABS.  NEUTROPHILS 5.1 03/27/2020 10:20 AM     Lab Results   Component Value Date/Time    Sodium 139 02/17/2020 01:01 PM    Potassium 4.5 02/17/2020 01:01 PM    Chloride 105 02/17/2020 01:01 PM    CO2 29 02/17/2020 01:01 PM    Glucose 161 (H) 02/17/2020 01:01 PM    BUN 41 (H) 02/17/2020 01:01 PM    Creatinine 2.90 (H) 02/17/2020 01:01 PM    GFR est AA 26 (L) 02/17/2020 01:01 PM    GFR est non-AA 21 (L) 02/17/2020 01:01 PM    Calcium 8.5 02/17/2020 01:01 PM     Lab Results   Component Value Date/Time    Bilirubin, total 0.4 02/17/2020 01:01 PM    ALT (SGPT) 16 02/17/2020 01:01 PM    AST (SGOT) 21 02/17/2020 01:01 PM    Alk. phosphatase 87 02/17/2020 01:01 PM    Protein, total 6.2 (L) 02/17/2020 01:01 PM    Albumin 3.2 (L) 02/17/2020 01:01 PM    Globulin 3.0 02/17/2020 01:01 PM       ECHO 7/19/2020  EF 56-60%     CT Chest 2/17/2020  IMPRESSION:  1. Presence of a focal nodular appearing density in the anteromedial aspect of  the right midlung. This may represent fibrotic scarring. Presence of a calcified  granuloma in the posterior aspect of the left lung base. No other focal  pulmonary nodules/mass lesions identified. No definite evidence of mediastinal  or hilar lymphadenopathy. 2. Presence of moderate coronary artery calcification. 3. Evidence of biapical fibrotic scarring (left greater than right).     CT A/P 2/17/2020  IMPRESSION:  1. Presence of a focal nodular appearing density in the anteromedial aspect of  the right midlung. A follow-up examination may prove useful. Presence of a  calcified granuloma in the posterior aspect of the left lung base. 2. Normal sized, somewhat unusually shaped liver with evidence of markedly  increased attenuation. This may represent hemochromatosis. Clinical correlation  is indicated. Presence of a few calcified granulomata within the liver. 3. Evidence of cholelithiasis. 4. Normal appearance of the pancreas. 5. Normal sized spleen. 6. Presence of two small, nonobstructing calculi within the right kidney.   Presence of multiple cysts involving both kidneys. Suboptimal distention of the  urinary bladder. Normal appearance of the prostate gland.     PET 3/2/2020  IMPRESSION:   1. No adenopathy is identified. 2. Liver is increased in density and correlation would be helpful.  Noreene Shouts is suspicion of a tiny gallstone. There are changes of emphysema. There is mild focal increased metabolic activity in the posterior iliac bony  cortex bilaterally without a definite CT correlate and is of uncertain  significance but could be related to gluteal muscular attachment on the cortex     Right Groin Lymph Node excision 5/8/2019  Lymph node, right groin, excision:   Atypical B-cell proliferation with extensive necrosis compatible with large B-cell lymphoma, activated B cell type (see comment). Comment   The histologic section has a lymph node with extensive necrosis and scattered residual islands of medium to large atypical lymphocytes with brisk mitotic activity. The cells are positive for CD45, CD20, MUM1, and BCL2 and negative for cytokeratin AE1/AE3, CD10, BCL6, and cyclinD1. The Ki-67 proliferation index is greater than 90%. Scattered CD3/CD5 positive T cells are present. The differential diagnosis includes diffuse large B-cell lymphoma, NOS and High-Grade B-Cell Lymphoma (double hit). Additional immunohistochemical and molecular studies are pending for further classification.      FISH  Interpretation:   L(69;75): Not Detected   BCL6 rearrangement: Not Detected   MYC rearrangement: Not Detected  MYC amplification: Not Detected (SEE BELOW)   And abnormal signal pattern was observed with the MYC break-apart probes in 34% of analyzed nuclei. This represents an abnormal result suggestive of gains of MYC or chromosome 8, but not MYC amplification.      Pathology Addendum  Immunohistochemical stain for MYC is positive. In situ hybridization for July-Barr viral RNA is positive. All stains have satisfactory controls.    Addendum Comment   EBV positivity and the associated extensive geographic necrosis support the diagnosis of EBV-Positive Diffuse Large B-Cell Lymphoma, NOS     Bone Marrow Biopsy 3/27/2020  FINAL PATHOLOGIC DIAGNOSIS   Bone marrow, posterior iliac crest, aspirate, clot section, touch imprint, and decalcified core biopsy:   Normocellular marrow with maturing trilineage hematopoiesis   No morphologic or flow cytometric evidence of B-cell non-Hodgkin lymphoma   Iron stains on aspirate and section show reduced iron stores   Normal Cytogenetics--46XY     Assessment:   1) DLBCL--EBV+  2) A-Fib  3) Abd Pain/Neoplasm Pain  4) CKD     Plan:   1) Stage I EBV positive DLBCL--in curative setting--does NOT want further treatment despite knowledge it will grow and kill him without further treatment. Witnessed by Bree Echevarria (Nurse Manager). He is ok with me talking with his daughter in law to ensure everyone is on same page. He has the right to refuse further treatment. Discussed letting us know when he does notice more lymph node swelling so we can get him enrolled in Hospice. He will also let us know if has further questions or concerns as we will get him an appt at any point if needed.     2) On warfarin--will need to be held prior to port removal  3) Resolved  4) Overall stable      Signed By: Rose Lieberman MD

## 2020-04-22 NOTE — LETTER
4/22/20 Patient: Peggy Delarosa YOB: 1940 Date of Visit: 4/22/2020 Fabby Bailey NP 
[address] [facility] [address] VIA In Basket Dear Fabby Bailey NP, Thank you for referring Mr. Jovan Funez to [facility] for evaluation. My notes for this consultation are attached. If you have questions, please do not hesitate to call me. I look forward to following your patient along with you.  
 
 
Sincerely, 
 
Kiersten Harris MD

## 2020-04-24 ENCOUNTER — TELEPHONE (OUTPATIENT)
Dept: ONCOLOGY | Age: 80
End: 2020-04-24

## 2020-04-24 NOTE — TELEPHONE ENCOUNTER
HIPAA verified. Patient is rethinking having his port removed and he would like to know what treatment would entail? Patient will come into [facility] on Monday for port flush.

## 2020-04-24 NOTE — TELEPHONE ENCOUNTER
Patient called. Patient stated that he has changed his mind & would like his port flushed.  Please call patient back [phone]

## 2020-04-28 NOTE — PROGRESS NOTES
Basia Bartlett a 79 y. o. male here for follow up DLBCL.   Patient has port, needs to be flushed today.   denies complaints.       Last;  PET 3/2/2020  CT Chest 2/17/2020  CT A/P 2/17/2020  Bone Marrow biopsy 3/27/2020

## 2020-04-30 ENCOUNTER — DOCUMENTATION ONLY (OUTPATIENT)
Dept: ONCOLOGY | Age: 80
End: 2020-04-30

## 2020-04-30 ENCOUNTER — OFFICE VISIT (OUTPATIENT)
Dept: ONCOLOGY | Age: 80
End: 2020-04-30

## 2020-04-30 VITALS
RESPIRATION RATE: 18 BRPM | HEIGHT: 70 IN | SYSTOLIC BLOOD PRESSURE: 181 MMHG | HEART RATE: 61 BPM | BODY MASS INDEX: 25.65 KG/M2 | WEIGHT: 179.2 LBS | TEMPERATURE: 97.7 F | DIASTOLIC BLOOD PRESSURE: 71 MMHG | OXYGEN SATURATION: 95 %

## 2020-04-30 DIAGNOSIS — C83.35 DIFFUSE LARGE B-CELL LYMPHOMA OF LYMPH NODES OF INGUINAL REGION (HCC): Primary | ICD-10-CM

## 2020-04-30 DIAGNOSIS — T45.1X5S ADVERSE EFFECT OF ANTINEOPLASTIC AND IMMUNOSUPPRESSIVE DRUGS, SEQUELA: ICD-10-CM

## 2020-04-30 RX ORDER — ACETAMINOPHEN 325 MG/1
650 TABLET ORAL AS NEEDED
Status: CANCELLED
Start: 2020-05-11

## 2020-04-30 RX ORDER — ONDANSETRON HYDROCHLORIDE 8 MG/1
8 TABLET, FILM COATED ORAL
Qty: 30 TAB | Refills: 2 | Status: SHIPPED | OUTPATIENT
Start: 2020-04-30 | End: 2020-09-17

## 2020-04-30 RX ORDER — SODIUM CHLORIDE 9 MG/ML
25 INJECTION, SOLUTION INTRAVENOUS CONTINUOUS
Status: CANCELLED | OUTPATIENT
Start: 2020-05-11

## 2020-04-30 RX ORDER — ONDANSETRON 2 MG/ML
8 INJECTION INTRAMUSCULAR; INTRAVENOUS AS NEEDED
Status: CANCELLED | OUTPATIENT
Start: 2020-05-11

## 2020-04-30 RX ORDER — HEPARIN 100 UNIT/ML
300-500 SYRINGE INTRAVENOUS AS NEEDED
Status: CANCELLED
Start: 2020-05-11

## 2020-04-30 RX ORDER — HYDROCORTISONE SODIUM SUCCINATE 100 MG/2ML
100 INJECTION, POWDER, FOR SOLUTION INTRAMUSCULAR; INTRAVENOUS AS NEEDED
Status: CANCELLED | OUTPATIENT
Start: 2020-05-11

## 2020-04-30 RX ORDER — ALBUTEROL SULFATE 0.83 MG/ML
2.5 SOLUTION RESPIRATORY (INHALATION) AS NEEDED
Status: CANCELLED
Start: 2020-05-11

## 2020-04-30 RX ORDER — SODIUM CHLORIDE 9 MG/ML
10 INJECTION INTRAMUSCULAR; INTRAVENOUS; SUBCUTANEOUS AS NEEDED
Status: CANCELLED | OUTPATIENT
Start: 2020-05-11

## 2020-04-30 RX ORDER — SODIUM CHLORIDE 0.9 % (FLUSH) 0.9 %
10 SYRINGE (ML) INJECTION AS NEEDED
Status: CANCELLED
Start: 2020-05-11

## 2020-04-30 RX ORDER — ONDANSETRON 2 MG/ML
8 INJECTION INTRAMUSCULAR; INTRAVENOUS ONCE
Status: CANCELLED | OUTPATIENT
Start: 2020-05-11

## 2020-04-30 RX ORDER — EPINEPHRINE 1 MG/ML
0.3 INJECTION, SOLUTION, CONCENTRATE INTRAVENOUS AS NEEDED
Status: CANCELLED | OUTPATIENT
Start: 2020-05-11

## 2020-04-30 RX ORDER — DOXORUBICIN HYDROCHLORIDE 2 MG/ML
25 INJECTION, SOLUTION INTRAVENOUS ONCE
Status: CANCELLED
Start: 2020-05-11 | End: 2020-05-11

## 2020-04-30 RX ORDER — DIPHENHYDRAMINE HYDROCHLORIDE 50 MG/ML
50 INJECTION, SOLUTION INTRAMUSCULAR; INTRAVENOUS AS NEEDED
Status: CANCELLED
Start: 2020-05-11

## 2020-04-30 RX ORDER — DIPHENHYDRAMINE HYDROCHLORIDE 50 MG/ML
25 INJECTION, SOLUTION INTRAMUSCULAR; INTRAVENOUS AS NEEDED
Status: CANCELLED
Start: 2020-05-11

## 2020-04-30 RX ORDER — PREDNISONE 1 MG/1
40 TABLET ORAL ONCE
Status: CANCELLED | OUTPATIENT
Start: 2020-05-11

## 2020-04-30 RX ORDER — ACETAMINOPHEN 325 MG/1
650 TABLET ORAL ONCE
Status: CANCELLED
Start: 2020-05-11

## 2020-04-30 RX ORDER — SODIUM CHLORIDE 9 MG/ML
100 INJECTION, SOLUTION INTRAVENOUS CONTINUOUS
Status: CANCELLED | OUTPATIENT
Start: 2020-05-11

## 2020-04-30 RX ORDER — DIPHENHYDRAMINE HYDROCHLORIDE 50 MG/ML
50 INJECTION, SOLUTION INTRAMUSCULAR; INTRAVENOUS ONCE
Status: CANCELLED
Start: 2020-05-11

## 2020-04-30 NOTE — PATIENT INSTRUCTIONS
High Blood Pressure: Care Instructions Overview It's normal for blood pressure to go up and down throughout the day. But if it stays up, you have high blood pressure. Another name for high blood pressure is hypertension. Despite what a lot of people think, high blood pressure usually doesn't cause headaches or make you feel dizzy or lightheaded. It usually has no symptoms. But it does increase your risk of stroke, heart attack, and other problems. You and your doctor will talk about your risks of these problems based on your blood pressure. Your doctor will give you a goal for your blood pressure. Your goal will be based on your health and your age. Lifestyle changes, such as eating healthy and being active, are always important to help lower blood pressure. You might also take medicine to reach your blood pressure goal. 
Follow-up care is a key part of your treatment and safety. Be sure to make and go to all appointments, and call your doctor if you are having problems. It's also a good idea to know your test results and keep a list of the medicines you take. How can you care for yourself at home? Medical treatment · If you stop taking your medicine, your blood pressure will go back up. You may take one or more types of medicine to lower your blood pressure. Be safe with medicines. Take your medicine exactly as prescribed. Call your doctor if you think you are having a problem with your medicine. · Talk to your doctor before you start taking aspirin every day. Aspirin can help certain people lower their risk of a heart attack or stroke. But taking aspirin isn't right for everyone, because it can cause serious bleeding. · See your doctor regularly. You may need to see the doctor more often at first or until your blood pressure comes down. · If you are taking blood pressure medicine, talk to your doctor before you take decongestants or anti-inflammatory medicine, such as ibuprofen. Some of these medicines can raise blood pressure. · Learn how to check your blood pressure at home. Lifestyle changes · Stay at a healthy weight. This is especially important if you put on weight around the waist. Losing even 10 pounds can help you lower your blood pressure. · If your doctor recommends it, get more exercise. Walking is a good choice. Bit by bit, increase the amount you walk every day. Try for at least 30 minutes on most days of the week. You also may want to swim, bike, or do other activities. · Avoid or limit alcohol. Talk to your doctor about whether you can drink any alcohol. · Try to limit how much sodium you eat to less than 2,300 milligrams (mg) a day. Your doctor may ask you to try to eat less than 1,500 mg a day. · Eat plenty of fruits (such as bananas and oranges), vegetables, legumes, whole grains, and low-fat dairy products. · Lower the amount of saturated fat in your diet. Saturated fat is found in animal products such as milk, cheese, and meat. Limiting these foods may help you lose weight and also lower your risk for heart disease. · Do not smoke. Smoking increases your risk for heart attack and stroke. If you need help quitting, talk to your doctor about stop-smoking programs and medicines. These can increase your chances of quitting for good. When should you call for help? Call  911 anytime you think you may need emergency care. This may mean having symptoms that suggest that your blood pressure is causing a serious heart or blood vessel problem. Your blood pressure may be over 180/120. 
 For example, call  911 if: 
  · You have symptoms of a heart attack. These may include: 
? Chest pain or pressure, or a strange feeling in the chest. 
? Sweating. ? Shortness of breath. ? Nausea or vomiting. ? Pain, pressure, or a strange feeling in the back, neck, jaw, or upper belly or in one or both shoulders or arms. ? Lightheadedness or sudden weakness. ? A fast or irregular heartbeat.  
  · You have symptoms of a stroke. These may include: 
? Sudden numbness, tingling, weakness, or loss of movement in your face, arm, or leg, especially on only one side of your body. ? Sudden vision changes. ? Sudden trouble speaking. ? Sudden confusion or trouble understanding simple statements. ? Sudden problems with walking or balance. ? A sudden, severe headache that is different from past headaches.  
  · You have severe back or belly pain.  
 Do not wait until your blood pressure comes down on its own. Get help right away. 
 Call your doctor now or seek immediate care if: 
  · Your blood pressure is much higher than normal (such as 180/120 or higher), but you don't have symptoms.  
  · You think high blood pressure is causing symptoms, such as: 
? Severe headache. 
? Blurry vision.  
 Watch closely for changes in your health, and be sure to contact your doctor if: 
  · Your blood pressure measures higher than your doctor recommends at least 2 times. That means the top number is higher or the bottom number is higher, or both.  
  · You think you may be having side effects from your blood pressure medicine. Where can you learn more? Go to http://harshProgeny Solartimothy.info/ Enter Z762 in the search box to learn more about \"High Blood Pressure: Care Instructions. \" Current as of: December 15, 2019Content Version: 12.4 © 3381-1982 Healthwise, Incorporated. Care instructions adapted under license by [employer] (which disclaims liability or warranty for this information). If you have questions about a medical condition or this instruction, always ask your healthcare professional. [place] [address] any warranty or liability for your use of this information. Heart-Healthy Diet: Care Instructions Your Care Instructions A heart-healthy diet has lots of vegetables, fruits, nuts, beans, and whole grains, and is low in salt. It limits foods that are high in saturated fat, such as meats, cheeses, and fried foods. It may be hard to change your diet, but even small changes can lower your risk of heart attack and heart disease. Follow-up care is a key part of your treatment and safety. Be sure to make and go to all appointments, and call your doctor if you are having problems. It's also a good idea to know your test results and keep a list of the medicines you take. How can you care for yourself at home? Watch your portions · Learn what a serving is. A \"serving\" and a \"portion\" are not always the same thing. Make sure that you are not eating larger portions than are recommended. For example, a serving of pasta is ½ cup. A serving size of meat is 2 to 3 ounces. A 3-ounce serving is about the size of a deck of cards. Measure serving sizes until you are good at [place]" them. Keep in mind that restaurants often serve portions that are 2 or 3 times the size of one serving. · To keep your energy level up and keep you from feeling hungry, eat often but in smaller portions. · Eat only the number of calories you need to stay at a healthy weight. If you need to lose weight, eat fewer calories than your body burns (through exercise and other physical activity). Eat more fruits and vegetables · Eat a variety of fruit and vegetables every day. Dark green, deep orange, red, or yellow fruits and vegetables are especially good for you. Examples include spinach, carrots, peaches, and berries. · Keep carrots, celery, and other veggies handy for snacks. Buy fruit that is in season and store it where you can see it so that you will be tempted to eat it. · Cook dishes that have a lot of veggies in them, such as stir-fries and soups. Limit saturated and trans fat · Read food labels, and try to avoid saturated and trans fats. They increase your risk of heart disease.  Trans fat is found in many processed foods such as cookies and crackers. · Use olive or canola oil when you cook. Try cholesterol-lowering spreads, such as Benecol or Take Control. · Bake, broil, grill, or steam foods instead of frying them. · Choose lean meats instead of high-fat meats such as hot dogs and sausages. Cut off all visible fat when you prepare meat. · Eat fish, skinless poultry, and meat alternatives such as soy products instead of high-fat meats. Soy products, such as tofu, may be especially good for your heart. · Choose low-fat or fat-free milk and dairy products. Eat foods high in fiber · Eat a variety of grain products every day. Include whole-grain foods that have lots of fiber and nutrients. Examples of whole-grain foods include oats, whole wheat bread, and brown rice. · Buy whole-grain breads and cereals, instead of white bread or pastries. Limit salt and sodium · Limit how much salt and sodium you eat to help lower your blood pressure. · Taste food before you salt it. Add only a little salt when you think you need it. With time, your taste buds will adjust to less salt. · Eat fewer snack items, fast foods, and other high-salt, processed foods. Check food labels for the amount of sodium in packaged foods. · Choose low-sodium versions of canned goods (such as soups, vegetables, and beans). Limit sugar · Limit drinks and foods with added sugar. These include candy, desserts, and soda pop. Limit alcohol · Limit alcohol to no more than 2 drinks a day for men and 1 drink a day for women. Too much alcohol can cause health problems. When should you call for help? Watch closely for changes in your health, and be sure to contact your doctor if: 
  · You would like help planning heart-healthy meals. Where can you learn more? Go to http://harsh-timothy.info/ Enter V137 in the search box to learn more about \"Heart-Healthy Diet: Care Instructions. \" Current as of: December 15, 2019Content Version: 12.4 © 3781-8952 Healthwise, Incorporated. Care instructions adapted under license by [employer] (which disclaims liability or warranty for this information). If you have questions about a medical condition or this instruction, always ask your healthcare professional. [address] any warranty or liability for your use of this information.

## 2020-04-30 NOTE — PROGRESS NOTES
Reason for Visit:   Josh Villanueva a 61 Q. o. male who is seen for follow up of new DLBCL--EBV positive     Treatment History:   Dx: Diffuse Large B Cell Lymphoma--EBV +, Right Groin Node--PET on 3/4/2020 did not show activity.  Bone Marrow Biopsy did not show involvement. PACO 1, Stage I  Tx: R-mini-CHOP x 3 then ISRT.    Goal: Cure    History of Present Illness:   Rethought treatment and wants to proceed. No change in symptoms, doing well otherwise. Able to do his activities alone.       Past Medical History:   Diagnosis Date    Arrhythmia     atrial fibrillation, cardioversion     CKD (chronic kidney disease) stage 3, GFR 30-59 ml/min (HCC)     Hyperlipemia     Hypertension     PVD [facility]peripheral vascular disease) ([facility] [address]     Thyroid disease       Past Surgical History:   Procedure Laterality Date    CHEST SURGERY PROCEDURE UNLISTED Left     lung surgery age 17, lobectomy    HX COLONOSCOPY      WNL SIS 10 y    HX HERNIA REPAIR Bilateral     Inguinal    HX ORTHOPAEDIC      right shoulder fracture repair    HX OTHER SURGICAL  2019    groin lymph gland excision    IR INSERT TUNL CVC W PORT OVER 5 YEARS  3/5/2020    VASCULAR SURGERY PROCEDURE UNLIST      bypass numerous surgeries both legs      Social History     Tobacco Use    Smoking status: Former Smoker     Packs/day: 1.00     Years: 54.00     Pack years: 54.00     Last attempt to quit: 3/15/2007     Years since quittin.1    Smokeless tobacco: Never Used   Substance Use Topics    Alcohol use: Yes     Comment: former drinker- beer   social      Family History   Problem Relation Age of Onset    Cancer Mother         lung    Cancer Brother 66        lung    Cancer Maternal Uncle         cancer unknown     Current Outpatient Medications   Medication Sig    dilTIAZem (CARDIZEM) 60 mg tablet Take 1 tablet by mouth twice daily    furosemide (LASIX) 40 mg tablet Take 1 tablet by mouth once daily    levothyroxine (SYNTHROID) 100 mcg tablet TAKE 1 TABLET BY MOUTH ONCE DAILY BEFORE BREAKFAST    amiodarone (CORDARONE) 200 mg tablet TAKE 1 TABLET BY MOUTH ONCE DAILY    polyethylene glycol (MIRALAX) 17 gram/dose powder Take 17 g by mouth daily. 1 tablespoon with 8 oz of water daily    warfarin (COUMADIN) 1 mg tablet 1mg tablets for adjustment of INR    warfarin (COUMADIN) 3 mg tablet Take 1 Tab by mouth daily.  omeprazole (PRILOSEC) 40 mg capsule Take 1 Cap by mouth daily.  KLOR-CON 10 10 mEq tablet TAKE 2 TABLETS BY MOUTH TWICE DAILY    losartan (COZAAR) 100 mg tablet TAKE 1 TABLET BY MOUTH ONCE DAILY    atorvastatin (LIPITOR) 20 mg tablet TAKE 1 TABLET BY MOUTH ONCE DAILY     No current facility-administered medications for this visit. Allergies   Allergen Reactions    Ketamine Other (comments)     confusion        Review of Systems: A complete review of systems was obtained, negative except as described above. Physical Exam:   There were no vitals taken for this visit. ECOG PS: 1-2  General: No distress  Eyes: PERRLA, anicteric sclerae  HENT: Atraumatic, OP clear  Neck: Supple  Lymphatic: No cervical, supraclavicular, or inguinal adenopathy  Respiratory: CTAB, normal respiratory effort  CV: Normal rate, regular rhythm, no murmurs, no peripheral edema  GI: Soft, nontender, nondistended, no masses, no hepatomegaly, no splenomegaly  MS: Normal gait and station. Digits without clubbing or cyanosis. Skin: No rashes, ecchymoses, or petechiae. Normal temperature, turgor, and texture. Psych: Alert, oriented, appropriate affect, normal judgment/insight    Results:     Lab Results   Component Value Date/Time    WBC 7.1 03/27/2020 10:20 AM    HGB 11.3 (L) 03/27/2020 10:20 AM    HCT 35.6 (L) 03/27/2020 10:20 AM    PLATELET 716 30/00/9300 10:20 AM    MCV 97.3 03/27/2020 10:20 AM    ABS.  NEUTROPHILS 5.1 03/27/2020 10:20 AM     Lab Results   Component Value Date/Time    Sodium 139 02/17/2020 01:01 PM    Potassium 4.5 02/17/2020 01:01 PM    Chloride 105 02/17/2020 01:01 PM    CO2 29 02/17/2020 01:01 PM    Glucose 161 (H) 02/17/2020 01:01 PM    BUN 41 (H) 02/17/2020 01:01 PM    Creatinine 2.90 (H) 02/17/2020 01:01 PM    GFR est AA 26 (L) 02/17/2020 01:01 PM    GFR est non-AA 21 (L) 02/17/2020 01:01 PM    Calcium 8.5 02/17/2020 01:01 PM     Lab Results   Component Value Date/Time    Bilirubin, total 0.4 02/17/2020 01:01 PM    ALT (SGPT) 16 02/17/2020 01:01 PM    AST (SGOT) 21 02/17/2020 01:01 PM    Alk. phosphatase 87 02/17/2020 01:01 PM    Protein, total 6.2 (L) 02/17/2020 01:01 PM    Albumin 3.2 (L) 02/17/2020 01:01 PM    Globulin 3.0 02/17/2020 01:01 PM       ECHO 7/19/2020  EF 56-60%     CT Chest 2/17/2020  IMPRESSION:  1. Presence of a focal nodular appearing density in the anteromedial aspect of  the right midlung. This may represent fibrotic scarring. Presence of a calcified  granuloma in the posterior aspect of the left lung base. No other focal  pulmonary nodules/mass lesions identified. No definite evidence of mediastinal  or hilar lymphadenopathy. 2. Presence of moderate coronary artery calcification. 3. Evidence of biapical fibrotic scarring (left greater than right).     CT A/P 2/17/2020  IMPRESSION:  1. Presence of a focal nodular appearing density in the anteromedial aspect of  the right midlung. A follow-up examination may prove useful. Presence of a  calcified granuloma in the posterior aspect of the left lung base. 2. Normal sized, somewhat unusually shaped liver with evidence of markedly  increased attenuation. This may represent hemochromatosis. Clinical correlation  is indicated. Presence of a few calcified granulomata within the liver. 3. Evidence of cholelithiasis. 4. Normal appearance of the pancreas. 5. Normal sized spleen. 6. Presence of two small, nonobstructing calculi within the right kidney. Presence of multiple cysts involving both kidneys. Suboptimal distention of the  urinary bladder.  Normal appearance of the prostate gland.     PET 3/2/2020  IMPRESSION:   1. No adenopathy is identified. 2. Liver is increased in density and correlation would be helpful.  Cecil Prosper is suspicion of a tiny gallstone.   There are changes of emphysema. There is mild focal increased metabolic activity in the posterior iliac bony  cortex bilaterally without a definite CT correlate and is of uncertain  significance but could be related to gluteal muscular attachment on the cortex     Right Groin Lymph Node excision 5/8/2019  Lymph node, right groin, excision:   Atypical B-cell proliferation with extensive necrosis compatible with large B-cell lymphoma, activated B cell type (see comment). Comment   The histologic section has a lymph node with extensive necrosis and scattered residual islands of medium to large atypical lymphocytes with brisk mitotic activity. The cells are positive for CD45, CD20, MUM1, and BCL2 and negative for cytokeratin AE1/AE3, CD10, BCL6, and cyclinD1. The Ki-67 proliferation index is greater than 90%. Scattered CD3/CD5 positive T cells are present. The differential diagnosis includes diffuse large B-cell lymphoma, NOS and High-Grade B-Cell Lymphoma (double hit). Additional immunohistochemical and molecular studies are pending for further classification.      FISH  Interpretation:   H(89;51): Not Detected   BCL6 rearrangement: Not Detected   MYC rearrangement: Not Detected  MYC amplification: Not Detected (SEE BELOW)   And abnormal signal pattern was observed with the MYC break-apart probes in 34% of analyzed nuclei. This represents an abnormal result suggestive of gains of MYC or chromosome 8, but not MYC amplification.      Pathology Addendum  Immunohistochemical stain for MYC is positive. In situ hybridization for July-Barr viral RNA is positive. All stains have satisfactory controls.    Addendum Comment   EBV positivity and the associated extensive geographic necrosis support the diagnosis of EBV-Positive Diffuse Large B-Cell Lymphoma, NOS      Bone Marrow Biopsy 3/27/2020  FINAL PATHOLOGIC DIAGNOSIS   Bone marrow, posterior iliac crest, aspirate, clot section, touch imprint, and decalcified core biopsy:   Normocellular marrow with maturing trilineage hematopoiesis   No morphologic or flow cytometric evidence of B-cell non-Hodgkin lymphoma   Iron stains on aspirate and section show reduced iron stores   Normal Cytogenetics--46XY     Assessment:   1) DLBCL--EBV+  2) A-Fib  3) Abd Pain/Neoplasm Pain  4) CKD     Plan:   1) Stage I EBV positive DLBCL--in curative setting--does want chemotherapy/treatment--discussed R-mini-CHOP x 3--will get ECHO and start--will need peg-filgrastim. Sending ondansetron to pharmacy--hopefully will tolerate well. 2) On warfarin--watch while getting therapy  3) Resolved  4) Overall stable--no need for dose reductions, Cr Cl 25.        Signed By: Rose Liebemran MD

## 2020-04-30 NOTE — PROGRESS NOTES
Chemo care information on Rituximab, cyclophosphamide, doxorubicin and Vincristine reviewed with Patient. Side effects/symptom management reviewed. Provided and reviewed chemo packet, and after hours contact information. Questions answered. Consent for curative treatment obtained at this time. Patient aware of RX for Zofran being sent to pharmacy. Provided chemo appointment date and time, as well as follow up OV.

## 2020-05-07 DIAGNOSIS — Z11.59 ENCOUNTER FOR SCREENING FOR OTHER VIRAL DISEASES: ICD-10-CM

## 2020-05-07 DIAGNOSIS — C83.35 DIFFUSE LARGE B-CELL LYMPHOMA OF LYMPH NODES OF INGUINAL REGION (HCC): Primary | ICD-10-CM

## 2020-05-11 RX ORDER — LOSARTAN POTASSIUM 100 MG/1
TABLET ORAL
Qty: 90 TAB | Refills: 0 | Status: SHIPPED | OUTPATIENT
Start: 2020-05-11 | End: 2020-09-11

## 2020-05-14 DIAGNOSIS — C83.35 DIFFUSE LARGE B-CELL LYMPHOMA OF LYMPH NODES OF INGUINAL REGION (HCC): ICD-10-CM

## 2020-05-14 DIAGNOSIS — Z11.59 ENCOUNTER FOR SCREENING FOR OTHER VIRAL DISEASES: ICD-10-CM

## 2020-05-18 NOTE — PROGRESS NOTES
Branden Mireles a 79 y. o. male here for follow up DLBCL.   Patient is scheduled for C2D1 of [facility] on 6/1/2020. Patient has had some problems with diarrhea, but per patient not much. He has not taken immodium, but feels like the \"diarreha\" is about ready to start at any time.     Last;  PET 3/2/2020  CT Chest 2/17/2020  CT A/P 2/17/2020  Bone Marrow biopsy 3/27/2020    Chemotherapy Flowsheet 5/11/2020   Cycle C1 D1   Date 5/11/2020   Drug / Regimen CHOP   Dosage See MAR   Time Up See MAR   Pre Meds Zofran, Emend, Decadron   Notes Prednisone 80 mg daily for 5 days; Neulasta OnBody 6 mg applied     Key Oncology Meds             ondansetron hcl (ZOFRAN) 8 mg tablet Take 1 Tab by mouth every eight (8) hours as needed for Nausea or Vomiting. Indications: nausea and vomiting caused by cancer drugs        Key Pain Meds     The patient is on no pain meds.

## 2020-05-20 ENCOUNTER — OFFICE VISIT (OUTPATIENT)
Dept: ONCOLOGY | Age: 80
End: 2020-05-20

## 2020-05-20 VITALS
WEIGHT: 186.7 LBS | TEMPERATURE: 97.9 F | SYSTOLIC BLOOD PRESSURE: 160 MMHG | DIASTOLIC BLOOD PRESSURE: 68 MMHG | HEART RATE: 72 BPM | HEIGHT: 70 IN | RESPIRATION RATE: 19 BRPM | OXYGEN SATURATION: 95 % | BODY MASS INDEX: 26.73 KG/M2

## 2020-05-20 DIAGNOSIS — C83.35 DIFFUSE LARGE B-CELL LYMPHOMA OF LYMPH NODES OF INGUINAL REGION (HCC): Primary | ICD-10-CM

## 2020-05-20 NOTE — PROGRESS NOTES
Reason for Visit:   Dali Blas a 65 M. o. male who is seen for follow up of new DLBCL--EBV positive     Treatment History:   Dx: Diffuse Large B Cell Lymphoma--EBV +, Right Groin Node--PET on 3/4/2020 did not show activity.  Bone Marrow Biopsy did not show involvement.  PACO 1, Stage I  Tx: R-mini-CHOP x 3 then ISRT.  Cycle #1 2020  Goal: Cure    History of Present Illness:   Did well with first dose--no changes physically. Some diarrhea but hasn't taken imodium. Stable weakness and stable dyspnea. Wt is up.   Still with bilateral le edema--2+, little more on left    Past Medical History:   Diagnosis Date    Arrhythmia     atrial fibrillation, cardioversion     CKD (chronic kidney disease) stage 3, GFR 30-59 ml/min ([facility] [place])     Hyperlipemia     Hypertension     PVD (peripheral vascular disease) ([facility] [address]     Thyroid disease       Past Surgical History:   Procedure Laterality Date    CHEST SURGERY PROCEDURE UNLISTED Left     lung surgery age 17, lobectomy    HX COLONOSCOPY      WNL SIS 10 y    HX HERNIA REPAIR Bilateral     Inguinal    HX ORTHOPAEDIC      right shoulder fracture repair    HX OTHER SURGICAL  2019    groin lymph gland excision    HX VASCULAR ACCESS      IR INSERT TUNL CVC W PORT OVER 5 YEARS  3/5/2020    VASCULAR SURGERY PROCEDURE UNLIST      bypass numerous surgeries both legs      Social History     Tobacco Use    Smoking status: Former Smoker     Packs/day: 1.00     Years: 54.00     Pack years: 54.00     Last attempt to quit: 3/15/2007     Years since quittin.1    Smokeless tobacco: Never Used   Substance Use Topics    Alcohol use: Yes     Comment: former drinker- beer   social      Family History   Problem Relation Age of Onset    Cancer Mother         lung    Cancer Brother 66        lung    Cancer Maternal Uncle         cancer unknown     Current Outpatient Medications   Medication Sig    losartan (COZAAR) 100 mg tablet Take 1 tablet by mouth once daily    predniSONE (DELTASONE) 20 mg tablet Take 80 mg by mouth daily. Take on Days 1 through 5 each cycle.  ondansetron hcl (ZOFRAN) 8 mg tablet Take 1 Tab by mouth every eight (8) hours as needed for Nausea or Vomiting. Indications: nausea and vomiting caused by cancer drugs    dilTIAZem (CARDIZEM) 60 mg tablet Take 1 tablet by mouth twice daily    furosemide (LASIX) 40 mg tablet Take 1 tablet by mouth once daily    levothyroxine (SYNTHROID) 100 mcg tablet TAKE 1 TABLET BY MOUTH ONCE DAILY BEFORE BREAKFAST    amiodarone (CORDARONE) 200 mg tablet TAKE 1 TABLET BY MOUTH ONCE DAILY    polyethylene glycol (MIRALAX) 17 gram/dose powder Take 17 g by mouth daily. 1 tablespoon with 8 oz of water daily    warfarin (COUMADIN) 1 mg tablet 1mg tablets for adjustment of INR    warfarin (COUMADIN) 3 mg tablet Take 1 Tab by mouth daily.  omeprazole (PRILOSEC) 40 mg capsule Take 1 Cap by mouth daily.  KLOR-CON 10 10 mEq tablet TAKE 2 TABLETS BY MOUTH TWICE DAILY    atorvastatin (LIPITOR) 20 mg tablet TAKE 1 TABLET BY MOUTH ONCE DAILY     No current facility-administered medications for this visit. Allergies   Allergen Reactions    Ketamine Other (comments)     confusion        Review of Systems: A complete review of systems was obtained, negative except as described above. Physical Exam:   There were no vitals taken for this visit. ECOG PS: 2  General: No distress  Eyes: PERRLA, anicteric sclerae  HENT: Atraumatic, OP clear  Neck: Supple  Lymphatic: No cervical, supraclavicular, or inguinal adenopathy  Respiratory: CTAB, normal respiratory effort  CV: Normal rate, regular rhythm, no murmurs, no peripheral edema  GI: Soft, nontender, nondistended, no masses, no hepatomegaly, no splenomegaly  MS: Normal gait and station. Digits without clubbing or cyanosis. Skin: No rashes, ecchymoses, or petechiae. Normal temperature, turgor, and texture.   Psych: Alert, oriented, appropriate affect, normal judgment/insight    Results:     Lab Results   Component Value Date/Time    WBC 7.4 05/11/2020 08:50 AM    HGB 10.6 (L) 05/11/2020 08:50 AM    HCT 34.0 (L) 05/11/2020 08:50 AM    PLATELET 437 03/12/5860 08:50 AM    MCV 98.0 05/11/2020 08:50 AM    ABS. NEUTROPHILS 4.9 05/11/2020 08:50 AM     Lab Results   Component Value Date/Time    Sodium 139 05/11/2020 08:50 AM    Potassium 4.4 05/11/2020 08:50 AM    Chloride 105 05/11/2020 08:50 AM    CO2 29 05/11/2020 08:50 AM    Glucose 108 (H) 05/11/2020 08:50 AM    BUN 44 (H) 05/11/2020 08:50 AM    Creatinine 2.78 (H) 05/11/2020 08:50 AM    GFR est AA 27 (L) 05/11/2020 08:50 AM    GFR est non-AA 22 (L) 05/11/2020 08:50 AM    Calcium 8.2 (L) 05/11/2020 08:50 AM     Lab Results   Component Value Date/Time    Bilirubin, total 0.3 05/11/2020 08:50 AM    ALT (SGPT) 19 05/11/2020 08:50 AM    AST (SGOT) 19 05/11/2020 08:50 AM    Alk. phosphatase 78 05/11/2020 08:50 AM    Protein, total 6.0 (L) 05/11/2020 08:50 AM    Albumin 3.2 (L) 05/11/2020 08:50 AM    Globulin 2.8 05/11/2020 08:50 AM       ECHO 7/19/2020  EF 56-60%     CT Chest 2/17/2020  IMPRESSION:  1. Presence of a focal nodular appearing density in the anteromedial aspect of  the right midlung. This may represent fibrotic scarring. Presence of a calcified  granuloma in the posterior aspect of the left lung base. No other focal  pulmonary nodules/mass lesions identified. No definite evidence of mediastinal  or hilar lymphadenopathy. 2. Presence of moderate coronary artery calcification. 3. Evidence of biapical fibrotic scarring (left greater than right).     CT A/P 2/17/2020  IMPRESSION:  1. Presence of a focal nodular appearing density in the anteromedial aspect of  the right midlung. A follow-up examination may prove useful. Presence of a  calcified granuloma in the posterior aspect of the left lung base. 2. Normal sized, somewhat unusually shaped liver with evidence of markedly  increased attenuation.  This may represent hemochromatosis. Clinical correlation  is indicated. Presence of a few calcified granulomata within the liver. 3. Evidence of cholelithiasis. 4. Normal appearance of the pancreas. 5. Normal sized spleen. 6. Presence of two small, nonobstructing calculi within the right kidney. Presence of multiple cysts involving both kidneys. Suboptimal distention of the  urinary bladder. Normal appearance of the prostate gland.     PET 3/2/2020  IMPRESSION:   1. No adenopathy is identified. 2. Liver is increased in density and correlation would be helpful.  Cecil Prosper is suspicion of a tiny gallstone.   There are changes of emphysema. There is mild focal increased metabolic activity in the posterior iliac bony  cortex bilaterally without a definite CT correlate and is of uncertain  significance but could be related to gluteal muscular attachment on the cortex     Right Groin Lymph Node excision 5/8/2019  Lymph node, right groin, excision:   Atypical B-cell proliferation with extensive necrosis compatible with large B-cell lymphoma, activated B cell type (see comment). Comment   The histologic section has a lymph node with extensive necrosis and scattered residual islands of medium to large atypical lymphocytes with brisk mitotic activity. The cells are positive for CD45, CD20, MUM1, and BCL2 and negative for cytokeratin AE1/AE3, CD10, BCL6, and cyclinD1. The Ki-67 proliferation index is greater than 90%. Scattered CD3/CD5 positive T cells are present. The differential diagnosis includes diffuse large B-cell lymphoma, NOS and High-Grade B-Cell Lymphoma (double hit). Additional immunohistochemical and molecular studies are pending for further classification.      FISH  Interpretation:   M(89;37): Not Detected   BCL6 rearrangement: Not Detected   MYC rearrangement: Not Detected  MYC amplification: Not Detected (SEE BELOW)   And abnormal signal pattern was observed with the MYC break-apart probes in 34% of analyzed nuclei.  This represents an abnormal result suggestive of gains of MYC or chromosome 8, but not MYC amplification.      Pathology Addendum  Immunohistochemical stain for MYC is positive. In situ hybridization for July-Barr viral RNA is positive. All stains have satisfactory controls. Addendum Comment   EBV positivity and the associated extensive geographic necrosis support the diagnosis of EBV-Positive Diffuse Large B-Cell Lymphoma, NOS      Bone Marrow Biopsy 3/27/2020  FINAL PATHOLOGIC DIAGNOSIS   Bone marrow, posterior iliac crest, aspirate, clot section, touch imprint, and decalcified core biopsy:   Normocellular marrow with maturing trilineage hematopoiesis   No morphologic or flow cytometric evidence of B-cell non-Hodgkin lymphoma   Iron stains on aspirate and section show reduced iron stores   Normal Cytogenetics--46XY     Assessment:   1) DLBCL--EBV+  2) A-Fib  3) Abd Pain/Neoplasm Pain  4) CKD  5) Diarrhea     Plan:   1) Stage I EBV positive DLBCL--in curative setting--continue with R-mini-CHOP x 3. Tolerating very well.   Has ondansetron.    2) On warfarin--watch while getting therapy  3) Resolved  4) Overall stable--no need for dose reductions, Cr Cl 25.   5) Try imodium      Signed By: Zuly Bartlett MD

## 2020-05-20 NOTE — PATIENT INSTRUCTIONS
Heart-Healthy Diet: Care Instructions Your Care Instructions A heart-healthy diet has lots of vegetables, fruits, nuts, beans, and whole grains, and is low in salt. It limits foods that are high in saturated fat, such as meats, cheeses, and fried foods. It may be hard to change your diet, but even small changes can lower your risk of heart attack and heart disease. Follow-up care is a key part of your treatment and safety. Be sure to make and go to all appointments, and call your doctor if you are having problems. It's also a good idea to know your test results and keep a list of the medicines you take. How can you care for yourself at home? Watch your portions · Learn what a serving is. A \"serving\" and a \"portion\" are not always the same thing. Make sure that you are not eating larger portions than are recommended. For example, a serving of pasta is ½ cup. A serving size of meat is 2 to 3 ounces. A 3-ounce serving is about the size of a deck of cards. Measure serving sizes until you are good at [place]" them. Keep in mind that restaurants often serve portions that are 2 or 3 times the size of one serving. · To keep your energy level up and keep you from feeling hungry, eat often but in smaller portions. · Eat only the number of calories you need to stay at a healthy weight. If you need to lose weight, eat fewer calories than your body burns (through exercise and other physical activity). Eat more fruits and vegetables · Eat a variety of fruit and vegetables every day. Dark green, deep orange, red, or yellow fruits and vegetables are especially good for you. Examples include spinach, carrots, peaches, and berries. · Keep carrots, celery, and other veggies handy for snacks. Buy fruit that is in season and store it where you can see it so that you will be tempted to eat it. · Cook dishes that have a lot of veggies in them, such as stir-fries and soups. Limit saturated and trans fat · Read food labels, and try to avoid saturated and trans fats. They increase your risk of heart disease. Trans fat is found in many processed foods such as cookies and crackers. · Use olive or canola oil when you cook. Try cholesterol-lowering spreads, such as Benecol or Take Control. · Bake, broil, grill, or steam foods instead of frying them. · Choose lean meats instead of high-fat meats such as hot dogs and sausages. Cut off all visible fat when you prepare meat. · Eat fish, skinless poultry, and meat alternatives such as soy products instead of high-fat meats. Soy products, such as tofu, may be especially good for your heart. · Choose low-fat or fat-free milk and dairy products. Eat foods high in fiber · Eat a variety of grain products every day. Include whole-grain foods that have lots of fiber and nutrients. Examples of whole-grain foods include oats, whole wheat bread, and brown rice. · Buy whole-grain breads and cereals, instead of white bread or pastries. Limit salt and sodium · Limit how much salt and sodium you eat to help lower your blood pressure. · Taste food before you salt it. Add only a little salt when you think you need it. With time, your taste buds will adjust to less salt. · Eat fewer snack items, fast foods, and other high-salt, processed foods. Check food labels for the amount of sodium in packaged foods. · Choose low-sodium versions of canned goods (such as soups, vegetables, and beans). Limit sugar · Limit drinks and foods with added sugar. These include candy, desserts, and soda pop. Limit alcohol · Limit alcohol to no more than 2 drinks a day for men and 1 drink a day for women. Too much alcohol can cause health problems. When should you call for help? Watch closely for changes in your health, and be sure to contact your doctor if: 
  · You would like help planning heart-healthy meals. Where can you learn more? Go to http://harsh-timothy.info/ Enter V137 in the search box to learn more about \"Heart-Healthy Diet: Care Instructions. \" Current as of: December 15, 2019Content Version: 12.4 © 8505-2254 [employer]wise, Incorporated. Care instructions adapted under license by [employer] (which disclaims liability or warranty for this information). If you have questions about a medical condition or this instruction, always ask your healthcare professional. [address] any warranty or liability for your use of this information. High Blood Pressure: Care Instructions Overview It's normal for blood pressure to go up and down throughout the day. But if it stays up, you have high blood pressure. Another name for high blood pressure is hypertension. Despite what a lot of people think, high blood pressure usually doesn't cause headaches or make you feel dizzy or lightheaded. It usually has no symptoms. But it does increase your risk of stroke, heart attack, and other problems. You and your doctor will talk about your risks of these problems based on your blood pressure. Your doctor will give you a goal for your blood pressure. Your goal will be based on your health and your age. Lifestyle changes, such as eating healthy and being active, are always important to help lower blood pressure. You might also take medicine to reach your blood pressure goal. 
Follow-up care is a key part of your treatment and safety. Be sure to make and go to all appointments, and call your doctor if you are having problems. It's also a good idea to know your test results and keep a list of the medicines you take. How can you care for yourself at home? Medical treatment · If you stop taking your medicine, your blood pressure will go back up. You may take one or more types of medicine to lower your blood pressure. Be safe with medicines. Take your medicine exactly as prescribed.  Call your doctor if you think you are having a problem with your medicine. · Talk to your doctor before you start taking aspirin every day. Aspirin can help certain people lower their risk of a heart attack or stroke. But taking aspirin isn't right for everyone, because it can cause serious bleeding. · See your doctor regularly. You may need to see the doctor more often at first or until your blood pressure comes down. · If you are taking blood pressure medicine, talk to your doctor before you take decongestants or anti-inflammatory medicine, such as ibuprofen. Some of these medicines can raise blood pressure. · Learn how to check your blood pressure at home. Lifestyle changes · Stay at a healthy weight. This is especially important if you put on weight around the waist. Losing even 10 pounds can help you lower your blood pressure. · If your doctor recommends it, get more exercise. Walking is a good choice. Bit by bit, increase the amount you walk every day. Try for at least 30 minutes on most days of the week. You also may want to swim, bike, or do other activities. · Avoid or limit alcohol. Talk to your doctor about whether you can drink any alcohol. · Try to limit how much sodium you eat to less than 2,300 milligrams (mg) a day. Your doctor may ask you to try to eat less than 1,500 mg a day. · Eat plenty of fruits (such as bananas and oranges), vegetables, legumes, whole grains, and low-fat dairy products. · Lower the amount of saturated fat in your diet. Saturated fat is found in animal products such as milk, cheese, and meat. Limiting these foods may help you lose weight and also lower your risk for heart disease. · Do not smoke. Smoking increases your risk for heart attack and stroke. If you need help quitting, talk to your doctor about stop-smoking programs and medicines. These can increase your chances of quitting for good. When should you call for help? Call  911 anytime you think you may need emergency care. This may mean having symptoms that suggest that your blood pressure is causing a serious heart or blood vessel problem. Your blood pressure may be over 180/120. 
 For example, call  911 if: 
  · You have symptoms of a heart attack. These may include: 
? Chest pain or pressure, or a strange feeling in the chest. 
? Sweating. ? Shortness of breath. ? Nausea or vomiting. ? Pain, pressure, or a strange feeling in the back, neck, jaw, or upper belly or in one or both shoulders or arms. ? Lightheadedness or sudden weakness. ? A fast or irregular heartbeat.  
  · You have symptoms of a stroke. These may include: 
? Sudden numbness, tingling, weakness, or loss of movement in your face, arm, or leg, especially on only one side of your body. ? Sudden vision changes. ? Sudden trouble speaking. ? Sudden confusion or trouble understanding simple statements. ? Sudden problems with walking or balance. ? A sudden, severe headache that is different from past headaches.  
  · You have severe back or belly pain.  
 Do not wait until your blood pressure comes down on its own. Get help right away. 
 Call your doctor now or seek immediate care if: 
  · Your blood pressure is much higher than normal (such as 180/120 or higher), but you don't have symptoms.  
  · You think high blood pressure is causing symptoms, such as: 
? Severe headache. 
? Blurry vision.  
 Watch closely for changes in your health, and be sure to contact your doctor if: 
  · Your blood pressure measures higher than your doctor recommends at least 2 times. That means the top number is higher or the bottom number is higher, or both.  
  · You think you may be having side effects from your blood pressure medicine. Where can you learn more? Go to http://harsh-timothy.info/ Enter C403 in the search box to learn more about \"High Blood Pressure: Care Instructions. \" 
 Current as of: December 15, 2019Content Version: 12.4 © 7528-2481 [employer]. Care instructions adapted under license by [employer] (which disclaims liability or warranty for this information). If you have questions about a medical condition or this instruction, always ask your healthcare professional. [address] any warranty or liability for your use of this information.

## 2020-05-27 ENCOUNTER — TELEPHONE (OUTPATIENT)
Dept: ONCOLOGY | Age: 80
End: 2020-05-27

## 2020-05-27 RX ORDER — ONDANSETRON 2 MG/ML
8 INJECTION INTRAMUSCULAR; INTRAVENOUS AS NEEDED
Status: CANCELLED | OUTPATIENT
Start: 2020-06-22

## 2020-05-27 RX ORDER — HEPARIN 100 UNIT/ML
300-500 SYRINGE INTRAVENOUS AS NEEDED
Status: CANCELLED
Start: 2020-06-22

## 2020-05-27 RX ORDER — ONDANSETRON 2 MG/ML
8 INJECTION INTRAMUSCULAR; INTRAVENOUS ONCE
Status: CANCELLED | OUTPATIENT
Start: 2020-06-22

## 2020-05-27 RX ORDER — ACETAMINOPHEN 325 MG/1
650 TABLET ORAL AS NEEDED
Status: CANCELLED
Start: 2020-06-22

## 2020-05-27 RX ORDER — SODIUM CHLORIDE 9 MG/ML
100 INJECTION, SOLUTION INTRAVENOUS CONTINUOUS
Status: CANCELLED | OUTPATIENT
Start: 2020-06-22

## 2020-05-27 RX ORDER — ALBUTEROL SULFATE 0.83 MG/ML
2.5 SOLUTION RESPIRATORY (INHALATION) AS NEEDED
Status: CANCELLED
Start: 2020-06-22

## 2020-05-27 RX ORDER — DOXORUBICIN HYDROCHLORIDE 2 MG/ML
25 INJECTION, SOLUTION INTRAVENOUS ONCE
Status: CANCELLED
Start: 2020-06-22 | End: 2020-06-01

## 2020-05-27 RX ORDER — DIPHENHYDRAMINE HYDROCHLORIDE 50 MG/ML
50 INJECTION, SOLUTION INTRAMUSCULAR; INTRAVENOUS ONCE
Status: CANCELLED
Start: 2020-06-22

## 2020-05-27 RX ORDER — DIPHENHYDRAMINE HYDROCHLORIDE 50 MG/ML
25 INJECTION, SOLUTION INTRAMUSCULAR; INTRAVENOUS AS NEEDED
Status: CANCELLED
Start: 2020-06-22

## 2020-05-27 RX ORDER — HYDROCORTISONE SODIUM SUCCINATE 100 MG/2ML
100 INJECTION, POWDER, FOR SOLUTION INTRAMUSCULAR; INTRAVENOUS AS NEEDED
Status: CANCELLED | OUTPATIENT
Start: 2020-06-22

## 2020-05-27 RX ORDER — ACETAMINOPHEN 325 MG/1
650 TABLET ORAL ONCE
Status: CANCELLED
Start: 2020-06-22

## 2020-05-27 RX ORDER — DIPHENHYDRAMINE HYDROCHLORIDE 50 MG/ML
50 INJECTION, SOLUTION INTRAMUSCULAR; INTRAVENOUS AS NEEDED
Status: CANCELLED
Start: 2020-06-22

## 2020-05-27 RX ORDER — SODIUM CHLORIDE 9 MG/ML
25 INJECTION, SOLUTION INTRAVENOUS CONTINUOUS
Status: CANCELLED | OUTPATIENT
Start: 2020-06-22

## 2020-05-27 RX ORDER — SODIUM CHLORIDE 9 MG/ML
10 INJECTION INTRAMUSCULAR; INTRAVENOUS; SUBCUTANEOUS AS NEEDED
Status: CANCELLED | OUTPATIENT
Start: 2020-06-22

## 2020-05-27 RX ORDER — PREDNISONE 1 MG/1
40 TABLET ORAL ONCE
Status: CANCELLED | OUTPATIENT
Start: 2020-06-22

## 2020-05-27 RX ORDER — SODIUM CHLORIDE 0.9 % (FLUSH) 0.9 %
10 SYRINGE (ML) INJECTION AS NEEDED
Status: CANCELLED
Start: 2020-06-22

## 2020-05-27 RX ORDER — EPINEPHRINE 1 MG/ML
0.3 INJECTION, SOLUTION, CONCENTRATE INTRAVENOUS AS NEEDED
Status: CANCELLED | OUTPATIENT
Start: 2020-06-22

## 2020-05-27 NOTE — TELEPHONE ENCOUNTER
Patient called. Patient stated that he has a really bad headache & he couldn't think of the other problem then he said his heart got to striving. Patient would like to speak with you before Monday to see if his (Form[place]) would be changed.

## 2020-05-27 NOTE — TELEPHONE ENCOUNTER
HIPAA verified. Relayed message to patient, he will continue to eat, and drink fluids. We will check back in with him on Friday to see how he is doing. Patient thanked for call.

## 2020-05-27 NOTE — TELEPHONE ENCOUNTER
Spoke with Dr Mya Lopez, we will check back in with patient on Friday to see how he is feeling, possible reduction in dose. Call if he starts feeling worse.

## 2020-05-29 ENCOUNTER — TELEPHONE (OUTPATIENT)
Dept: ONCOLOGY | Age: 80
End: 2020-05-29

## 2020-05-29 NOTE — TELEPHONE ENCOUNTER
HIPAA verified. Notified patient of cancellation of chemo on Monday, OV on Thursday at 1140am, okayed with patient. Patient thanked for call.

## 2020-05-29 NOTE — TELEPHONE ENCOUNTER
HIPAA verified. Called patient around 0900 on 5/29/2020, patiet states he is not doing well, constipation taking MOM, he is having a BM every other day. He is eating, feels his breathing is heavy. Denies N/V. He is urinating but does not know color. Relayed this information to Dr Maame Peguero, he wants to hold chemo on Monday 6/1/2020 and bring patient in to office on 6/4 or 6/5 for visit. 1144- called patient to give above information, no answer. VM left to return call.

## 2020-06-09 RX ORDER — ATORVASTATIN CALCIUM 20 MG/1
TABLET, FILM COATED ORAL
Qty: 90 TAB | Refills: 0 | Status: SHIPPED | OUTPATIENT
Start: 2020-06-09 | End: 2020-08-17

## 2020-06-10 ENCOUNTER — OFFICE VISIT (OUTPATIENT)
Dept: ONCOLOGY | Age: 80
End: 2020-06-10

## 2020-06-10 VITALS
OXYGEN SATURATION: 93 % | SYSTOLIC BLOOD PRESSURE: 146 MMHG | HEART RATE: 63 BPM | DIASTOLIC BLOOD PRESSURE: 69 MMHG | TEMPERATURE: 98.3 F | BODY MASS INDEX: 25.68 KG/M2 | HEIGHT: 70 IN | RESPIRATION RATE: 18 BRPM

## 2020-06-10 DIAGNOSIS — C83.35 DIFFUSE LARGE B-CELL LYMPHOMA OF LYMPH NODES OF INGUINAL REGION (HCC): ICD-10-CM

## 2020-06-10 DIAGNOSIS — R06.00 DYSPNEA, UNSPECIFIED TYPE: Primary | ICD-10-CM

## 2020-06-10 NOTE — PROGRESS NOTES
Bob Ritter a 79 y. o. male here for follow up DLBCL. C2D1 of Mini-RCHOP on 6/1/2020 was held due to Chest discomfort and SOB, patient stated he was not doing well on phone. Patient in office today states it all started when he felt his heart went \"boom boom boom\" and he has had SOB since then. He states the pain in his chest is a 7, it has been intermittent since the original episode. The constipation has resolved. Patient is taking warfarin 4mg daily, he is unsure why.       Last;  PET 3/2/2020  CT Chest 2/17/2020  CT A/P 2/17/2020  Bone Marrow biopsy 3/27/2020    Chemotherapy Flowsheet 5/11/2020   Cycle C1 D1   Date 5/11/2020   Drug / Regimen CHOP   Dosage See MAR   Time Up See MAR   Pre Meds Zofran, Emend, Decadron   Notes Prednisone 80 mg daily for 5 days; Neulasta OnBody 6 mg applied     Key Oncology Meds             ondansetron hcl (ZOFRAN) 8 mg tablet Take 1 Tab by mouth every eight (8) hours as needed for Nausea or Vomiting. Indications: nausea and vomiting caused by cancer drugs        Key Pain Meds     The patient is on no pain meds.

## 2020-06-10 NOTE — PROGRESS NOTES
Reason for Visit:   Makeda Kohler a 5 Y. o. male who is seen for follow up of new DLBCL--EBV positive     Treatment History:   Dx: Diffuse Large B Cell Lymphoma--EBV +, Right Groin Node--PET on 3/4/2020 did not show activity.  Bone Marrow Biopsy did not show involvement.  PACO 1, Stage I  Tx: R-mini-CHOP x 3 then ISRT.  Cycle #1 2020  Goal: Cure    History of Present Illness:   Palpitations and dyspnea after first cycle. Had chest pain in central chest since first cycle. Not pleuritic, not positional, no change with food, no point tenderness. No altered heart beats since first occurrence. No f/c/s, no cough.       Past Medical History:   Diagnosis Date    Arrhythmia     atrial fibrillation, cardioversion     CKD (chronic kidney disease) stage 3, GFR 30-59 ml/min (HCC)     Hyperlipemia     Hypertension     PVD [facility]peripheral vascular disease) ([facility] [address]     Thyroid disease       Past Surgical History:   Procedure Laterality Date    CHEST SURGERY PROCEDURE UNLISTED Left     lung surgery age 17, lobectomy    HX COLONOSCOPY      WNL SIS 10 y    HX HERNIA REPAIR Bilateral     Inguinal    HX ORTHOPAEDIC      right shoulder fracture repair    HX OTHER SURGICAL  2019    groin lymph gland excision    HX VASCULAR ACCESS      IR INSERT TUNL CVC W PORT OVER 5 YEARS  3/5/2020    VASCULAR SURGERY PROCEDURE UNLIST      bypass numerous surgeries both legs      Social History     Tobacco Use    Smoking status: Former Smoker     Packs/day: 1.00     Years: 54.00     Pack years: 54.00     Last attempt to quit: 3/15/2007     Years since quittin.2    Smokeless tobacco: Never Used   Substance Use Topics    Alcohol use: Yes     Comment: former drinker- beer   social      Family History   Problem Relation Age of Onset    Cancer Mother         lung    Cancer Brother 66        lung    Cancer Maternal Uncle         cancer unknown     Current Outpatient Medications   Medication Sig    atorvastatin (LIPITOR) 20 mg tablet Take 1 tablet by mouth once daily    losartan (COZAAR) 100 mg tablet Take 1 tablet by mouth once daily    predniSONE (DELTASONE) 20 mg tablet Take 80 mg by mouth daily. Take on Days 1 through 5 each cycle.  dilTIAZem (CARDIZEM) 60 mg tablet Take 1 tablet by mouth twice daily    furosemide (LASIX) 40 mg tablet Take 1 tablet by mouth once daily    levothyroxine (SYNTHROID) 100 mcg tablet TAKE 1 TABLET BY MOUTH ONCE DAILY BEFORE BREAKFAST    amiodarone (CORDARONE) 200 mg tablet TAKE 1 TABLET BY MOUTH ONCE DAILY    warfarin (COUMADIN) 1 mg tablet 1mg tablets for adjustment of INR    warfarin (COUMADIN) 3 mg tablet Take 1 Tab by mouth daily.  KLOR-CON 10 10 mEq tablet TAKE 2 TABLETS BY MOUTH TWICE DAILY    ondansetron hcl (ZOFRAN) 8 mg tablet Take 1 Tab by mouth every eight (8) hours as needed for Nausea or Vomiting. Indications: nausea and vomiting caused by cancer drugs    polyethylene glycol (MIRALAX) 17 gram/dose powder Take 17 g by mouth daily. 1 tablespoon with 8 oz of water daily    omeprazole (PRILOSEC) 40 mg capsule Take 1 Cap by mouth daily. No current facility-administered medications for this visit. Allergies   Allergen Reactions    Ketamine Other (comments)     confusion        Review of Systems: A complete review of systems was obtained, negative except as described above. Physical Exam:     Visit Vitals  /75   Pulse 63   Temp 98.3 °F (36.8 °C) (Oral)   Resp 18   Ht 5' 10\" (1.778 m)   SpO2 93%   BMI 25.68 kg/m²     ECOG PS: 2  General: No distress  Eyes: PERRLA, anicteric sclerae  HENT: Atraumatic, OP clear  Neck: Supple  Lymphatic: No cervical, supraclavicular, or inguinal adenopathy  Respiratory: CTAB, normal respiratory effort  CV: Normal rate, regular rhythm, no murmurs, no peripheral edema  GI: Soft, nontender, nondistended, no masses, no hepatomegaly, no splenomegaly  MS: Normal gait and station.  Digits without clubbing or cyanosis. Skin: No rashes, ecchymoses, or petechiae. Normal temperature, turgor, and texture. Psych: Alert, oriented, appropriate affect, normal judgment/insight    Results:     Lab Results   Component Value Date/Time    WBC 7.4 05/11/2020 08:50 AM    HGB 10.6 (L) 05/11/2020 08:50 AM    HCT 34.0 (L) 05/11/2020 08:50 AM    PLATELET 800 65/23/8851 08:50 AM    MCV 98.0 05/11/2020 08:50 AM    ABS. NEUTROPHILS 4.9 05/11/2020 08:50 AM     Lab Results   Component Value Date/Time    Sodium 139 05/11/2020 08:50 AM    Potassium 4.4 05/11/2020 08:50 AM    Chloride 105 05/11/2020 08:50 AM    CO2 29 05/11/2020 08:50 AM    Glucose 108 (H) 05/11/2020 08:50 AM    BUN 44 (H) 05/11/2020 08:50 AM    Creatinine 2.78 (H) 05/11/2020 08:50 AM    GFR est AA 27 (L) 05/11/2020 08:50 AM    GFR est non-AA 22 (L) 05/11/2020 08:50 AM    Calcium 8.2 (L) 05/11/2020 08:50 AM     Lab Results   Component Value Date/Time    Bilirubin, total 0.3 05/11/2020 08:50 AM    ALT (SGPT) 19 05/11/2020 08:50 AM    Alk. phosphatase 78 05/11/2020 08:50 AM    Protein, total 6.0 (L) 05/11/2020 08:50 AM    Albumin 3.2 (L) 05/11/2020 08:50 AM    Globulin 2.8 05/11/2020 08:50 AM       ECHO 4/30/2020  EF >70%     CT Chest 2/17/2020  IMPRESSION:  1. Presence of a focal nodular appearing density in the anteromedial aspect of  the right midlung. This may represent fibrotic scarring. Presence of a calcified  granuloma in the posterior aspect of the left lung base. No other focal  pulmonary nodules/mass lesions identified. No definite evidence of mediastinal  or hilar lymphadenopathy. 2. Presence of moderate coronary artery calcification. 3. Evidence of biapical fibrotic scarring (left greater than right).     CT A/P 2/17/2020  IMPRESSION:  1. Presence of a focal nodular appearing density in the anteromedial aspect of  the right midlung. A follow-up examination may prove useful. Presence of a  calcified granuloma in the posterior aspect of the left lung base.   2. Normal sized, somewhat unusually shaped liver with evidence of markedly  increased attenuation. This may represent hemochromatosis. Clinical correlation  is indicated. Presence of a few calcified granulomata within the liver. 3. Evidence of cholelithiasis. 4. Normal appearance of the pancreas. 5. Normal sized spleen. 6. Presence of two small, nonobstructing calculi within the right kidney. Presence of multiple cysts involving both kidneys. Suboptimal distention of the  urinary bladder. Normal appearance of the prostate gland.     PET 3/2/2020  IMPRESSION:   1. No adenopathy is identified. 2. Liver is increased in density and correlation would be helpful.  Kalyani Cheeks is suspicion of a tiny gallstone.   There are changes of emphysema. There is mild focal increased metabolic activity in the posterior iliac bony  cortex bilaterally without a definite CT correlate and is of uncertain  significance but could be related to gluteal muscular attachment on the cortex     Right Groin Lymph Node excision 5/8/2019  Lymph node, right groin, excision:   Atypical B-cell proliferation with extensive necrosis compatible with large B-cell lymphoma, activated B cell type (see comment). Comment   The histologic section has a lymph node with extensive necrosis and scattered residual islands of medium to large atypical lymphocytes with brisk mitotic activity. The cells are positive for CD45, CD20, MUM1, and BCL2 and negative for cytokeratin AE1/AE3, CD10, BCL6, and cyclinD1. The Ki-67 proliferation index is greater than 90%. Scattered CD3/CD5 positive T cells are present. The differential diagnosis includes diffuse large B-cell lymphoma, NOS and High-Grade B-Cell Lymphoma (double hit). Additional immunohistochemical and molecular studies are pending for further classification.      FISH  Interpretation:   T(35;50):  Not Detected   BCL6 rearrangement: Not Detected   MYC rearrangement: Not Detected  MYC amplification: Not Detected (SEE BELOW) And abnormal signal pattern was observed with the [address] break-apart probes in 34% of analyzed nuclei. This represents an abnormal result suggestive of gains of MYC or chromosome 8, but not MYC amplification.      Pathology Addendum  Immunohistochemical stain for MYC is positive. In situ hybridization for July-Barr viral RNA is positive. All stains have satisfactory controls. Addendum Comment   EBV positivity and the associated extensive geographic necrosis support the diagnosis of EBV-Positive Diffuse Large B-Cell Lymphoma, NOS      Bone Marrow Biopsy 3/27/2020  FINAL PATHOLOGIC DIAGNOSIS   Bone marrow, posterior iliac crest, aspirate, clot section, touch imprint, and decalcified core biopsy:   Normocellular marrow with maturing trilineage hematopoiesis   No morphologic or flow cytometric evidence of B-cell non-Hodgkin lymphoma   Iron stains on aspirate and section show reduced iron stores   Normal Cytogenetics--46XY     Assessment:   1) DLBCL--EBV+  2) A-Fib  3) Abd Pain/Neoplasm Pain  4) CKD  5) Diarrhea  6) Chest Pain     Plan:   1) Stage I EBV positive DLBCL--in curative setting--new persistent CP since first dose chemo with R-mini-CHOP x 3.  Hold second dose until we can work up CP.    2) On warfarin--watch while getting therapy  3) Resolved  4) Overall stable--no need for dose reductions, Cr Cl 25.   5) Try imodium--resolved. 6) EKG, CXR, ECHO, Troponin--if all ok will proceed with Cycle #2--could be GI origin.       Signed By: Elias Gallegos MD

## 2020-06-16 NOTE — PROGRESS NOTES
Virgen Donaldson a 79 y. o. male here for follow up DLBCL. Patient had chest pain after last chemo cycle, ECHO has been ordered but patient has not scheduled. Patient is due for C2D1 on 6/22/2020. Patient states he is eating and drinking without difficulty. He still has he pain in his chest when he is up and about. SOB per patient got worse after chemo but has not gotten any worse since the initial decrese.      Last;  PET 3/2/2020  CT Chest 2/17/2020  CT A/P 2/17/2020  Bone Marrow biopsy 3/27/2020    Chemotherapy Flowsheet 5/11/2020   Cycle C1 D1   Date 5/11/2020   Drug / Regimen CHOP   Dosage See MAR   Time Up See MAR   Pre Meds Zofran, Emend, Decadron   Notes Prednisone 80 mg daily for 5 days; Neulasta OnBody 6 mg applied     Key Oncology Meds             ondansetron hcl (ZOFRAN) 8 mg tablet Take 1 Tab by mouth every eight (8) hours as needed for Nausea or Vomiting. Indications: nausea and vomiting caused by cancer drugs        Key Pain Meds     The patient is on no pain meds.

## 2020-06-17 ENCOUNTER — OFFICE VISIT (OUTPATIENT)
Dept: ONCOLOGY | Age: 80
End: 2020-06-17

## 2020-06-17 VITALS
OXYGEN SATURATION: 95 % | HEIGHT: 70 IN | RESPIRATION RATE: 18 BRPM | SYSTOLIC BLOOD PRESSURE: 156 MMHG | BODY MASS INDEX: 25.57 KG/M2 | HEART RATE: 67 BPM | WEIGHT: 178.6 LBS | DIASTOLIC BLOOD PRESSURE: 74 MMHG | TEMPERATURE: 97.7 F

## 2020-06-17 DIAGNOSIS — C83.35 DIFFUSE LARGE B-CELL LYMPHOMA OF LYMPH NODES OF INGUINAL REGION (HCC): Primary | ICD-10-CM

## 2020-06-17 NOTE — PROGRESS NOTES
Reason for Visit:   Jose G Kirk a 71 G. o. male who is seen for follow up of new DLBCL--EBV positive     Treatment History:   Dx: Diffuse Large B Cell Lymphoma--EBV +, Right Groin Node--PET on 3/4/2020 did not show activity.  Bone Marrow Biopsy did not show involvement.  PACO 1, Stage I  Tx: R-mini-CHOP x 3 then ISRT.  Cycle #1 2020  Goal: Cure    History of Present Illness:   Frustrated with all the appointments and tests he has to do. No change in chest pain. Stable JONES, stable small swelling in le--left greater than right but that is normal for him. No orthopnea or PND.       Past Medical History:   Diagnosis Date    Arrhythmia     atrial fibrillation, cardioversion     CKD (chronic kidney disease) stage 3, GFR 30-59 ml/min ([facility])     Hyperlipemia     Hypertension     PVD (peripheral vascular disease) ([facility] [address]     Thyroid disease       Past Surgical History:   Procedure Laterality Date    CHEST SURGERY PROCEDURE UNLISTED Left     lung surgery age 17, lobectomy    HX COLONOSCOPY      WNL SIS 10 y    HX HERNIA REPAIR Bilateral     Inguinal    HX ORTHOPAEDIC      right shoulder fracture repair    HX OTHER SURGICAL  2019    groin lymph gland excision    HX VASCULAR ACCESS      IR INSERT TUNL CVC W PORT OVER 5 YEARS  3/5/2020    VASCULAR SURGERY PROCEDURE UNLIST      bypass numerous surgeries both legs      Social History     Tobacco Use    Smoking status: Former Smoker     Packs/day: 1.00     Years: 54.00     Pack years: 54.00     Last attempt to quit: 3/15/2007     Years since quittin.2    Smokeless tobacco: Never Used   Substance Use Topics    Alcohol use: Yes     Comment: former drinker- beer   social      Family History   Problem Relation Age of Onset    Cancer Mother         lung    Cancer Brother 66        lung    Cancer Maternal Uncle         cancer unknown     Current Outpatient Medications   Medication Sig    atorvastatin (LIPITOR) 20 mg tablet Take 1 tablet by mouth once daily    losartan (COZAAR) 100 mg tablet Take 1 tablet by mouth once daily    predniSONE (DELTASONE) 20 mg tablet Take 80 mg by mouth daily. Take on Days 1 through 5 each cycle.  ondansetron hcl (ZOFRAN) 8 mg tablet Take 1 Tab by mouth every eight (8) hours as needed for Nausea or Vomiting. Indications: nausea and vomiting caused by cancer drugs    dilTIAZem (CARDIZEM) 60 mg tablet Take 1 tablet by mouth twice daily    furosemide (LASIX) 40 mg tablet Take 1 tablet by mouth once daily    levothyroxine (SYNTHROID) 100 mcg tablet TAKE 1 TABLET BY MOUTH ONCE DAILY BEFORE BREAKFAST    amiodarone (CORDARONE) 200 mg tablet TAKE 1 TABLET BY MOUTH ONCE DAILY    polyethylene glycol (MIRALAX) 17 gram/dose powder Take 17 g by mouth daily. 1 tablespoon with 8 oz of water daily    warfarin (COUMADIN) 1 mg tablet 1mg tablets for adjustment of INR    warfarin (COUMADIN) 3 mg tablet Take 1 Tab by mouth daily.  omeprazole (PRILOSEC) 40 mg capsule Take 1 Cap by mouth daily.  KLOR-CON 10 10 mEq tablet TAKE 2 TABLETS BY MOUTH TWICE DAILY     No current facility-administered medications for this visit. Allergies   Allergen Reactions    Ketamine Other (comments)     confusion        Review of Systems: A complete review of systems was obtained, negative except as described above. Physical Exam:   There were no vitals taken for this visit. ECOG PS: 2  General: No distress  Eyes: PERRLA, anicteric sclerae  HENT: Atraumatic, OP clear  Neck: Supple  Lymphatic: No cervical, supraclavicular, or inguinal adenopathy  Respiratory: CTAB, normal respiratory effort  CV: Normal rate, regular rhythm, no murmurs, no peripheral edema  GI: Soft, nontender, nondistended, no masses, no hepatomegaly, no splenomegaly  MS: Normal gait and station. Digits without clubbing or cyanosis. Skin: No rashes, ecchymoses, or petechiae. Normal temperature, turgor, and texture.   Psych: Alert, oriented, appropriate affect, normal judgment/insight    Results:     Lab Results   Component Value Date/Time    WBC 7.4 05/11/2020 08:50 AM    HGB 10.6 (L) 05/11/2020 08:50 AM    HCT 34.0 (L) 05/11/2020 08:50 AM    PLATELET 783 78/77/3839 08:50 AM    MCV 98.0 05/11/2020 08:50 AM    ABS. NEUTROPHILS 4.9 05/11/2020 08:50 AM     Lab Results   Component Value Date/Time    Sodium 139 05/11/2020 08:50 AM    Potassium 4.4 05/11/2020 08:50 AM    Chloride 105 05/11/2020 08:50 AM    CO2 29 05/11/2020 08:50 AM    Glucose 108 (H) 05/11/2020 08:50 AM    BUN 44 (H) 05/11/2020 08:50 AM    Creatinine 2.78 (H) 05/11/2020 08:50 AM    GFR est AA 27 (L) 05/11/2020 08:50 AM    GFR est non-AA 22 (L) 05/11/2020 08:50 AM    Calcium 8.2 (L) 05/11/2020 08:50 AM     Lab Results   Component Value Date/Time    Bilirubin, total 0.3 05/11/2020 08:50 AM    ALT (SGPT) 19 05/11/2020 08:50 AM    Alk. phosphatase 78 05/11/2020 08:50 AM    Protein, total 6.0 (L) 05/11/2020 08:50 AM    Albumin 3.2 (L) 05/11/2020 08:50 AM    Globulin 2.8 05/11/2020 08:50 AM       ECHO 4/30/2020  EF >70%     CT Chest 2/17/2020  IMPRESSION:  1. Presence of a focal nodular appearing density in the anteromedial aspect of  the right midlung. This may represent fibrotic scarring. Presence of a calcified  granuloma in the posterior aspect of the left lung base. No other focal  pulmonary nodules/mass lesions identified. No definite evidence of mediastinal  or hilar lymphadenopathy. 2. Presence of moderate coronary artery calcification. 3. Evidence of biapical fibrotic scarring (left greater than right).     CT A/P 2/17/2020  IMPRESSION:  1. Presence of a focal nodular appearing density in the anteromedial aspect of  the right midlung. A follow-up examination may prove useful. Presence of a  calcified granuloma in the posterior aspect of the left lung base. 2. Normal sized, somewhat unusually shaped liver with evidence of markedly  increased attenuation. This may represent hemochromatosis.  Clinical correlation  is indicated. Presence of a few calcified granulomata within the liver. 3. Evidence of cholelithiasis. 4. Normal appearance of the pancreas. 5. Normal sized spleen. 6. Presence of two small, nonobstructing calculi within the right kidney. Presence of multiple cysts involving both kidneys. Suboptimal distention of the  urinary bladder. Normal appearance of the prostate gland.     PET 3/2/2020  IMPRESSION:   1. No adenopathy is identified. 2. Liver is increased in density and correlation would be helpful.  Bharath Luke is suspicion of a tiny gallstone.   There are changes of emphysema. There is mild focal increased metabolic activity in the posterior iliac bony  cortex bilaterally without a definite CT correlate and is of uncertain  significance but could be related to gluteal muscular attachment on the cortex     Right Groin Lymph Node excision 5/8/2019  Lymph node, right groin, excision:   Atypical B-cell proliferation with extensive necrosis compatible with large B-cell lymphoma, activated B cell type (see comment). Comment   The histologic section has a lymph node with extensive necrosis and scattered residual islands of medium to large atypical lymphocytes with brisk mitotic activity. The cells are positive for CD45, CD20, MUM1, and BCL2 and negative for cytokeratin AE1/AE3, CD10, BCL6, and cyclinD1. The Ki-67 proliferation index is greater than 90%. Scattered CD3/CD5 positive T cells are present. The differential diagnosis includes diffuse large B-cell lymphoma, NOS and High-Grade B-Cell Lymphoma (double hit). Additional immunohistochemical and molecular studies are pending for further classification.      FISH  Interpretation:   W(40;24): Not Detected   BCL6 rearrangement: Not Detected   MYC rearrangement: Not Detected  MYC amplification: Not Detected (SEE BELOW)   And abnormal signal pattern was observed with the MYC break-apart probes in 34% of analyzed nuclei.  This represents an abnormal result suggestive of gains of MYC or chromosome 8, but not MYC amplification.      Pathology Addendum  Immunohistochemical stain for MYC is positive. In situ hybridization for July-Barr viral RNA is positive. All stains have satisfactory controls. Addendum Comment   EBV positivity and the associated extensive geographic necrosis support the diagnosis of EBV-Positive Diffuse Large B-Cell Lymphoma, NOS      Bone Marrow Biopsy 3/27/2020  FINAL PATHOLOGIC DIAGNOSIS   Bone marrow, posterior iliac crest, aspirate, clot section, touch imprint, and decalcified core biopsy:   Normocellular marrow with maturing trilineage hematopoiesis   No morphologic or flow cytometric evidence of B-cell non-Hodgkin lymphoma   Iron stains on aspirate and section show reduced iron stores   Normal Cytogenetics--46XY     Assessment:   1) DLBCL--EBV+  2) A-Fib  3) Abd Pain/Neoplasm Pain  4) CKD  5) Diarrhea  6) Chest Pain     Plan:   1) Stage I EBV positive DLBCL--in curative setting--new persistent CP since first dose chemo with R-mini-CHOP x 3.  Hold second dose until we can work up CP.    2) On warfarin--watch while getting therapy  3) Resolved  4) Overall stable--no need for dose reductions, Cr Cl 25.   5) Try imodium--resolved. 6) EKG, CXR, and Troponin all normal--getting ECHO on 6/24. if all ok will proceed with Cycle #2--could be GI origin.       Signed By: Abdirahman Carpenter MD

## 2020-06-18 RX ORDER — AMIODARONE HYDROCHLORIDE 200 MG/1
TABLET ORAL
Qty: 90 TAB | Refills: 0 | Status: SHIPPED | OUTPATIENT
Start: 2020-06-18 | End: 2020-09-11

## 2020-06-25 ENCOUNTER — OFFICE VISIT (OUTPATIENT)
Dept: ONCOLOGY | Age: 80
End: 2020-06-25

## 2020-06-25 VITALS
SYSTOLIC BLOOD PRESSURE: 174 MMHG | HEART RATE: 65 BPM | WEIGHT: 178.4 LBS | DIASTOLIC BLOOD PRESSURE: 79 MMHG | RESPIRATION RATE: 18 BRPM | BODY MASS INDEX: 25.54 KG/M2 | TEMPERATURE: 98.1 F | OXYGEN SATURATION: 93 % | HEIGHT: 70 IN

## 2020-06-25 DIAGNOSIS — C83.35 DIFFUSE LARGE B-CELL LYMPHOMA OF LYMPH NODES OF INGUINAL REGION (HCC): Primary | ICD-10-CM

## 2020-06-25 NOTE — PROGRESS NOTES
Keerthi Felder a 79 y. o. male here for follow up DLBCL. C2D1 of Mini-RCHOP on 6/1/2020 was held due to Chest discomfort and SOB, patient stated he was not doing well on phone. Patient had his ECHO since last visit. Denies comlaints. Chemotherapy Flowsheet 5/11/2020   Cycle C1 D1   Date 5/11/2020   Drug / Regimen CHOP   Dosage See MAR   Time Up See MAR   Pre Meds Zofran, Emend, Decadron   Notes Prednisone 80 mg daily for 5 days; Neulasta OnBody 6 mg applied     Key Oncology Meds             ondansetron hcl (ZOFRAN) 8 mg tablet Take 1 Tab by mouth every eight (8) hours as needed for Nausea or Vomiting. Indications: nausea and vomiting caused by cancer drugs        Key Pain Meds     The patient is on no pain meds.

## 2020-06-25 NOTE — PROGRESS NOTES
Reason for Visit:   Virgen Donaldson a 46 T. o. male who is seen for follow up of new DLBCL--EBV positive     Treatment History:   Dx: Diffuse Large B Cell Lymphoma--EBV +, Right Groin Node--PET on 3/4/2020 did not show activity.  Bone Marrow Biopsy did not show involvement.  PACO 1, Stage I  Tx: R-mini-CHOP x 3 then ISRT.  Cycle #1 2020  Goal: Cure     History of Present Illness:   Cardiac work up complete--ECHO with preserved function--due for his second cycle. Isn't sure if he wants to continue, tired of the visits and issues that have come with chemotherapy. No current complaints.     Past Medical History:   Diagnosis Date    Arrhythmia     atrial fibrillation, cardioversion     CKD (chronic kidney disease) stage 3, GFR 30-59 ml/min (HCC)     Hyperlipemia     Hypertension     PVD [facility]peripheral vascular disease) ([facility] [address]     Thyroid disease       Past Surgical History:   Procedure Laterality Date    CHEST SURGERY PROCEDURE UNLISTED Left     lung surgery age 17, lobectomy    HX COLONOSCOPY      WNL SIS 10 y    HX HERNIA REPAIR Bilateral     Inguinal    HX ORTHOPAEDIC      right shoulder fracture repair    HX OTHER SURGICAL  2019    groin lymph gland excision    HX VASCULAR ACCESS      IR INSERT TUNL CVC W PORT OVER 5 YEARS  3/5/2020    VASCULAR SURGERY PROCEDURE UNLIST      bypass numerous surgeries both legs      Social History     Tobacco Use    Smoking status: Former Smoker     Packs/day: 1.00     Years: 54.00     Pack years: 54.00     Last attempt to quit: 3/15/2007     Years since quittin.2    Smokeless tobacco: Never Used   Substance Use Topics    Alcohol use: Yes     Comment: former drinker- beer   social      Family History   Problem Relation Age of Onset    Cancer Mother         lung    Cancer Brother 66        lung    Cancer Maternal Uncle         cancer unknown     Current Outpatient Medications   Medication Sig    warfarin (COUMADIN) 3 mg tablet Take 1 tablet by mouth once daily    amiodarone (CORDARONE) 200 mg tablet Take 1 tablet by mouth once daily    atorvastatin (LIPITOR) 20 mg tablet Take 1 tablet by mouth once daily    losartan (COZAAR) 100 mg tablet Take 1 tablet by mouth once daily    predniSONE (DELTASONE) 20 mg tablet Take 80 mg by mouth daily. Take on Days 1 through 5 each cycle.  ondansetron hcl (ZOFRAN) 8 mg tablet Take 1 Tab by mouth every eight (8) hours as needed for Nausea or Vomiting. Indications: nausea and vomiting caused by cancer drugs    dilTIAZem (CARDIZEM) 60 mg tablet Take 1 tablet by mouth twice daily    furosemide (LASIX) 40 mg tablet Take 1 tablet by mouth once daily    levothyroxine (SYNTHROID) 100 mcg tablet TAKE 1 TABLET BY MOUTH ONCE DAILY BEFORE BREAKFAST    polyethylene glycol (MIRALAX) 17 gram/dose powder Take 17 g by mouth daily. 1 tablespoon with 8 oz of water daily    warfarin (COUMADIN) 1 mg tablet 1mg tablets for adjustment of INR    omeprazole (PRILOSEC) 40 mg capsule Take 1 Cap by mouth daily.  KLOR-CON 10 10 mEq tablet TAKE 2 TABLETS BY MOUTH TWICE DAILY     No current facility-administered medications for this visit. Allergies   Allergen Reactions    Ketamine Other (comments)     confusion        Review of Systems: A complete review of systems was obtained, negative except as described above. Physical Exam:   There were no vitals taken for this visit. ECOG PS: 2  General: No distress  Eyes: PERRLA, anicteric sclerae  HENT: Atraumatic, OP clear  Neck: Supple  Lymphatic: No cervical, supraclavicular, or inguinal adenopathy  Respiratory: CTAB, normal respiratory effort  CV: Normal rate, regular rhythm, no murmurs, no peripheral edema  GI: Soft, nontender, nondistended, no masses, no hepatomegaly, no splenomegaly  MS: Normal gait and station. Digits without clubbing or cyanosis. Skin: No rashes, ecchymoses, or petechiae. Normal temperature, turgor, and texture.   Psych: Alert, oriented, appropriate affect, normal judgment/insight    Results:     Lab Results   Component Value Date/Time    WBC 7.4 05/11/2020 08:50 AM    HGB 10.6 (L) 05/11/2020 08:50 AM    HCT 34.0 (L) 05/11/2020 08:50 AM    PLATELET 503 34/21/7132 08:50 AM    MCV 98.0 05/11/2020 08:50 AM    ABS. NEUTROPHILS 4.9 05/11/2020 08:50 AM     Lab Results   Component Value Date/Time    Sodium 139 05/11/2020 08:50 AM    Potassium 4.4 05/11/2020 08:50 AM    Chloride 105 05/11/2020 08:50 AM    CO2 29 05/11/2020 08:50 AM    Glucose 108 (H) 05/11/2020 08:50 AM    BUN 44 (H) 05/11/2020 08:50 AM    Creatinine 2.78 (H) 05/11/2020 08:50 AM    GFR est AA 27 (L) 05/11/2020 08:50 AM    GFR est non-AA 22 (L) 05/11/2020 08:50 AM    Calcium 8.2 (L) 05/11/2020 08:50 AM     Lab Results   Component Value Date/Time    Bilirubin, total 0.3 05/11/2020 08:50 AM    ALT (SGPT) 19 05/11/2020 08:50 AM    Alk. phosphatase 78 05/11/2020 08:50 AM    Protein, total 6.0 (L) 05/11/2020 08:50 AM    Albumin 3.2 (L) 05/11/2020 08:50 AM    Globulin 2.8 05/11/2020 08:50 AM       ECHO 4/30/2020  EF >70%    ECHO 6/24/2020  EF 55-60%     CT Chest 2/17/2020  IMPRESSION:  1. Presence of a focal nodular appearing density in the anteromedial aspect of  the right midlung. This may represent fibrotic scarring. Presence of a calcified  granuloma in the posterior aspect of the left lung base. No other focal  pulmonary nodules/mass lesions identified. No definite evidence of mediastinal  or hilar lymphadenopathy. 2. Presence of moderate coronary artery calcification. 3. Evidence of biapical fibrotic scarring (left greater than right).     CT A/P 2/17/2020  IMPRESSION:  1. Presence of a focal nodular appearing density in the anteromedial aspect of  the right midlung. A follow-up examination may prove useful. Presence of a  calcified granuloma in the posterior aspect of the left lung base. 2. Normal sized, somewhat unusually shaped liver with evidence of markedly  increased attenuation. This may represent hemochromatosis. Clinical correlation  is indicated. Presence of a few calcified granulomata within the liver. 3. Evidence of cholelithiasis. 4. Normal appearance of the pancreas. 5. Normal sized spleen. 6. Presence of two small, nonobstructing calculi within the right kidney. Presence of multiple cysts involving both kidneys. Suboptimal distention of the  urinary bladder. Normal appearance of the prostate gland.     PET 3/2/2020  IMPRESSION:   1. No adenopathy is identified. 2. Liver is increased in density and correlation would be helpful.  Susie Ambaragustin is suspicion of a tiny gallstone.   There are changes of emphysema. There is mild focal increased metabolic activity in the posterior iliac bony  cortex bilaterally without a definite CT correlate and is of uncertain  significance but could be related to gluteal muscular attachment on the cortex     Right Groin Lymph Node excision 5/8/2019  Lymph node, right groin, excision:   Atypical B-cell proliferation with extensive necrosis compatible with large B-cell lymphoma, activated B cell type (see comment). Comment   The histologic section has a lymph node with extensive necrosis and scattered residual islands of medium to large atypical lymphocytes with brisk mitotic activity. The cells are positive for CD45, CD20, MUM1, and BCL2 and negative for cytokeratin AE1/AE3, CD10, BCL6, and cyclinD1. The Ki-67 proliferation index is greater than 90%. Scattered CD3/CD5 positive T cells are present. The differential diagnosis includes diffuse large B-cell lymphoma, NOS and High-Grade B-Cell Lymphoma (double hit). Additional immunohistochemical and molecular studies are pending for further classification.      FISH  Interpretation:   O(24;78):  Not Detected   BCL6 rearrangement: Not Detected   MYC rearrangement: Not Detected  MYC amplification: Not Detected (SEE BELOW)   And abnormal signal pattern was observed with the MYC break-apart probes in 34% of analyzed nuclei. This represents an abnormal result suggestive of gains of MYC or chromosome 8, but not MYC amplification.      Pathology Addendum  Immunohistochemical stain for MYC is positive. In situ hybridization for July-Barr viral RNA is positive. All stains have satisfactory controls. Addendum Comment   EBV positivity and the associated extensive geographic necrosis support the diagnosis of EBV-Positive Diffuse Large B-Cell Lymphoma, NOS      Bone Marrow Biopsy 3/27/2020  FINAL PATHOLOGIC DIAGNOSIS   Bone marrow, posterior iliac crest, aspirate, clot section, touch imprint, and decalcified core biopsy:   Normocellular marrow with maturing trilineage hematopoiesis   No morphologic or flow cytometric evidence of B-cell non-Hodgkin lymphoma   Iron stains on aspirate and section show reduced iron stores   Normal Cytogenetics--46XY     Assessment:   1) DLBCL--EBV+  2) A-Fib  3) Abd Pain/Neoplasm Pain  4) CKD  5) Diarrhea  6) Chest Pain     Plan:   1) Stage I EBV positive DLBCL--in curative setting--new persistent CP since first dose chemo with R-mini-CHOP x 3.  Held second dose due to the CP--work up did not show cardiac abnormalities. Not sure if he wants to continue. Went over risks and benefits again--mainly that he would be unlikely to be cured. He will contact us tomorrow with decision. .   2) On warfarin--watch while getting therapy  3) Resolved  4) Overall stable--no need for dose reductions, Cr Cl 25.   5) Try imodium--resolved.    6) Could be GI origin--not cardiac      Signed By: Denny Parra MD

## 2020-06-26 ENCOUNTER — TELEPHONE (OUTPATIENT)
Dept: ONCOLOGY | Age: 80
End: 2020-06-26

## 2020-06-26 NOTE — TELEPHONE ENCOUNTER
Patient called & canceled apt scheduled for 7/24/2020, no r/s.  Patient would like to discuss port removal.

## 2020-06-26 NOTE — TELEPHONE ENCOUNTER
HIPAA verified. Patient wants his port removed and would like to have it done at [facility] so he does not have to drive to [facility]. He does not want any more chemotherapy. Would DR Ever Wise remove a port that was placed at [facility]? If so can you let me know what we need to do?   Referral?

## 2020-06-29 RX ORDER — LEVOTHYROXINE SODIUM 100 UG/1
TABLET ORAL
Qty: 90 TAB | Refills: 0 | Status: SHIPPED | OUTPATIENT
Start: 2020-06-29 | End: 2020-12-06

## 2020-07-08 ENCOUNTER — TELEPHONE (OUTPATIENT)
Dept: ONCOLOGY | Age: 80
End: 2020-07-08

## 2020-07-28 ENCOUNTER — TELEPHONE (OUTPATIENT)
Dept: ONCOLOGY | Age: 80
End: 2020-07-28

## 2020-07-29 NOTE — TELEPHONE ENCOUNTER
Spoke with Michi Soto at Dr Wilfred Correia office, Dr Wilfred Correia can see patient on September 14, 2020 @ 1:30pm in the office, then to make appointment for removal.    Called patient provided date, time and phone number. Patient verbalized understanding. Thanked for call.

## 2020-08-17 RX ORDER — FUROSEMIDE 40 MG/1
TABLET ORAL
Qty: 90 TAB | Refills: 0 | Status: SHIPPED | OUTPATIENT
Start: 2020-08-17 | End: 2021-02-11

## 2020-08-17 RX ORDER — ATORVASTATIN CALCIUM 20 MG/1
TABLET, FILM COATED ORAL
Qty: 90 TAB | Refills: 0 | Status: SHIPPED | OUTPATIENT
Start: 2020-08-17 | End: 2020-12-07

## 2020-08-17 RX ORDER — FUROSEMIDE 40 MG/1
TABLET ORAL
Qty: 90 TAB | Refills: 0 | Status: SHIPPED | OUTPATIENT
Start: 2020-08-17 | End: 2020-08-17

## 2020-09-11 RX ORDER — LOSARTAN POTASSIUM 100 MG/1
TABLET ORAL
Qty: 90 TAB | Refills: 0 | Status: SHIPPED | OUTPATIENT
Start: 2020-09-11 | End: 2020-12-06

## 2020-09-11 RX ORDER — AMIODARONE HYDROCHLORIDE 200 MG/1
TABLET ORAL
Qty: 90 TAB | Refills: 0 | Status: SHIPPED | OUTPATIENT
Start: 2020-09-11 | End: 2020-12-06

## 2020-09-22 PROBLEM — H25.11 AGE-RELATED NUCLEAR CATARACT, RIGHT EYE: Chronic | Status: ACTIVE | Noted: 2020-09-22

## 2020-09-23 PROBLEM — H25.11 AGE-RELATED NUCLEAR CATARACT, RIGHT EYE: Chronic | Status: RESOLVED | Noted: 2020-09-22 | Resolved: 2020-09-23

## 2020-09-29 PROBLEM — H25.12 AGE-RELATED NUCLEAR CATARACT, LEFT EYE: Chronic | Status: ACTIVE | Noted: 2020-09-29

## 2020-09-30 PROBLEM — H25.12 AGE-RELATED NUCLEAR CATARACT, LEFT EYE: Chronic | Status: RESOLVED | Noted: 2020-09-29 | Resolved: 2020-09-30

## 2020-10-09 NOTE — PROGRESS NOTES
Called pt and informed about adenocarcinoma, to see oncology and dr rosario       Subjective:     Masha Guzman is a 69 y.o. male who presents today with the following:  Chief Complaint   Patient presents with    Anticoagulation       COMPLIANT WITH MEDICATION:         ROS:  Gen: denies fever, chills, fatigue, weight loss, weight gain  HEENT:denies blurry vision, nasal congestion, sore throat  Resp: denies dypsnea, cough, wheezing  CV: denies chest pain radiating to the jaws or arms, palpitations, lower extremity edema  Abd: denies nausea, vomiting, diarrhea, constipation  Neuro: denies numbness/tingling  Endo: denies polyuria, polydipsia, heat/cold intolerance  Heme: no lymphadenopathy      Anticoagulation. Dx:  Atrial fibrillation. Current symptoms: none  Current control agent: amiodarone  Current anticoagulant: warfarin5 mg on Monday and Thursday and 2.5 mg on all the other days. History of bleeding problems: none  Lab Results   Component Value Date/Time    INR 1.6 10/04/2010 04:33 PM    INR 1.4 11/14/2009 05:10 AM    INR 1.4 11/13/2009 04:30 AM    INR (POC) 2.9 10/15/2010 07:37 AM    INR POC 32.2 11/10/2017 11:55 AM    INR POC 2.9 10/11/2017 10:10 AM    INR POC 2.1 09/06/2017 10:00 AM    Prothrombin time 16.3 10/04/2010 04:33 PM    Prothrombin time 13.8 11/14/2009 05:10 AM    Prothrombin time 13.8 11/13/2009 04:30 AM     Allergies   Allergen Reactions    Ketamine Other (comments)     confusion         Current Outpatient Prescriptions:     amiodarone (PACERONE) 200 mg tablet, Take 1 Tab by mouth daily. , Disp: 90 Tab, Rfl: 0    potassium chloride SR (KLOR-CON 10) 10 mEq tablet, TAKE TWO TABLETS BY MOUTH TWICE DAILY, Disp: 120 Tab, Rfl: 5    dilTIAZem (CARDIZEM) 60 mg tablet, Take 1 Tab by mouth two (2) times a day., Disp: 180 Tab, Rfl: 1    warfarin (COUMADIN) 5 mg tablet, 1 tab Mon and Thurs, 1/2 tab all other days. , Disp: 90 Tab, Rfl: 1    levothyroxine (SYNTHROID) 100 mcg tablet, TAKE ONE TABLET BY MOUTH ONCE DAILY BEFORE  BREAKFAST, Disp: 90 Tab, Rfl: 1    furosemide (LASIX) 40 mg tablet, TAKE ONE TABLET BY MOUTH ONCE DAILY, Disp: 90 Tab, Rfl: 1    atorvastatin (LIPITOR) 20 mg tablet, Take 1 Tab by mouth daily. , Disp: 90 Tab, Rfl: 3    losartan (COZAAR) 100 mg tablet, Take 1 Tab by mouth daily. , Disp: 90 Tab, Rfl: 3    sildenafil citrate (VIAGRA) 100 mg tablet, Take 1 Tab by mouth as needed. , Disp: 5 Tab, Rfl: 2    Past Medical History:   Diagnosis Date    Arrhythmia     atrial fibrillation, cardioversion 2010    CKD (chronic kidney disease) stage 3, GFR 30-59 ml/min     Hyperlipemia     Hypertension     PVD [facility]peripheral vascular disease) ([facility] [address]     Thyroid disease        Past Surgical History:   Procedure Laterality Date    CHEST SURGERY PROCEDURE UNLISTED      lung surgery age 17    HX COLONOSCOPY  2010    WNL SIS 10 y    HX HERNIA REPAIR      VASCULAR SURGERY PROCEDURE UNLIST      bypass numerous surgeries both legs       History   Smoking Status    Former Smoker    Quit date: 3/15/2007   Smokeless Tobacco    Never Used       Social History     Social History    Marital status:      Spouse name: N/A    Number of children: N/A    Years of education: N/A     Social History Main Topics    Smoking status: Former Smoker     Quit date: 3/15/2007    Smokeless tobacco: Never Used    Alcohol use No      Comment: former drinker/beer    Drug use: None    Sexual activity: Not Asked     Other Topics Concern     Service No    Blood Transfusions Yes    Caffeine Concern No    Occupational Exposure No    Hobby Hazards No    Sleep Concern Yes    Stress Concern No    Weight Concern No    Special Diet No    Back Care Yes    Exercise Yes    Seat Belt No    Self-Exams Yes     Social History Narrative       Family History   Problem Relation Age of Onset    Cancer Mother     Cancer Brother          Objective:     Visit Vitals    /62 (BP 1 Location: Right arm, BP Patient Position: Sitting)    Pulse 65    Temp 97.9 °F (36.6 °C) (Oral)    Resp 24    Wt 205 lb (93 kg)    SpO2 94%    BMI 29.41 kg/m2     Body mass index is 29.41 kg/(m^2). General: Alert and oriented. No acute distress. Well nourished  HEENT :  Ears:TMs are normal. Canals are clear. Eyes: pupils equal, round, react to light and accommodation. Extra ocular movements intact. Nose: patent. Mouth and throat is clear. Neck:supple full range of motion no thyromegaly. Trachea midline, No carotid bruits. No significant lymphadenopathy  Lungs[de-identified] clear to auscultation without wheezes, rales, or rhonchi. Heart :RRR, S1 & S2 are normal intensity. No murmur; no gallop      Results for orders placed or performed in visit on 11/10/17   AMB POC PT/INR   Result Value Ref Range    VALID INTERNAL CONTROL POC Yes     Prothrombin time (POC) 2.7 seconds    INR POC 32.2        Results for orders placed or performed in visit on 11/10/17   AMB POC PT/INR   Result Value Ref Range    VALID INTERNAL CONTROL POC Yes     Prothrombin time (POC) 2.7 seconds    INR POC 32.2        Assessment/ Plan:     Diagnoses and all orders for this visit:    1. Anticoagulant long-term use  -     AMB POC PT/INR         1. Anticoagulant long-term use        Orders Placed This Encounter    AMB POC PT/INR         Verbal and written instructions (see AVS) provided.  Patient expresses understanding of diagnosis and treatment plan. Follow-up Disposition:  Return in about 1 month (around 12/10/2017).       Jose De Jesus Singh, WENDY

## 2020-11-02 ENCOUNTER — OFFICE VISIT (OUTPATIENT)
Dept: SURGERY | Age: 80
End: 2020-11-02
Payer: MEDICARE

## 2020-11-02 VITALS
DIASTOLIC BLOOD PRESSURE: 63 MMHG | HEART RATE: 63 BPM | TEMPERATURE: 97.1 F | RESPIRATION RATE: 18 BRPM | BODY MASS INDEX: 23.62 KG/M2 | SYSTOLIC BLOOD PRESSURE: 128 MMHG | OXYGEN SATURATION: 97 % | HEIGHT: 70 IN | WEIGHT: 165 LBS

## 2020-11-02 DIAGNOSIS — C83.35 DIFFUSE LARGE B-CELL LYMPHOMA OF LYMPH NODES OF INGUINAL REGION (HCC): Primary | ICD-10-CM

## 2020-11-02 PROCEDURE — G8432 DEP SCR NOT DOC, RNG: HCPCS | Performed by: SURGERY

## 2020-11-02 PROCEDURE — G8427 DOCREV CUR MEDS BY ELIG CLIN: HCPCS | Performed by: SURGERY

## 2020-11-02 PROCEDURE — 3288F FALL RISK ASSESSMENT DOCD: CPT | Performed by: SURGERY

## 2020-11-02 PROCEDURE — G9231 DOC ESRD DIA TRANS PREG: HCPCS | Performed by: SURGERY

## 2020-11-02 PROCEDURE — 99213 OFFICE O/P EST LOW 20 MIN: CPT | Performed by: SURGERY

## 2020-11-02 PROCEDURE — G8420 CALC BMI NORM PARAMETERS: HCPCS | Performed by: SURGERY

## 2020-11-02 PROCEDURE — G8536 NO DOC ELDER MAL SCRN: HCPCS | Performed by: SURGERY

## 2020-11-02 PROCEDURE — 1100F PTFALLS ASSESS-DOCD GE2>/YR: CPT | Performed by: SURGERY

## 2020-11-02 NOTE — PATIENT INSTRUCTIONS
Implanted [facility] for Chemotherapy: Care Instructions Your Care Instructions An implanted port is a device placed, in most cases, under the skin of your chest below your collarbone. It is made of plastic, stainless steel, or titanium. The port is about the size of a quarter, but thicker. A thin, flexible tube called a catheter runs from the port under your skin into a large vein. A membrane (septum) similar to a pencil eraser is in the center of the port. A nurse uses a needle to put chemotherapy or other medicine and fluids through the septum into a blood vessel. The port may be used to draw blood for tests only if another vein, such as in the hand or arm, can't be used. An implanted port can be used for months. A special needle (called a Johns needle) may stay in the port for a short time. The port needs regular care to make sure it does not get blocked. Tell your doctor if you take aspirin or some other blood thinner. These medicines can increase the chance of bleeding inside your body. Follow-up care is a key part of your treatment and safety. Be sure to make and go to all appointments, and call your doctor if you are having problems. It's also a good idea to know your test results and keep a list of the medicines you take. How can you care for yourself at home? · You will probably need to take 1 day off from work and will be able return to normal activities shortly after. This depends on the type of work you do, why you have the port, and how you feel. · You probably will be able to bathe and swim. But you may need to avoid some activities if a Johns needle is left in the port. Talk to your doctor about any limits on your activity. · Some clothes may rub the skin over the port. Do not wear a bra or suspenders that irritate your skin near the port. · You will get a medical alert card with information about your port. Carry this with you.  It will tell health care workers you have a port in case you need emergency care. · Your port will need regular flushing to keep it open. A nurse or other health professional will do this for you. When should you call for help? Call your doctor now or seek immediate medical care if: 
  · You have signs of infection, such as: 
? Increased pain, swelling, warmth, or redness near the port. ? Red streaks leading from the port. ? Pus draining from the port. ? A fever.  
  · You have pain or swelling in your neck or arm.  
  · You have trouble breathing. Watch closely for changes in your health, and be sure to contact your doctor if: 
  · You have any problems with your port. Where can you learn more? Go to http://www.gray.com/ Enter [phone] in the search box to learn more about \"Implanted [employer] [employer] [facility] [place] for Chemotherapy: Care Instructions. \" Current as of: June 26, 2019               Content Version: 12.6 © 8116-3295 [employer]. Care instructions adapted under license by [employer] (which disclaims liability or warranty for this information). If you have questions about a medical condition or this instruction, always ask your healthcare professional. [place] [address] any warranty or liability for your use of this information.

## 2020-11-02 NOTE — PROGRESS NOTES
Terri Enciso is a 79 y.o. male who presents today with the following:  Chief Complaint   Patient presents with    Vascular Access Problem       HPI    72-year-old male with diffuse B-cell lymphoma presents for discussion of possible port removal.  Patient starts off the conversation by stating that he does not want to have the port removed unless he absolutely has to. He also has admitted that he has no intention of proceeding with chemotherapy.   Past Medical History:   Diagnosis Date    Arrhythmia     atrial fibrillation, cardioversion     CKD (chronic kidney disease) stage 3, GFR 30-59 ml/min     Hyperlipemia     Hypertension     PVD [facility]peripheral vascular disease) [facility] [address]     Thyroid disease        Past Surgical History:   Procedure Laterality Date    CHEST SURGERY PROCEDURE UNLISTED Left     lung surgery age 17, lobectomy    HX COLONOSCOPY      WNL SIS 10 y    HX HERNIA REPAIR Bilateral     Inguinal    HX ORTHOPAEDIC      right shoulder fracture repair    HX OTHER SURGICAL  2019    groin lymph gland excision    HX VASCULAR ACCESS      IR INSERT TUNL CVC W PORT OVER 5 YEARS  3/5/2020    VASCULAR SURGERY PROCEDURE UNLIST      bypass numerous surgeries both legs       Social History     Socioeconomic History    Marital status:      Spouse name: Not on file    Number of children: Not on file    Years of education: Not on file    Highest education level: Not on file   Occupational History    Not on file   Social Needs    Financial resource strain: Not on file    Food insecurity     Worry: Not on file     Inability: Not on file   [employer] needs     Medical: Not on file     Non-medical: Not on file   Tobacco Use    Smoking status: Former Smoker     Packs/day: 1.00     Years: 54.00     Pack years: 54.00     Last attempt to quit: 3/15/2007     Years since quittin.6    Smokeless tobacco: Never Used   Substance and Sexual Activity    Alcohol use: Yes     Comment: former drinker- beer   social    Drug use: Never    Sexual activity: Not on file   Lifestyle    Physical activity     Days per week: Not on file     Minutes per session: Not on file    Stress: Not on file   Relationships    Social connections     Talks on phone: Not on file     Gets together: Not on file     Attends Anabaptism service: Not on file     Active member of club or organization: Not on file     Attends meetings of clubs or organizations: Not on file     Relationship status: Not on file    Intimate partner violence     Fear of current or ex partner: Not on file     Emotionally abused: Not on file     Physically abused: Not on file     Forced sexual activity: Not on file   Other Topics Concern     Service No    Blood Transfusions Yes    Caffeine Concern No    Occupational Exposure No    Hobby Hazards No    Sleep Concern Yes    Stress Concern No    Weight Concern No    Special Diet No    Back Care Yes    Exercise Yes    Bike Helmet Not Asked    Seat Belt No    Self-Exams Yes   Social History Narrative    Not on file       Family History   Problem Relation Age of Onset    Cancer Mother         lung    Cancer Brother 66        lung    Cancer Maternal Uncle         cancer unknown       Allergies   Allergen Reactions    Ketamine Other (comments)     confusion       Current Outpatient Medications   Medication Sig    dilTIAZem IR (CARDIZEM) 60 mg tablet Take 1 tablet by mouth twice daily    amiodarone (CORDARONE) 200 mg tablet Take 1 tablet by mouth once daily    losartan (COZAAR) 100 mg tablet Take 1 tablet by mouth once daily    atorvastatin (LIPITOR) 20 mg tablet Take 1 tablet by mouth once daily    furosemide (LASIX) 40 mg tablet Take 1 tablet by mouth once daily    Euthyrox 100 mcg tablet TAKE 1 TABLET BY MOUTH ONCE DAILY BEFORE BREAKFAST    warfarin (COUMADIN) 3 mg tablet Take 1 tablet by mouth once daily    polyethylene glycol (MIRALAX) 17 gram/dose powder Take 17 g by mouth daily. 1 tablespoon with 8 oz of water daily    warfarin (COUMADIN) 1 mg tablet 1mg tablets for adjustment of INR (Patient taking differently: 1mg tablets for adjustment of INR  Takes 2 a day)    KLOR-CON 10 10 mEq tablet TAKE 2 TABLETS BY MOUTH TWICE DAILY     No current facility-administered medications for this visit. The above histories, medications and allergies have been reviewed. ROS    Visit Vitals  /63 (BP 1 Location: Left arm, BP Patient Position: Sitting)   Pulse 63   Temp 97.1 °F (36.2 °C) (Temporal)   Resp 18   Ht 5' 10\" (1.778 m)   Wt 165 lb (74.8 kg)   SpO2 97%   BMI 23.68 kg/m²     Physical Exam  Constitutional:       Comments: He appears his stated age but is in no acute distress         1. 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)  Communicated with his oncologist.  He feels that it is fine to have a port stay and. They will arrange for flushing as needed. If the patient changes his mind we would be more than happy to perform port removal here. Follow-up and Dispositions    · Return if symptoms worsen or fail to improve.          Shahla Sweeney MD

## 2020-11-02 NOTE — PROGRESS NOTES
1. Have you been to the ER, urgent care clinic since your last visit? Hospitalized since your last visit? No    2. Have you seen or consulted any other health care providers outside of the [address] [place] [address] since your last visit? Include any pap smears or colon screening.  No

## 2020-12-06 RX ORDER — LOSARTAN POTASSIUM 100 MG/1
TABLET ORAL
Qty: 90 TAB | Refills: 0 | Status: SHIPPED | OUTPATIENT
Start: 2020-12-06 | End: 2021-02-11

## 2020-12-06 RX ORDER — LEVOTHYROXINE SODIUM 100 UG/1
TABLET ORAL
Qty: 90 TAB | Refills: 0 | Status: SHIPPED | OUTPATIENT
Start: 2020-12-06 | End: 2021-04-20

## 2020-12-06 RX ORDER — AMIODARONE HYDROCHLORIDE 200 MG/1
TABLET ORAL
Qty: 90 TAB | Refills: 0 | Status: SHIPPED | OUTPATIENT
Start: 2020-12-06 | End: 2021-04-07

## 2020-12-06 NOTE — TELEPHONE ENCOUNTER
Please schedule a telephone visit with labs . Oncology patient   Thyroid levels need to be checked. Thanks!  DL

## 2020-12-07 RX ORDER — ATORVASTATIN CALCIUM 20 MG/1
TABLET, FILM COATED ORAL
Qty: 90 TAB | Refills: 0 | Status: SHIPPED | OUTPATIENT
Start: 2020-12-07 | End: 2021-04-07

## 2021-02-08 PROBLEM — N17.9 AKI (ACUTE KIDNEY INJURY) (HCC): Status: ACTIVE | Noted: 2021-02-08
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
## 2021-02-08 PROBLEM — R53.1 GENERALIZED WEAKNESS: Status: ACTIVE | Noted: 2021-02-08

## 2021-02-09 PROBLEM — I48.0 PAF (PAROXYSMAL ATRIAL FIBRILLATION) (HCC): Status: ACTIVE | Noted: 2021-02-09

## 2021-02-09 PROBLEM — D50.0 IRON DEFICIENCY ANEMIA DUE TO CHRONIC BLOOD LOSS: Status: ACTIVE | Noted: 2021-02-09

## 2021-02-11 PROBLEM — S72.009A HIP FRACTURE (HCC): Status: ACTIVE | Noted: 2021-02-11

## 2021-02-13 PROBLEM — Z51.89 ENCOUNTER FOR REHABILITATION: Status: ACTIVE | Noted: 2021-02-13

## 2021-02-18 ENCOUNTER — TELEPHONE (OUTPATIENT)
Dept: ONCOLOGY | Age: 81
End: 2021-02-18

## 2021-02-18 NOTE — TELEPHONE ENCOUNTER
Spoke with DR Litzy Fofana, he will return call to Marisue Denver, Mr Macias's daughter in law. Dr Litzy Fofana will be on Office tomorrow and will go see patient.

## 2021-03-10 ENCOUNTER — OFFICE VISIT (OUTPATIENT)
Dept: FAMILY MEDICINE CLINIC | Age: 81
End: 2021-03-10
Payer: MEDICARE

## 2021-03-10 VITALS
RESPIRATION RATE: 22 BRPM | HEART RATE: 68 BPM | OXYGEN SATURATION: 95 % | SYSTOLIC BLOOD PRESSURE: 142 MMHG | DIASTOLIC BLOOD PRESSURE: 60 MMHG | TEMPERATURE: 98 F | BODY MASS INDEX: 22.45 KG/M2 | HEIGHT: 70 IN | WEIGHT: 156.8 LBS

## 2021-03-10 DIAGNOSIS — I73.9 PVD (PERIPHERAL VASCULAR DISEASE) (HCC): ICD-10-CM

## 2021-03-10 DIAGNOSIS — R53.83 FATIGUE, UNSPECIFIED TYPE: ICD-10-CM

## 2021-03-10 DIAGNOSIS — I50.32 DIASTOLIC CHF, CHRONIC (HCC): Primary | ICD-10-CM

## 2021-03-10 DIAGNOSIS — I10 ESSENTIAL HYPERTENSION: ICD-10-CM

## 2021-03-10 DIAGNOSIS — R53.1 WEAKNESS GENERALIZED: ICD-10-CM

## 2021-03-10 DIAGNOSIS — Z91.81 AT RISK FOR FALLING: ICD-10-CM

## 2021-03-10 DIAGNOSIS — R54 FRAIL ELDERLY: ICD-10-CM

## 2021-03-10 DIAGNOSIS — N18.4 CKD (CHRONIC KIDNEY DISEASE) STAGE 4, GFR 15-29 ML/MIN (HCC): ICD-10-CM

## 2021-03-10 DIAGNOSIS — I48.20 CHRONIC ATRIAL FIBRILLATION (HCC): ICD-10-CM

## 2021-03-10 LAB
INR BLD: 2.6
PT POC: 31.6 SECONDS
VALID INTERNAL CONTROL?: YES

## 2021-03-10 PROCEDURE — 3288F FALL RISK ASSESSMENT DOCD: CPT | Performed by: NURSE PRACTITIONER

## 2021-03-10 PROCEDURE — G8427 DOCREV CUR MEDS BY ELIG CLIN: HCPCS | Performed by: NURSE PRACTITIONER

## 2021-03-10 PROCEDURE — 1100F PTFALLS ASSESS-DOCD GE2>/YR: CPT | Performed by: NURSE PRACTITIONER

## 2021-03-10 PROCEDURE — G8432 DEP SCR NOT DOC, RNG: HCPCS | Performed by: NURSE PRACTITIONER

## 2021-03-10 PROCEDURE — G9231 DOC ESRD DIA TRANS PREG: HCPCS | Performed by: NURSE PRACTITIONER

## 2021-03-10 PROCEDURE — G8420 CALC BMI NORM PARAMETERS: HCPCS | Performed by: NURSE PRACTITIONER

## 2021-03-10 PROCEDURE — 1111F DSCHRG MED/CURRENT MED MERGE: CPT | Performed by: NURSE PRACTITIONER

## 2021-03-10 PROCEDURE — G8536 NO DOC ELDER MAL SCRN: HCPCS | Performed by: NURSE PRACTITIONER

## 2021-03-10 PROCEDURE — 99214 OFFICE O/P EST MOD 30 MIN: CPT | Performed by: NURSE PRACTITIONER

## 2021-03-10 PROCEDURE — 85610 PROTHROMBIN TIME: CPT | Performed by: NURSE PRACTITIONER

## 2021-03-10 NOTE — PROGRESS NOTES
1. Have you been to the ER, urgent care clinic since your last visit? Hospitalized since your last visit? [facility] ER and hospital for fall 2-    2. Have you seen or consulted any other health care providers outside of the [address] [place] [address] since your last visit? Include any pap smears or colon screening.  No      Chief Complaint   Patient presents with   Dariel Carrera Fall     f[facility]u [facility]- and rehab 2-         Visit Vitals  BP (!) 142/60 (BP 1 Location: Right upper arm, BP Patient Position: Sitting, BP Cuff Size: Adult)   Pulse 68   Temp 98 °F (36.7 °C) (Temporal)   Resp 22   Ht 5' 10\" (1.778 m)   Wt 156 lb 12.8 oz (71.1 kg)   SpO2 95%   BMI 22.50 kg/m²       Pain Scale: 0 - No pain/10  Pain Location:

## 2021-03-11 ENCOUNTER — TELEPHONE (OUTPATIENT)
Dept: FAMILY MEDICINE CLINIC | Age: 81
End: 2021-03-11

## 2021-03-11 NOTE — TELEPHONE ENCOUNTER
Returned call, sp/w Aspen Spencer, advised Torrey Tyrell Chaudhari was dx with CHF about a yr ago.  KT

## 2021-03-11 NOTE — TELEPHONE ENCOUNTER
----- Message from Rg Elias sent at 3/11/2021 12:08 PM EST -----  Regarding: Shon Osorio  General Message/Vendor Calls    Caller's first and last name: Don Rodarte from [facility]      Reason for call:diagnosis for congestive heart failure      Callback required yes/no and why:yes      Best contact number(s):[phone] or [phone]      Details to clarify the request:please call Don Rodarte back if the patient has been diagnosed with heart faillure      Rg Elias

## 2021-03-12 NOTE — PROGRESS NOTES
Home Care  Face to Face Encounter  Alana Aguirre is a [de-identified] y.o. male presenting for/with:    Primary Diagnosis:  Chronic diastolic CHF with hx of frequent falls and frailty Date of Face to Face:  03/10/2021                      Face to Face Encounter findings are related to primary reason for home care:   yes. 1. I certify that the patient needs intermittent care as follows: skilled nursing care:  skilled observation/assessment, patient education, complex care plan management, administration of medications and rehabilitative nursing  physical therapy: strengthening, stretching/ROM, gait/stair training and balance training  occupational therapy:  ROM and pt/caregiver education    2. I certify that this patient is homebound, that is:   1) patient requires the use of a walker and wheelchair device, special transportation, or assistance of another to leave the home;        3) patient has a normal inability to leave the home and leaving the home requires considerable and taxing effort. Patient may leave the home for infrequent and short duration for medical reasons, and occasional absences for non-medical reasons. Homebound status is due to the following functional limitations: Limited ambulation secondary to generalized weakness, debility with CHF. Ambulation limited to 2  feet with the use of a walker or wheelchair  (assistive device). Patient with increased shortness of breath and elevated heart rate with ambulation greater than 20 feet limiting patient's ability to ambulate safely within the community. Patient with strength deficits limiting the performance of all ADL's without caregiver assistance or the use of an assistive device. Patient with poor safety awareness and is at risk for falls without assistance of another person and the use of an assistive device. Patient with poor ambulation endurance limiting their safe ability to ascend/descend the required number of steps to leave the home.     3. I certify that this patient is under my care and that I, or a nurse practitioner or  [id number], or clinical nurse specialist, or certified nurse midwife, working with me, had a Face-to-Face Encounter that meets the physician Face-to-Face Encounter requirements.   The following are the clinical findings from the [address] encounter that support the need for skilled services and is a summary of the encounter: See attached progess note    Junior Rangel NP-C

## 2021-03-12 NOTE — PROGRESS NOTES
Subjective:     Jame Camargo is a [de-identified] y.o. male who presents today with the following:  Chief Complaint   Patient presents with   [facility]     f/u hospitals 2- and rehab 2-       Patient Active Problem List   Diagnosis Code    Hypertension I10    Thyroid disease E07.9    Arrhythmia I49.9    Hyperlipemia E78.5    PVD [facility]peripheral vascular disease) [facility] I73.9    CKD (chronic kidney disease) stage 3, GFR 30-59 ml/min ([facility]) N18.30    Anticoagulant long-term use Z79.01    Pulmonary scarring J98.4    Spondylosis of lumbar region without myelopathy or radiculopathy M47.816    Chronic atrial fibrillation ([facility] I48.20    CKD (chronic kidney disease) stage 4, GFR 15-29 ml/min ([facility]) N18.4    CKD (chronic kidney disease) stage 5, GFR less than 15 ml/min ([facility]) N18.5    Closed right humeral fracture S42.301A    Coumadin toxicity, accidental or unintentional, initial encounter T45.511A    History of anemia due to chronic kidney disease N18.9, Z86.2    Left rib fracture S22.32XA    Stage 3 chronic kidney disease N18.30    Closed fracture of right proximal humerus S42.201A    Closed fracture of proximal end of right humerus with routine healing S42.201D    Hypothyroid E03.9    Essential hypertension Q54    Diastolic CHF, chronic ([facility] I50.32    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 Radene Long    Generalized weakness R53.1    STEVEN (acute kidney injury) ([facility] [address] N17.9    Iron deficiency anemia due to chronic blood loss D50.0    PAF (paroxysmal atrial fibrillation) ([facility] I48.0    Hip fracture ([facility] [address] S72.009A    Encounter for rehabilitation Z51.89         COMPLIANT WITH MEDICATION:   HTN; Denies chest pain, baseline dyspnea, palpitations, headache and blurred vision. Blood pressure near  Normotensive. CHF; Denies chest pain, baseline dyspnea, palpitations, headache and blurred vision. Blood pressure near  Normotensive.     Generalized weakness/debility/frail ; Has in home care with private agency. Close call falls reported to attendant present at visit. Usually at transfer missing seating. Is also working with [facility] and was asked to see patient for transition for ongoing care. depression screening addressed not at risk    abuse screening addressed denies    learning assessment addressed reviewed nurses notes    fall risk addressed not at risk    HM: addressed    ROS:  Gen: denies fever, chills,(+) fatigue, weight loss,(-) weight gain  HEENT:denies blurry vision, nasal congestion, sore throat  Resp: baseline dypsnea,(-) cough, wheezing  CV: denies chest pain radiating to the jaws or arms, palpitations, lower extremity edema  Abd: denies nausea, vomiting, diarrhea, constipation  Neuro: denies numbness/tingling  Endo: denies polyuria, polydipsia, heat/cold intolerance  Heme: no lymphadenopathy    Allergies   Allergen Reactions    Ketamine Other (comments)     confusion         Current Outpatient Medications:     furosemide (LASIX) 20 mg tablet, Take 1 Tab by mouth daily. , Disp: 30 Tab, Rfl: 0    losartan (COZAAR) 50 mg tablet, Take 1 Tab by mouth daily. , Disp: 30 Tab, Rfl: 0    acetaminophen (TYLENOL) 325 mg tablet, Take 2 Tabs by mouth every six (6) hours as needed for Pain., Disp: 30 Tab, Rfl: 0    dilTIAZem IR (CARDIZEM) 60 mg tablet, Take 1 tablet by mouth twice daily, Disp: 180 Tab, Rfl: 0    atorvastatin (LIPITOR) 20 mg tablet, Take 1 tablet by mouth once daily, Disp: 90 Tab, Rfl: 0    Euthyrox 100 mcg tablet, TAKE 1 TABLET BY MOUTH ONCE DAILY BEFORE BREAKFAST, Disp: 90 Tab, Rfl: 0    Pacerone 200 mg tablet, Take 1 tablet by mouth once daily, Disp: 90 Tab, Rfl: 0    polyethylene glycol (MIRALAX) 17 gram/dose powder, Take 17 g by mouth daily.  1 tablespoon with 8 oz of water daily, Disp: 238 g, Rfl: 0    Past Medical History:   Diagnosis Date    Arrhythmia     atrial fibrillation, cardioversion 2010    CKD (chronic kidney disease) stage 3, GFR 30-59 ml/min     Hyperlipemia     Hypertension     Iron deficiency anemia due to chronic blood loss 2021    PAF (paroxysmal atrial fibrillation) ([facility] [address] 2021    PVD (peripheral vascular disease) ([facility] [address]     Thyroid disease        Past Surgical History:   Procedure Laterality Date    HX COLONOSCOPY      WNL SIS 11 y    HX HERNIA REPAIR Bilateral     Inguinal    HX ORTHOPAEDIC      right shoulder fracture repair    HX OTHER SURGICAL  2019    groin lymph gland excision    HX VASCULAR ACCESS      IR INSERT TUNL CVC W PORT OVER 5 YEARS  3/5/2020    CA CHEST SURGERY PROCEDURE UNLISTED Left     lung surgery age 17, lobectomy    VASCULAR SURGERY PROCEDURE UNLIST      bypass numerous surgeries both legs       Social History     Tobacco Use   Smoking Status Former Smoker    Packs/day: 1.00    Years: 54.00    Pack years: 54.00    Quit date: 3/15/2007    Years since quittin.0   Smokeless Tobacco Never Used       Social History     Socioeconomic History    Marital status:      Spouse name: Not on file    Number of children: Not on file    Years of education: Not on file    Highest education level: Not on file   Tobacco Use    Smoking status: Former Smoker     Packs/day: 1.00     Years: 54.00     Pack years: 54.00     Quit date: 3/15/2007     Years since quittin.0    Smokeless tobacco: Never Used   Substance and Sexual Activity    Alcohol use: Yes     Comment: former drinker- beer   social    Drug use: Never   Other Topics Concern     Service No    Blood Transfusions Yes    Caffeine Concern No    Occupational Exposure No    Hobby Hazards No    Sleep Concern Yes    Stress Concern No    Weight Concern No    Special Diet No    Back Care Yes    Exercise Yes    Seat Belt No    Self-Exams Yes       Family History   Problem Relation Age of Onset    Cancer Mother         lung    Cancer Brother 66        lung    Cancer Maternal Uncle         cancer unknown         Objective:     Visit Vitals  BP (!) 142/60 (BP 1 Location: Right upper arm, BP Patient Position: Sitting, BP Cuff Size: Adult)   Pulse 68   Temp 98 °F (36.7 °C) (Temporal)   Resp 22   Ht 5' 10\" (1.778 m)   Wt 156 lb 12.8 oz (71.1 kg)   SpO2 95%   BMI 22.50 kg/m²     Body mass index is 22.5 kg/m². General: Alert and oriented. No acute distress. Frail appearing. Sitting in a wheelchair. HEENT :  Ears:TMs are normal. Canals are clear. Eyes: pupils equal, round, react to light and accommodation. Extra ocular movements intact. Nose: patent. Mouth and throat is clear. MASK  Neck:supple full range of motion no thyromegaly. Trachea midline, No carotid bruits. No significant lymphadenopathy  Lungs[de-identified] clear to auscultation without wheezes, rales, or rhonchi. Heart :RRR, S1 & S2 are normal intensity. No murmur; no gallop  Abdomen: bowel sounds active. No tenderness, guarding, rebound, masses, hepatic or spleen enlargement  Back: no CVA tenderness. Extremities: without clubbing, cyanosis, or edema  Pulses: radial and femoral pulses are normal  Neuro: HMF intact. Cranial nerves II through XII grossly normal.  Motor: is 3 to 4  over 5 and symmetrical.   Deep tendon reflexes: +2 equal  Psych:appropriate behavior, mood, affect and judgement. Results for orders placed or performed in visit on 03/10/21   AMB POC PT/INR   Result Value Ref Range    VALID INTERNAL CONTROL POC Yes     Prothrombin time (POC) 31.6 seconds    INR POC 2.6        Results for orders placed or performed in visit on 03/10/21   AMB POC PT/INR   Result Value Ref Range    VALID INTERNAL CONTROL POC Yes     Prothrombin time (POC) 31.6 seconds    INR POC 2.6        Assessment/ Plan:     1. Chronic atrial fibrillation (HCC)  INR in range no change to medical managemnt   - AMB POC PT/INR  - COLLECTION CAPILLARY BLOOD SPECIMEN; Future  - COLLECTION CAPILLARY BLOOD SPECIMEN  - REFERRAL TO HOME HEALTH    2.  Frail elderly  Safety precautions reinforced   - REFERRAL TO HOME HEALTH    3. Weakness generalized  Safety precautions reinforced. Encouraged small healthy meals to encourage strengthening   - REFERRAL TO HOME HEALTH    4. At risk for falling  Safety precautions reinforced. - [address]    5. Fatigue, unspecified type    - [address]    6. Essential hypertension  No change to medical management   - REFERRAL TO HOME HEALTH    7. PVD (peripheral vascular disease) ([address]  No change to medial management   - REFERRAL TO HOME HEALTH    8. CKD (chronic kidney disease) stage 4, GFR 15-29 ml/min (HCC)  No change to medial management   - REFERRAL TO HOME HEALTH    9. Diastolic CHF, chronic (HCC)  No change to medial management   - REFERRAL TO HOME HEALTH      Orders Placed This Encounter    COLLECTION CAPILLARY BLOOD SPECIMEN     Standing Status:   Future     Number of Occurrences:   1     Standing Expiration Date:   3/10/2022    REFERRAL TO HOME HEALTH     Referral Priority:   Routine     Referral Type:   Home Health Evaluation     Referral Reason:   Continuity of Care     Number of Visits Requested:   1    AMB POC PT/INR         Verbal and written instructions (see AVS) provided. Patient expresses understanding of diagnosis and treatment plan. Health Maintenance Due   Topic Date Due    COVID-19 Vaccine (1) Never done    Shingrix Vaccine Age 50> (1 of 2) Never done    GLAUCOMA SCREENING Q2Y  Never done       Follow up in 1 month or sooner as needed.         WENDY Amezcua

## 2021-03-16 ENCOUNTER — TELEPHONE (OUTPATIENT)
Dept: FAMILY MEDICINE CLINIC | Age: 81
End: 2021-03-16

## 2021-03-17 RX ORDER — FUROSEMIDE 20 MG/1
20 TABLET ORAL DAILY
Qty: 30 TAB | Refills: 0 | Status: SHIPPED | OUTPATIENT
Start: 2021-03-17 | End: 2021-04-20

## 2021-03-17 RX ORDER — LOSARTAN POTASSIUM 50 MG/1
50 TABLET ORAL DAILY
Qty: 30 TAB | Refills: 0 | Status: SHIPPED | OUTPATIENT
Start: 2021-03-17 | End: 2021-04-20

## 2021-03-26 ENCOUNTER — TELEPHONE (OUTPATIENT)
Dept: FAMILY MEDICINE CLINIC | Age: 81
End: 2021-03-26

## 2021-03-26 NOTE — TELEPHONE ENCOUNTER
----- Message from Jadiel Ivory sent at 3/26/2021  9:50 AM EDT -----  Regarding: Davis Mccarty/telephone  General Message/Vendor Calls    Caller's first and last name ; Leny/nurse aid      Reason for call:wants to check on the status of the walker for the patient      Callback required yes/no and why:yes      Best contact number(s):[phone]      Details to clarify the request:      Jadiel Ivory

## 2021-03-29 RX ORDER — WARFARIN 2.5 MG/1
TABLET ORAL
COMMUNITY
Start: 2021-03-03 | End: 2021-03-29 | Stop reason: SDUPTHER

## 2021-03-29 RX ORDER — WARFARIN 2.5 MG/1
TABLET ORAL
Qty: 90 TAB | Refills: 1 | Status: SHIPPED | OUTPATIENT
Start: 2021-03-29 | End: 2021-06-03 | Stop reason: DRUGHIGH

## 2021-04-06 ENCOUNTER — TELEPHONE (OUTPATIENT)
Dept: FAMILY MEDICINE CLINIC | Age: 81
End: 2021-04-06

## 2021-04-07 RX ORDER — ATORVASTATIN CALCIUM 20 MG/1
TABLET, FILM COATED ORAL
Qty: 90 TAB | Refills: 0 | Status: SHIPPED | OUTPATIENT
Start: 2021-04-07 | End: 2021-04-08

## 2021-04-07 RX ORDER — AMIODARONE HYDROCHLORIDE 200 MG/1
TABLET ORAL
Qty: 90 TAB | Refills: 0 | Status: SHIPPED | OUTPATIENT
Start: 2021-04-07 | End: 2021-04-08

## 2021-04-08 RX ORDER — ATORVASTATIN CALCIUM 20 MG/1
TABLET, FILM COATED ORAL
Qty: 90 TAB | Refills: 0 | Status: SHIPPED | OUTPATIENT
Start: 2021-04-08 | End: 2021-07-08

## 2021-04-08 RX ORDER — AMIODARONE HYDROCHLORIDE 200 MG/1
TABLET ORAL
Qty: 90 TAB | Refills: 0 | Status: SHIPPED | OUTPATIENT
Start: 2021-04-08 | End: 2021-07-08

## 2021-04-12 ENCOUNTER — OFFICE VISIT (OUTPATIENT)
Dept: FAMILY MEDICINE CLINIC | Age: 81
End: 2021-04-12
Payer: MEDICARE

## 2021-04-12 VITALS
DIASTOLIC BLOOD PRESSURE: 78 MMHG | BODY MASS INDEX: 22.48 KG/M2 | RESPIRATION RATE: 22 BRPM | WEIGHT: 157 LBS | SYSTOLIC BLOOD PRESSURE: 156 MMHG | HEIGHT: 70 IN | OXYGEN SATURATION: 95 % | TEMPERATURE: 97.2 F | HEART RATE: 66 BPM

## 2021-04-12 DIAGNOSIS — I10 ESSENTIAL HYPERTENSION: ICD-10-CM

## 2021-04-12 DIAGNOSIS — Z51.89 VISIT FOR WOUND CHECK: ICD-10-CM

## 2021-04-12 DIAGNOSIS — I48.20 CHRONIC ATRIAL FIBRILLATION (HCC): Primary | ICD-10-CM

## 2021-04-12 LAB
INR BLD: 1.4
PT POC: 16.9 SECONDS
VALID INTERNAL CONTROL?: YES

## 2021-04-12 PROCEDURE — G8432 DEP SCR NOT DOC, RNG: HCPCS | Performed by: NURSE PRACTITIONER

## 2021-04-12 PROCEDURE — 1100F PTFALLS ASSESS-DOCD GE2>/YR: CPT | Performed by: NURSE PRACTITIONER

## 2021-04-12 PROCEDURE — G8420 CALC BMI NORM PARAMETERS: HCPCS | Performed by: NURSE PRACTITIONER

## 2021-04-12 PROCEDURE — G9231 DOC ESRD DIA TRANS PREG: HCPCS | Performed by: NURSE PRACTITIONER

## 2021-04-12 PROCEDURE — 3288F FALL RISK ASSESSMENT DOCD: CPT | Performed by: NURSE PRACTITIONER

## 2021-04-12 PROCEDURE — 99213 OFFICE O/P EST LOW 20 MIN: CPT | Performed by: NURSE PRACTITIONER

## 2021-04-12 PROCEDURE — G8427 DOCREV CUR MEDS BY ELIG CLIN: HCPCS | Performed by: NURSE PRACTITIONER

## 2021-04-12 PROCEDURE — G8536 NO DOC ELDER MAL SCRN: HCPCS | Performed by: NURSE PRACTITIONER

## 2021-04-12 PROCEDURE — 85610 PROTHROMBIN TIME: CPT | Performed by: NURSE PRACTITIONER

## 2021-04-12 NOTE — PROGRESS NOTES
1. Have you been to the ER, urgent care clinic since your last visit? Hospitalized since your last visit? No    2. Have you seen or consulted any other health care providers outside of the [address] [place] [place] [address] since your last visit? Include any pap smears or colon screening.  No      Chief Complaint   Patient presents with    Anticoagulation         Visit Vitals  BP (!) 156/78 (BP 1 Location: Left upper arm, BP Patient Position: Sitting, BP Cuff Size: Adult)   Pulse 66   Temp 97.2 °F (36.2 °C) (Temporal)   Resp 22   Ht 5' 10\" (1.778 m)   Wt 157 lb (71.2 kg)   SpO2 95%   BMI 22.53 kg/m²       Pain Scale: /10  Pain Location:

## 2021-04-13 NOTE — ACP (ADVANCE CARE PLANNING)
Discussed importance of advanced medical directives with patient. Patient is capable of making decisions.  Sana Harrison NP-C

## 2021-04-13 NOTE — PROGRESS NOTES
Subjective:      Diana Anderson is a [de-identified] y.o. male who presents today for wound check. He has a abrasion wound which is located on the left forearm post fall. Date undetermined. Current symptoms: none  wound healing as expected. Interventions to date: none  dressing changed 1 day ago. Objective:     Visit Vitals  BP (!) 156/78 (BP 1 Location: Left upper arm, BP Patient Position: Sitting, BP Cuff Size: Adult)   Pulse 66   Temp 97.2 °F (36.2 °C) (Temporal)   Resp 22   Ht 5' 10\" (1.778 m)   Wt 157 lb (71.2 kg)   SpO2 95%   BMI 22.53 kg/m²       Wound:   wound margins intact and healing well. No signs of infection. Assessment:     Wound check. Interventions today removal of existing dressing  visual inspection  cleansing with sterile saline  solution  application of topical medication Bacitracin  application of clean dressing. Plan:     1. Discussed appropriate home care of this wound. , Wound redressed. 2. Patient instructions were given. 3. Follow up: home health fr wound care and 1 month PCP or sooner as needed .     Emiliano FIERRO

## 2021-04-13 NOTE — PROGRESS NOTES
Subjective:     Norm Abler is a [de-identified] y.o. male who presents today with nursing agency caregiver with the following:  Chief Complaint   Patient presents with    Anticoagulation    Wound Check      left arm fall       Patient Active Problem List   Diagnosis Code    Hypertension I10    Thyroid disease E07.9    Arrhythmia I49.9    Hyperlipemia E78.5    PVD (peripheral vascular disease) ([facility]) I73.9    CKD (chronic kidney disease) stage 3, GFR 30-59 ml/min ([facility]) N18.30    Anticoagulant long-term use Z79.01    Pulmonary scarring J98.4    Spondylosis of lumbar region without myelopathy or radiculopathy M47.816    Chronic atrial fibrillation ([facility]) I48.20    CKD (chronic kidney disease) stage 4, GFR 15-29 ml/min ([facility]) N18.4    CKD (chronic kidney disease) stage 5, GFR less than 15 ml/min ([facility]) N18.5    Closed right humeral fracture S42.301A    Coumadin toxicity, accidental or unintentional, initial encounter T45.511A    History of anemia due to chronic kidney disease N18.9, Z86.2    Left rib fracture S22.32XA    Stage 3 chronic kidney disease N18.30    Closed fracture of right proximal humerus S42.201A    Closed fracture of proximal end of right humerus with routine healing S42.201D    Hypothyroid E03.9    Essential hypertension L55    Diastolic CHF, chronic ([facility]) I50.32    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 [facility]) C83.35    Fall W19. [place]    Generalized weakness R53.1    STEVEN (acute kidney injury) ([facility] [address] N17.9    Iron deficiency anemia due to chronic blood loss D50.0    PAF (paroxysmal atrial fibrillation) ([facility]) I48.0    Hip fracture ([facility] [address] S72.009A    Encounter for rehabilitation Z51.89         COMPLIANT WITH MEDICATION:   HTN; Denies chest pain, dyspnea, palpitations, headache and blurred vision. Blood pressure normotensive. Anticoagulation. Dx: Atrial fibrillation.   Current symptoms: none  Current control agent: amiodarone  Current anticoagulant: warfarin missed at least one dose per patient and caregiver. Warfarin 2.5 mg per day. History of bleeding problems: none  Lab Results   Component Value Date/Time    INR 2.9 (H) 03/03/2021 05:16 AM    INR 2.6 (H) 03/02/2021 06:00 AM    INR 2.4 (H) 03/01/2021 05:50 AM    INR POC 1.4 04/12/2021 02:00 PM    INR POC 2.6 03/10/2021 01:50 PM    Prothrombin time 28.1 (H) 03/03/2021 05:16 AM    Prothrombin time 25.2 (H) 03/02/2021 06:00 AM    Prothrombin time 23.4 (H) 03/01/2021 05:50 AM         depression screening addressed not at risk    abuse screening addressed denies    learning assessment addressed reviewed nurses notes    fall risk addressed not at risk    HM: addressed he would like a COVID vaccine tamara available in the office. ROS:  Gen: denies fever, chills, fatigue, weight loss, weight gain  HEENT:denies blurry vision, nasal congestion, sore throat  Resp: denies dypsnea, cough, wheezing  CV: denies chest pain radiating to the jaws or arms, palpitations, lower extremity edema  Abd: denies nausea, vomiting, diarrhea, constipation  Neuro: denies numbness/tingling  Endo: denies polyuria, polydipsia, heat/cold intolerance  Heme: no lymphadenopathy    Allergies   Allergen Reactions    Ketamine Other (comments)     confusion         Current Outpatient Medications:     Pacerone 200 mg tablet, Take 1 tablet by mouth once daily, Disp: 90 Tab, Rfl: 0    atorvastatin (LIPITOR) 20 mg tablet, Take 1 tablet by mouth once daily, Disp: 90 Tab, Rfl: 0    warfarin (COUMADIN) 2.5 mg tablet, TAKE 1 TABLET BY MOUTH ONCE DAILY AS DIRECTED TO PREVENT BLOOD CLOT IN CHRONIC ATRIAL FIBRILLATION, Disp: 90 Tab, Rfl: 1    losartan (COZAAR) 50 mg tablet, Take 1 Tab by mouth daily. , Disp: 30 Tab, Rfl: 0    furosemide (LASIX) 20 mg tablet, Take 1 Tab by mouth daily. , Disp: 30 Tab, Rfl: 0    acetaminophen (TYLENOL) 325 mg tablet, Take 2 Tabs by mouth every six (6) hours as needed for Pain., Disp: 30 Tab, Rfl: 0    dilTIAZem IR (CARDIZEM) 60 mg tablet, Take 1 tablet by mouth twice daily, Disp: 180 Tab, Rfl: 0    Euthyrox 100 mcg tablet, TAKE 1 TABLET BY MOUTH ONCE DAILY BEFORE BREAKFAST, Disp: 90 Tab, Rfl: 0    polyethylene glycol (MIRALAX) 17 gram/dose powder, Take 17 g by mouth daily.  1 tablespoon with 8 oz of water daily, Disp: 238 g, Rfl: 0    Past Medical History:   Diagnosis Date    Arrhythmia     atrial fibrillation, cardioversion     CKD (chronic kidney disease) stage 3, GFR 30-59 ml/min ([facility])     Hyperlipemia     Hypertension     Iron deficiency anemia due to chronic blood loss 2021    PAF ([facility]oxysmal atrial fibrillation) ([facility] [address] 2021    PVD (peripheral vascular disease) ([facility] [address]     Thyroid disease        Past Surgical History:   Procedure Laterality Date    HX COLONOSCOPY      WNL SIS 10 y    HX HERNIA REPAIR Bilateral     Inguinal    HX ORTHOPAEDIC      right shoulder fracture repair    HX OTHER SURGICAL  2019    groin lymph gland excision    HX VASCULAR ACCESS      IR INSERT TUNL CVC W PORT OVER 5 YEARS  3/5/2020    [place] CHEST SURGERY PROCEDURE UNLISTED Left     lung surgery age 17, lobectomy    VASCULAR SURGERY PROCEDURE UNLIST      bypass numerous surgeries both legs       Social History     Tobacco Use   Smoking Status Former Smoker    Packs/day: 1.00    Years: 54.00    Pack years: 54.00    Quit date: 3/15/2007    Years since quittin.0   Smokeless Tobacco Never Used       Social History     Socioeconomic History    Marital status:      Spouse name: Not on file    Number of children: Not on file    Years of education: Not on file    Highest education level: Not on file   Tobacco Use    Smoking status: Former Smoker     Packs/day: 1.00     Years: 54.00     Pack years: 54.00     Quit date: 3/15/2007     Years since quittin.0    Smokeless tobacco: Never Used   Substance and Sexual Activity    Alcohol use: Yes     Comment: former drinker- beer   social    Drug use: Never   Other Topics Concern     Service No    Blood Transfusions Yes    Caffeine Concern No    Occupational Exposure No    Hobby Hazards No    Sleep Concern Yes    Stress Concern No    Weight Concern No    Special Diet No    Back Care Yes    Exercise Yes    Seat Belt No    Self-Exams Yes       Family History   Problem Relation Age of Onset    Cancer Mother         lung    Cancer Brother 66        lung    Cancer Maternal Uncle         cancer unknown         Objective:     Visit Vitals  BP (!) 156/78 (BP 1 Location: Left upper arm, BP Patient Position: Sitting, BP Cuff Size: Adult)   Pulse 66   Temp 97.2 °F (36.2 °C) (Temporal)   Resp 22   Ht 5' 10\" (1.778 m)   Wt 157 lb (71.2 kg)   SpO2 95%   BMI 22.53 kg/m²     Body mass index is 22.53 kg/m². General: Alert and oriented. No acute distress. Thin , frail appearing. Wheelchair uses  a Rolator at home. HEENT :  Ears:TMs are normal. Canals are clear. Eyes: pupils equal, round, react to light and accommodation. Extra ocular movements intact. Nose: patent. Mouth and throat is clear. MASK   Neck:supple full range of motion no thyromegaly. Trachea midline, No carotid bruits. No significant lymphadenopathy  Lungs[de-identified] clear to auscultation without wheezes, rales, or rhonchi. Heart :RRR, S1 & S2 are normal intensity. No murmur; no gallop  Abdomen: bowel sounds active. No tenderness, guarding, rebound, masses, hepatic or spleen enlargement  Back: no CVA tenderness. Extremities: without clubbing, cyanosis, or edema  Pulses: radial and femoral pulses are normal  Neuro: HMF intact. Cranial nerves II through XII grossly normal.  Motor: is 5 over 5 and symmetrical.   Deep tendon reflexes: +2 equal  Psych:appropriate behavior, mood, affect and judgement.    Results for orders placed or performed in visit on 04/12/21   AMB POC PT/INR   Result Value Ref Range    VALID INTERNAL CONTROL POC Yes     Prothrombin time (POC) 16.9 seconds    INR POC 1.4        Results for orders placed or performed in visit on 04/12/21   AMB POC PT/INR   Result Value Ref Range    VALID INTERNAL CONTROL POC Yes     Prothrombin time (POC) 16.9 seconds    INR POC 1.4        Assessment/ Plan:     1. Chronic atrial fibrillation (HCC)    - COLLECTION CAPILLARY BLOOD SPECIMEN  - AMB POC PT/INR    2. Essential hypertension    BP elevated today. 3. Visit for wound check  Abrasion on left forearm healing . We discussed wound care at home. Orders Placed This Encounter    COLLECTION CAPILLARY BLOOD SPECIMEN    AMB POC PT/INR         Verbal and written instructions (see AVS) provided. Patient expresses understanding of diagnosis and treatment plan.     Health Maintenance Due   Topic Date Due    COVID-19 Vaccine (1) Never done    Shingrix Vaccine Age 50> (1 of 2) Never done               WENDY Mcdaniel

## 2021-04-20 RX ORDER — FUROSEMIDE 20 MG/1
TABLET ORAL
Qty: 30 TAB | Refills: 0 | Status: SHIPPED | OUTPATIENT
Start: 2021-04-20 | End: 2021-04-26

## 2021-04-20 RX ORDER — LEVOTHYROXINE SODIUM 100 UG/1
TABLET ORAL
Qty: 90 TAB | Refills: 0 | Status: SHIPPED | OUTPATIENT
Start: 2021-04-20 | End: 2021-07-21

## 2021-04-20 RX ORDER — LOSARTAN POTASSIUM 50 MG/1
TABLET ORAL
Qty: 30 TAB | Refills: 0 | Status: SHIPPED | OUTPATIENT
Start: 2021-04-20 | End: 2021-04-26

## 2021-04-28 RX ORDER — FUROSEMIDE 20 MG/1
TABLET ORAL
Qty: 30 TAB | Refills: 0 | Status: SHIPPED | OUTPATIENT
Start: 2021-04-28 | End: 2021-10-21 | Stop reason: SDUPTHER

## 2021-04-28 RX ORDER — LOSARTAN POTASSIUM 50 MG/1
TABLET ORAL
Qty: 30 TAB | Refills: 0 | Status: ON HOLD | OUTPATIENT
Start: 2021-04-28 | End: 2021-12-24 | Stop reason: SDUPTHER

## 2021-04-29 ENCOUNTER — TELEPHONE (OUTPATIENT)
Dept: FAMILY MEDICINE CLINIC | Age: 81
End: 2021-04-29

## 2021-04-29 NOTE — TELEPHONE ENCOUNTER
----- Message from Cielo Sullivan sent at 4/29/2021  9:04 AM EDT -----  Regarding: SHARRON Mccarty/telephone  General Message/Vendor Calls    Caller's first and last name:Jaylin with [facility]      Reason for call:Requesting verbal order  for PT once a week for 7 weeks ,starting tomorrow. This is for continuing services.       Callback required yes/no and why:yes      Best contact number(s):[phone]      Details to clarify the request:      Cielo Sullivan

## 2021-05-17 ENCOUNTER — TELEPHONE (OUTPATIENT)
Dept: FAMILY MEDICINE CLINIC | Age: 81
End: 2021-05-17

## 2021-05-17 ENCOUNTER — OFFICE VISIT (OUTPATIENT)
Dept: FAMILY MEDICINE CLINIC | Age: 81
End: 2021-05-17
Payer: MEDICARE

## 2021-05-17 VITALS
OXYGEN SATURATION: 95 % | SYSTOLIC BLOOD PRESSURE: 140 MMHG | BODY MASS INDEX: 22.53 KG/M2 | TEMPERATURE: 97 F | RESPIRATION RATE: 16 BRPM | HEIGHT: 70 IN | HEART RATE: 57 BPM | DIASTOLIC BLOOD PRESSURE: 64 MMHG

## 2021-05-17 DIAGNOSIS — I48.20 CHRONIC ATRIAL FIBRILLATION (HCC): Primary | ICD-10-CM

## 2021-05-17 DIAGNOSIS — I10 ESSENTIAL HYPERTENSION: ICD-10-CM

## 2021-05-17 LAB
INR BLD: 1.7
PT POC: 20.6 SECONDS
VALID INTERNAL CONTROL?: YES

## 2021-05-17 PROCEDURE — G8536 NO DOC ELDER MAL SCRN: HCPCS | Performed by: NURSE PRACTITIONER

## 2021-05-17 PROCEDURE — G8432 DEP SCR NOT DOC, RNG: HCPCS | Performed by: NURSE PRACTITIONER

## 2021-05-17 PROCEDURE — G8427 DOCREV CUR MEDS BY ELIG CLIN: HCPCS | Performed by: NURSE PRACTITIONER

## 2021-05-17 PROCEDURE — G8420 CALC BMI NORM PARAMETERS: HCPCS | Performed by: NURSE PRACTITIONER

## 2021-05-17 PROCEDURE — 1100F PTFALLS ASSESS-DOCD GE2>/YR: CPT | Performed by: NURSE PRACTITIONER

## 2021-05-17 PROCEDURE — G9231 DOC ESRD DIA TRANS PREG: HCPCS | Performed by: NURSE PRACTITIONER

## 2021-05-17 PROCEDURE — 3288F FALL RISK ASSESSMENT DOCD: CPT | Performed by: NURSE PRACTITIONER

## 2021-05-17 PROCEDURE — 85610 PROTHROMBIN TIME: CPT | Performed by: NURSE PRACTITIONER

## 2021-05-17 PROCEDURE — 99212 OFFICE O/P EST SF 10 MIN: CPT | Performed by: NURSE PRACTITIONER

## 2021-05-17 RX ORDER — WARFARIN 3 MG/1
TABLET ORAL
Qty: 90 TAB | Refills: 1 | Status: SHIPPED | OUTPATIENT
Start: 2021-05-17 | End: 2021-06-03 | Stop reason: DRUGHIGH

## 2021-05-17 NOTE — TELEPHONE ENCOUNTER
----- Message from Flores Huerta sent at 5/17/2021  4:16 PM EDT -----  Regarding: Davis Mccarty/Telephone  General Message/Vendor Calls    Caller's first and last name: [facility] caregiver      Reason for call: requesting to know if homehealth care will monitor  for the inr      Callback required yes/no and why: yes      Best contact number(s): [phone]      Details to clarify the request: n/a      Flores Huerta

## 2021-05-17 NOTE — PROGRESS NOTES
Subjective:     Eleanor Reno is a [de-identified] y.o. male who presents today with the following:  Chief Complaint   Patient presents with    Anticoagulation       Patient Active Problem List   Diagnosis Code    Hypertension I10    Thyroid disease E07.9    Arrhythmia I49.9    Hyperlipemia E78.5    PVD (peripheral vascular disease) ([facility]) I73.9    CKD (chronic kidney disease) stage 3, GFR 30-59 ml/min ([facility]) N18.30    Anticoagulant long-term use Z79.01    Pulmonary scarring J98.4    Spondylosis of lumbar region without myelopathy or radiculopathy M47.816    Chronic atrial fibrillation ([facility]) I48.20    CKD (chronic kidney disease) stage 4, GFR 15-29 ml/min ([facility]) N18.4    CKD (chronic kidney disease) stage 5, GFR less than 15 ml/min ([facility]) N18.5    Closed right humeral fracture S42.301A    Coumadin toxicity, accidental or unintentional, initial encounter T45.511A    History of anemia due to chronic kidney disease N18.9, Z86.2    Left rib fracture S22.32XA    Stage 3 chronic kidney disease N18.30    Closed fracture of right proximal humerus S42.201A    Closed fracture of proximal end of right humerus with routine healing S42.201D    Hypothyroid E03.9    Essential hypertension R44    Diastolic CHF, chronic ([facility]) I50.32    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 [place]    Generalized weakness R53.1    STEVEN (acute kidney injury) ([facility] [address] N17.9    Iron deficiency anemia due to chronic blood loss D50.0    PAF (paroxysmal atrial fibrillation) ([facility]) I48.0    Hip fracture ([facility] [address] S72.009A    Encounter for rehabilitation Z51.89         COMPLIANT WITH MEDICATION:   HTN; Denies chest pain, dyspnea, palpitations, headache and blurred vision. Blood pressure normotensive. Anticoagulation. Dx: Atrial fibrillation.    Current symptoms: none  Current control agent: B-blocker  Current anticoagulant: warfarin2.5 mg falcon   History of bleeding problems: none  Lab Results Component Value Date/Time    INR 2.9 (H) 03/03/2021 05:16 AM    INR 2.6 (H) 03/02/2021 06:00 AM    INR 2.4 (H) 03/01/2021 05:50 AM    INR POC 1.7 05/17/2021 01:50 PM    INR POC 1.4 04/12/2021 02:00 PM    INR POC 2.6 03/10/2021 01:50 PM    Prothrombin time 28.1 (H) 03/03/2021 05:16 AM    Prothrombin time 25.2 (H) 03/02/2021 06:00 AM    Prothrombin time 23.4 (H) 03/01/2021 05:50 AM       depression screening addressed not at risk    abuse screening addressed denies    learning assessment addressed reviewed nurses notes    fall risk addressed not at risk    HM: addressed declines vaccines today. ROS:  Gen: denies fever, chills, fatigue, weight loss, weight gain  HEENT:denies blurry vision, nasal congestion, sore throat  Resp: denies dypsnea, cough, wheezing  CV: denies chest pain radiating to the jaws or arms, palpitations, lower extremity edema  Abd: denies nausea, vomiting, diarrhea, constipation  Neuro: denies numbness/tingling  Endo: denies polyuria, polydipsia, heat/cold intolerance  Heme: no lymphadenopathy    Allergies   Allergen Reactions    Ketamine Other (comments)     confusion         Current Outpatient Medications:     warfarin (COUMADIN) 3 mg tablet, Take 3 mg tab po every Thursday. Take 2.5 mg tab po  on all the other days. , Disp: 90 Tab, Rfl: 1    losartan (COZAAR) 50 mg tablet, Take 1 tablet by mouth once daily, Disp: 30 Tab, Rfl: 0    furosemide (LASIX) 20 mg tablet, Take 1 tablet by mouth once daily, Disp: 30 Tab, Rfl: 0    levothyroxine (SYNTHROID) 100 mcg tablet, TAKE 1 TABLET BY MOUTH ONCE DAILY BEFORE BREAKFAST, Disp: 90 Tab, Rfl: 0    Pacerone 200 mg tablet, Take 1 tablet by mouth once daily, Disp: 90 Tab, Rfl: 0    atorvastatin (LIPITOR) 20 mg tablet, Take 1 tablet by mouth once daily, Disp: 90 Tab, Rfl: 0    warfarin (COUMADIN) 2.5 mg tablet, TAKE 1 TABLET BY MOUTH ONCE DAILY AS DIRECTED TO PREVENT BLOOD CLOT IN CHRONIC ATRIAL FIBRILLATION, Disp: 90 Tab, Rfl: 1    acetaminophen (TYLENOL) 325 mg tablet, Take 2 Tabs by mouth every six (6) hours as needed for Pain., Disp: 30 Tab, Rfl: 0    dilTIAZem IR (CARDIZEM) 60 mg tablet, Take 1 tablet by mouth twice daily, Disp: 180 Tab, Rfl: 0    polyethylene glycol (MIRALAX) 17 gram/dose powder, Take 17 g by mouth daily.  1 tablespoon with 8 oz of water daily, Disp: 238 g, Rfl: 0    Past Medical History:   Diagnosis Date    Arrhythmia     atrial fibrillation, cardioversion     CKD (chronic kidney disease) stage 3, GFR 30-59 ml/min ([facility])     Hyperlipemia     Hypertension     Iron deficiency anemia due to chronic blood loss 2021    PAF (paroxysmal atrial fibrillation) ([facility] [address] 2021    PVD (peripheral vascular disease) ([facility] [address]     Thyroid disease        Past Surgical History:   Procedure Laterality Date    HX COLONOSCOPY      WNL SIS 10 y    HX HERNIA REPAIR Bilateral     Inguinal    HX ORTHOPAEDIC      right shoulder fracture repair    HX OTHER SURGICAL  2019    groin lymph gland excision    HX VASCULAR ACCESS      IR INSERT TUNL CVC W PORT OVER 5 YEARS  3/5/2020    [place] CHEST SURGERY PROCEDURE UNLISTED Left     lung surgery age 17, lobectomy    VASCULAR SURGERY PROCEDURE UNLIST      bypass numerous surgeries both legs       Social History     Tobacco Use   Smoking Status Former Smoker    Packs/day: 1.00    Years: 54.00    Pack years: 54.00    Quit date: 3/15/2007    Years since quittin.1   Smokeless Tobacco Never Used       Social History     Socioeconomic History    Marital status:      Spouse name: Not on file    Number of children: Not on file    Years of education: Not on file    Highest education level: Not on file   Tobacco Use    Smoking status: Former Smoker     Packs/day: 1.00     Years: 54.00     Pack years: 54.00     Quit date: 3/15/2007     Years since quittin.1    Smokeless tobacco: Never Used   Substance and Sexual Activity    Alcohol use: Yes     Comment: former drinker- beer social    Drug use: Never   Other Topics Concern     Service No    Blood Transfusions Yes    Caffeine Concern No    Occupational Exposure No    Hobby Hazards No    Sleep Concern Yes    Stress Concern No    Weight Concern No    Special Diet No    Back Care Yes    Exercise Yes    Seat Belt No    Self-Exams Yes       Family History   Problem Relation Age of Onset    Cancer Mother         lung    Cancer Brother 66        lung    Cancer Maternal Uncle         cancer unknown         Objective:     Visit Vitals  BP (!) 140/64 (BP 1 Location: Left upper arm, BP Patient Position: Sitting, BP Cuff Size: Adult)   Pulse (!) 57   Temp 97 °F (36.1 °C) (Temporal)   Resp 16   Ht 5' 10\" (1.778 m)   SpO2 95%   BMI 22.53 kg/m²     Body mass index is 22.53 kg/m². General: Alert and oriented. No acute distress. Well nourished  HENT :  Eyes: pupils equal, round, react to light and accommodation. Extra ocular movements intact. Nose: patent. Mouth and throat is clear. MASK   Neck:supple full range of motion no thyromegaly. Trachea midline, No carotid bruits. No significant lymphadenopathy  Lungs[de-identified] clear to auscultation without wheezes, rales, or rhonchi. Heart :RRR, S1 & S2 are normal intensity. No murmur; no gallop  Abdomen: bowel sounds active. No tenderness, guarding, rebound, masses, hepatic or spleen enlargement  Back: no CVA tenderness. Extremities: without clubbing, or cyanosis. +1 nonpitting edema left lower extremity. No streaks, or erythema. Pulses: radial and femoral pulses are normal  Neuro: HMF intact. Cranial nerves II through XII grossly normal.  Motor: is 5 over 5 and symmetrical.    Deep tendon reflexes: +2 equal  Integumentary; multiple healed skin tears. Psych:appropriate behavior, mood, affect and judgement.    Results for orders placed or performed in visit on 05/17/21   AMB POC PT/INR   Result Value Ref Range    VALID INTERNAL CONTROL POC Yes     Prothrombin time (POC) 20.6 seconds INR POC 1.7        Results for orders placed or performed in visit on 05/17/21   AMB POC PT/INR   Result Value Ref Range    VALID INTERNAL CONTROL POC Yes     Prothrombin time (POC) 20.6 seconds    INR POC 1.7        Assessment/ Plan:     1. Chronic atrial fibrillation (HCC)  INR low increase warfarin to 3 mg on Thursdays and 2.5 mg on all the other days.   - COLLECTION CAPILLARY BLOOD SPECIMEN; Future  - AMB POC PT/INR    2. Essential hypertension  BP close to goal.   Due to history of frequent falls no changes to medial management. Orders Placed This Encounter    COLLECTION CAPILLARY BLOOD SPECIMEN     Standing Status:   Future     Standing Expiration Date:   5/17/2022    AMB POC PT/INR    warfarin (COUMADIN) 3 mg tablet     Sig: Take 3 mg tab po every Thursday. Take 2.5 mg tab po  on all the other days. Dispense:  90 Tab     Refill:  1         Verbal and written instructions (see AVS) provided. Patient expresses understanding of diagnosis and treatment plan.     Health Maintenance Due   Topic Date Due    COVID-19 Vaccine (1) Never done    Shingrix Vaccine Age 50> (1 of 2) Never done               WENDY Albright

## 2021-05-17 NOTE — PROGRESS NOTES
1. Have you been to the ER, urgent care clinic since your last visit? Hospitalized since your last visit? No    2. Have you seen or consulted any other health care providers outside of the [address] [place] [address] since your last visit? Include any pap smears or colon screening.  No      Chief Complaint   Patient presents with    Anticoagulation         Visit Vitals  BP (!) 140/64 (BP 1 Location: Left upper arm, BP Patient Position: Sitting, BP Cuff Size: Adult)   Pulse (!) 57   Temp 97 °F (36.1 °C) (Temporal)   Resp 16   Ht 5' 10\" (1.778 m)   SpO2 95%   BMI 22.53 kg/m²       Pain Scale: 0 - No pain/10  Pain Location:

## 2021-05-27 ENCOUNTER — TELEPHONE (OUTPATIENT)
Dept: FAMILY MEDICINE CLINIC | Age: 81
End: 2021-05-27

## 2021-05-27 NOTE — TELEPHONE ENCOUNTER
S/w C/G Celeste she is requesting HH for PT and INR. The in home device is too pricey. She had already spoken to [facility] and they  can but would need a skilled need also.  Please advice

## 2021-05-27 NOTE — TELEPHONE ENCOUNTER
Nurse Romie Palacios called again. She asked if someone could call before 5.  She states she has been waiting for a phone call since the 17th

## 2021-06-02 ENCOUNTER — TELEPHONE (OUTPATIENT)
Dept: FAMILY MEDICINE CLINIC | Age: 81
End: 2021-06-02

## 2021-06-03 ENCOUNTER — TELEPHONE (OUTPATIENT)
Dept: FAMILY MEDICINE CLINIC | Age: 81
End: 2021-06-03

## 2021-06-03 RX ORDER — WARFARIN 3 MG/1
3 TABLET ORAL DAILY
Qty: 90 TABLET | Refills: 1
Start: 2021-06-03 | End: 2021-09-27 | Stop reason: DRUGHIGH

## 2021-06-03 NOTE — TELEPHONE ENCOUNTER
Patients c/g  Celeste to know when you need to see him for a regular appointment.  He is now checking his PT at home weekly

## 2021-06-03 NOTE — TELEPHONE ENCOUNTER
Patient now has PT machine and wants to know when he can start using it. He also has a PT appointment the 14th that may need to be cancelled. Please advise caregiver.

## 2021-06-03 NOTE — TELEPHONE ENCOUNTER
S/w patient he is to take 1 3mg tablet of warfarin every day and repeat in 1 week.  Will calc/g later to inform also

## 2021-06-04 NOTE — TELEPHONE ENCOUNTER
BARBARA MADISON MS Chuckie Walton, she was informed of Elif's advice, but did question is she should cancel the appointment on 06/14/2021 or would the August appointment be after the June appointment? I stated to her I am not sure, so checked with Elva Huber / front, she agreed not sure as well. I stated to Ms Chuckie Walton, I will confirm with Herber Hoffmann when she returns back to the office, but she is off all next week. She stated OK and thanks.

## 2021-06-07 ENCOUNTER — TELEPHONE (OUTPATIENT)
Dept: FAMILY MEDICINE CLINIC | Age: 81
End: 2021-06-07

## 2021-06-07 NOTE — TELEPHONE ENCOUNTER
----- Message from Angelica Gorman sent at 6/7/2021  4:53 PM EDT -----  Regarding: NO Grigsby/telephone  Patient return call    Caller's first and last name and relationship (if not the patient):bessie Fox care giver      Best contact number(s):[phone]      Whose call is being returned:nurse      Details to clarify the request:stated she will listen out for the phone, confirm if want to keep  June appt or move to August,for pt?       Angelica Gorman

## 2021-06-08 NOTE — TELEPHONE ENCOUNTER
Spoke with caregiver . Patient reported some nervous feeling wanted caregiver to call office to see warfarin can be causing this . Advised that it should not be the issue and he has been on higher doses in past .  Advised to call us back if symptoms get worse she agreed.

## 2021-07-08 RX ORDER — AMIODARONE HYDROCHLORIDE 200 MG/1
TABLET ORAL
Qty: 90 TABLET | Refills: 0 | Status: SHIPPED | OUTPATIENT
Start: 2021-07-08 | End: 2021-10-11

## 2021-07-08 RX ORDER — ATORVASTATIN CALCIUM 20 MG/1
TABLET, FILM COATED ORAL
Qty: 90 TABLET | Refills: 0 | Status: SHIPPED | OUTPATIENT
Start: 2021-07-08 | End: 2021-10-11

## 2021-07-12 ENCOUNTER — TELEPHONE (OUTPATIENT)
Dept: FAMILY MEDICINE CLINIC | Age: 81
End: 2021-07-12

## 2021-07-12 NOTE — TELEPHONE ENCOUNTER
C/G BB states he takes 3 mg every day in AM he has been missing but taking in AM should help per C/G because she calls when she is not there to check that he has taken his AM pills

## 2021-07-21 RX ORDER — LEVOTHYROXINE SODIUM 100 UG/1
TABLET ORAL
Qty: 90 TABLET | Refills: 0 | Status: SHIPPED | OUTPATIENT
Start: 2021-07-21 | End: 2021-10-21 | Stop reason: SDUPTHER

## 2021-07-21 RX ORDER — LEVOTHYROXINE SODIUM 100 UG/1
TABLET ORAL
Qty: 90 TABLET | Refills: 0 | Status: SHIPPED | OUTPATIENT
Start: 2021-07-21 | End: 2021-07-21

## 2021-08-03 PROBLEM — I10 HYPERTENSION: Status: RESOLVED | Noted: 2021-08-03 | Resolved: 2021-08-03

## 2021-08-09 ENCOUNTER — OFFICE VISIT (OUTPATIENT)
Dept: FAMILY MEDICINE CLINIC | Age: 81
End: 2021-08-09
Payer: MEDICARE

## 2021-08-09 VITALS
TEMPERATURE: 97.1 F | OXYGEN SATURATION: 95 % | HEART RATE: 57 BPM | DIASTOLIC BLOOD PRESSURE: 70 MMHG | SYSTOLIC BLOOD PRESSURE: 138 MMHG | BODY MASS INDEX: 27.81 KG/M2 | HEIGHT: 63 IN

## 2021-08-09 DIAGNOSIS — I48.20 CHRONIC ATRIAL FIBRILLATION (HCC): Primary | ICD-10-CM

## 2021-08-09 DIAGNOSIS — Z13.39 SCREENING FOR ALCOHOLISM: ICD-10-CM

## 2021-08-09 DIAGNOSIS — Z00.00 MEDICARE ANNUAL WELLNESS VISIT, SUBSEQUENT: ICD-10-CM

## 2021-08-09 DIAGNOSIS — E03.9 ACQUIRED HYPOTHYROIDISM: ICD-10-CM

## 2021-08-09 DIAGNOSIS — I50.32 DIASTOLIC CHF, CHRONIC (HCC): ICD-10-CM

## 2021-08-09 DIAGNOSIS — N18.5 CKD (CHRONIC KIDNEY DISEASE) STAGE 5, GFR LESS THAN 15 ML/MIN (HCC): ICD-10-CM

## 2021-08-09 DIAGNOSIS — I10 ESSENTIAL HYPERTENSION: ICD-10-CM

## 2021-08-09 LAB
INR BLD: 2.2
PT POC: 26.8 SECONDS
VALID INTERNAL CONTROL?: YES

## 2021-08-09 PROCEDURE — G0439 PPPS, SUBSEQ VISIT: HCPCS | Performed by: NURSE PRACTITIONER

## 2021-08-09 PROCEDURE — 99213 OFFICE O/P EST LOW 20 MIN: CPT | Performed by: NURSE PRACTITIONER

## 2021-08-09 PROCEDURE — 85610 PROTHROMBIN TIME: CPT | Performed by: NURSE PRACTITIONER

## 2021-08-09 RX ORDER — CICLOPIROX 80 MG/ML
1 SOLUTION TOPICAL
Qty: 6.6 ML | Refills: 0 | Status: SHIPPED | OUTPATIENT
Start: 2021-08-09 | End: 2021-09-27

## 2021-08-09 NOTE — PATIENT INSTRUCTIONS
Medicare Wellness Visit, Male    The best way to live healthy is to have a lifestyle where you eat a well-balanced diet, exercise regularly, limit alcohol use, and quit all forms of tobacco/nicotine, if applicable. Regular preventive services are another way to keep healthy. Preventive services (vaccines, screening tests, monitoring & exams) can help personalize your care plan, which helps you manage your own care. Screening tests can find health problems at the earliest stages, when they are easiest to treat. [facility] follows the current, evidence-based guidelines published by the [facility] Jose Luis ([facility]) when recommending preventive services for our patients. Because we follow these guidelines, sometimes recommendations change over time as research supports it. (For example, a prostate screening blood test is no longer routinely recommended for men with no symptoms). Of course, you and your doctor may decide to screen more often for some diseases, based on your risk and co-morbidities (chronic disease you are already diagnosed with). Preventive services for you include:  - Medicare offers their members a free annual wellness visit, which is time for you and your primary care provider to discuss and plan for your preventive service needs. Take advantage of this benefit every year!  -All adults over age 72 should receive the recommended pneumonia vaccines. Current USPSTF guidelines recommend a series of two vaccines for the best pneumonia protection.   -All adults should have a flu vaccine yearly and tetanus vaccine every 10 years.  -All adults age 48 and older should receive the shingles vaccines (series of two vaccines).        -All adults age 38-68 who are overweight should have a diabetes screening test once every three years.   -Other screening tests & preventive services for persons with diabetes include: an eye exam to screen for diabetic retinopathy, a kidney function test, a foot exam, and stricter control over your cholesterol.   -Cardiovascular screening for adults with routine risk involves an electrocardiogram (ECG) at intervals determined by the provider.   -Colorectal cancer screening should be done for adults age 54-65 with no increased risk factors for colorectal cancer. There are a number of acceptable methods of screening for this type of cancer. Each test has its own benefits and drawbacks. Discuss with your provider what is most appropriate for you during your annual wellness visit. The different tests include: colonoscopy (considered the best screening method), a fecal occult blood test, a fecal DNA test, and sigmoidoscopy.  -All adults born between [facility] should be screened once for Hepatitis C.  -An Abdominal Aortic Aneurysm (AAA) Screening is recommended for men age 73-68 who has ever smoked in their lifetime.      Here is a list of your current Health Maintenance items (your personalized list of preventive services) with a due date:  Health Maintenance Due   Topic Date Due    COVID-19 Vaccine (1) Never done    Shingles Vaccine (1 of 2) Never done    Smoker or Former [address]  Never done

## 2021-08-09 NOTE — PROGRESS NOTES
Chief Complaint   Patient presents with   [facility] Annual Wellness Visit    Anticoagulation         Visit Vitals  /70 (BP 1 Location: Left upper arm, BP Patient Position: Sitting, BP Cuff Size: Adult)   Pulse (!) 57   Temp 97.1 °F (36.2 °C) (Temporal)   Ht 5' 3\" (1.6 m)   SpO2 95%   BMI 27.81 kg/m²       Pain Scale: 0 - No pain/10  Pain Location:    1. Have you been to the ER, urgent care clinic since your last visit? Hospitalized since your last visit? no    2. Have you seen or consulted any other health care providers outside of the [address] [place] [address] since your last visit? Include any pap smears or colon screening. No        Chief Complaint   Patient presents with    Annual Wellness Visit    Anticoagulation         Visit Vitals  /70 (BP 1 Location: Left upper arm, BP Patient Position: Sitting, BP Cuff Size: Adult)   Pulse (!) 57   Temp 97.1 °F (36.2 °C) (Temporal)   Ht 5' 3\" (1.6 m)   SpO2 95%   BMI 27.81 kg/m²       Pain Scale: 0 - No pain/10  Pain Location: This is the Subsequent Medicare Annual Wellness Exam, performed 12 months or more after the Initial AWV or the last Subsequent AWV    I have reviewed the patient's medical history in detail and updated the computerized patient record. Assessment/Plan   Education and counseling provided:  Are appropriate based on today's review and evaluation    1. Medicare annual wellness visit, subsequent  2.  Screening for alcoholism       Depression Risk Factor Screening     3 most recent PHQ Screens 5/17/2021   PHQ Not Done -   Little interest or pleasure in doing things Not at all   Feeling down, depressed, irritable, or hopeless Not at all   Total Score PHQ 2 0       Alcohol Risk Screen    Do you average more than 1 drink per night or more than 7 drinks a week: No    In the past three months have you have had more than 4 drinks containing alcohol on one occasion: No        Functional Ability and Level of Safety    Hearing: Hearing is good.      Activities of Daily Living: The home contains: handrails  Patient does total self care      Ambulation: wheelchair bound     Fall Risk:  Fall Risk Assessment, last 12 mths 5/17/2021   Able to walk? Yes   Fall in past 12 months? 0   Do you feel unsteady? 1   Are you worried about falling 1   Is TUG test greater than 12 seconds? 0   Is the gait abnormal? 1   Number of falls in past 12 months 2   Fall with injury?  -      Abuse Screen:  Patient is not abused       Cognitive Screening    Has your family/caregiver stated any concerns about your memory: no     Cognitive Screening: Normal - MMSE (Mini Mental Status Exam)    Health Maintenance Due     Health Maintenance Due   Topic Date Due    COVID-19 Vaccine (1) Never done    Shingrix Vaccine Age 50> (1 of 2) Never done    Low dose CT lung screening  Never done       Patient Care Team   Patient Care Team:  Fabiano Pires NP as PCP - General (Nurse Practitioner)  Fabiano Pires NP as PCP [facility]  Lisbet Cain MD (Surgery)    History     Patient Active Problem List   Diagnosis Code    Thyroid disease E07.9    Arrhythmia I49.9    Hyperlipemia E78.5    PVD (peripheral vascular disease) ([facility] [address] I73.9    CKD (chronic kidney disease) stage 3, GFR 30-59 ml/min ([facility]) N18.30    Anticoagulant long-term use Z79.01    Pulmonary scarring J98.4    Spondylosis of lumbar region without myelopathy or radiculopathy M47.816    Chronic atrial fibrillation (HCC) I48.20    CKD (chronic kidney disease) stage 4, GFR 15-29 ml/min (HCC) N18.4    CKD (chronic kidney disease) stage 5, GFR less than 15 ml/min (HCC) N18.5    Closed right humeral fracture S42.301A    Coumadin toxicity, accidental or unintentional, initial encounter T45.511A    History of anemia due to chronic kidney disease N18.9, Z86.2    Left rib fracture S22.32XA    Stage 3 chronic kidney disease N18.30    Closed fracture of right proximal humerus S42.201A    Closed fracture of proximal end of right humerus with routine healing S42.201D    Hypothyroid E03.9    Essential hypertension E32    Diastolic CHF, chronic (HCC) I50.32    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 Estela     Generalized weakness R53.1    STEVEN (acute kidney injury) ([facility] [address] N17.9    Iron deficiency anemia due to chronic blood loss D50.0    PAF (paroxysmal atrial fibrillation) (HCC) I48.0    Hip fracture ([facility] [address] S72.009A    Encounter for rehabilitation Z51.89     Past Medical History:   Diagnosis Date    Arrhythmia     atrial fibrillation, cardioversion 2010    CKD (chronic kidney disease) stage 3, GFR 30-59 ml/min ([facility])     Hyperlipemia     Hypertension     Iron deficiency anemia due to chronic blood loss 2/9/2021    PAF (paroxysmal atrial fibrillation) ([facility] [address] 2/9/2021    PVD (peripheral vascular disease) ([facility] [address]     Thyroid disease       Past Surgical History:   Procedure Laterality Date    HX COLONOSCOPY  2010    WNL SIS 10 y    HX HERNIA REPAIR Bilateral     Inguinal    HX ORTHOPAEDIC      right shoulder fracture repair    HX OTHER SURGICAL  03/09/2019    groin lymph gland excision    HX VASCULAR ACCESS      IR INSERT TUNL CVC W PORT OVER 5 YEARS  3/5/2020    [place] CHEST SURGERY PROCEDURE UNLISTED Left     lung surgery age 17, lobectomy    VASCULAR SURGERY PROCEDURE UNLIST      bypass numerous surgeries both legs     Current Outpatient Medications   Medication Sig Dispense Refill    levothyroxine (SYNTHROID) 100 mcg tablet TAKE 1 TABLET BY MOUTH ONCE DAILY BEFORE BREAKFAST 90 Tablet 0    amiodarone (CORDARONE) 200 mg tablet Take 1 tablet by mouth once daily 90 Tablet 0    atorvastatin (LIPITOR) 20 mg tablet Take 1 tablet by mouth once daily 90 Tablet 0    warfarin (COUMADIN) 3 mg tablet Take 1 Tablet by mouth daily. Take 3 mg tab po every Thursday. Take 2.5 mg tab po  on all the other days. (Patient taking differently: Take 3 mg by mouth daily.  Take 3 mg tab po every day) 90 Tablet 1    dilTIAZem IR (CARDIZEM) 60 mg tablet Take 1 tablet by mouth twice daily 180 Tablet 0    losartan (COZAAR) 50 mg tablet Take 1 tablet by mouth once daily 30 Tab 0    furosemide (LASIX) 20 mg tablet Take 1 tablet by mouth once daily 30 Tab 0    acetaminophen (TYLENOL) 325 mg tablet Take 2 Tabs by mouth every six (6) hours as needed for Pain. 30 Tab 0    polyethylene glycol (MIRALAX) 17 gram/dose powder Take 17 g by mouth daily.  1 tablespoon with 8 oz of water daily 238 g 0     Allergies   Allergen Reactions    Ketamine Other (comments)     confusion       Family History   Problem Relation Age of Onset    Cancer Mother         lung    Cancer Brother 66        lung    Cancer Maternal Uncle         cancer unknown     Social History     Tobacco Use    Smoking status: Former Smoker     Packs/day: 1.00     Years: 54.00     Pack years: 54.00     Quit date: 3/15/2007     Years since quittin.4    Smokeless tobacco: Never Used   Substance Use Topics    Alcohol use: Yes     Comment: former drinker- beer   social Guillermo FIERRO

## 2021-08-14 NOTE — PROGRESS NOTES
Subjective:     Justin Dick is a [de-identified] y.o. male who presents today with the following:  Chief Complaint   Patient presents with    Annual Wellness Visit    Anticoagulation    Nail Problem       Patient Active Problem List   Diagnosis Code    Thyroid disease E07.9    Arrhythmia I49.9    Hyperlipemia E78.5    PVD [facility]peripheral vascular disease) ([facility]) I73.9    CKD (chronic kidney disease) stage 3, GFR 30-59 ml/min ([facility]) N18.30    Anticoagulant long-term use Z79.01    Pulmonary scarring J98.4    Spondylosis of lumbar region without myelopathy or radiculopathy M47.816    Chronic atrial fibrillation ([facility]) I48.20    CKD (chronic kidney disease) stage 4, GFR 15-29 ml/min ([facility]) N18.4    CKD (chronic kidney disease) stage 5, GFR less than 15 ml/min ([facility]) N18.5    Closed right humeral fracture S42.301A    Coumadin toxicity, accidental or unintentional, initial encounter T45.511A    History of anemia due to chronic kidney disease N18.9, Z86.2    Left rib fracture S22.32XA    Stage 3 chronic kidney disease N18.30    Closed fracture of right proximal humerus S42.201A    Closed fracture of proximal end of right humerus with routine healing S42.201D    Hypothyroid E03.9    Essential hypertension I48    Diastolic CHF, chronic ([facility]) I50.32    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 Erickson Callander    Generalized weakness R53.1    STEVEN (acute kidney injury) ([facility] [address] N17.9    Iron deficiency anemia due to chronic blood loss D50.0    PAF (paroxysmal atrial fibrillation) ([facility]) I48.0    Hip fracture ([facility] [address] S72.009A    Encounter for rehabilitation Z51.89         COMPLIANT WITH MEDICATION:   HTN; Denies chest pain, dyspnea, palpitations, headache and blurred vision. Blood pressure normotensive. Anticoagulation. Dx:  Atrial fibrillation.    Current symptoms: none  Current control agent: amiodarone  Current anticoagulant: warfarin3 mg tablets po daily  History of bleeding problems: none  Lab Results   Component Value Date/Time    INR 2.9 (H) 03/03/2021 05:16 AM    INR 2.6 (H) 03/02/2021 06:00 AM    INR 2.4 (H) 03/01/2021 05:50 AM    INR POC 2.2 08/09/2021 02:30 PM    INR POC 1.7 05/17/2021 01:50 PM    INR POC 1.4 04/12/2021 02:00 PM    Prothrombin time 28.1 (H) 03/03/2021 05:16 AM    Prothrombin time 25.2 (H) 03/02/2021 06:00 AM    Prothrombin time 23.4 (H) 03/01/2021 05:50 AM       depression screening addressed not at risk    abuse screening addressed denies    learning assessment addressed reviewed nurses notes    fall risk addressed not at risk    HM: addressed    ROS:  Gen: denies fever, chills, fatigue, weight loss, weight gain  HEENT:denies blurry vision, nasal congestion, sore throat  Resp: denies dypsnea, cough, wheezing  CV: denies chest pain radiating to the jaws or arms, palpitations, lower extremity edema  Abd: denies nausea, vomiting, diarrhea, constipation  Neuro: denies numbness/tingling  Endo: denies polyuria, polydipsia, heat/cold intolerance  Heme: no lymphadenopathy    Allergies   Allergen Reactions    Ketamine Other (comments)     confusion         Current Outpatient Medications:     ciclopirox (Penlac) 8 % solution, Apply 1 Each to affected area nightly for 90 days. Indications: a fungal disease of the nails called dermatophyte onychomycosis, Disp: 6.6 mL, Rfl: 0    levothyroxine (SYNTHROID) 100 mcg tablet, TAKE 1 TABLET BY MOUTH ONCE DAILY BEFORE BREAKFAST, Disp: 90 Tablet, Rfl: 0    amiodarone (CORDARONE) 200 mg tablet, Take 1 tablet by mouth once daily, Disp: 90 Tablet, Rfl: 0    atorvastatin (LIPITOR) 20 mg tablet, Take 1 tablet by mouth once daily, Disp: 90 Tablet, Rfl: 0    warfarin (COUMADIN) 3 mg tablet, Take 1 Tablet by mouth daily. Take 3 mg tab po every Thursday. Take 2.5 mg tab po  on all the other days. (Patient taking differently: Take 3 mg by mouth daily.  Take 3 mg tab po every day), Disp: 90 Tablet, Rfl: 1    dilTIAZem IR (CARDIZEM) 60 mg tablet, Take 1 tablet by mouth twice daily, Disp: 180 Tablet, Rfl: 0    losartan (COZAAR) 50 mg tablet, Take 1 tablet by mouth once daily, Disp: 30 Tab, Rfl: 0    furosemide (LASIX) 20 mg tablet, Take 1 tablet by mouth once daily, Disp: 30 Tab, Rfl: 0    acetaminophen (TYLENOL) 325 mg tablet, Take 2 Tabs by mouth every six (6) hours as needed for Pain., Disp: 30 Tab, Rfl: 0    polyethylene glycol (MIRALAX) 17 gram/dose powder, Take 17 g by mouth daily.  1 tablespoon with 8 oz of water daily, Disp: 238 g, Rfl: 0    Past Medical History:   Diagnosis Date    Arrhythmia     atrial fibrillation, cardioversion     CKD (chronic kidney disease) stage 3, GFR 30-59 ml/min ([facility])     Hyperlipemia     Hypertension     Iron deficiency anemia due to chronic blood loss 2021    PAF (paroxysmal atrial fibrillation) ([facility] [address] 2021    PVD (peripheral vascular disease) ([facility] [address]     Thyroid disease        Past Surgical History:   Procedure Laterality Date    HX COLONOSCOPY      WNL SIS 10 y    HX HERNIA REPAIR Bilateral     Inguinal    HX ORTHOPAEDIC      right shoulder fracture repair    HX OTHER SURGICAL  2019    groin lymph gland excision    HX VASCULAR ACCESS      IR INSERT TUNL CVC W PORT OVER 5 YEARS  3/5/2020    [place] CHEST SURGERY PROCEDURE UNLISTED Left     lung surgery age 17, lobectomy    VASCULAR SURGERY PROCEDURE UNLIST      bypass numerous surgeries both legs       Social History     Tobacco Use   Smoking Status Former Smoker    Packs/day: 1.00    Years: 54.00    Pack years: 54.00    Quit date: 3/15/2007    Years since quittin.4   Smokeless Tobacco Never Used       Social History     Socioeconomic History    Marital status:      Spouse name: Not on file    Number of children: Not on file    Years of education: Not on file    Highest education level: Not on file   Tobacco Use    Smoking status: Former Smoker     Packs/day: 1.00     Years: 54.00     Pack years: 54.00 Quit date: 3/15/2007     Years since quittin.4    Smokeless tobacco: Never Used   Vaping Use    Vaping Use: Never used   Substance and Sexual Activity    Alcohol use: Yes     Comment: former drinker- beer   social    Drug use: Never   Other Topics Concern     Service No    Blood Transfusions Yes    Caffeine Concern No    Occupational Exposure No    Hobby Hazards No    Sleep Concern Yes    Stress Concern No    Weight Concern No    Special Diet No    Back Care Yes    Exercise Yes    Seat Belt No    Self-Exams Yes     Social Determinants of Health     Financial Resource Strain:     Difficulty of Paying Living Expenses:    Food Insecurity:     Worried About Running Out of Food in the Last Year:     Ran Out of Food in the Last Year:    Transportation Needs:     Lack of Transportation (Medical):  Lack of Transportation (Non-Medical):    Physical Activity:     Days of Exercise per Week:     Minutes of Exercise per Session:    Stress:     Feeling of Stress :    Social Connections:     Frequency of Communication with Friends and Family:     Frequency of Social Gatherings with Friends and Family:     Attends Yazidi Services:     Active Member of Clubs or Organizations:     Attends Club or Organization Meetings:     Marital Status:        Family History   Problem Relation Age of Onset    Cancer Mother         lung    Cancer Brother 66        lung    Cancer Maternal Uncle         cancer unknown         Objective:     Visit Vitals  /70 (BP 1 Location: Left upper arm, BP Patient Position: Sitting, BP Cuff Size: Adult)   Pulse (!) 57   Temp 97.1 °F (36.2 °C) (Temporal)   Ht 5' 3\" (1.6 m)   SpO2 95%   BMI 27.81 kg/m²     Body mass index is 27.81 kg/m². General: Alert and oriented. No acute distress. Well nourished  HEENT :  Ears:TMs are normal. Canals are clear. Eyes: pupils equal, round, react to light and accommodation. Extra ocular movements intact. Nose: patent. Mouth and throat is clear. Neck:supple full range of motion no thyromegaly. Trachea midline, No carotid bruits. No significant lymphadenopathy  Lungs[de-identified] clear to auscultation without wheezes, rales, or rhonchi. Heart :RRR, S1 & S2 are normal intensity. No murmur; no gallop  Abdomen: bowel sounds active. No tenderness, guarding, rebound, masses, hepatic or spleen enlargement  Back: no CVA tenderness. Extremities: without clubbing, cyanosis, or edema  Pulses: radial and femoral pulses are normal  Neuro: HMF intact. Cranial nerves II through XII grossly normal.  Motor: is 5 over 5 and symmetrical.   Deep tendon reflexes: +2 equal  Psych:appropriate behavior, mood, affect and judgement. Results for orders placed or performed in visit on 08/09/21   AMB POC PT/INR   Result Value Ref Range    VALID INTERNAL CONTROL POC Yes     Prothrombin time (POC) 26.8 seconds    INR POC 2.2        Results for orders placed or performed in visit on 08/09/21   AMB POC PT/INR   Result Value Ref Range    VALID INTERNAL CONTROL POC Yes     Prothrombin time (POC) 26.8 seconds    INR POC 2.2        Assessment/ Plan:     1. Medicare annual wellness visit, subsequent  Note Attached   - ND ANNUAL ALCOHOL SCREEN 15 MIN  - COLLECTION CAPILLARY BLOOD SPECIMEN; Future  - COLLECTION CAPILLARY BLOOD SPECIMEN  - CBC WITH AUTOMATED DIFF; Future  - LIPID PANEL; Future  - METABOLIC PANEL, COMPREHENSIVE; Future  - COLLECTION VENOUS BLOOD,VENIPUNCTURE; Future  - TSH 3RD GENERATION; Future    2. Screening for alcoholism  Note attached  - ND ANNUAL ALCOHOL SCREEN 15 MIN  - COLLECTION CAPILLARY BLOOD SPECIMEN; Future  - COLLECTION CAPILLARY BLOOD SPECIMEN  - CBC WITH AUTOMATED DIFF; Future  - LIPID PANEL; Future  - METABOLIC PANEL, COMPREHENSIVE; Future  - COLLECTION VENOUS BLOOD,VENIPUNCTURE; Future  - TSH 3RD GENERATION; Future    3.  Chronic atrial fibrillation (HCC)  Warfarin therapeutic no change in medical management or dosage .  - AMB POC PT/INR  - CBC WITH AUTOMATED DIFF; Future  - LIPID PANEL; Future  - METABOLIC PANEL, COMPREHENSIVE; Future  - COLLECTION VENOUS BLOOD,VENIPUNCTURE; Future  - TSH 3RD GENERATION; Future    4. CKD (chronic kidney disease) stage 5, GFR less than 15 ml/min (HCC)    - CBC WITH AUTOMATED DIFF; Future  - LIPID PANEL; Future  - METABOLIC PANEL, COMPREHENSIVE; Future  - COLLECTION VENOUS BLOOD,VENIPUNCTURE; Future  - TSH 3RD GENERATION; Future    5. Essential hypertension  BP in goal continue antihypertensives as prescribed. - CBC WITH AUTOMATED DIFF; Future  - LIPID PANEL; Future  - METABOLIC PANEL, COMPREHENSIVE; Future  - COLLECTION VENOUS BLOOD,VENIPUNCTURE; Future  - TSH 3RD GENERATION; Future    6. Diastolic CHF, chronic (HCC)    - CBC WITH AUTOMATED DIFF; Future  - LIPID PANEL; Future  - METABOLIC PANEL, COMPREHENSIVE; Future  - COLLECTION VENOUS BLOOD,VENIPUNCTURE; Future  - TSH 3RD GENERATION; Future    7. Acquired hypothyroidism    - CBC WITH AUTOMATED DIFF; Future  - LIPID PANEL; Future  - METABOLIC PANEL, COMPREHENSIVE; Future  - COLLECTION VENOUS BLOOD,VENIPUNCTURE; Future  - TSH 3RD GENERATION; Future      Orders Placed This Encounter    COLLECTION CAPILLARY BLOOD SPECIMEN     Standing Status:   Future     Number of Occurrences:   1     Standing Expiration Date:   8/9/2022    COLLECTION VENOUS BLOOD,VENIPUNCTURE     Standing Status:   Future     Standing Expiration Date:   9/9/2021    CBC WITH AUTOMATED DIFF     Standing Status:   Future     Standing Expiration Date:   9/9/2021    LIPID PANEL     Standing Status:   Future     Standing Expiration Date:   2/4/6181    METABOLIC PANEL, COMPREHENSIVE     Standing Status:   Future     Standing Expiration Date:   9/9/2021    TSH 3RD GENERATION     Standing Status:   Future     Standing Expiration Date:   8/9/2022    AMB POC PT/INR    ANNUAL ALCOHOL SCREEN 8-15 MIN    ciclopirox (Penlac) 8 % solution     Sig: Apply 1 Each to affected area nightly for 90 days. Indications: a fungal disease of the nails called dermatophyte onychomycosis     Dispense:  6.6 mL     Refill:  0         Verbal and written instructions (see AVS) provided. Patient expresses understanding of diagnosis and treatment plan.     Health Maintenance Due   Topic Date Due    COVID-19 Vaccine (1) Never done    Shingrix Vaccine Age 50> (1 of 2) Never done    Low dose CT lung screening  Never done               Bola Gonzalez, JOANP-C

## 2021-09-27 ENCOUNTER — OFFICE VISIT (OUTPATIENT)
Dept: FAMILY MEDICINE CLINIC | Age: 81
End: 2021-09-27
Payer: MEDICARE

## 2021-09-27 VITALS
RESPIRATION RATE: 18 BRPM | BODY MASS INDEX: 28.06 KG/M2 | WEIGHT: 158.38 LBS | OXYGEN SATURATION: 97 % | SYSTOLIC BLOOD PRESSURE: 156 MMHG | TEMPERATURE: 97.9 F | HEART RATE: 62 BPM | DIASTOLIC BLOOD PRESSURE: 64 MMHG | HEIGHT: 63 IN

## 2021-09-27 DIAGNOSIS — L08.9 ACUTE POST-TRAUMATIC WOUND INFECTION, INITIAL ENCOUNTER: Primary | ICD-10-CM

## 2021-09-27 DIAGNOSIS — Z79.01 ANTICOAGULANT LONG-TERM USE: ICD-10-CM

## 2021-09-27 DIAGNOSIS — T14.8XXA ACUTE POST-TRAUMATIC WOUND INFECTION, INITIAL ENCOUNTER: Primary | ICD-10-CM

## 2021-09-27 LAB
INR BLD: 2.4
PT POC: 28.6 SECONDS
VALID INTERNAL CONTROL?: YES

## 2021-09-27 PROCEDURE — 85610 PROTHROMBIN TIME: CPT | Performed by: NURSE PRACTITIONER

## 2021-09-27 PROCEDURE — 99213 OFFICE O/P EST LOW 20 MIN: CPT | Performed by: NURSE PRACTITIONER

## 2021-09-27 RX ORDER — WARFARIN 3 MG/1
TABLET ORAL
Qty: 90 TABLET | Refills: 0
Start: 2021-09-27 | End: 2021-11-22

## 2021-09-27 RX ORDER — MUPIROCIN 20 MG/G
OINTMENT TOPICAL DAILY
Qty: 22 G | Refills: 0 | Status: SHIPPED | OUTPATIENT
Start: 2021-09-27 | End: 2022-04-20 | Stop reason: ALTCHOICE

## 2021-09-27 RX ORDER — DOXYCYCLINE 100 MG/1
100 TABLET ORAL 2 TIMES DAILY
Qty: 20 TABLET | Refills: 0 | Status: SHIPPED | OUTPATIENT
Start: 2021-09-27 | End: 2021-10-07

## 2021-09-27 RX ORDER — WARFARIN 2 MG/1
TABLET ORAL
Qty: 30 TABLET | Refills: 0
Start: 2021-09-27 | End: 2021-09-27 | Stop reason: DRUGHIGH

## 2021-09-27 NOTE — PROGRESS NOTES
1. Have you been to the ER, urgent care clinic since your last visit? Hospitalized since your last visit? No    2. Have you seen or consulted any other health care providers outside of the [address] [place] [address] since your last visit? Include any pap smears or colon screening.  No

## 2021-09-27 NOTE — PROGRESS NOTES
Subjective:     Chief Complaint   Patient presents with    Hand Injury    Leg Injury     right       Celeste English is a [de-identified] y.o. male who is a patient of NP Vesna Ponce, who presents today with c/o a hand and leg injury. He is accompanied by his caregiver BB. He is in a W/C. Two days ago he fell going up the steps and scraped his right hand and right leg. Today the wounds have partially scabbed over but the surrounding skin is red, warm, and swollen. Denies fever or chills, N/V, or active drainage. Caregiver states he is on Coumadin 3mg daily for A-fib. She has his INR log with him. It has been within goal (2.2) every week for the past month. She would like his INR checked today. He denies any black or bloody stools or nosebleeds. Patient Active Problem List   Diagnosis Code    Thyroid disease E07.9    Arrhythmia I49.9    Hyperlipemia E78.5    PVD (peripheral vascular disease) ([facility]) I73.9    CKD (chronic kidney disease) stage 3, GFR 30-59 ml/min ([facility]) N18.30    Anticoagulant long-term use Z79.01    Pulmonary scarring J98.4    Spondylosis of lumbar region without myelopathy or radiculopathy M47.816    Chronic atrial fibrillation ([facility]) I48.20    CKD (chronic kidney disease) stage 4, GFR 15-29 ml/min ([facility]) N18.4    CKD (chronic kidney disease) stage 5, GFR less than 15 ml/min ([facility]) N18.5    Closed right humeral fracture S42.301A    Coumadin toxicity, accidental or unintentional, initial encounter T45.511A    History of anemia due to chronic kidney disease N18.9, Z86.2    Left rib fracture S22.32XA    Stage 3 chronic kidney disease N18.30    Closed fracture of right proximal humerus S42.201A    Closed fracture of proximal end of right humerus with routine healing S42.201D    Hypothyroid E03.9    Essential hypertension H60    Diastolic CHF, chronic ([facility]) I50.32    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 XXXA    Generalized weakness R53.1    STEVEN (acute kidney injury) ([facility] N17.9    Iron deficiency anemia due to chronic blood loss D50.0    PAF (paroxysmal atrial fibrillation) ([facility]) I48.0    Hip fracture ([facility] S72.009A    Encounter for rehabilitation Z51.89       Past Medical History:   Diagnosis Date    Arrhythmia     atrial fibrillation, cardioversion 2010    CKD (chronic kidney disease) stage 3, GFR 30-59 ml/min ([facility])     Hyperlipemia     Hypertension     Iron deficiency anemia due to chronic blood loss 2/9/2021    PAF (paroxysmal atrial fibrillation) ([facility] 2/9/2021    PVD (peripheral vascular disease) ([facility]     Thyroid disease          Current Outpatient Medications:     dilTIAZem IR (CARDIZEM) 60 mg tablet, Take 1 tablet by mouth twice daily, Disp: 180 Tablet, Rfl: 0    levothyroxine (SYNTHROID) 100 mcg tablet, TAKE 1 TABLET BY MOUTH ONCE DAILY BEFORE BREAKFAST, Disp: 90 Tablet, Rfl: 0    amiodarone (CORDARONE) 200 mg tablet, Take 1 tablet by mouth once daily, Disp: 90 Tablet, Rfl: 0    atorvastatin (LIPITOR) 20 mg tablet, Take 1 tablet by mouth once daily, Disp: 90 Tablet, Rfl: 0    warfarin (COUMADIN) 3 mg tablet, Take 1 Tablet by mouth daily. Take 3 mg tab po every Thursday. Take 2.5 mg tab po  on all the other days. (Patient taking differently: Take 3 mg by mouth daily. Take 3 mg tab po every day), Disp: 90 Tablet, Rfl: 1    losartan (COZAAR) 50 mg tablet, Take 1 tablet by mouth once daily, Disp: 30 Tab, Rfl: 0    furosemide (LASIX) 20 mg tablet, Take 1 tablet by mouth once daily, Disp: 30 Tab, Rfl: 0    acetaminophen (TYLENOL) 325 mg tablet, Take 2 Tabs by mouth every six (6) hours as needed for Pain., Disp: 30 Tab, Rfl: 0    polyethylene glycol (MIRALAX) 17 gram/dose powder, Take 17 g by mouth daily. 1 tablespoon with 8 oz of water daily, Disp: 238 g, Rfl: 0    ciclopirox (Penlac) 8 % solution, Apply 1 Each to affected area nightly for 90 days.  Indications: a fungal disease of the nails called dermatophyte onychomycosis (Patient not taking: Reported on 2021), Disp: 6.6 mL, Rfl: 0    Allergies   Allergen Reactions    Ketamine Other (comments)     confusion       Past Surgical History:   Procedure Laterality Date    HX COLONOSCOPY      WNL SIS 10 y    HX HERNIA REPAIR Bilateral     Inguinal    HX ORTHOPAEDIC      right shoulder fracture repair    HX OTHER SURGICAL  2019    groin lymph gland excision    HX VASCULAR ACCESS      IR INSERT TUNL CVC W PORT OVER 5 YEARS  3/5/2020    LA CHEST SURGERY PROCEDURE UNLISTED Left     lung surgery age 17, lobectomy    VASCULAR SURGERY PROCEDURE UNLIST      bypass numerous surgeries both legs       Social History     Tobacco Use   Smoking Status Former Smoker    Packs/day: 1.00    Years: 54.00    Pack years: 54.00    Quit date: 3/15/2007    Years since quittin.5   Smokeless Tobacco Never Used       Social History     Socioeconomic History    Marital status:      Spouse name: Not on file    Number of children: Not on file    Years of education: Not on file    Highest education level: Not on file   Tobacco Use    Smoking status: Former Smoker     Packs/day: 1.00     Years: 54.00     Pack years: 54.00     Quit date: 3/15/2007     Years since quittin.5    Smokeless tobacco: Never Used   Vaping Use    Vaping Use: Never used   Substance and Sexual Activity    Alcohol use: Yes     Comment: former drinker- beer   social    Drug use: Never   Other Topics Concern     Service No    Blood Transfusions Yes    Caffeine Concern No    Occupational Exposure No    Hobby Hazards No    Sleep Concern Yes    Stress Concern No    Weight Concern No    Special Diet No    Back Care Yes    Exercise Yes    Seat Belt No    Self-Exams Yes     Social Determinants of Health     Financial Resource Strain:     Difficulty of Paying Living Expenses:    Food Insecurity:     Worried About Running Out of Food in the Last Year:     Ran Out of Food in the Last Year:    Transportation Needs:     Lack of Transportation (Medical):  Lack of Transportation (Non-Medical):    Physical Activity:     Days of Exercise per Week:     Minutes of Exercise per Session:    Stress:     Feeling of Stress :    Social Connections:     Frequency of Communication with Friends and Family:     Frequency of Social Gatherings with Friends and Family:     Attends Baptism Services:     Active Member of Clubs or Organizations:     Attends Club or Organization Meetings:     Marital Status:        Family History   Problem Relation Age of Onset    Cancer Mother         lung    Cancer Brother 66        lung    Cancer Maternal Uncle         cancer unknown       ROS:  Gen: denies fever, chills, or fatigue  Resp: denies dyspnea, cough, or wheezing  CV: denies chest pain, pressure, or palpitations  Extremeties: +mild edema in BLE's  GI[de-identified] denies nausea or vomiting  Musculoskeletal: no joint pain, stiffness, or muscle cramps  Neuro: denies numbness/tingling or dizziness  Skin: +abrasion to right hand and right lower leg with surrounding redness, heat, and swelling    Objective:     Visit Vitals  BP (!) 156/64 (BP 1 Location: Left upper arm)   Pulse 62   Temp 97.9 °F (36.6 °C) (Temporal)   Resp 18   Ht 5' 3\" (1.6 m)   Wt 158 lb 6 oz (71.8 kg)   SpO2 97%   BMI 28.05 kg/m²     Body mass index is 28.05 kg/m². General: Alert and oriented. No acute distress. Frail older gentleman, sitting in W/C  HEENT :  Eyes: Sclera white, conjunctiva clear. PERRLA. Extra ocular movements intact. Neck: Supple  Lungs: Breathing even and unlabored. All lobes clear to auscultation bilaterally   Heart :RRR, S1 and S2 normal intensity, no extra heart sounds  Extremities: +mild non-pitting edema to BLE's  Musculoskeletal: No joint pain, heat, erythema, or swelling. Neuro: Cranial nerves grossly normal.  Psych: Mood and thought content appropriate for situation.  Dressed appropriately  Skin: Warm and dry, +abrasion to right hand and right lower leg, both partially scabbed over, both with surrounding redness, heat, and swelling, both without active drainage    Assessment/ Plan:     Acute post traumatic wound with infection, right hand and right lower leg  Open area of right hand was swabbed and sent off for C/S  Start Doxycycline 100mg BID x 10 days   Apply Mupirocin ointment 1-2 times daily  Ok to let warm soapy water run over wounds but do not soak wounds in tub  Cover with fresh dry bandage daily  F/U 1 week or sooner for worsening pain or swelling or spreading redness    Long term anticoagulant use  INR today at goal 2.4  He should cont his current Warfarin dose of 3mg daily. However we are starting Doxycycline today which will interact with the Warfarin, causing increased effects. Therefore he will reduce Warfarin dose to 1.5mg daily while on the Doxy and then resume 3mg daily once Doxy completed  Pt and caregiver verbalized understanding  F/I 1 week       Verbal and written instructions (see AVS) provided.  Patient expresses understanding of diagnosis and treatment plan. Health Maintenance Due   Topic Date Due    COVID-19 Vaccine (1) Never done    Shingrix Vaccine Age 50> (1 of 2) Never done    Low dose CT lung screening  Never done    Flu Vaccine (1) 09/01/2021               Yamila Urbina NP

## 2021-09-28 NOTE — ADDENDUM NOTE
Addended by: Cecil Morales on: 9/28/2021 11:48 AM     Modules accepted: Orders
Addended by: Nuno Marx on: 9/27/2021 02:39 PM     Modules accepted: Orders
Copy received from Patient Care    
none

## 2021-09-30 LAB
BACTERIA SPEC CULT: ABNORMAL
BACTERIA SPEC CULT: NORMAL
GRAM STN SPEC: ABNORMAL
SERVICE CMNT-IMP: ABNORMAL
SERVICE CMNT-IMP: NORMAL

## 2021-09-30 NOTE — PROGRESS NOTES
Patient identified by two patient identifiers, name and date of birth. Spoke with patient. Patient aware of results and states understanding at this time. Patient states that is is a little better.

## 2021-09-30 NOTE — PROGRESS NOTES
Wound culture shows possible Staph and gram neg bacteria, Doxy should cover. How do his wounds look? Is redness better? False labor

## 2021-10-04 ENCOUNTER — DOCUMENTATION ONLY (OUTPATIENT)
Dept: FAMILY MEDICINE CLINIC | Age: 81
End: 2021-10-04

## 2021-10-04 ENCOUNTER — TELEPHONE (OUTPATIENT)
Dept: FAMILY MEDICINE CLINIC | Age: 81
End: 2021-10-04

## 2021-10-04 DIAGNOSIS — T14.8XXA ACUTE POST-TRAUMATIC WOUND INFECTION, INITIAL ENCOUNTER: Primary | ICD-10-CM

## 2021-10-04 DIAGNOSIS — L08.9 ACUTE POST-TRAUMATIC WOUND INFECTION, INITIAL ENCOUNTER: Primary | ICD-10-CM

## 2021-10-04 NOTE — TELEPHONE ENCOUNTER
Pt aide aware 2 identifiers verified name and  and says she tried to get him to come back to see us but he wanted to stick with his provider she said she had a difficult time trying to get him here the first time but she did want to thank you

## 2021-10-04 NOTE — PROGRESS NOTES
Manually faxed referral, ov note, labs, demo and ins cards to [facility]

## 2021-10-04 NOTE — TELEPHONE ENCOUNTER
The aide called and stated that she only goes there twice a week, Monday and Thursday there is no one else there during the week, she said she know he is not changing bandage or cleaning wounds like he is suppose to and she was wondering if we could do home health order for him.

## 2021-10-11 ENCOUNTER — OFFICE VISIT (OUTPATIENT)
Dept: FAMILY MEDICINE CLINIC | Age: 81
End: 2021-10-11
Payer: MEDICARE

## 2021-10-11 VITALS
WEIGHT: 159.6 LBS | BODY MASS INDEX: 22.85 KG/M2 | HEIGHT: 70 IN | TEMPERATURE: 97.3 F | RESPIRATION RATE: 20 BRPM | DIASTOLIC BLOOD PRESSURE: 84 MMHG | SYSTOLIC BLOOD PRESSURE: 120 MMHG | OXYGEN SATURATION: 96 % | HEART RATE: 56 BPM

## 2021-10-11 DIAGNOSIS — R54 FRAIL ELDERLY: Primary | ICD-10-CM
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
## 2021-10-11 DIAGNOSIS — Z87.828 HISTORY OF OPEN HAND WOUND: ICD-10-CM

## 2021-10-11 DIAGNOSIS — S91.001A WOUND OF RIGHT ANKLE, INITIAL ENCOUNTER: ICD-10-CM

## 2021-10-11 PROCEDURE — 99213 OFFICE O/P EST LOW 20 MIN: CPT | Performed by: NURSE PRACTITIONER

## 2021-10-11 RX ORDER — ATORVASTATIN CALCIUM 20 MG/1
TABLET, FILM COATED ORAL
Qty: 90 TABLET | Refills: 0 | Status: SHIPPED | OUTPATIENT
Start: 2021-10-11 | End: 2021-10-13

## 2021-10-11 RX ORDER — AMIODARONE HYDROCHLORIDE 200 MG/1
TABLET ORAL
Qty: 90 TABLET | Refills: 0 | Status: SHIPPED | OUTPATIENT
Start: 2021-10-11 | End: 2021-10-13

## 2021-10-11 RX ORDER — CICLOPIROX 80 MG/ML
SOLUTION TOPICAL
COMMUNITY

## 2021-10-11 NOTE — PROGRESS NOTES
1. Have you been to the ER, urgent care clinic since your last visit? No  Hospitalized since your last visit? No    2. Have you seen or consulted any other health care providers outside of the [address] [place] [address] since your last visit? Include any pap smears or colon screening.  No

## 2021-10-13 RX ORDER — ATORVASTATIN CALCIUM 20 MG/1
TABLET, FILM COATED ORAL
Qty: 90 TABLET | Refills: 0 | Status: ON HOLD | OUTPATIENT
Start: 2021-10-13 | End: 2021-12-24 | Stop reason: SDUPTHER

## 2021-10-13 RX ORDER — AMIODARONE HYDROCHLORIDE 200 MG/1
TABLET ORAL
Qty: 90 TABLET | Refills: 0 | Status: ON HOLD | OUTPATIENT
Start: 2021-10-13 | End: 2021-12-24 | Stop reason: SDUPTHER

## 2021-10-15 NOTE — ACP (ADVANCE CARE PLANNING)
Discussed importance of advanced medical directives with patient. Patient is capable of making decisions.  Connie Way NP-C

## 2021-10-15 NOTE — PROGRESS NOTES
Subjective:     Ba Gorman is a [de-identified] y.o. male who presents today with his caregiver with the following:  Chief Complaint   Patient presents with    Fall     injuries follow up / right hand and ankle       Patient Active Problem List   Diagnosis Code    Thyroid disease E07.9    Arrhythmia I49.9    Hyperlipemia E78.5    PVD (peripheral vascular disease) ([facility] I73.9    CKD (chronic kidney disease) stage 3, GFR 30-59 ml/min ([facility]) N18.30    Anticoagulant long-term use Z79.01    Pulmonary scarring J98.4    Spondylosis of lumbar region without myelopathy or radiculopathy M47.816    Chronic atrial fibrillation ([facility]) I48.20    CKD (chronic kidney disease) stage 4, GFR 15-29 ml/min ([facility]) N18.4    CKD (chronic kidney disease) stage 5, GFR less than 15 ml/min ([facility]) N18.5    Closed right humeral fracture S42.301A    Coumadin toxicity, accidental or unintentional, initial encounter T45.511A    History of anemia due to chronic kidney disease N18.9, Z86.2    Left rib fracture S22.32XA    Stage 3 chronic kidney disease N18.30    Closed fracture of right proximal humerus S42.201A    Closed fracture of proximal end of right humerus with routine healing S42.201D    Hypothyroid E03.9    Essential hypertension C85    Diastolic CHF, chronic ([facility]) I50.32    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) C83.35    Fall W19. Sethi Blinks    Generalized weakness R53.1    STEVEN (acute kidney injury) ([facility] N17.9    Iron deficiency anemia due to chronic blood loss D50.0    PAF (paroxysmal atrial fibrillation) ([facility]) I48.0    Hip fracture ([facility] S72.009A    Encounter for rehabilitation Z51.89         COMPLIANT WITH MEDICATION:   HTN; Denies chest pain, dyspnea, palpitations, headache and blurred vision. Blood pressure normotensive    Fall ; wound on hand healing.   We discussed fall prevention,    depression screening addressed not at risk    abuse screening addressed denies    learning assessment addressed reviewed nurses notes    fall risk addressed not at risk    HM: addressed discussed vaccines and fall prevention today. ROS:  Gen: denies fever, chills, fatigue, weight loss, weight gain  HEENT:denies blurry vision, nasal congestion, sore throat  Resp: denies dypsnea, cough, wheezing  CV: denies chest pain radiating to the jaws or arms, palpitations, lower extremity edema  Abd: denies nausea, vomiting, diarrhea, constipation  Neuro: denies numbness/tingling  Endo: denies polyuria, polydipsia, heat/cold intolerance  Heme: no lymphadenopathy    Allergies   Allergen Reactions    Ketamine Other (comments)     confusion         Current Outpatient Medications:     ciclopirox (PENLAC) 8 % solution, Apply  to affected area nightly. As directed, Disp: , Rfl:     mupirocin (BACTROBAN) 2 % ointment, Apply  to affected area daily. , Disp: 22 g, Rfl: 0    warfarin (COUMADIN) 3 mg tablet, Take 1/2 pill daily while on Doxycycline, then take 1 pill daily, Disp: 90 Tablet, Rfl: 0    dilTIAZem IR (CARDIZEM) 60 mg tablet, Take 1 tablet by mouth twice daily, Disp: 180 Tablet, Rfl: 0    levothyroxine (SYNTHROID) 100 mcg tablet, TAKE 1 TABLET BY MOUTH ONCE DAILY BEFORE BREAKFAST, Disp: 90 Tablet, Rfl: 0    losartan (COZAAR) 50 mg tablet, Take 1 tablet by mouth once daily, Disp: 30 Tab, Rfl: 0    furosemide (LASIX) 20 mg tablet, Take 1 tablet by mouth once daily, Disp: 30 Tab, Rfl: 0    acetaminophen (TYLENOL) 325 mg tablet, Take 2 Tabs by mouth every six (6) hours as needed for Pain., Disp: 30 Tab, Rfl: 0    polyethylene glycol (MIRALAX) 17 gram/dose powder, Take 17 g by mouth daily.  1 tablespoon with 8 oz of water daily, Disp: 238 g, Rfl: 0    atorvastatin (LIPITOR) 20 mg tablet, Take 1 tablet by mouth once daily, Disp: 90 Tablet, Rfl: 0    amiodarone (CORDARONE) 200 mg tablet, Take 1 tablet by mouth once daily, Disp: 90 Tablet, Rfl: 0    Past Medical History:   Diagnosis Date    Arrhythmia     atrial fibrillation, cardioversion     CKD (chronic kidney disease) stage 3, GFR 30-59 ml/min ([facility])     Hyperlipemia     Hypertension     Iron deficiency anemia due to chronic blood loss 2021    PAF (paroxysmal atrial fibrillation) ([facility] 2021    [facility]     Thyroid disease        Past Surgical History:   Procedure Laterality Date    HX COLONOSCOPY      WNL SIS 11 y    HX HERNIA REPAIR Bilateral     Inguinal    HX ORTHOPAEDIC      right shoulder fracture repair    HX OTHER SURGICAL  2019    groin lymph gland excision    HX VASCULAR ACCESS      IR INSERT TUNL CVC W PORT OVER 5 YEARS  3/5/2020    CA CHEST SURGERY PROCEDURE UNLISTED Left     lung surgery age 17, lobectomy    VASCULAR SURGERY PROCEDURE UNLIST      bypass numerous surgeries both legs       Social History     Tobacco Use   Smoking Status Former Smoker    Packs/day: 1.00    Years: 54.00    Pack years: 54.00    Quit date: 3/15/2007    Years since quittin.5   Smokeless Tobacco Never Used       Social History     Socioeconomic History    Marital status:      Spouse name: Not on file    Number of children: Not on file    Years of education: Not on file    Highest education level: Not on file   Tobacco Use    Smoking status: Former Smoker     Packs/day: 1.00     Years: 54.00     Pack years: 54.00     Quit date: 3/15/2007     Years since quittin.5    Smokeless tobacco: Never Used   Vaping Use    Vaping Use: Never used   Substance and Sexual Activity    Alcohol use: Yes     Comment: former drinker- beer   social    Drug use: Never   Other Topics Concern     Service No    Blood Transfusions Yes    Caffeine Concern No    Occupational Exposure No    Hobby Hazards No    Sleep Concern Yes    Stress Concern No    Weight Concern No    Special Diet No    Back Care Yes    Exercise Yes    Seat Belt No    Self-Exams Yes     Social Determinants of Health Financial Resource Strain:     Difficulty of Paying Living Expenses:    Food Insecurity:     Worried About Running Out of Food in the Last Year:     [address] in the Last Year:    Transportation Needs:     Lack of Transportation (Medical):  Lack of Transportation (Non-Medical):    Physical Activity:     Days of Exercise per Week:     Minutes of Exercise per Session:    Stress:     Feeling of Stress :    Social Connections:     Frequency of Communication with Friends and Family:     Frequency of Social Gatherings with Friends and Family:     Attends Yarsanism Services:     Active Member of Clubs or Organizations:     Attends Club or Organization Meetings:     Marital Status:        Family History   Problem Relation Age of Onset    Cancer Mother         lung    Cancer Brother 66        lung    Cancer Maternal Uncle         cancer unknown         Objective:     Visit Vitals  /84 (BP 1 Location: Right arm, BP Patient Position: Sitting, BP Cuff Size: Adult)   Pulse (!) 56   Temp 97.3 °F (36.3 °C) (Core)   Resp 20   Ht 5' 10\" (1.778 m)   Wt 159 lb 9.6 oz (72.4 kg)   SpO2 96%   BMI 22.90 kg/m²     Body mass index is 22.9 kg/m². General: Alert and oriented. No acute distress. Frail appearing . Using a walker for ambulation. HEENT :  Ears:TMs are normal. Canals are clear. Eyes: pupils equal, round, react to light and accommodation. Extra ocular movements intact. Nose: patent. Mouth and throat is clear. Neck:supple full range of motion no thyromegaly. Trachea midline, No carotid bruits. No significant lymphadenopathy  Lungs[de-identified] clear to auscultation without wheezes, rales, or rhonchi. Heart :RRR, S1 & S2 are normal intensity. No murmur; no gallop  Abdomen: bowel sounds active. No tenderness, guarding, rebound, masses, hepatic or spleen enlargement  Back: no CVA tenderness.   Extremities: without clubbing, cyanosis, or edema  Pulses: radial and femoral pulses are normal  Neuro: HMF intact. Cranial nerves II through XII grossly normal.  Motor: is 5 over 5 and symmetrical.   Deep tendon reflexes: +2 equal  integumentary right sideankle wound healed. Hand wound healing . no open jose or sigh of infection   Psych:appropriate behavior, mood, affect and judgement. Results for orders placed or performed in visit on 09/27/21   CULTURE, ANAEROBIC    Specimen: Drainage   Result Value Ref Range    Special Requests: NO SPECIAL REQUESTS      Culture result: NO ANAEROBES ISOLATED     CULTURE, WOUND W GRAM STAIN    Specimen: Drainage   Result Value Ref Range    Special Requests: NO SPECIAL REQUESTS      GRAM STAIN FEW WBCS SEEN      GRAM STAIN 2+ GRAM POSITIVE COCCI IN CLUSTERS      GRAM STAIN 2+ GRAM NEGATIVE RODS      GRAM STAIN 1+ GRAM POSITIVE RODS      Culture result: HEAVY SERRATIA LIQUIFACIENS (A)      Culture result: HEAVY CITROBACTER KOSERI (A)      Culture result: HEAVY STAPHYLOCOCCUS AUREUS (A)         Susceptibility    Staphylococcus aureus - ANAID     Clindamycin ($) RESISTANT Resistant ug/mL     Daptomycin ($$$$$)* 0.25 Susceptible ug/mL      * Susceptible     Erythromycin ($$$$)* >=8 Resistant ug/mL      * Resistant     Gentamicin ($)* <=0.5 Susceptible ug/mL      * Susceptible     Linezolid ($$$$$)* 2 Susceptible ug/mL      * Susceptible     Oxacillin* <=0.25 Susceptible ug/mL      * Susceptible     Rifampin ($$$$)* <=0.5 Susceptible ug/mL      * SusceptibleRifampin is not to be used for mono-therapy.      Tetracycline* <=1 Susceptible ug/mL      * Susceptible     Trimeth/Sulfa* <=10 Susceptible ug/mL      * Susceptible     Vancomycin ($)* <=0.5 Susceptible ug/mL      * Susceptible     Ciprofloxacin ($)* <=0.5 Susceptible ug/mL      * Susceptible     Levofloxacin ($)* 0.25 Susceptible ug/mL      * Susceptible     Moxifloxacin ($$$$)* <=0.25 Susceptible ug/mL      * Susceptible     Doxycycline ($$)* <=0.5 Susceptible ug/mL      * Susceptible    Citrobacter koseri - ANAID     Amikacin ($)* <=2 Susceptible ug/mL      * Susceptible     Cefazolin ($)* <=4 Susceptible ug/mL      * Susceptible     Ceftazidime ($)* <=1 Susceptible ug/mL      * Susceptible     Ceftriaxone ($)* <=1 Susceptible ug/mL      * Susceptible     Cefepime ($$)* <=1 Susceptible ug/mL      * Susceptible     Ciprofloxacin ($)* <=0.25 Susceptible ug/mL      * Susceptible     Gentamicin ($)* <=1 Susceptible ug/mL      * Susceptible     Levofloxacin ($)* <=0.12 Susceptible ug/mL      * Susceptible     Meropenem ($$)* <=0.25 Susceptible ug/mL      * Susceptible     Piperacillin/Tazobac ($)* <=4 Susceptible ug/mL      * Susceptible     Tobramycin ($)* <=1 Susceptible ug/mL      * Susceptible     Cefoxitin* <=4 Susceptible ug/mL      * Susceptible    Serratia liquifaciens - ANAID     Amikacin ($)* <=2 Susceptible ug/mL      * Susceptible     Cefazolin ($)* >=64 Resistant ug/mL      * Resistant     Ceftazidime ($)* <=1 Susceptible ug/mL      * Susceptible     Ceftriaxone ($)* <=1 Susceptible ug/mL      * Susceptible     Cefepime ($$)* <=1 Susceptible ug/mL      * Susceptible     Ciprofloxacin ($)* <=0.25 Susceptible ug/mL      * Susceptible     Gentamicin ($)* <=1 Susceptible ug/mL      * Susceptible     Levofloxacin ($)* <=0.12 Susceptible ug/mL      * Susceptible     Meropenem ($$)* <=0.25 Susceptible ug/mL      * Susceptible     Tobramycin ($)* <=1 Susceptible ug/mL      * Susceptible     Cefoxitin RESISTANT Resistant ug/mL   AMB POC PT/INR   Result Value Ref Range    VALID INTERNAL CONTROL POC Yes     Prothrombin time (POC) 28.6 seconds    INR POC 2.4        No results found for this visit on 10/11/21. Assessment/ Plan:       ICD-10-CM ICD-9-CM    1. Frail elderly  R54 797    2. Fall, subsequent encounter  W19. XXXD V58.89      E888.9    3. Onychomycosis  B35.1 110.1          Orders Placed This Encounter    ciclopirox (PENLAC) 8 % solution     Sig: Apply  to affected area nightly.  As directed         Verbal and written instructions (see AVS) provided. Patient expresses understanding of diagnosis and treatment plan.     Health Maintenance Due   Topic Date Due    Shingrix Vaccine Age 49> (1 of 2) Never done    Low dose CT lung screening  Never done    COVID-19 Vaccine (3 - Kaila Redding risk 3-dose series) 10/12/2021               Ramirez Yost, FNP-C Plan: Will request path report from Dr. Vincent

## 2021-10-21 ENCOUNTER — TELEPHONE (OUTPATIENT)
Dept: FAMILY MEDICINE CLINIC | Age: 81
End: 2021-10-21

## 2021-10-21 RX ORDER — FUROSEMIDE 20 MG/1
20 TABLET ORAL DAILY
Qty: 90 TABLET | Refills: 1 | Status: ON HOLD | OUTPATIENT
Start: 2021-10-21 | End: 2021-12-24 | Stop reason: SDUPTHER

## 2021-10-21 RX ORDER — LEVOTHYROXINE SODIUM 100 UG/1
100 TABLET ORAL
Qty: 90 TABLET | Refills: 1 | Status: ON HOLD | OUTPATIENT
Start: 2021-10-21 | End: 2021-12-24 | Stop reason: SDUPTHER

## 2021-10-21 NOTE — TELEPHONE ENCOUNTER
INR 1.8, s/w CG BB and he is back on full tablet 3 mg every day from a dosage decrease due to recent antibiotic therapy.  She reports he has not missed any doses

## 2021-11-16 ENCOUNTER — TELEPHONE (OUTPATIENT)
Dept: FAMILY MEDICINE CLINIC | Age: 81
End: 2021-11-16

## 2021-11-16 NOTE — TELEPHONE ENCOUNTER
FYI,   Patient has been discharged from [address] [place] [address]. Wounds are all healed.

## 2021-11-22 RX ORDER — WARFARIN 3 MG/1
TABLET ORAL
Qty: 90 TABLET | Refills: 0 | Status: ON HOLD | OUTPATIENT
Start: 2021-11-22 | End: 2021-12-24 | Stop reason: SDUPTHER

## 2021-11-24 ENCOUNTER — TELEPHONE (OUTPATIENT)
Dept: FAMILY MEDICINE CLINIC | Age: 81
End: 2021-11-24

## 2021-12-02 ENCOUNTER — TELEPHONE (OUTPATIENT)
Dept: FAMILY MEDICINE CLINIC | Age: 81
End: 2021-12-02

## 2021-12-02 NOTE — TELEPHONE ENCOUNTER
----- Message from Yesenia Diallo sent at 12/2/2021 11:31 AM EST -----  Subject: Appointment Request    Reason for Call: Urgent (Patient Request) No Script    QUESTIONS  Type of Appointment? Established Patient  Reason for appointment request? Available appointments did not meet   patient need  Additional Information for Provider? Ms Remy Gramajo is calling to see if Pt can   get in sooner than 12/13. Pt has been falling and she would like to get   him checked out. Please call Ms. Remy Gramajo at [phone].  ---------------------------------------------------------------------------  --------------  Manny Zambrano INFO  What is the best way for the office to contact you? OK to leave message on   voicemail  Preferred Call Back Phone Number? [phone]  ---------------------------------------------------------------------------  --------------  SCRIPT ANSWERS  Relationship to Patient? Other  Representative Name? Ms Remy Gramajo  Additional information verified (besides Name and Date of Birth)? Address  (Is the patient requesting to see the provider for a procedure?)? No  (Is the patient requesting to see the provider urgently  today or   tomorrow. )? Yes  Have you been diagnosed with, awaiting test results for, or told that you   are suspected of having COVID-19 (Coronavirus)? (If patient has tested   negative or was tested as a requirement for work, school, or travel and   not based on symptoms, answer no)? No  Within the past two weeks have you developed any of the following symptoms   (answer no if symptoms have been present longer than 2 weeks or began   more than 2 weeks ago)? Fever or Chills, Cough, Shortness of breath or   difficulty breathing, Loss of taste or smell, Sore throat, Nasal   congestion, Sneezing or runny nose, Fatigue or generalized body aches   (answer no if pain is specific to a body part e.g. back pain), Diarrhea,   Headache? No  Have you had close contact with someone with COVID-19 in the last 14 days? No  (Service Expert  click yes below to proceed with [employer] As Usual   Scheduling)?  Yes

## 2021-12-03 ENCOUNTER — APPOINTMENT (OUTPATIENT)
Dept: GENERAL RADIOLOGY | Age: 81
DRG: 564 | End: 2021-12-03
Attending: EMERGENCY MEDICINE
Payer: MEDICARE

## 2021-12-03 ENCOUNTER — APPOINTMENT (OUTPATIENT)
Dept: CT IMAGING | Age: 81
DRG: 564 | End: 2021-12-03
Attending: EMERGENCY MEDICINE
Payer: MEDICARE

## 2021-12-03 ENCOUNTER — HOSPITAL ENCOUNTER (INPATIENT)
Age: 81
LOS: 6 days | Discharge: NURSING, REHAB OR LONG-TERM CARE | DRG: 564 | End: 2021-12-09
Attending: EMERGENCY MEDICINE | Admitting: INTERNAL MEDICINE
Payer: MEDICARE

## 2021-12-03 DIAGNOSIS — J18.9 COMMUNITY ACQUIRED PNEUMONIA OF LEFT LOWER LOBE OF LUNG: Primary | ICD-10-CM

## 2021-12-03 DIAGNOSIS — R53.1 GENERALIZED WEAKNESS: ICD-10-CM

## 2021-12-03 PROBLEM — J90 LOCULATED PLEURAL EFFUSION: Status: ACTIVE | Noted: 2021-12-03

## 2021-12-03 PROBLEM — N18.30 ACUTE RENAL FAILURE SUPERIMPOSED ON STAGE 3 CHRONIC KIDNEY DISEASE (HCC): Status: ACTIVE | Noted: 2021-12-03

## 2021-12-03 PROBLEM — D72.829 LEUKOCYTOSIS: Status: ACTIVE | Noted: 2021-12-03

## 2021-12-03 PROBLEM — N17.9 ACUTE RENAL FAILURE SUPERIMPOSED ON STAGE 3 CHRONIC KIDNEY DISEASE (HCC): Status: ACTIVE | Noted: 2021-12-03

## 2021-12-03 PROBLEM — M62.82 RHABDOMYOLYSIS: Status: ACTIVE | Noted: 2021-12-03

## 2021-12-03 PROBLEM — T79.6XXA TRAUMATIC RHABDOMYOLYSIS (HCC): Status: ACTIVE | Noted: 2021-12-03

## 2021-12-03 LAB
ALBUMIN SERPL-MCNC: 2.9 G/DL (ref 3.5–5)
ALBUMIN/GLOB SERPL: 0.8 {RATIO} (ref 1.1–2.2)
ALP SERPL-CCNC: 80 U/L (ref 45–117)
ALT SERPL-CCNC: 69 U/L (ref 12–78)
AMORPH CRY URNS QL MICRO: ABNORMAL
ANION GAP SERPL CALC-SCNC: 11 MMOL/L (ref 5–15)
APPEARANCE UR: CLEAR
AST SERPL-CCNC: 61 U/L (ref 15–37)
BACTERIA URNS QL MICRO: NEGATIVE /HPF
BASOPHILS # BLD: 0 K/UL (ref 0–0.1)
BASOPHILS NFR BLD: 0 % (ref 0–1)
BILIRUB SERPL-MCNC: 0.8 MG/DL (ref 0.2–1)
BILIRUB UR QL: NEGATIVE
BUN SERPL-MCNC: 52 MG/DL (ref 6–20)
BUN/CREAT SERPL: 16 (ref 12–20)
CALCIUM SERPL-MCNC: 9.4 MG/DL (ref 8.5–10.1)
CHLORIDE SERPL-SCNC: 104 MMOL/L (ref 97–108)
CK MB CFR SERPL CALC: 0.9 % (ref 0–2.5)
CK MB SERPL-MCNC: 11 NG/ML (ref 5–25)
CK SERPL-CCNC: 1170 U/L (ref 39–308)
CO2 SERPL-SCNC: 26 MMOL/L (ref 21–32)
COLOR UR: ABNORMAL
COMMENT, HOLDF: NORMAL
CREAT SERPL-MCNC: 3.3 MG/DL (ref 0.7–1.3)
DIFFERENTIAL METHOD BLD: ABNORMAL
EOSINOPHIL # BLD: 0 K/UL (ref 0–0.4)
EOSINOPHIL NFR BLD: 0 % (ref 0–7)
EPITH CASTS URNS QL MICRO: ABNORMAL /LPF
ERYTHROCYTE [DISTWIDTH] IN BLOOD BY AUTOMATED COUNT: 14.2 % (ref 11.5–14.5)
GLOBULIN SER CALC-MCNC: 3.6 G/DL (ref 2–4)
GLUCOSE SERPL-MCNC: 136 MG/DL (ref 65–100)
GLUCOSE UR STRIP.AUTO-MCNC: NEGATIVE MG/DL
HCT VFR BLD AUTO: 32.9 % (ref 36.6–50.3)
HGB BLD-MCNC: 10.4 G/DL (ref 12.1–17)
HGB UR QL STRIP: ABNORMAL
IMM GRANULOCYTES # BLD AUTO: 0.2 K/UL (ref 0–0.04)
IMM GRANULOCYTES NFR BLD AUTO: 1 % (ref 0–0.5)
INR PPP: 2.3 (ref 0.9–1.1)
KETONES UR QL STRIP.AUTO: NEGATIVE MG/DL
LACTATE SERPL-SCNC: 1.1 MMOL/L (ref 0.4–2)
LACTATE SERPL-SCNC: 1.8 MMOL/L (ref 0.4–2)
LEUKOCYTE ESTERASE UR QL STRIP.AUTO: NEGATIVE
LYMPHOCYTES # BLD: 0.7 K/UL (ref 0.8–3.5)
LYMPHOCYTES NFR BLD: 3 % (ref 12–49)
MAGNESIUM SERPL-MCNC: 2.3 MG/DL (ref 1.6–2.4)
MCH RBC QN AUTO: 31.4 PG (ref 26–34)
MCHC RBC AUTO-ENTMCNC: 31.6 G/DL (ref 30–36.5)
MCV RBC AUTO: 99.4 FL (ref 80–99)
MONOCYTES # BLD: 1.7 K/UL (ref 0–1)
MONOCYTES NFR BLD: 8 % (ref 5–13)
NEUTS SEG # BLD: 19.1 K/UL (ref 1.8–8)
NEUTS SEG NFR BLD: 88 % (ref 32–75)
NITRITE UR QL STRIP.AUTO: NEGATIVE
NRBC # BLD: 0.02 K/UL (ref 0–0.01)
NRBC BLD-RTO: 0.1 PER 100 WBC
PH UR STRIP: 5.5 [PH] (ref 5–8)
PLATELET # BLD AUTO: 230 K/UL (ref 150–400)
PMV BLD AUTO: 11.6 FL (ref 8.9–12.9)
POTASSIUM SERPL-SCNC: 4.7 MMOL/L (ref 3.5–5.1)
PROT SERPL-MCNC: 6.5 G/DL (ref 6.4–8.2)
PROT UR STRIP-MCNC: 100 MG/DL
PROTHROMBIN TIME: 22.3 SEC (ref 9–11.1)
RBC # BLD AUTO: 3.31 M/UL (ref 4.1–5.7)
RBC #/AREA URNS HPF: ABNORMAL /HPF (ref 0–5)
RBC MORPH BLD: ABNORMAL
SAMPLES BEING HELD,HOLD: NORMAL
SODIUM SERPL-SCNC: 141 MMOL/L (ref 136–145)
SP GR UR REFRACTOMETRY: 1.02 (ref 1–1.03)
TROPONIN-HIGH SENSITIVITY: 63 NG/L (ref 0–76)
UA: UC IF INDICATED,UAUC: ABNORMAL
UROBILINOGEN UR QL STRIP.AUTO: 0.2 EU/DL (ref 0.2–1)
WBC # BLD AUTO: 21.7 K/UL (ref 4.1–11.1)
WBC URNS QL MICRO: ABNORMAL /HPF (ref 0–4)

## 2021-12-03 PROCEDURE — 84484 ASSAY OF TROPONIN QUANT: CPT

## 2021-12-03 PROCEDURE — 96367 TX/PROPH/DG ADDL SEQ IV INF: CPT

## 2021-12-03 PROCEDURE — 36415 COLL VENOUS BLD VENIPUNCTURE: CPT

## 2021-12-03 PROCEDURE — 96361 HYDRATE IV INFUSION ADD-ON: CPT

## 2021-12-03 PROCEDURE — 85610 PROTHROMBIN TIME: CPT

## 2021-12-03 PROCEDURE — 96366 THER/PROPH/DIAG IV INF ADDON: CPT

## 2021-12-03 PROCEDURE — 83605 ASSAY OF LACTIC ACID: CPT

## 2021-12-03 PROCEDURE — 87040 BLOOD CULTURE FOR BACTERIA: CPT

## 2021-12-03 PROCEDURE — 83735 ASSAY OF MAGNESIUM: CPT

## 2021-12-03 PROCEDURE — 74011250636 HC RX REV CODE- 250/636: Performed by: EMERGENCY MEDICINE

## 2021-12-03 PROCEDURE — 70450 CT HEAD/BRAIN W/O DYE: CPT

## 2021-12-03 PROCEDURE — 74011000258 HC RX REV CODE- 258: Performed by: EMERGENCY MEDICINE

## 2021-12-03 PROCEDURE — 74011250637 HC RX REV CODE- 250/637: Performed by: INTERNAL MEDICINE

## 2021-12-03 PROCEDURE — 82553 CREATINE MB FRACTION: CPT

## 2021-12-03 PROCEDURE — 85025 COMPLETE CBC W/AUTO DIFF WBC: CPT

## 2021-12-03 PROCEDURE — 81001 URINALYSIS AUTO W/SCOPE: CPT

## 2021-12-03 PROCEDURE — 99285 EMERGENCY DEPT VISIT HI MDM: CPT

## 2021-12-03 PROCEDURE — 96365 THER/PROPH/DIAG IV INF INIT: CPT

## 2021-12-03 PROCEDURE — 65270000029 HC RM PRIVATE

## 2021-12-03 PROCEDURE — 93005 ELECTROCARDIOGRAM TRACING: CPT

## 2021-12-03 PROCEDURE — 72125 CT NECK SPINE W/O DYE: CPT

## 2021-12-03 PROCEDURE — 80053 COMPREHEN METABOLIC PANEL: CPT

## 2021-12-03 PROCEDURE — 74011250637 HC RX REV CODE- 250/637: Performed by: EMERGENCY MEDICINE

## 2021-12-03 PROCEDURE — 82550 ASSAY OF CK (CPK): CPT

## 2021-12-03 PROCEDURE — 77010033678 HC OXYGEN DAILY

## 2021-12-03 PROCEDURE — 71045 X-RAY EXAM CHEST 1 VIEW: CPT

## 2021-12-03 RX ORDER — AMIODARONE HYDROCHLORIDE 200 MG/1
200 TABLET ORAL DAILY
Status: DISCONTINUED | OUTPATIENT
Start: 2021-12-04 | End: 2021-12-09 | Stop reason: HOSPADM

## 2021-12-03 RX ORDER — LEVOTHYROXINE SODIUM 100 UG/1
100 TABLET ORAL
Status: DISCONTINUED | OUTPATIENT
Start: 2021-12-04 | End: 2021-12-09 | Stop reason: HOSPADM

## 2021-12-03 RX ORDER — SODIUM CHLORIDE 0.9 % (FLUSH) 0.9 %
5-10 SYRINGE (ML) INJECTION AS NEEDED
Status: DISCONTINUED | OUTPATIENT
Start: 2021-12-03 | End: 2021-12-09 | Stop reason: HOSPADM

## 2021-12-03 RX ORDER — WARFARIN 2 MG/1
2 TABLET ORAL EVERY EVENING
Status: DISCONTINUED | OUTPATIENT
Start: 2021-12-03 | End: 2021-12-05

## 2021-12-03 RX ORDER — POLYETHYLENE GLYCOL 3350 17 G/17G
17 POWDER, FOR SOLUTION ORAL DAILY PRN
Status: DISCONTINUED | OUTPATIENT
Start: 2021-12-03 | End: 2021-12-09 | Stop reason: HOSPADM

## 2021-12-03 RX ORDER — SODIUM CHLORIDE 0.9 % (FLUSH) 0.9 %
5-40 SYRINGE (ML) INJECTION EVERY 8 HOURS
Status: DISCONTINUED | OUTPATIENT
Start: 2021-12-03 | End: 2021-12-09 | Stop reason: HOSPADM

## 2021-12-03 RX ORDER — DILTIAZEM HYDROCHLORIDE 60 MG/1
60 TABLET, FILM COATED ORAL 2 TIMES DAILY
Status: DISCONTINUED | OUTPATIENT
Start: 2021-12-03 | End: 2021-12-08

## 2021-12-03 RX ORDER — SODIUM CHLORIDE 0.9 % (FLUSH) 0.9 %
5-40 SYRINGE (ML) INJECTION AS NEEDED
Status: DISCONTINUED | OUTPATIENT
Start: 2021-12-03 | End: 2021-12-09 | Stop reason: HOSPADM

## 2021-12-03 RX ORDER — POLYETHYLENE GLYCOL 3350 17 G/17G
17 POWDER, FOR SOLUTION ORAL DAILY
Status: DISCONTINUED | OUTPATIENT
Start: 2021-12-04 | End: 2021-12-03 | Stop reason: SDUPTHER

## 2021-12-03 RX ORDER — LOSARTAN POTASSIUM 25 MG/1
50 TABLET ORAL
Status: COMPLETED | OUTPATIENT
Start: 2021-12-03 | End: 2021-12-03

## 2021-12-03 RX ORDER — LOSARTAN POTASSIUM 50 MG/1
50 TABLET ORAL DAILY
Status: DISCONTINUED | OUTPATIENT
Start: 2021-12-04 | End: 2021-12-08

## 2021-12-03 RX ORDER — FAMOTIDINE 20 MG/1
20 TABLET, FILM COATED ORAL EVERY EVENING
Status: DISCONTINUED | OUTPATIENT
Start: 2021-12-03 | End: 2021-12-09 | Stop reason: HOSPADM

## 2021-12-03 RX ORDER — ACETAMINOPHEN 325 MG/1
650 TABLET ORAL
Status: DISCONTINUED | OUTPATIENT
Start: 2021-12-03 | End: 2021-12-09 | Stop reason: HOSPADM

## 2021-12-03 RX ORDER — ONDANSETRON 2 MG/ML
4 INJECTION INTRAMUSCULAR; INTRAVENOUS
Status: DISCONTINUED | OUTPATIENT
Start: 2021-12-03 | End: 2021-12-09 | Stop reason: HOSPADM

## 2021-12-03 RX ADMIN — SODIUM CHLORIDE 1000 ML: 9 INJECTION, SOLUTION INTRAVENOUS at 13:05

## 2021-12-03 RX ADMIN — LOSARTAN POTASSIUM 50 MG: 25 TABLET, FILM COATED ORAL at 18:11

## 2021-12-03 RX ADMIN — SODIUM CHLORIDE 1000 ML: 9 INJECTION, SOLUTION INTRAVENOUS at 15:33

## 2021-12-03 RX ADMIN — FAMOTIDINE 20 MG: 20 TABLET, FILM COATED ORAL at 20:35

## 2021-12-03 RX ADMIN — WARFARIN SODIUM 2 MG: 2 TABLET ORAL at 21:56

## 2021-12-03 RX ADMIN — Medication 10 ML: at 21:58

## 2021-12-03 RX ADMIN — AZITHROMYCIN MONOHYDRATE 500 MG: 500 INJECTION, POWDER, LYOPHILIZED, FOR SOLUTION INTRAVENOUS at 16:11

## 2021-12-03 RX ADMIN — CEFTRIAXONE SODIUM 2 G: 2 INJECTION, POWDER, FOR SOLUTION INTRAMUSCULAR; INTRAVENOUS at 15:33

## 2021-12-03 RX ADMIN — DILTIAZEM HYDROCHLORIDE 60 MG: 60 TABLET, FILM COATED ORAL at 21:56

## 2021-12-03 NOTE — ED NOTES
Pt pain assessed. Offered use of restroom and assistance as necessary. Pt accepted. Pt offered repositioning in bed for comfort. Assessed pt's ability to access possessions, everything within reach of pt. Pt reminded to use call bell as necessary, report any changes in status. Pt thanked for allowing care. Bed locked in lowest position, side rails up as necessary.

## 2021-12-03 NOTE — Clinical Note
Status[de-identified] INPATIENT [101]   Type of Bed: Medical [8]   Inpatient Hospitalization Certified Necessary for the Following Reasons: 3.  Patient receiving treatment that can only be provided in an inpatient setting (further clarification in H&P documentation)   Admitting Diagnosis: CAP (community acquired pneumonia) [1638430]   Admitting Physician: Nahomi Collier [[id number]]   Attending Physician: Nahomi Collier [[id number]]   Estimated Length of Stay: < 96 Hours   Discharge Plan[de-identified] Extended Care Facility (e.g. Adult Home, Nursing Home, etc.)

## 2021-12-03 NOTE — ED PROVIDER NOTES
[facility]  EMERGENCY DEPARTMENT HISTORY AND PHYSICAL EXAM         Date of Service: 12/3/2021   Patient Name: Manus Najjar   YOB: 1940  Medical Record Number: [id number]    History of Presenting Illness     Chief Complaint   Patient presents with    Fall        History Provided By:  patient and EMS    Additional History:   Manus Najjar is a [de-identified] y.o. male who presents via EMS to the ED with cc of generalized weakness for the past weak, resulting in several falls. Pt lives alone. He called EMS today when he was unable to get up. He was found on the floor, incontinent of urine. He denies any acute injury. He has a tremor of the BUE. Denies CP, SOB, N/V/D. He states he has been eating normally. There are no other complaints, changes or physical findings at this time.     Primary Care Provider: Sapna Bates NP   Specialist:    Past History     Past Medical History:   Past Medical History:   Diagnosis Date    Arrhythmia     atrial fibrillation, cardioversion 2010    CKD (chronic kidney disease) stage 3, GFR 30-59 ml/min ([facility])     Hyperlipemia     Hypertension     Iron deficiency anemia due to chronic blood loss 2/9/2021    PAF (paroxysmal atrial fibrillation) ([facility] [address] 2/9/2021    PVD (peripheral vascular disease) ([facility] [address]     Thyroid disease         Past Surgical History:   Past Surgical History:   Procedure Laterality Date    HX COLONOSCOPY  2010    WNL SIS 10 y    HX HERNIA REPAIR Bilateral     Inguinal    HX ORTHOPAEDIC      right shoulder fracture repair    HX OTHER SURGICAL  03/09/2019    groin lymph gland excision    HX VASCULAR ACCESS      IR INSERT TUNL CVC W PORT OVER 5 YEARS  3/5/2020    MS CHEST SURGERY PROCEDURE UNLISTED Left     lung surgery age 17, lobectomy    VASCULAR SURGERY PROCEDURE UNLIST      bypass numerous surgeries both legs        Family History:   Family History   Problem Relation Age of Onset    Cancer Mother         lung    Cancer Brother 66        lung    Cancer Maternal Uncle         cancer unknown        Social History:   Social History     Tobacco Use    Smoking status: Former Smoker     Packs/day: 1.00     Years: 54.00     Pack years: 54.00     Quit date: 3/15/2007     Years since quittin.7    Smokeless tobacco: Never Used   Vaping Use    Vaping Use: Never used   Substance Use Topics    Alcohol use: Yes     Comment: former drinker- beer   social    Drug use: Never        Allergies: Allergies   Allergen Reactions    Ketamine Other (comments)     confusion        Review of Systems   Review of Systems   Constitutional: Negative for appetite change, chills and fever. HENT: Negative for congestion. Eyes: Negative for visual disturbance. Respiratory: Negative for cough, shortness of breath and wheezing. Cardiovascular: Negative for chest pain, palpitations and leg swelling. Gastrointestinal: Negative for abdominal pain, nausea and vomiting. Genitourinary: Negative for dysuria, frequency and urgency. Musculoskeletal: Negative for back pain, joint swelling, myalgias and neck stiffness. Skin: Negative for rash. Neurological: Positive for tremors and weakness. Negative for dizziness, syncope and headaches. Hematological: Negative for adenopathy. Psychiatric/Behavioral: Negative for behavioral problems and dysphoric mood. Physical Exam  Physical Exam  Vitals and nursing note reviewed. Constitutional:       General: He is not in acute distress. Appearance: He is well-developed. He is not diaphoretic. HENT:      Head: Normocephalic. Comments: Bruise mid forehead     Mouth/Throat:      Mouth: Mucous membranes are dry. Eyes:      Pupils: Pupils are equal, round, and reactive to light. Cardiovascular:      Rate and Rhythm: Normal rate and regular rhythm. Heart sounds: Normal heart sounds. No murmur heard. No friction rub.    Pulmonary:      Effort: Pulmonary effort is normal. No respiratory distress. Breath sounds: Normal breath sounds. No wheezing or rales. Chest:      Chest wall: No tenderness. Abdominal:      General: Bowel sounds are normal. There is no distension. Palpations: Abdomen is soft. Tenderness: There is no abdominal tenderness. There is no guarding or rebound. Musculoskeletal:         General: No tenderness. Normal range of motion. Cervical back: Normal range of motion and neck supple. Skin:     General: Skin is warm and dry. Coloration: Skin is not pale. Findings: Bruising present. Comments: Multiple bruises of varying ages on all 4 extremities   Neurological:      General: No focal deficit present. Mental Status: He is alert. Motor: No abnormal muscle tone. Coordination: Coordination normal.      Comments: Oriented X 2 (knows month, not year). Low frequency tremor of bilateral upper extremities   Psychiatric:         Behavior: Behavior normal.         Medical Decision Making   I am the first provider for this patient. I reviewed the vital signs, available nursing notes, past medical history, past surgical history, family history and social history. Old Medical Records: EMS report     Provider Notes:   DDX: Metabolic abnormality, ICH, CVA, ACS, SIRS/sepsis     ED Course:  12:56 PM   Initial assessment performed. The patients presenting problems have been discussed, and they are in agreement with the care plan formulated and outlined with them. I have encouraged them to ask questions as they arise throughout their visit. Progress Notes:  5:35 PM  Workup shows leukocytosis with normal lactate, and CXR showing possible left infiltrate. Will treat as PNA. He is not septic. Will admit. Nevaeh Garcia MD       5:37 PM  Nevaeh Garcia MD spoke with Dr. Julia Chew, Consult for Hospitalist. Discussed available diagnostic tests and clinical findings. He is in agreement with care plans as outlined.  He/she will admit.      Procedures:   Procedures    Diagnostic Study Results   Labs -      Recent Results (from the past 12 hour(s))   CBC WITH AUTOMATED DIFF    Collection Time: 12/03/21  1:02 PM   Result Value Ref Range    WBC 21.7 (H) 4.1 - 11.1 K/uL    RBC 3.31 (L) 4.10 - 5.70 M/uL    HGB 10.4 (L) 12.1 - 17.0 g/dL    HCT 32.9 (L) 36.6 - 50.3 %    MCV 99.4 (H) 80.0 - 99.0 FL    MCH 31.4 26.0 - 34.0 PG    MCHC 31.6 30.0 - 36.5 g/dL    RDW 14.2 11.5 - 14.5 %    PLATELET 928 023 - 181 K/uL    MPV 11.6 8.9 - 12.9 FL    NRBC 0.1 (H) 0  WBC    ABSOLUTE NRBC 0.02 (H) 0.00 - 0.01 K/uL    NEUTROPHILS 88 (H) 32 - 75 %    LYMPHOCYTES 3 (L) 12 - 49 %    MONOCYTES 8 5 - 13 %    EOSINOPHILS 0 0 - 7 %    BASOPHILS 0 0 - 1 %    IMMATURE GRANULOCYTES 1 (H) 0.0 - 0.5 %    ABS. NEUTROPHILS 19.1 (H) 1.8 - 8.0 K/UL    ABS. LYMPHOCYTES 0.7 (L) 0.8 - 3.5 K/UL    ABS. MONOCYTES 1.7 (H) 0.0 - 1.0 K/UL    ABS. EOSINOPHILS 0.0 0.0 - 0.4 K/UL    ABS. BASOPHILS 0.0 0.0 - 0.1 K/UL    ABS. IMM. GRANS. 0.2 (H) 0.00 - 0.04 K/UL    DF AUTOMATED      RBC COMMENTS ANISOCYTOSIS  1+        RBC COMMENTS POIKILOCYTOSIS  1+        RBC COMMENTS HYPOCHROMIA  1+        RBC COMMENTS ADEQUATE PLATELETS     MAGNESIUM    Collection Time: 12/03/21  1:02 PM   Result Value Ref Range    Magnesium 2.3 1.6 - 2.4 mg/dL   METABOLIC PANEL, COMPREHENSIVE    Collection Time: 12/03/21  1:02 PM   Result Value Ref Range    Sodium 141 136 - 145 mmol/L    Potassium 4.7 3.5 - 5.1 mmol/L    Chloride 104 97 - 108 mmol/L    CO2 26 21 - 32 mmol/L    Anion gap 11 5 - 15 mmol/L    Glucose 136 (H) 65 - 100 mg/dL    BUN 52 (H) 6 - 20 MG/DL    Creatinine 3.30 (H) 0.70 - 1.30 MG/DL    BUN/Creatinine ratio 16 12 - 20      GFR est AA 22 (L) >60 ml/min/1.73m2    GFR est non-AA 18 (L) >60 ml/min/1.73m2    Calcium 9.4 8.5 - 10.1 MG/DL    Bilirubin, total 0.8 0.2 - 1.0 MG/DL    ALT (SGPT) 69 12 - 78 U/L    AST (SGOT) 61 (H) 15 - 37 U/L    Alk.  phosphatase 80 45 - 117 U/L    Protein, total 6.5 6.4 - 8.2 g/dL    Albumin 2.9 (L) 3.5 - 5.0 g/dL    Globulin 3.6 2.0 - 4.0 g/dL    A-G Ratio 0.8 (L) 1.1 - 2.2     TROPONIN-HIGH SENSITIVITY    Collection Time: 12/03/21  1:02 PM   Result Value Ref Range    Troponin-High Sensitivity 63 0 - 76 ng/L   LACTIC ACID    Collection Time: 12/03/21  1:02 PM   Result Value Ref Range    Lactic acid 1.8 0.4 - 2.0 MMOL/L   CK W/ REFLX CKMB    Collection Time: 12/03/21  1:02 PM   Result Value Ref Range    CK 1,170 (H) 39 - 308 U/L   SAMPLES BEING HELD    Collection Time: 12/03/21  1:02 PM   Result Value Ref Range    SAMPLES BEING HELD 1SST, 1RED, 1BLUE     COMMENT        Add-on orders for these samples will be processed based on acceptable specimen integrity and analyte stability, which may vary by analyte. CK-MB,QUANT.     Collection Time: 12/03/21  1:02 PM   Result Value Ref Range    CK - MB 11.0 (H) <3.6 NG/ML    CK-MB Index 0.9 0.0 - 2.5     EKG, 12 LEAD, INITIAL    Collection Time: 12/03/21  1:27 PM   Result Value Ref Range    Ventricular Rate 86 BPM    Atrial Rate 300 BPM    P-R Interval 238 ms    QRS Duration 142 ms    Q-T Interval 420 ms    QTC Calculation (Bezet) 502 ms    Calculated P Axis 171 degrees    Calculated R Axis 93 degrees    Calculated T Axis -17 degrees    Diagnosis       ** Suspect arm lead reversal, interpretation assumes no reversal  Undetermined rhythm  Right bundle branch block  T wave abnormality, consider inferior ischemia  Abnormal ECG  When compared with ECG of 15-FEB-2021 08:59,  Current undetermined rhythm precludes rhythm comparison, needs review  Right bundle branch block has replaced Incomplete right bundle branch block     LACTIC ACID    Collection Time: 12/03/21  2:41 PM   Result Value Ref Range    Lactic acid 1.1 0.4 - 2.0 MMOL/L   PROTHROMBIN TIME + INR    Collection Time: 12/03/21  2:41 PM   Result Value Ref Range    INR 2.3 (H) 0.9 - 1.1      Prothrombin time 22.3 (H) 9.0 - 11.1 sec   URINALYSIS W/ REFLEX CULTURE    Collection Time: 12/03/21 4:00 PM    Specimen: Urine   Result Value Ref Range    Color YELLOW/STRAW      Appearance CLEAR CLEAR      Specific gravity 1.020 1.003 - 1.030      pH (UA) 5.5 5.0 - 8.0      Protein 100 (A) NEG mg/dL    Glucose Negative NEG mg/dL    Ketone Negative NEG mg/dL    Bilirubin Negative NEG      Blood LARGE (A) NEG      Urobilinogen 0.2 0.2 - 1.0 EU/dL    Nitrites Negative NEG      Leukocyte Esterase Negative NEG      WBC 0-4 0 - 4 /hpf    RBC 0-5 0 - 5 /hpf    Epithelial cells FEW FEW /lpf    Bacteria Negative NEG /hpf    UA:UC IF INDICATED CULTURE NOT INDICATED BY UA RESULT CNI      Amorphous Crystals 1+ (A) NEG       Radiologic Studies -  The following have been ordered and reviewed:  XR CHEST PORT   Final Result   Loculated left pleural fluid adjacent left chest wall indentation. An underlying   occult rib fracture is possible. CT HEAD WO CONT   Final Result   No acute intracranial abnormality. CT SPINE CERV WO CONT   Final Result   1. No fracture. 2. Degenerative stenoses as described above. 3. Emphysema. CT Results  (Last 48 hours)               12/03/21 1317  CT HEAD WO CONT Final result    Impression:  No acute intracranial abnormality. Narrative:  HEAD CT WITHOUT CONTRAST: 12/3/2021 1:17 PM       INDICATION: fall, head injury       COMPARISON: 2/8/2021. PROCEDURE: Axial images of the head were obtained without contrast. Coronal and   sagittal reformats were performed. CT dose reduction was achieved through use of   a standardized protocol tailored for this examination and automatic exposure   control for dose modulation. FINDINGS: Periventricular white matter hypodensity is nonspecific, but   consistent with chronic small vessel ischemic disease. The ventricles and sulci   are appropriate in size and configuration for age. No loss of gray-white   differentiation to suggest late acute or early subacute infarction. No mass   effect or intracranial hemorrhage. 12/03/21 1317  CT SPINE CERV WO CONT Final result    Impression:  1. No fracture. 2. Degenerative stenoses as described above. 3. Emphysema. Narrative:  EXAM:  CT CERVICAL SPINE WITHOUT CONTRAST       INDICATION:   fall        COMPARISON: None. TECHNIQUE: CT of the cervical spine was performed without intravenous contrast   administration. Sagittal and coronal reconstructions were generated. CT dose   reduction was achieved through use of a standardized protocol tailored for this   examination and automatic exposure control for dose modulation. FINDINGS:   No fracture or dislocation. Cervical lordosis and thoracic kyphosis are   exaggerated. Cerumen is impacted in the right external auditory canal. The   pulmonary apices are emphysematous. Degenerative disease causes the following stenoses:   C2-C3:  No stenosis. C3-C4:  Bilateral neural foraminal stenosis. C4-C5:  Left greater than right neural foraminal stenosis. C5-C6:  Right greater than left neural foraminal stenosis. C6-C7:  Mild right neural foraminal stenosis. C7-T1:  Mild canal stenosis. CXR Results  (Last 48 hours)               12/03/21 1318  XR CHEST PORT Final result    Impression:  Loculated left pleural fluid adjacent left chest wall indentation. An underlying   occult rib fracture is possible. Narrative:  PORTABLE CHEST RADIOGRAPH/S: 12/3/2021 1:18 PM       INDICATION: Chest pain. COMPARISON: 2/16/2021, 2/15/2021, 2/8/2021, 6/10/2020. TECHNIQUE: Portable frontal radiograph/s of the chest.       FINDINGS:    The left chest wall is chronically indented at the level of the fifth and sixth   lateral ribs. Rounded pleural density adjacent this is new, however. An   underlying occult rib fracture is possible; a displaced fracture is not   visualized. Aside from atelectasis adjacent the loculated left pleural fluid, the lungs are   clear. The central airways are patent.  No pneumothorax. A right IJ ported   catheter terminates in the SVC. Post right humeral ORIF. Vital Signs-Reviewed the patient's vital signs. Patient Vitals for the past 12 hrs:   Temp Pulse Resp BP SpO2   12/03/21 1541  81 19 (!) 176/75 92 %   12/03/21 1254  80 20 (!) 179/88 (!) 89 %   12/03/21 1253  80 20 (!) 142/74 92 %   12/03/21 1247 98.1 °F (36.7 °C) 85 19 (!) 179/98 91 %       EKG interpretation: (Preliminary)  Rhythm: normal sinus rhythm; and regular . Rate (approx.): 85; Axis: normal; TN interval: prolonged; QRS interval: prolonge; Other findings: RBBB. Medications Given in the ED:  Medications   sodium chloride (NS) flush 5-10 mL (has no administration in time range)   cefTRIAXone (ROCEPHIN) 2 g in 0.9% sodium chloride (MBP/ADV) 50 mL MBP (0 g IntraVENous IV Completed 12/3/21 1617)   azithromycin (ZITHROMAX) 500 mg in 0.9% sodium chloride 250 mL (VIAL-MATE) (500 mg IntraVENous New Bag 12/3/21 1611)   sodium chloride 0.9 % bolus infusion 1,000 mL (0 mL IntraVENous IV Completed 12/3/21 1532)   sodium chloride 0.9 % bolus infusion 1,000 mL (1,000 mL IntraVENous New Bag 12/3/21 1533)       Diagnosis:  Clinical Impression:   1. Community acquired pneumonia of left lower lobe of lung    2. Generalized weakness         Disposition:  Admit  _______________________________   Attestations: This note was performed by Mc Alvarez MD in its entirety.   _______________________________

## 2021-12-03 NOTE — ED TRIAGE NOTES
Pt has been falling a lot this past week. EMS states that his neighbors and EMS have been coming by to help assist him.

## 2021-12-04 ENCOUNTER — APPOINTMENT (OUTPATIENT)
Dept: CT IMAGING | Age: 81
DRG: 564 | End: 2021-12-04
Attending: INTERNAL MEDICINE
Payer: MEDICARE

## 2021-12-04 LAB
ANION GAP SERPL CALC-SCNC: 9 MMOL/L (ref 5–15)
ATRIAL RATE: 300 BPM
ATRIAL RATE: 67 BPM
BASOPHILS # BLD: 0 K/UL (ref 0–0.1)
BASOPHILS NFR BLD: 0 % (ref 0–1)
BUN SERPL-MCNC: 52 MG/DL (ref 6–20)
BUN/CREAT SERPL: 18 (ref 12–20)
CALCIUM SERPL-MCNC: 7.9 MG/DL (ref 8.5–10.1)
CALCULATED P AXIS, ECG09: 171 DEGREES
CALCULATED P AXIS, ECG09: 85 DEGREES
CALCULATED R AXIS, ECG10: 80 DEGREES
CALCULATED R AXIS, ECG10: 93 DEGREES
CALCULATED T AXIS, ECG11: -17 DEGREES
CALCULATED T AXIS, ECG11: 69 DEGREES
CHLORIDE SERPL-SCNC: 109 MMOL/L (ref 97–108)
CK SERPL-CCNC: 670 U/L (ref 39–308)
CO2 SERPL-SCNC: 23 MMOL/L (ref 21–32)
CREAT SERPL-MCNC: 2.9 MG/DL (ref 0.7–1.3)
DIAGNOSIS, 93000: NORMAL
DIAGNOSIS, 93000: NORMAL
DIFFERENTIAL METHOD BLD: ABNORMAL
EOSINOPHIL # BLD: 0 K/UL (ref 0–0.4)
EOSINOPHIL NFR BLD: 0 % (ref 0–7)
ERYTHROCYTE [DISTWIDTH] IN BLOOD BY AUTOMATED COUNT: 14.4 % (ref 11.5–14.5)
ERYTHROCYTE [SEDIMENTATION RATE] IN BLOOD: 70 MM/HR (ref 0–20)
GLUCOSE SERPL-MCNC: 89 MG/DL (ref 65–100)
HCT VFR BLD AUTO: 26.6 % (ref 36.6–50.3)
HGB BLD-MCNC: 8.5 G/DL (ref 12.1–17)
IMM GRANULOCYTES # BLD AUTO: 0.1 K/UL (ref 0–0.04)
IMM GRANULOCYTES NFR BLD AUTO: 1 % (ref 0–0.5)
LACTATE SERPL-SCNC: 0.6 MMOL/L (ref 0.4–2)
LYMPHOCYTES # BLD: 0.8 K/UL (ref 0.8–3.5)
LYMPHOCYTES NFR BLD: 6 % (ref 12–49)
MAGNESIUM SERPL-MCNC: 2.2 MG/DL (ref 1.6–2.4)
MCH RBC QN AUTO: 31.4 PG (ref 26–34)
MCHC RBC AUTO-ENTMCNC: 32 G/DL (ref 30–36.5)
MCV RBC AUTO: 98.2 FL (ref 80–99)
MONOCYTES # BLD: 1.3 K/UL (ref 0–1)
MONOCYTES NFR BLD: 10 % (ref 5–13)
NEUTS SEG # BLD: 11 K/UL (ref 1.8–8)
NEUTS SEG NFR BLD: 83 % (ref 32–75)
NRBC # BLD: 0 K/UL (ref 0–0.01)
NRBC BLD-RTO: 0 PER 100 WBC
P-R INTERVAL, ECG05: 194 MS
P-R INTERVAL, ECG05: 238 MS
PLATELET # BLD AUTO: 175 K/UL (ref 150–400)
PMV BLD AUTO: 11.5 FL (ref 8.9–12.9)
POTASSIUM SERPL-SCNC: 4.3 MMOL/L (ref 3.5–5.1)
Q-T INTERVAL, ECG07: 420 MS
Q-T INTERVAL, ECG07: 480 MS
QRS DURATION, ECG06: 142 MS
QRS DURATION, ECG06: 154 MS
QTC CALCULATION (BEZET), ECG08: 502 MS
QTC CALCULATION (BEZET), ECG08: 507 MS
RBC # BLD AUTO: 2.71 M/UL (ref 4.1–5.7)
RBC MORPH BLD: ABNORMAL
SODIUM SERPL-SCNC: 141 MMOL/L (ref 136–145)
TROPONIN-HIGH SENSITIVITY: 59 NG/L (ref 0–76)
VENTRICULAR RATE, ECG03: 67 BPM
VENTRICULAR RATE, ECG03: 86 BPM
WBC # BLD AUTO: 13.2 K/UL (ref 4.1–11.1)

## 2021-12-04 PROCEDURE — 77010033678 HC OXYGEN DAILY

## 2021-12-04 PROCEDURE — 74011250636 HC RX REV CODE- 250/636: Performed by: INTERNAL MEDICINE

## 2021-12-04 PROCEDURE — 71250 CT THORAX DX C-: CPT

## 2021-12-04 PROCEDURE — 83605 ASSAY OF LACTIC ACID: CPT

## 2021-12-04 PROCEDURE — 94760 N-INVAS EAR/PLS OXIMETRY 1: CPT

## 2021-12-04 PROCEDURE — 74011250637 HC RX REV CODE- 250/637: Performed by: INTERNAL MEDICINE

## 2021-12-04 PROCEDURE — 86140 C-REACTIVE PROTEIN: CPT

## 2021-12-04 PROCEDURE — 80048 BASIC METABOLIC PNL TOTAL CA: CPT

## 2021-12-04 PROCEDURE — 83735 ASSAY OF MAGNESIUM: CPT

## 2021-12-04 PROCEDURE — 93005 ELECTROCARDIOGRAM TRACING: CPT

## 2021-12-04 PROCEDURE — 84484 ASSAY OF TROPONIN QUANT: CPT

## 2021-12-04 PROCEDURE — 36415 COLL VENOUS BLD VENIPUNCTURE: CPT

## 2021-12-04 PROCEDURE — 85025 COMPLETE CBC W/AUTO DIFF WBC: CPT

## 2021-12-04 PROCEDURE — 85652 RBC SED RATE AUTOMATED: CPT

## 2021-12-04 PROCEDURE — 82550 ASSAY OF CK (CPK): CPT

## 2021-12-04 PROCEDURE — 74011000258 HC RX REV CODE- 258: Performed by: INTERNAL MEDICINE

## 2021-12-04 PROCEDURE — 65270000029 HC RM PRIVATE

## 2021-12-04 RX ORDER — POLYETHYLENE GLYCOL 3350 17 G/17G
17 POWDER, FOR SOLUTION ORAL DAILY
Status: DISCONTINUED | OUTPATIENT
Start: 2021-12-05 | End: 2021-12-09 | Stop reason: HOSPADM

## 2021-12-04 RX ORDER — FACIAL-BODY WIPES
10 EACH TOPICAL DAILY PRN
Status: DISCONTINUED | OUTPATIENT
Start: 2021-12-04 | End: 2021-12-09 | Stop reason: HOSPADM

## 2021-12-04 RX ORDER — SODIUM CHLORIDE 9 MG/ML
100 INJECTION, SOLUTION INTRAVENOUS CONTINUOUS
Status: DISPENSED | OUTPATIENT
Start: 2021-12-04 | End: 2021-12-06

## 2021-12-04 RX ADMIN — CEFTRIAXONE SODIUM 2 G: 2 INJECTION, POWDER, FOR SOLUTION INTRAMUSCULAR; INTRAVENOUS at 14:07

## 2021-12-04 RX ADMIN — WARFARIN SODIUM 2 MG: 2 TABLET ORAL at 17:25

## 2021-12-04 RX ADMIN — FAMOTIDINE 20 MG: 20 TABLET, FILM COATED ORAL at 17:25

## 2021-12-04 RX ADMIN — AZITHROMYCIN MONOHYDRATE 500 MG: 500 INJECTION, POWDER, LYOPHILIZED, FOR SOLUTION INTRAVENOUS at 15:42

## 2021-12-04 RX ADMIN — LOSARTAN POTASSIUM 50 MG: 50 TABLET, FILM COATED ORAL at 08:58

## 2021-12-04 RX ADMIN — DILTIAZEM HYDROCHLORIDE 60 MG: 60 TABLET, FILM COATED ORAL at 08:58

## 2021-12-04 RX ADMIN — AMIODARONE HYDROCHLORIDE 200 MG: 200 TABLET ORAL at 08:58

## 2021-12-04 RX ADMIN — LEVOTHYROXINE SODIUM 100 MCG: 0.1 TABLET ORAL at 06:47

## 2021-12-04 RX ADMIN — DILTIAZEM HYDROCHLORIDE 60 MG: 60 TABLET, FILM COATED ORAL at 17:25

## 2021-12-04 RX ADMIN — SODIUM CHLORIDE 50 ML/HR: 9 INJECTION, SOLUTION INTRAVENOUS at 09:44

## 2021-12-04 RX ADMIN — Medication 10 ML: at 09:46

## 2021-12-04 NOTE — PROGRESS NOTES
TRANSFER - IN REPORT:    Verbal report received from Mary(name) on [employer]  being received from ED(unit) for routine progression of care      Report consisted of patients Situation, Background, Assessment and   Recommendations(SBAR). Information from the following report(s) SBAR, Kardex, Intake/Output, MAR, Recent Results and Med Rec Status was reviewed with the receiving nurse. Opportunity for questions and clarification was provided. Assessment completed upon patients arrival to unit and care assumed.

## 2021-12-04 NOTE — PROGRESS NOTES
Orders received and chart reviewed. Patient adamantly refuses therapy today. Will f/u on Monday for eval if patient still here.  Oscar Quintero, PT

## 2021-12-04 NOTE — PROGRESS NOTES
[facility]  Hospitalist Progress Note    NAME: Sahara Jacobs   :  1940   MRN:  [id number]     Total duration of encounter: 1 day      Interim Hospital Summary: [de-identified] y.o. male who presented on 12/3/2021 with Traumatic rhabdomyolysis ([facility] [address] He has a past medical history of Arrhythmia, CKD (chronic kidney disease) stage 3, GFR 30-59 ml/min ([facility]), Hyperlipemia, Hypertension, Iron deficiency anemia due to chronic blood loss (2021), PAF (paroxysmal atrial fibrillation) ([facility] [address] (2021), PVD (peripheral vascular disease) ([facility] and Thyroid disease. .     Pt brought into ED by EMS after being found on the floor likely for several days. Recent h/o recurrent falls a/w weakness. Was active / driving independently one week ago. Some subjective fever, but not documented. Subjective:     Chief Complaint / Reason for Physician Visit  \"better\".   Discussed with RN   Eating well but constipated  Less pain c/o  No SOB    Review of Systems:  Symptom Y/N Comments  Symptom Y/N Comments   Fever/Chills n   Chest Pain n    Poor Appetite n   Edema n    Cough n   Abdominal Pain n    Sputum n   Joint Pain n    SOB/JONES y   Pruritis/Rash n    Nausea/vomit n   Tolerating PT/OT     Diarrhea n   Tolerating Diet y    Constipation y   Other         Current Facility-Administered Medications:     0.9% sodium chloride infusion, 50 mL/hr, IntraVENous, CONTINUOUS, Julianna Ramirez MD, Last Rate: 50 mL/hr at 21 0944, 50 mL/hr at 21 0944    sodium chloride (NS) flush 5-10 mL, 5-10 mL, IntraVENous, PRN, Julianna Ramirez MD    acetaminophen (TYLENOL) tablet 650 mg, 650 mg, Oral, Q6H PRN, Julianna Ramirez MD    ondansetron [facility]) injection 4 mg, 4 mg, IntraVENous, Q4H PRN, Julianna Ramirez MD    azithromycin (ZITHROMAX) 500 mg in 0.9% sodium chloride 250 mL (VIAL-MATE), 500 mg, IntraVENous, Q24H, Julianna Ramirez MD, Last Rate: 250 mL/hr at 21 1542, 500 mg at 21 1542    cefTRIAXone (ROCEPHIN) 2 g in 0.9% sodium chloride (MBP/ADV) 50 mL MBP, 2 g, IntraVENous, Q24H, Juan Sotelo MD, Last Rate: 100 mL/hr at 12/04/21 1407, 2 g at 12/04/21 1407    amiodarone (CORDARONE) tablet 200 mg, 200 mg, Oral, DAILY, Juan Sotelo MD, 200 mg at 12/04/21 0858    dilTIAZem IR (CARDIZEM) tablet 60 mg, 60 mg, Oral, BID, Juan Sotelo MD, 60 mg at 12/04/21 1725    levothyroxine (SYNTHROID) tablet 100 mcg, 100 mcg, Oral, ACB, Juan Sotelo MD, 100 mcg at 12/04/21 5750    losartan (COZAAR) tablet 50 mg, 50 mg, Oral, DAILY, Juan Sotelo MD, 50 mg at 12/04/21 7899    warfarin (COUMADIN) tablet 2 mg, 2 mg, Oral, QPM, Juan Sotelo MD, 2 mg at 12/04/21 1725    sodium chloride (NS) flush 5-40 mL, 5-40 mL, IntraVENous, Q8H, Juan Sotelo MD, 10 mL at 12/04/21 0946    sodium chloride (NS) flush 5-40 mL, 5-40 mL, IntraVENous, PRN, Juan Sotelo MD    polyethylene glycol (MIRALAX) packet 17 g, 17 g, Oral, DAILY PRN, Juan Sotelo MD    famotidine (PEPCID) tablet 20 mg, 20 mg, Oral, QPM, Juan Sotelo MD, 20 mg at 12/04/21 1725    Objective:     VITALS:   Patient Vitals for the past 12 hrs:   Temp Pulse Resp BP SpO2   12/04/21 1538 98.9 °F (37.2 °C) 66 18 127/61 94 %   12/04/21 1119 98.4 °F (36.9 °C) 72 18 (!) 130/58 94 %   12/04/21 0739 98.6 °F (37 °C)  18 (!) 139/53 96 %       Intake/Output Summary (Last 24 hours) at 12/4/2021 1741  Last data filed at 12/4/2021 1246  Gross per 24 hour   Intake 480 ml   Output    Net 480 ml        PHYSICAL EXAM:  General: WD, WN. Alert, cooperative, no acute distress    EENT:  EOMI. Anicteric sclerae. MMM  Resp:  CTA bilaterally, no wheezing or rales. No accessory muscle use  CV:  Regular  rhythm,  M apex  GI:  Soft, Non distended, Non tender. +Bowel sounds  Neurologic:  Alert and oriented X 3, mumbled speech, nonfocal  Psych:   Not anxious nor agitated  Skin:  No rashes. No jaundice    LABS:  I reviewed today's most current labs and imaging studies.   Pertinent labs include:  Recent Labs     12/04/21  0550 12/03/21  1302   WBC 13.2* 21.7*   HGB 8.5* 10.4*   HCT 26.6* 32.9*    230     Recent Labs     12/04/21  0550 12/03/21  1441 12/03/21  1302     --  141   K 4.3  --  4.7   *  --  104   CO2 23  --  26   GLU 89  --  136*   BUN 52*  --  52*   CREA 2.90*  --  3.30*   CA 7.9*  --  9.4   MG 2.2  --  2.3   ALB  --   --  2.9*   TBILI  --   --  0.8   ALT  --   --  69   INR  --  2.3*  --      Results for Radha Muñoz (MRN [id number]) as of 12/4/2021 17:36   12/3/2021 13:02 12/3/2021 14:41 12/4/2021 05:50   Lactic acid 1.8 1.1 0.6   CK   670 (H)   CK 1,170 (H)     CK - MB 11.0 (H)     CK-MB Index 0.9     Troponin-HS 63  59     RADIOLOGY:  pCXR 12/3:  The left chest wall is chronically indented at the level of the fifth and sixth  lateral ribs. Rounded pleural density adjacent this is new, however. An  underlying occult rib fracture is possible; a displaced fracture is not visualized.   Aside from atelectasis adjacent the loculated left pleural fluid, the lungs are  clear. The central airways are patent. No pneumothorax. A right IJ ported  catheter terminates in the SVC. Post right humeral ORIF.    IMPRESSION:  Loculated left pleural fluid adjacent left chest wall indentation. An underlying  occult rib fracture is possible. CT HEAD 12/3:  Periventricular white matter hypodensity is nonspecific, but  consistent with chronic small vessel ischemic disease. The ventricles and sulci  are appropriate in size and configuration for age. No loss of gray-white  differentiation to suggest late acute or early subacute infarction. No mass  effect or intracranial hemorrhage.   IMPRESSION:  No acute intracranial abnormality. CT C-SPINE 12/3:  1. No fracture. 2. Degenerative stenoses as described above. 3. Emphysema. CT CHEST 12/4:  CHEST: Chest wall/thoracic inlet: Right IJ port with its tip in the superior vena cava. .  Thyroid: Within normal limits.   Mediastinum/matilde: There are no pathologically enlarged mediastinal, hilar or axillary nodes. Heart/vessels: Heart is not enlarged. Aorta is normal in caliber. There are  coronary artery and atherosclerotic vascular calcifications. .  Lungs/Pleura: Partially loculated left pleural effusion with linear changes in  the left base and partial collapse of the left base. Overall lungs are  hyperinflated with emphysematous changes. .  MSK: Bones are osteopenic. There are multiple chronic left-sided rib fractures. Subacute fracture of the left 10th rib laterally  IMPRESSION  1. Partially loculated left pleural effusion with linear changes at the left base  2. Multiple chronic left-sided rib fractures. Subacute left 10th rib fracture. .    EKG:  NSR 86 bpm, RBBB, NS inf TW inversion - new,  Progression anterior TW inversion  Procedures: see electronic medical records for all procedures/Xrays and details which were not copied into this note but were reviewed prior to creation of Plan. Assessment / Plan:  Principal Problem:    Traumatic rhabdomyolysis ([address] (12/3/2021)  Suspected fall several days ago   Presenting with multiple contusions abrasions.   Concern for loculated effusion/hematoma and rib fractures on the left chest.  Confirmed by CT today  Admitted for hydration and monitoring of acute on chronic renal insufficiency  Reconsider advisability of anticoagulation with frequent falls     Active Problems:    Loculated pleural effusion (12/3/2021)  Was diagnosed with pneumonia in the ED  This seems more consistent with a rib fracture and associated hematoma on anticoagulation  Further assessment with chest CT confirms this suspicion        Leukocytosis (12/3/2021)  No clear history for infection/fever  Concern for possible left chest pneumonia - not apparent on CT CHEST today   Have initiated Rocephin/Azithromycin for 5-day course  Cultures pending  Possible contaminant GCP on BC - one bottle        Fall (2/8/2021)    Generalized weakness (2/8/2021)  Functional decline over the past week  Etiology not fully clarified  Concern with underlying history of lymphoma untreated in the past  Sed rate/CRP - pending  CT CHEST w/o lymphadenopathy       Anticoagulant long-term use (2/28/2017)    PAF (paroxysmal atrial fibrillation) ([facility] [address] (2/9/2021)  Appears to be in sinus rhythm on presentation  Uncertain advisability of ongoing anticoagulation with history of falls  Heart rate controlled, maintain current treatments for now  Coumadin tapered dose to 2mg with antibiotics added       Stage 3 chronic kidney disease (8/5/2019)    Acute renal failure superimposed on stage 3 chronic kidney disease ([address] (12/3/2021)  Some jump in the BUN and creatinine from prior  Easily accounted for by the fall and poor fluid intake  And admitted for hydration and treatment of acute rhabdo  Improving, continue IVF       Hyperlipemia ()  Hold statin therapy until CK elevation resolved       Hypothyroid (8/5/2019)  Maintain thyroid replacement,  will plan follow-up TSH       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 Fili Adrian  Pt had refused treatment in the past        SAFETY:   Code Status:Full  DVT prophylaxis:Coumadin  Stress Ulcer prophylaxis: Pepcid  Bladder catheter:no  Family Contact Info:  Primary Emergency ContactGil Jonatan Miller Phone: [phone]  Bedded: [facility] Room CARITO/CARITO  Disposition: TBD, likely home when stable  Admission status:  Inpatient       Reviewed most current lab test results and cultures  YES  Reviewed most current radiology test results   YES  Review and summation of old records today    NO  Reviewed patient's current orders and MAR    YES  PMH/SH reviewed - no change compared to H&P    Care Plan discussed with:                                   Comments  Patient x     Family      RN x     Care Manager       Consultant                           Multidiciplinary team rounds were held today with , nursing, pharmacist and clinical coordinator. Patient's plan of care was discussed; medications were reviewed and discharge planning was addressed.         ____________________________________________    Total NON Critical Care TIME:  35  Minutes        Comments   >50% of visit spent in counseling and coordination of care   x      Signed: Raheem Honeycutt MD  [facility] Hospitalist  [phone]

## 2021-12-04 NOTE — H&P
[facility]   Admission History & Physical        12/3/2021 8:13 PM  Patient: Lala Jenkins 1940  PCP: Liliana Alston NP    HISTORY  Chief Complaint:   Chief Complaint   Patient presents with   [facility]       HPI: [de-identified] y.o. male presenting for admission to [facility] for further evaluation and treatment for Traumatic rhabdomyolysis ([facility] [address] He  has a past medical history of Arrhythmia, CKD (chronic kidney disease) stage 3, GFR 30-59 ml/min ([facility]), Hyperlipemia, Hypertension, Iron deficiency anemia due to chronic blood loss (2/9/2021), PAF (paroxysmal atrial fibrillation) ([facility] [address] (2/9/2021), PVD (peripheral vascular disease) ([facility] [address] and Thyroid disease. Patient was found on the floor this morning by his son and was transported to the ED via EMS. The patient reports he has been extremely weak over the past 7 days. Was able to drive to a relatives home last Thursday for Thanksgiving. He fell several days ago and was not able to get up due to extreme weakness. He has had no access to nutrition or fluids. He was found to be incontinent of urine. He was noted to have increased tremor in both extremities but right greater than left. He has no prior history of tremor or Parkinson disease. He denied symptoms of chest pain, shortness of breath, nausea or vomiting. He has fallen several times during the past week and he exam was noted for contusions to the right forehead and left chest.  Laboratory assessment was noted for leukocytosis with a white count of 21,000. CK elevation was present consistent with rhabdomyolysis. The patient has chronic renal insufficiency stage III which had progressed since previous testing. He was not found to be febrile, but he states he has had some chills and symptomatic fever during the past week. His chest x-ray was remarkable for a loculated effusion in the left lateral chest wall with left chest wall indentation concerning for occult rib fracture.   CT of the head and neck was without evidence of injury. The patient was treated empirically for possible pneumonia with Rocephin and azithromycin in the ED. He is on chronic anticoagulation with Coumadin for paroxysmal atrial fib with a recent INR therapeutic. 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 Past Medical History:  Past Medical History:   Diagnosis Date    Arrhythmia     atrial fibrillation, cardioversion 2010    CKD (chronic kidney disease) stage 3, GFR 30-59 ml/min ([facility])     Hyperlipemia     Hypertension     Iron deficiency anemia due to chronic blood loss 2/9/2021    PAF (paroxysmal atrial fibrillation) ([facility] [address] 2/9/2021    PVD (peripheral vascular disease) ([facility] [address]     Thyroid disease        Past Surgical History:  Past Surgical History:   Procedure Laterality Date    HX COLONOSCOPY  2010    WNL SIS 10 y    HX HERNIA REPAIR Bilateral     Inguinal    HX ORTHOPAEDIC      right shoulder fracture repair    HX OTHER SURGICAL  03/09/2019    groin lymph gland excision    HX VASCULAR ACCESS      IR INSERT TUNL CVC W PORT OVER 5 YEARS  3/5/2020    MS CHEST SURGERY PROCEDURE UNLISTED Left     lung surgery age 17, lobectomy    VASCULAR SURGERY PROCEDURE UNLIST      bypass numerous surgeries both legs       Medication:  Prior to Admission medications    Medication Sig Start Date End Date Taking? Authorizing Provider   warfarin (COUMADIN) 3 mg tablet TAKE 1 TABLET BY MOUTH EVERY THURSDAY AND 2.5 MG ON ALL OTHER DAYS 11/22/21  Yes Wojciech Miller NP   furosemide (LASIX) 20 mg tablet Take 1 Tablet by mouth daily. 10/21/21  Yes Patricia Marie NP   levothyroxine (SYNTHROID) 100 mcg tablet Take 1 Tablet by mouth Daily (before breakfast).  10/21/21  Yes Patricia Marie NP   atorvastatin (LIPITOR) 20 mg tablet Take 1 tablet by mouth once daily 10/13/21  Yes Patricia Marie NP amiodarone (CORDARONE) 200 mg tablet Take 1 tablet by mouth once daily 10/13/21  Yes Kisha Garcia NP   ciclopirox [facility]) 8 % solution Apply  to affected area nightly. As directed   Yes Provider, Historical   mupirocin (BACTROBAN) 2 % ointment Apply  to affected area daily. 21  Yes Isidro PRYOR NP   dilTIAZem IR (CARDIZEM) 60 mg tablet Take 1 tablet by mouth twice daily 21  Yes Kisha Garcia NP   losartan (COZAAR) 50 mg tablet Take 1 tablet by mouth once daily 21  Yes Shawn Rios NP   acetaminophen (TYLENOL) 325 mg tablet Take 2 Tabs by mouth every six (6) hours as needed for Pain. 21  Yes Nazario Root MD   polyethylene glycol [facility]) 17 gram/dose powder Take 17 g by mouth daily. 1 tablespoon with 8 oz of water daily 20  Yes Barbara Nunn MD       Allergies:   Allergies   Allergen Reactions    Ketamine Other (comments)     confusion       Social History:  Social History     Tobacco Use    Smoking status: Former Smoker     Packs/day: 1.00     Years: 54.00     Pack years: 54.00     Quit date: 3/15/2007     Years since quittin.7    Smokeless tobacco: Never Used   Vaping Use    Vaping Use: Never used   Substance Use Topics    Alcohol use: Yes     Comment: former drinker- beer   social    Drug use: Never       Family History:  Family History   Problem Relation Age of Onset    Cancer Mother         lung    Cancer Brother 66        lung    Cancer Maternal Uncle         cancer unknown       ROS  Total of 12 systems reviewed as follows:  POSITIVE= bolded text  Negative = text not bolded       General:  fever, chills, sweats, generalized weakness, weight loss/gain, loss of appetite   Eyes:    blurred vision, eye pain, loss of vision, double vision  ENT:    rhinorrhea, pharyngitis   Respiratory:  cough, sputum production, SOB, JONES, wheezing, pleuritic pain   Cardiology:   chest pain, palpitations, orthopnea, PND, edema, syncope   Gastrointestinal: abdominal pain , N/V, diarrhea, dysphagia, constipation, bleeding   Genitourinary:  frequency, urgency, dysuria, hematuria, incontinence, prostatism   Muskuloskeletal: arthralgia, myalgia, back pain  Hematology:   easy bruising, nose or gum bleeding, lymphadenopathy   Dermatological: rash, ulceration, pruritis, color change / jaundice  Endocrine:   hot flashes or polydipsia   Neurological:  headache, dizziness, confusion, focal weakness, paresthesia, speech difficulties, memory loss, gait difficulty  Psychological: feelings of anxiety, depression, agitation      PHYSICAL EXAM:  Patient Vitals for the past 24 hrs:   Temp Pulse Resp BP SpO2   12/03/21 1937  78 18 (!) 162/70 98 %   12/03/21 1858  80 19 (!) 158/68 94 %   12/03/21 1803  84 20 (!) 204/81 95 %   12/03/21 1541  81 19 (!) 176/75 92 %   12/03/21 1254  80 20 (!) 179/88 (!) 89 %   12/03/21 1253  80 20 (!) 142/74 92 %   12/03/21 1247 98.1 °F (36.7 °C) 85 19 (!) 179/98 91 %       General:    Alert, cooperative, no distress, appears stated age. Weak / Ill   HEENT: R frontal abrasion / contusion, anicteric sclerae, pink conjunctivae     No oral ulcers, mucosa dry, throat clear, dentition fair  Neck:  Supple, symmetrical;   thyroid non tender  Lungs:   Clear to auscultation bilaterally. No wheezing or rhonchi. No rales. Chest wall:  No tenderness. No accessory muscle use. Heart:   Reg rhythm. No  murmur. No edema  Abdomen:   Soft, non-tender. Not distended. Bowel sounds normal  Extremities: No cyanosis. No clubbing      Capillary refill normal,  Radial pulse 2+,  DP 1+  Skin:     Not pale. Not jaundiced. Contusion / Abrasion R forehead, L ant mid chest   Psych:  Not depressed. Not anxious or agitated. Neurologic: EOMs intact. No facial asymmetry. No aphasia or slurred speech. Symmetrical strength, Sensation grossly intact.  Alert and oriented     Lab Data Reviewed:    Recent Results (from the past 24 hour(s))   CBC WITH AUTOMATED DIFF Collection Time: 12/03/21  1:02 PM   Result Value Ref Range    WBC 21.7 (H) 4.1 - 11.1 K/uL    RBC 3.31 (L) 4.10 - 5.70 M/uL    HGB 10.4 (L) 12.1 - 17.0 g/dL    HCT 32.9 (L) 36.6 - 50.3 %    MCV 99.4 (H) 80.0 - 99.0 FL    MCH 31.4 26.0 - 34.0 PG    MCHC 31.6 30.0 - 36.5 g/dL    RDW 14.2 11.5 - 14.5 %    PLATELET 019 468 - 077 K/uL    MPV 11.6 8.9 - 12.9 FL    NRBC 0.1 (H) 0  WBC    ABSOLUTE NRBC 0.02 (H) 0.00 - 0.01 K/uL    NEUTROPHILS 88 (H) 32 - 75 %    LYMPHOCYTES 3 (L) 12 - 49 %    MONOCYTES 8 5 - 13 %    EOSINOPHILS 0 0 - 7 %    BASOPHILS 0 0 - 1 %    IMMATURE GRANULOCYTES 1 (H) 0.0 - 0.5 %    ABS. NEUTROPHILS 19.1 (H) 1.8 - 8.0 K/UL    ABS. LYMPHOCYTES 0.7 (L) 0.8 - 3.5 K/UL    ABS. MONOCYTES 1.7 (H) 0.0 - 1.0 K/UL    ABS. EOSINOPHILS 0.0 0.0 - 0.4 K/UL    ABS. BASOPHILS 0.0 0.0 - 0.1 K/UL    ABS. IMM. GRANS. 0.2 (H) 0.00 - 0.04 K/UL    DF AUTOMATED      RBC COMMENTS ANISOCYTOSIS  1+        RBC COMMENTS POIKILOCYTOSIS  1+        RBC COMMENTS HYPOCHROMIA  1+        RBC COMMENTS ADEQUATE PLATELETS     MAGNESIUM    Collection Time: 12/03/21  1:02 PM   Result Value Ref Range    Magnesium 2.3 1.6 - 2.4 mg/dL   METABOLIC PANEL, COMPREHENSIVE    Collection Time: 12/03/21  1:02 PM   Result Value Ref Range    Sodium 141 136 - 145 mmol/L    Potassium 4.7 3.5 - 5.1 mmol/L    Chloride 104 97 - 108 mmol/L    CO2 26 21 - 32 mmol/L    Anion gap 11 5 - 15 mmol/L    Glucose 136 (H) 65 - 100 mg/dL    BUN 52 (H) 6 - 20 MG/DL    Creatinine 3.30 (H) 0.70 - 1.30 MG/DL    BUN/Creatinine ratio 16 12 - 20      GFR est AA 22 (L) >60 ml/min/1.73m2    GFR est non-AA 18 (L) >60 ml/min/1.73m2    Calcium 9.4 8.5 - 10.1 MG/DL    Bilirubin, total 0.8 0.2 - 1.0 MG/DL    ALT (SGPT) 69 12 - 78 U/L    AST (SGOT) 61 (H) 15 - 37 U/L    Alk.  phosphatase 80 45 - 117 U/L    Protein, total 6.5 6.4 - 8.2 g/dL    Albumin 2.9 (L) 3.5 - 5.0 g/dL    Globulin 3.6 2.0 - 4.0 g/dL    A-G Ratio 0.8 (L) 1.1 - 2.2     TROPONIN-HIGH SENSITIVITY    Collection Time: 12/03/21  1:02 PM   Result Value Ref Range    Troponin-High Sensitivity 63 0 - 76 ng/L   LACTIC ACID    Collection Time: 12/03/21  1:02 PM   Result Value Ref Range    Lactic acid 1.8 0.4 - 2.0 MMOL/L   CK W/ REFLX CKMB    Collection Time: 12/03/21  1:02 PM   Result Value Ref Range    CK 1,170 (H) 39 - 308 U/L   SAMPLES BEING HELD    Collection Time: 12/03/21  1:02 PM   Result Value Ref Range    SAMPLES BEING HELD 1SST, 1RED, 1BLUE     COMMENT        Add-on orders for these samples will be processed based on acceptable specimen integrity and analyte stability, which may vary by analyte. CK-MB,QUANT.     Collection Time: 12/03/21  1:02 PM   Result Value Ref Range    CK - MB 11.0 (H) <3.6 NG/ML    CK-MB Index 0.9 0.0 - 2.5     EKG, 12 LEAD, INITIAL    Collection Time: 12/03/21  1:27 PM   Result Value Ref Range    Ventricular Rate 86 BPM    Atrial Rate 300 BPM    P-R Interval 238 ms    QRS Duration 142 ms    Q-T Interval 420 ms    QTC Calculation (Bezet) 502 ms    Calculated P Axis 171 degrees    Calculated R Axis 93 degrees    Calculated T Axis -17 degrees    Diagnosis       ** Suspect arm lead reversal, interpretation assumes no reversal  Undetermined rhythm  Right bundle branch block  T wave abnormality, consider inferior ischemia  Abnormal ECG  When compared with ECG of 15-FEB-2021 08:59,  Current undetermined rhythm precludes rhythm comparison, needs review  Right bundle branch block has replaced Incomplete right bundle branch block     LACTIC ACID    Collection Time: 12/03/21  2:41 PM   Result Value Ref Range    Lactic acid 1.1 0.4 - 2.0 MMOL/L   PROTHROMBIN TIME + INR    Collection Time: 12/03/21  2:41 PM   Result Value Ref Range    INR 2.3 (H) 0.9 - 1.1      Prothrombin time 22.3 (H) 9.0 - 11.1 sec   URINALYSIS W/ REFLEX CULTURE    Collection Time: 12/03/21  4:00 PM    Specimen: Urine   Result Value Ref Range    Color YELLOW/STRAW      Appearance CLEAR CLEAR      Specific gravity 1.020 1.003 - 1.030 pH (UA) 5.5 5.0 - 8.0      Protein 100 (A) NEG mg/dL    Glucose Negative NEG mg/dL    Ketone Negative NEG mg/dL    Bilirubin Negative NEG      Blood LARGE (A) NEG      Urobilinogen 0.2 0.2 - 1.0 EU/dL    Nitrites Negative NEG      Leukocyte Esterase Negative NEG      WBC 0-4 0 - 4 /hpf    RBC 0-5 0 - 5 /hpf    Epithelial cells FEW FEW /lpf    Bacteria Negative NEG /hpf    UA:UC IF INDICATED CULTURE NOT INDICATED BY UA RESULT CNI      Amorphous Crystals 1+ (A) NEG       EKG:  NSR 86 bpm, RBBB, NS inf TW inversion - new,  Progression anterior TW inversion    Radiology:  XR CHEST PORT   Final Result   Loculated left pleural fluid adjacent left chest wall indentation. An underlying   occult rib fracture is possible. CT HEAD WO CONT   Final Result   No acute intracranial abnormality. CT SPINE CERV WO CONT   Final Result   1. No fracture. 2. Degenerative stenoses as described above. 3. Emphysema. Care Plan discussed with:   Patient x    Family son    RN x         Consultant      Expected  Disposition:   Home with Family x   HH/PT/OT/RN    SNF/LTC    BENJY      TOTAL TIME:  61 Minutes      Comments    x Reviewed previous records   >50% of visit spent in counseling and coordination of care x Discussion with patient and/or family and questions answered       _______________________________________________________  Given the patient's current clinical presentation, I have a high level of concern for decompensation if discharged from the emergency department. Complex decision making was performed, which includes reviewing the patient's available past medical records, laboratory results, and x-ray films. My assessment of this patient's clinical condition and my plan of care is as follows. ASSESSMENT / PLAN    Principal Problem:    Traumatic rhabdomyolysis ([facility] [address] (12/3/2021)  Treated weakness with fall several days ago  -?   Timing  Presenting with multiple contusions abrasions.   Concern for loculated effusion/hematoma and rib fractures on the left chest.  She is admitted for hydration and monitoring of acute on chronic renal insufficiency  Consider advisability of anticoagulation with frequent falls    Active Problems:    Loculated pleural effusion (12/3/2021)  Was diagnosed with ammonia in the ED  This seems more consistent with a rib fracture and associated hematoma on anticoagulation  Further assessment with chest CT in the morning      Leukocytosis (12/3/2021)  No clear history for infection/fever  Concern for possible left chest pneumonia  Have initiated Rocephin/azithromycin for 5-day course  Cultures pending      Fall (2/8/2021)    Generalized weakness (2/8/2021)  Functional decline over the past week  Etiology not fully clarified  Concern with underlying history of lymphoma untreated in the past  Sed rate/CRP  Monitor for more obvious signs of acute infection      Anticoagulant long-term use (2/28/2017)    PAF (paroxysmal atrial fibrillation) ([facility] [address] (2/9/2021)  Appears to be in sinus rhythm on presentation  Certain advisability of ongoing anticoagulation with history of falls  Heart rate controlled, maintain current treatments for now      Stage 3 chronic kidney disease (8/5/2019)    Acute renal failure superimposed on stage 3 chronic kidney disease ([address] (12/3/2021)  Some jump in the BUN and creatinine from prior  Easily accounted for by the fall and poor fluid intake  And admitted for hydration and treatment of acute rhabdo      Hyperlipemia ()  Hold statin therapy until CK elevation resolved      Hypothyroid (8/5/2019)  Maintain thyroid replacement,  will plan follow-up TSH      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 [facility]) (2/11/2020)  Consider for reassessment with Dr. [place]  Pt had refused treatment in the past      SAFETY:   Code Status:Full  DVT prophylaxis:Coumadin  Stress Ulcer prophylaxis: Pepcid  Bladder catheter:no  Family Contact Info:  Primary Emergency Contact: [address] [place] [address], [place] Phone: [phone]  Bedded: [facility] Room CARITO/CARITO  Disposition: TBD, likely home when stable  Admission status:  Inpatient    -Tentative plan of care discussed with patient / family, who demonstrated understanding and is in agreement to the above  -Case was reviewed with the ED Provider, Ruben Bowers, [address] [place] [address], MD  [facility] Hospitalist  [phone]

## 2021-12-04 NOTE — ED NOTES
Spoke to Tamia who said that someone will call soon. The floor is currently tied up but someone will take report.

## 2021-12-05 LAB
ANION GAP SERPL CALC-SCNC: 10 MMOL/L (ref 5–15)
BACTERIA SPEC CULT: ABNORMAL
BASOPHILS # BLD: 0 K/UL (ref 0–0.1)
BASOPHILS NFR BLD: 0 % (ref 0–1)
BUN SERPL-MCNC: 62 MG/DL (ref 6–20)
BUN/CREAT SERPL: 18 (ref 12–20)
CALCIUM SERPL-MCNC: 8.3 MG/DL (ref 8.5–10.1)
CHLORIDE SERPL-SCNC: 107 MMOL/L (ref 97–108)
CK SERPL-CCNC: 323 U/L (ref 39–308)
CO2 SERPL-SCNC: 21 MMOL/L (ref 21–32)
CREAT SERPL-MCNC: 3.4 MG/DL (ref 0.7–1.3)
CRP SERPL-MCNC: 26.8 MG/DL (ref 0–0.6)
DIFFERENTIAL METHOD BLD: ABNORMAL
EOSINOPHIL # BLD: 0.1 K/UL (ref 0–0.4)
EOSINOPHIL NFR BLD: 1 % (ref 0–7)
ERYTHROCYTE [DISTWIDTH] IN BLOOD BY AUTOMATED COUNT: 14.4 % (ref 11.5–14.5)
GLUCOSE SERPL-MCNC: 111 MG/DL (ref 65–100)
HCT VFR BLD AUTO: 27.4 % (ref 36.6–50.3)
HGB BLD-MCNC: 8.6 G/DL (ref 12.1–17)
IMM GRANULOCYTES # BLD AUTO: 0.1 K/UL (ref 0–0.04)
IMM GRANULOCYTES NFR BLD AUTO: 1 % (ref 0–0.5)
INR PPP: 2.6 (ref 0.9–1.1)
LYMPHOCYTES # BLD: 0.8 K/UL (ref 0.8–3.5)
LYMPHOCYTES NFR BLD: 8 % (ref 12–49)
MCH RBC QN AUTO: 31.2 PG (ref 26–34)
MCHC RBC AUTO-ENTMCNC: 31.4 G/DL (ref 30–36.5)
MCV RBC AUTO: 99.3 FL (ref 80–99)
MONOCYTES # BLD: 1 K/UL (ref 0–1)
MONOCYTES NFR BLD: 10 % (ref 5–13)
NEUTS SEG # BLD: 8.7 K/UL (ref 1.8–8)
NEUTS SEG NFR BLD: 80 % (ref 32–75)
NRBC # BLD: 0 K/UL (ref 0–0.01)
NRBC BLD-RTO: 0 PER 100 WBC
PLATELET # BLD AUTO: 169 K/UL (ref 150–400)
PMV BLD AUTO: 11.8 FL (ref 8.9–12.9)
POTASSIUM SERPL-SCNC: 4.1 MMOL/L (ref 3.5–5.1)
PROTHROMBIN TIME: 25 SEC (ref 9–11.1)
RBC # BLD AUTO: 2.76 M/UL (ref 4.1–5.7)
SERVICE CMNT-IMP: ABNORMAL
SODIUM SERPL-SCNC: 138 MMOL/L (ref 136–145)
TSH SERPL DL<=0.05 MIU/L-ACNC: 0.31 UIU/ML (ref 0.36–3.74)
WBC # BLD AUTO: 10.8 K/UL (ref 4.1–11.1)

## 2021-12-05 PROCEDURE — 85610 PROTHROMBIN TIME: CPT

## 2021-12-05 PROCEDURE — 74011000258 HC RX REV CODE- 258: Performed by: INTERNAL MEDICINE

## 2021-12-05 PROCEDURE — 77010033678 HC OXYGEN DAILY

## 2021-12-05 PROCEDURE — 65270000029 HC RM PRIVATE

## 2021-12-05 PROCEDURE — 74011250637 HC RX REV CODE- 250/637: Performed by: INTERNAL MEDICINE

## 2021-12-05 PROCEDURE — 94760 N-INVAS EAR/PLS OXIMETRY 1: CPT

## 2021-12-05 PROCEDURE — 85025 COMPLETE CBC W/AUTO DIFF WBC: CPT

## 2021-12-05 PROCEDURE — 36415 COLL VENOUS BLD VENIPUNCTURE: CPT

## 2021-12-05 PROCEDURE — 74011250636 HC RX REV CODE- 250/636: Performed by: INTERNAL MEDICINE

## 2021-12-05 PROCEDURE — 84443 ASSAY THYROID STIM HORMONE: CPT

## 2021-12-05 PROCEDURE — 82550 ASSAY OF CK (CPK): CPT

## 2021-12-05 PROCEDURE — 80048 BASIC METABOLIC PNL TOTAL CA: CPT

## 2021-12-05 RX ORDER — WARFARIN 2 MG/1
2 TABLET ORAL ONCE
Status: COMPLETED | OUTPATIENT
Start: 2021-12-05 | End: 2021-12-05

## 2021-12-05 RX ADMIN — DILTIAZEM HYDROCHLORIDE 60 MG: 60 TABLET, FILM COATED ORAL at 17:27

## 2021-12-05 RX ADMIN — SODIUM CHLORIDE 50 ML/HR: 9 INJECTION, SOLUTION INTRAVENOUS at 08:40

## 2021-12-05 RX ADMIN — AMIODARONE HYDROCHLORIDE 200 MG: 200 TABLET ORAL at 08:44

## 2021-12-05 RX ADMIN — LOSARTAN POTASSIUM 50 MG: 50 TABLET, FILM COATED ORAL at 08:44

## 2021-12-05 RX ADMIN — CEFTRIAXONE SODIUM 2 G: 2 INJECTION, POWDER, FOR SOLUTION INTRAMUSCULAR; INTRAVENOUS at 14:09

## 2021-12-05 RX ADMIN — LEVOTHYROXINE SODIUM 100 MCG: 0.1 TABLET ORAL at 06:45

## 2021-12-05 RX ADMIN — WARFARIN SODIUM 2 MG: 2 TABLET ORAL at 17:27

## 2021-12-05 RX ADMIN — FAMOTIDINE 20 MG: 20 TABLET, FILM COATED ORAL at 17:27

## 2021-12-05 RX ADMIN — AZITHROMYCIN MONOHYDRATE 500 MG: 500 INJECTION, POWDER, LYOPHILIZED, FOR SOLUTION INTRAVENOUS at 16:02

## 2021-12-05 RX ADMIN — DILTIAZEM HYDROCHLORIDE 60 MG: 60 TABLET, FILM COATED ORAL at 08:44

## 2021-12-05 NOTE — PROGRESS NOTES
Bedside shift change report given to IRMA Lugo (oncoming nurse) by KATHERINE Timmons LPN (offgoing nurse). Report included the following information SBAR, Kardex, Intake/Output, MAR and Recent Results.

## 2021-12-05 NOTE — PROGRESS NOTES
Pharmacy: Warfarin  Dosing    Indication: AFib  Goal INR: 2-3  Today's Dose: Warfarin 2 mg PO x1  Labs:  Recent Labs     12/05/21  0755 12/04/21  0550 12/03/21  1441 12/03/21  1302   INR 2.6*  --  2.3*  --    HGB 8.6* 8.5*  --  10.4*   HCT 27.4* 26.6*  --  32.9*    175  --  230       Home Dose: Warfarin 3 mg on Thursday and 2.5 mg all other days  Drug Interactions: Amiodarone, Azithromycin  Dietary Intake: Regular - low fat, low cholesterol    Impression/Plan:    Warfarin 2 mg PO today       Thanks,  Janice Rosales, PHARMD

## 2021-12-05 NOTE — PROGRESS NOTES
Problem: Pressure Injury - Risk of  Goal: *Prevention of pressure injury  Description: Document Alexander Scale and appropriate interventions in the flowsheet.   Outcome: Progressing Towards Goal  Note: Pressure Injury Interventions:  Sensory Interventions: Assess changes in LOC, Check visual cues for pain    Moisture Interventions: Absorbent underpads, Apply protective barrier, creams and emollients, Check for incontinence Q2 hours and as needed    Activity Interventions: Increase time out of bed    Mobility Interventions: HOB 30 degrees or less    Nutrition Interventions: Document food/fluid/supplement intake

## 2021-12-05 NOTE — PROGRESS NOTES
[facility]  Hospitalist Progress Note    NAME: Elias Rubin   :  1940   MRN:  [id number]     Total duration of encounter: 2 days      Interim Hospital Summary: [de-identified] y.o. male who presented on 12/3/2021 with Traumatic rhabdomyolysis ([facility] [address] He has a past medical history of Arrhythmia, CKD (chronic kidney disease) stage 3, GFR 30-59 ml/min ([facility]), Hyperlipemia, Hypertension, Iron deficiency anemia due to chronic blood loss (2021), PAF (paroxysmal atrial fibrillation) ([facility] [address] (2021), PVD (peripheral vascular disease) ([facility] and Thyroid disease. .     Pt brought into ED by EMS after being found on the floor likely for several days. Recent h/o recurrent falls a/w weakness. Was active / driving independently one week ago. Some subjective fever, but not documented. No sputum        Subjective:     Chief Complaint / Reason for Physician Visit  \"better\".   Discussed with VIVIAN CAMERON last evening w/o laxative  Denies c/o pain  JONES better    Review of Systems:  Symptom Y/N Comments  Symptom Y/N Comments   Fever/Chills n   Chest Pain n    Poor Appetite n   Edema n    Cough n   Abdominal Pain n    Sputum n   Joint Pain n    SOB/JONES y   Pruritis/Rash n    Nausea/vomit n   Tolerating PT/OT     Diarrhea n   Tolerating Diet y    Constipation n   Other         Current Facility-Administered Medications:     0.9% sodium chloride infusion, 50 mL/hr, IntraVENous, CONTINUOUS, Rafa Sharp MD, Last Rate: 50 mL/hr at 21 0840, 50 mL/hr at 21 0840    bisacodyL (DULCOLAX) suppository 10 mg, 10 mg, Rectal, DAILY PRN, Rafa Sharp MD    polyethylene glycol (MIRALAX) packet 17 g, 17 g, Oral, DAILY, Rafa Sharp MD    sodium chloride (NS) flush 5-10 mL, 5-10 mL, IntraVENous, PRN, Rafa Sharp MD    acetaminophen (TYLENOL) tablet 650 mg, 650 mg, Oral, Q6H PRN, Rafa Sharp MD    ondansetron [facility]) injection 4 mg, 4 mg, IntraVENous, Q4H PRN, Rafa Sharp MD    azithromycin (ZITHROMAX) 500 mg in 0.9% sodium chloride 250 mL (VIAL-MATE), 500 mg, IntraVENous, Q24H, Marcus Francisco MD, Last Rate: 250 mL/hr at 12/04/21 1542, 500 mg at 12/04/21 1542    cefTRIAXone (ROCEPHIN) 2 g in 0.9% sodium chloride (MBP/ADV) 50 mL MBP, 2 g, IntraVENous, Q24H, Marcus Francisco MD, Last Rate: 100 mL/hr at 12/04/21 1407, 2 g at 12/04/21 1407    amiodarone (CORDARONE) tablet 200 mg, 200 mg, Oral, DAILY, Marcus Francisco MD, 200 mg at 12/05/21 0844    dilTIAZem IR (CARDIZEM) tablet 60 mg, 60 mg, Oral, BID, Marcus Francisco MD, 60 mg at 12/05/21 0844    levothyroxine (SYNTHROID) tablet 100 mcg, 100 mcg, Oral, ACB, Marcus Francisco MD, 100 mcg at 12/05/21 0645    losartan (COZAAR) tablet 50 mg, 50 mg, Oral, DAILY, Marcus Francisco MD, 50 mg at 12/05/21 0844    warfarin (COUMADIN) tablet 2 mg, 2 mg, Oral, QPM, Marcus Francisco MD, 2 mg at 12/04/21 1725    sodium chloride (NS) flush 5-40 mL, 5-40 mL, IntraVENous, Q8H, Marcus Francisco MD, 10 mL at 12/04/21 0946    sodium chloride (NS) flush 5-40 mL, 5-40 mL, IntraVENous, PRN, Marcus Francisco MD    polyethylene glycol (MIRALAX) packet 17 g, 17 g, Oral, DAILY PRN, Marcus Francisco MD    famotidine (PEPCID) tablet 20 mg, 20 mg, Oral, QPM, Marcus Francisco MD, 20 mg at 12/04/21 1725    Objective:     VITALS:   Patient Vitals for the past 12 hrs:   Temp Pulse Resp BP SpO2   12/05/21 0738 98.4 °F (36.9 °C) 72 20 (!) 156/70 90 %   12/05/21 0450 98.1 °F (36.7 °C) 69 18 (!) 140/58 96 %       Intake/Output Summary (Last 24 hours) at 12/5/2021 1334  Last data filed at 12/5/2021 0647  Gross per 24 hour   Intake 774 ml   Output    Net 774 ml        PHYSICAL EXAM:  General: WD, WN. Alert, cooperative, no acute distress    EENT:  EOMI. Anicteric sclerae. MMM  Resp:  CTA bilaterally, no wheezing or rales. No accessory muscle use  CV:  Regular  rhythm,  M apex  GI:  Soft, Non distended, Non tender.   +Bowel sounds  Neurologic:  Alert and oriented X 3, mumbled speech, nonfocal  Psych: Not anxious nor agitated  Skin:  No rashes. No jaundice    LABS:  I reviewed today's most current labs and imaging studies. Pertinent labs include:  Recent Labs     12/05/21  0755 12/04/21  0550 12/03/21  1302   WBC 10.8 13.2* 21.7*   HGB 8.6* 8.5* 10.4*   HCT 27.4* 26.6* 32.9*    175 230     Recent Labs     12/05/21  0755 12/04/21  0550 12/03/21  1441 12/03/21  1302    141  --  141   K 4.1 4.3  --  4.7    109*  --  104   CO2 21 23  --  26   * 89  --  136*   BUN 62* 52*  --  52*   CREA 3.40* 2.90*  --  3.30*   CA 8.3* 7.9*  --  9.4   MG  --  2.2  --  2.3   ALB  --   --   --  2.9*   TBILI  --   --   --  0.8   ALT  --   --   --  69   INR 2.6*  --  2.3*  --      Results for Enoch Jim (MRN [id number]) as of 12/4/2021 17:36   12/3/2021 13:02 12/3/2021 14:41 12/4/2021 05:50 12/5/2021   Lactic acid 1.8 1.1 0.6    CK   670 (H) 323 (H)   CK 1,170 (H)      CK - MB 11.0 (H)      CK-MB Index 0.9      Troponin-HS 63  59      Results for Enoch Jim (MRN [id number]) as of 12/5/2021 13:32   Ref. Range 12/5/2021 07:55   TSH 0.36 - 3.74  0.31 (L)       RADIOLOGY:  pCXR 12/3:  The left chest wall is chronically indented at the level of the fifth and sixth  lateral ribs. Rounded pleural density adjacent this is new, however. An  underlying occult rib fracture is possible; a displaced fracture is not visualized.   Aside from atelectasis adjacent the loculated left pleural fluid, the lungs are  clear. The central airways are patent. No pneumothorax. A right IJ ported  catheter terminates in the SVC. Post right humeral ORIF.    IMPRESSION:  Loculated left pleural fluid adjacent left chest wall indentation. An underlying  occult rib fracture is possible. CT HEAD 12/3:  Periventricular white matter hypodensity is nonspecific, but  consistent with chronic small vessel ischemic disease. The ventricles and sulci  are appropriate in size and configuration for age.  No loss of gray-white  differentiation to suggest late acute or early subacute infarction. No mass  effect or intracranial hemorrhage.   IMPRESSION:  No acute intracranial abnormality. CT C-SPINE 12/3:  1. No fracture. 2. Degenerative stenoses as described above. 3. Emphysema. CT CHEST 12/4:  CHEST: Chest wall/thoracic inlet: Right IJ port with its tip in the superior vena cava. .  Thyroid: Within normal limits. Mediastinum/matilde: There are no pathologically enlarged mediastinal, hilar or axillary nodes. Heart/vessels: Heart is not enlarged. Aorta is normal in caliber. There are  coronary artery and atherosclerotic vascular calcifications. .  Lungs/Pleura: Partially loculated left pleural effusion with linear changes in  the left base and partial collapse of the left base. Overall lungs are  hyperinflated with emphysematous changes. .  MSK: Bones are osteopenic. There are multiple chronic left-sided rib fractures. Subacute fracture of the left 10th rib laterally  IMPRESSION  1. Partially loculated left pleural effusion with linear changes at the left base  2. Multiple chronic left-sided rib fractures. Subacute left 10th rib fracture. .    EKG:  NSR 86 bpm, RBBB, NS inf TW inversion - new,  Progression anterior TW inversion  Procedures: see electronic medical records for all procedures/Xrays and details which were not copied into this note but were reviewed prior to creation of Plan. Assessment / Plan:  Principal Problem:    Traumatic rhabdomyolysis ([address] (12/3/2021)  Suspected fall several days PTA - states was on floor for 2 nights until found  Presenting with multiple contusions abrasions.   Concern for loculated effusion/hematoma and rib fractures on the left chest.  Confirmed by CT 12/4  Admitted for hydration and monitoring of acute on chronic renal insufficiency  Reconsider advisability of anticoagulation with frequent falls - up to PCP     Active Problems:    Loculated pleural effusion (12/3/2021)  Was diagnosed with pneumonia in the ED  This seems more consistent with a rib fracture and associated hematoma on anticoagulation  Further assessment with chest CT confirms this suspicion   F/u CXR in AM       Leukocytosis (12/3/2021)  No clear history for infection/fever  Concern for possible left chest pneumonia - not apparent on CT CHEST   Have initiated Rocephin/Azithromycin for 5-day course 12/3 - 7  Cultures note 1 or 2 bottles + GPC clusters - ID pending  Possible contaminant GPC on BC - one bottle        Fall (2/8/2021)    Generalized weakness (2/8/2021)  Functional decline over the past week PTA  Etiology not fully clarified  Concern with underlying history of lymphoma untreated in the past  Sed rate/CRP - 70 SR and CRP pending  CT CHEST w/o lymphadenopathy       Anticoagulant long-term use (2/28/2017)    PAF (paroxysmal atrial fibrillation) ([facility] [address] (2/9/2021)  Appears to be in sinus rhythm on presentation  Uncertain advisability of ongoing anticoagulation with history of falls  Heart rate controlled, maintain current treatments for now  Coumadin tapered to 2mg   Long term use to be decided by PCP       Stage 3 chronic kidney disease (8/5/2019)    Acute renal failure superimposed on stage 3 chronic kidney disease ([address] (12/3/2021)  Some jump in the BUN and creatinine from prior  Easily accounted for by the fall and poor fluid intake  And admitted for hydration and treatment of acute rhabdo  Elevation worse today, advance IVF NS 100cc/hr       Hyperlipemia ()  Hold statin therapy until CK elevation resolved       Hypothyroid (8/5/2019)  Maintain thyroid replacement,  follow-up TSH mildly suppressed       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 Tonny Rodriguez  Pt had refused treatment in the past  CT w/o lymphadenopathy.    ESR  elevated        SAFETY:   Code Status:Full  DVT prophylaxis:Coumadin  Stress Ulcer prophylaxis: Pepcid  Bladder catheter:no  Family Contact Info: Primary Emergency Contact: Jonatan Crandall Phone: [phone]  Bedded: [facility] Room CARITO/CARITO  Disposition: TBD, likely home when stable  Admission status:  Inpatient       Reviewed most current lab test results and cultures  YES  Reviewed most current radiology test results   YES  Review and summation of old records today    NO  Reviewed patient's current orders and MAR    YES  PMH/SH reviewed - no change compared to H&P    Care Plan discussed with:                                   Comments  Patient x     Family      RN x     Care Manager       Consultant                           Multidiciplinary team rounds were held today with , nursing, pharmacist and clinical coordinator. Patient's plan of care was discussed; medications were reviewed and discharge planning was addressed.         ____________________________________________    Total NON Critical Care TIME:  35  Minutes        Comments   >50% of visit spent in counseling and coordination of care   x      Signed: Sri Clark MD  [facility] Hospitalist  [phone]

## 2021-12-06 LAB
ANION GAP SERPL CALC-SCNC: 11 MMOL/L (ref 5–15)
BUN SERPL-MCNC: 54 MG/DL (ref 6–20)
BUN/CREAT SERPL: 18 (ref 12–20)
CALCIUM SERPL-MCNC: 8.2 MG/DL (ref 8.5–10.1)
CHLORIDE SERPL-SCNC: 109 MMOL/L (ref 97–108)
CK SERPL-CCNC: 224 U/L (ref 39–308)
CO2 SERPL-SCNC: 20 MMOL/L (ref 21–32)
COMMENT, HOLDF: NORMAL
CREAT SERPL-MCNC: 2.94 MG/DL (ref 0.7–1.3)
GLUCOSE SERPL-MCNC: 102 MG/DL (ref 65–100)
INR PPP: 2.6 (ref 0.9–1.1)
POTASSIUM SERPL-SCNC: 4.2 MMOL/L (ref 3.5–5.1)
PROTHROMBIN TIME: 25.5 SEC (ref 9–11.1)
SAMPLES BEING HELD,HOLD: NORMAL
SODIUM SERPL-SCNC: 140 MMOL/L (ref 136–145)

## 2021-12-06 PROCEDURE — 36415 COLL VENOUS BLD VENIPUNCTURE: CPT

## 2021-12-06 PROCEDURE — 97530 THERAPEUTIC ACTIVITIES: CPT

## 2021-12-06 PROCEDURE — 82550 ASSAY OF CK (CPK): CPT

## 2021-12-06 PROCEDURE — 97161 PT EVAL LOW COMPLEX 20 MIN: CPT

## 2021-12-06 PROCEDURE — 74011000258 HC RX REV CODE- 258: Performed by: INTERNAL MEDICINE

## 2021-12-06 PROCEDURE — 74011250637 HC RX REV CODE- 250/637: Performed by: INTERNAL MEDICINE

## 2021-12-06 PROCEDURE — 85610 PROTHROMBIN TIME: CPT

## 2021-12-06 PROCEDURE — 80048 BASIC METABOLIC PNL TOTAL CA: CPT

## 2021-12-06 PROCEDURE — 94760 N-INVAS EAR/PLS OXIMETRY 1: CPT

## 2021-12-06 PROCEDURE — 77010033678 HC OXYGEN DAILY

## 2021-12-06 PROCEDURE — 74011250636 HC RX REV CODE- 250/636: Performed by: INTERNAL MEDICINE

## 2021-12-06 PROCEDURE — 65270000029 HC RM PRIVATE

## 2021-12-06 PROCEDURE — 97165 OT EVAL LOW COMPLEX 30 MIN: CPT

## 2021-12-06 RX ORDER — SODIUM CHLORIDE 9 MG/ML
50 INJECTION, SOLUTION INTRAVENOUS CONTINUOUS
Status: DISPENSED | OUTPATIENT
Start: 2021-12-06 | End: 2021-12-07

## 2021-12-06 RX ORDER — WARFARIN 2 MG/1
2 TABLET ORAL ONCE
Status: COMPLETED | OUTPATIENT
Start: 2021-12-06 | End: 2021-12-06

## 2021-12-06 RX ADMIN — AMIODARONE HYDROCHLORIDE 200 MG: 200 TABLET ORAL at 10:35

## 2021-12-06 RX ADMIN — SODIUM CHLORIDE 50 ML/HR: 9 INJECTION, SOLUTION INTRAVENOUS at 21:28

## 2021-12-06 RX ADMIN — WARFARIN SODIUM 2 MG: 2 TABLET ORAL at 17:55

## 2021-12-06 RX ADMIN — Medication 10 ML: at 21:29

## 2021-12-06 RX ADMIN — LEVOTHYROXINE SODIUM 100 MCG: 0.1 TABLET ORAL at 07:06

## 2021-12-06 RX ADMIN — AZITHROMYCIN MONOHYDRATE 500 MG: 500 INJECTION, POWDER, LYOPHILIZED, FOR SOLUTION INTRAVENOUS at 15:33

## 2021-12-06 RX ADMIN — CEFTRIAXONE SODIUM 2 G: 2 INJECTION, POWDER, FOR SOLUTION INTRAMUSCULAR; INTRAVENOUS at 15:32

## 2021-12-06 RX ADMIN — LOSARTAN POTASSIUM 50 MG: 50 TABLET, FILM COATED ORAL at 10:34

## 2021-12-06 RX ADMIN — SODIUM CHLORIDE 100 ML/HR: 9 INJECTION, SOLUTION INTRAVENOUS at 09:31

## 2021-12-06 RX ADMIN — FAMOTIDINE 20 MG: 20 TABLET, FILM COATED ORAL at 17:55

## 2021-12-06 RX ADMIN — POLYETHYLENE GLYCOL 3350 17 G: 17 POWDER, FOR SOLUTION ORAL at 10:35

## 2021-12-06 RX ADMIN — DILTIAZEM HYDROCHLORIDE 60 MG: 60 TABLET, FILM COATED ORAL at 10:34

## 2021-12-06 RX ADMIN — Medication 10 ML: at 15:33

## 2021-12-06 RX ADMIN — DILTIAZEM HYDROCHLORIDE 60 MG: 60 TABLET, FILM COATED ORAL at 17:55

## 2021-12-06 NOTE — PROGRESS NOTES
Patient seen for the PT initial evaluation. Patient required min to mod A of 2 for bed mobility and transferred bed to chair with mod A of 2, and 2 liters 02.  02 sats at rest on 2 L 02 92%, sats without O2 at rest 84%, 02 sats with mobility on 2 L 02 87%. Returns to 90% after resting. Full evaluation note to follow.

## 2021-12-06 NOTE — PROGRESS NOTES
Bedside shift change report given to Munir Portillo RN (oncoming nurse) by KATHERINE Timmons LPN (offgoing nurse). Report included the following information SBAR, Kardex, MAR and Recent Results.

## 2021-12-06 NOTE — PROGRESS NOTES
Care Management Interventions  PCP Verified by CM:  Matthieu London NP)  Last Visit to PCP: 10/11/21  Palliative Care Criteria Met (RRAT>21 & CHF Dx)?: No (No MD order for Palliative Care)  Mode of Transport at Discharge: Other (see comment)  Transition of Care Consult (CM Consult): SNF, Discharge Planning, [address]: No  Reason Outside [facility]: Unable to staff case, Out of service area  Discharge Durable Medical Equipment: No  Physical Therapy Consult: Yes  Occupational Therapy Consult: Yes  Speech Therapy Consult: No  Support Systems: Child(nanci)  [address] Provided?: No  Discharge Location  Discharge Placement: Skilled nursing facility     Met with the patient to review and discuss the plan of care. Remember the patient when he was here in February and required skilled care. It was a difficult task to persuade the patient to receive skill care and go home with help, but he eventually agreed. Patient lives in his home alone. He has a walker and a wheelchair but according to the patient, he does not use them much. He has help 2 days a week: Mon and Thurs for 8 hours. May need to increase hours that help is in the home, if he agrees. May benefit from home health or skilled care. CM introductory letter with contact information given to the patient. Will continue to follow and assist with any discharge planning need. Reason for Admission:   Traumatic Rhabdomyolysis and loculated pleural effusion                    RUR Score:                  PCP: First and Last name:   Spike Newotn NP     Name of Practice: [address] [place] [address]   Are you a current patient: Yes/No:   YES   Approximate date of last visit:  10/11/21   Can you participate in a virtual visit if needed:  NO    Do you (patient/family) have any concerns for transition/discharge? May need more help or increase the help that he has in his home.  The issue with that is, he may not want to do it. Plan for utilizing home health:   Yes vs skilled care    Current Advanced Directive/Advance Care Plan:  Full Code      Healthcare Decision Maker:   Click here to complete [address] including selection of the Healthcare Decision Maker Relationship (ie \"Primary\")            Primary Decision MakerCaechayo French - Other Relative - [phone]    Secondary Decision Maker: Peter Byrd    Transition of Care Plan:      TBD. Home with home health/and  service increased vs skilled care.       Advance Care Planning     General Advance Care Planning (ACP) Conversation      Date of Conversation: 12/3/2021  Conducted with: Patient with Decision Making Capacity    Healthcare Decision Maker:     Primary Decision Maker: [address] [place] [address] - Other Relative - [phone]    Secondary Decision Maker: Peter Byrd  Click here to [address] including selection of the Healthcare Decision Maker Relationship (ie \"Primary\")          Content/Action Overview:   Has NO ACP documents/care preferences - information provided, considering goals and options  Reviewed DNR/DNI and patient elects Full Code (Attempt Resuscitation)  Topics discussed: NA  Additional Comments: NA     Length of Voluntary ACP Conversation in minutes:  16 minutes    Emely Lux

## 2021-12-06 NOTE — PROGRESS NOTES
Problem: Pressure Injury - Risk of  Goal: *Prevention of pressure injury  Description: Document Alexander Scale and appropriate interventions in the flowsheet.   Outcome: Progressing Towards Goal  Note: Pressure Injury Interventions:  Sensory Interventions: Assess changes in LOC, Check visual cues for pain, Discuss PT/OT consult with provider    Moisture Interventions: Absorbent underpads, Apply protective barrier, creams and emollients, Check for incontinence Q2 hours and as needed    Activity Interventions: Increase time out of bed, PT/OT evaluation    Mobility Interventions: HOB 30 degrees or less    Nutrition Interventions: Document food/fluid/supplement intake    Friction and Shear Interventions: HOB 30 degrees or less

## 2021-12-06 NOTE — PROGRESS NOTES
Problem: Pressure Injury - Risk of  Goal: *Prevention of pressure injury  Description: Document Alexander Scale and appropriate interventions in the flowsheet. Outcome: Progressing Towards Goal  Note: Pressure Injury Interventions:  Sensory Interventions: Assess changes in LOC, Chair cushion, Discuss PT/OT consult with provider    Moisture Interventions: Absorbent underpads, Apply protective barrier, creams and emollients    Activity Interventions: PT/OT evaluation, Increase time out of bed    Mobility Interventions: Chair cushion    Nutrition Interventions: Document food/fluid/supplement intake    Friction and Shear Interventions: Apply protective barrier, creams and emollients                Problem: Falls - Risk of  Goal: *Absence of Falls  Description: Document Ellie Fall Risk and appropriate interventions in the flowsheet.   Outcome: Progressing Towards Goal  Note: Fall Risk Interventions:  Mobility Interventions: Bed/chair exit alarm, Patient to call before getting OOB    Mentation Interventions: Adequate sleep, hydration, pain control, Bed/chair exit alarm, Door open when patient unattended    Medication Interventions: Bed/chair exit alarm, Patient to call before getting OOB, Teach patient to arise slowly    Elimination Interventions: Bed/chair exit alarm, Call light in reach    History of Falls Interventions: Bed/chair exit alarm, Door open when patient unattended         Problem: Fluid Volume - Risk of, Imbalanced  Goal: *Balanced intake and output  Outcome: Progressing Towards Goal

## 2021-12-06 NOTE — PROGRESS NOTES
Problem: Self Care Deficits Care Plan (Adult)  Goal: *Acute Goals and Plan of Care (Insert Text)  Description:   FUNCTIONAL STATUS PRIOR TO ADMISSION: Pt was living alone, has reportedly some assistance at home but might be an unreliable . He has had increased falls at home with unknown causes. Page Zaheer he was driving. HOME SUPPORT: lives alone and either had or has some assistance paid coming into the home    Occupational Therapy Goals  Initiated 12/6/2021  1. Patient will perform grooming with independence within 7 day(s). 2.  Patient will perform bathing with supervision/set-up within 7 day(s). 3.  Patient will perform lower body dressing with supervision/set-up within 7 day(s). 4.  Patient will perform toilet transfers with minimal assistance/contact guard assist within 7 day(s). 5.  Patient will perform all aspects of toileting with modified independence within 7 day(s). 6.  Patient will participate in upper extremity therapeutic exercise/activities with supervision/set-up for 8 minutes within 7 day(s). Outcome: Not Progressing Towards Goal   OCCUPATIONAL THERAPY EVALUATION  Patient: Jame Camargo (77 y.o. 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 Reports of   1. Partially loculated left pleural effusion with linear changes at the left  base  2. Multiple chronic left-sided rib fractures. Subacute left 10th rib fracture. .    Pt lived at home alone with family member nearby and we believe some degree of paid help although he claims independence with self care. Has a walker and a cane but hasn't been using at home. He is unable to sit EOB independently Mod assist to get to EOB and even with prop sitting has posterior lean.  He is resistant to using walker for transfer, preferring to just grab furniture to move over. He is confused. He is verbally aggressive with OTR at one point. Current Level of Function Impacting Discharge (ADLs/self-care): if he was independent before he is not now. He needs assist to sit propped on EOB. He needs 2 persons to assist with transfer using walker, which he doesn't want to use. He will be max assist for LE bathing/dressing, managing clothing at toilet might be dependent due to poor standing balance. Functional Outcome Measure: The patient scored Total: 35/100 on the Barthel Index outcome measure which is indicative of being significantly impaired in basic self-care. Other factors to consider for discharge: wants to go home to live alone     Patient will benefit from skilled therapy intervention to address the above noted impairments. PLAN :  Recommendations and Planned Interventions: self care training, functional mobility training, therapeutic exercise, therapeutic activities, and endurance activities    Frequency/Duration: Patient will be followed by occupational therapy 3 - 5 times a week to address goals. Recommendation for discharge: (in order for the patient to meet his/her long term goals)  Therapy up to 5 days/week in SNF setting    This discharge recommendation:  Has been made in collaboration with the attending provider and/or case management    IF patient discharges home will need the following DME: none       SUBJECTIVE:   Patient stated \" I don't know why I fell.     OBJECTIVE DATA SUMMARY:   HISTORY:   Past Medical History:   Diagnosis Date    Arrhythmia     atrial fibrillation, cardioversion 2010    CKD (chronic kidney disease) stage 3, GFR 30-59 ml/min ([facility])     Hyperlipemia     Hypertension     Iron deficiency anemia due to chronic blood loss 2/9/2021    PAF (paroxysmal atrial fibrillation) ([facility] 2/9/2021    PVD [facility]peripheral vascular disease) [facility]     Thyroid disease      Past Surgical History:   Procedure Laterality Date    HX COLONOSCOPY  2010    WNL SIS 10 y    HX HERNIA REPAIR Bilateral     Inguinal    HX ORTHOPAEDIC      right shoulder fracture repair    HX OTHER SURGICAL  03/09/2019    groin lymph gland excision    HX VASCULAR ACCESS      IR INSERT TUNL CVC W PORT OVER 5 YEARS  3/5/2020    MD CHEST SURGERY PROCEDURE UNLISTED Left     lung surgery age 17, lobectomy    VASCULAR SURGERY PROCEDURE UNLIST      bypass numerous surgeries both legs       Expanded or extensive additional review of patient history:     Home Situation  Home Environment: Private residence  # Steps to Enter: 4  One/Two Story Residence: Two story  Living Alone: Yes  Support Systems: Child(nanci)  Patient Expects to be Discharged to[de-identified] [employer]  Current DME Used/Available at Home: Cane, straight, Walker, rolling    Hand dominance: Right    EXAMINATION OF PERFORMANCE DEFICITS:  Cognitive/Behavioral Status:  Neurologic State: Alert  Orientation Level: Oriented to person; Oriented to place; Oriented to situation; Disoriented to time           Safety/Judgement: Decreased awareness of need for assistance; Decreased awareness of need for safety; Decreased insight into deficits    Skin: intact    Edema: none noted    Hearing: Auditory  Auditory Impairment: None    Vision/Perceptual:    intact       Range of Motion:  wFL    Strength:  Deferred due to aggitation after transfer      Coordination:     Fine Motor Skills-Upper: Left Intact; Right Intact    Gross Motor Skills-Upper: Left Intact; Right Intact    Tone & Sensation:  Normal tone and sensation       Balance:  Sitting: With support; Impaired  Sitting - Static: Prop sitting; Poor (constant support)  Sitting - Dynamic: Prop sitting; Poor (constant support)  Standing: Impaired;  With support  Standing - Static: Fair; Constant support  Standing - Dynamic : Poor; Constant support    Functional Mobility and Transfers for ADLs:  Bed Mobility:  Rolling: Modified independent  Supine to Sit: Minimum assistance  Scooting: Minimum assistance    Transfers:  Sit to Stand: Assist x2; Moderate assistance  Stand to Sit: Assist x2; Moderate assistance  Bed to Chair: Assist x2; Moderate assistance    ADL Assessment:  Feeding: Setup              Upper Body Dressing: Additional time; Minimum assistance    Lower Body Dressing: Maximum assistance    Toileting: Maximum assistance                ADL Intervention and task modifications:       Grooming  Position Performed: Seated in chair  Washing Face: Set-up  Washing Hands: Set-up  Brushing Teeth: Set-up  Brushing/Combing Hair: Supervision       Cognitive Retraining  Safety/Judgement: Decreased awareness of need for assistance; Decreased awareness of need for safety; Decreased insight into deficits      Functional Measure:    Barthel Index:  Bathin  Bladder: 5  Bowels: 5  Groomin  Dressin  Feeding: 10  Mobility: 0  Stairs: 0  Toilet Use: 5  Transfer (Bed to Chair and Back): 5  Total: 35/100      The Barthel ADL Index: Guidelines  1. The index should be used as a record of what a patient does, not as a record of what a patient could do. 2. The main aim is to establish degree of independence from any help, physical or verbal, however minor and for whatever reason. 3. The need for supervision renders the patient not independent. 4. A patient's performance should be established using the best available evidence. Asking the patient, friends/relatives and nurses are the usual sources, but direct observation and common sense are also important. However direct testing is not needed. 5. Usually the patient's performance over the preceding 24-48 hours is important, but occasionally longer periods will be relevant. 6. Middle categories imply that the patient supplies over 50 per cent of the effort. 7. Use of aids to be independent is allowed.     Score Interpretation (from [address] [place] [address])    Independent   60-79 Minimally independent   40-59 Partially dependent   20-39 Very dependent   <20 Totally dependent     -Niki Murillo, Barthel, D.W. (3571). Functional evaluation: the Barthel Index. [address] ([address] (1997). The Barthel activities of daily living index: self-reporting versus actual performance in the old (> or = 75 years). Journal of [address] [place] [address] 45(7), [address] [facility], MERCEDES, Tasneem Cain., Ranjit Lloyd. (1999). Measuring the change in disability after inpatient rehabilitation; comparison of the responsiveness of the Barthel Index and Functional [place] Measure. Journal of Neurology, Neurosurgery, and Psychiatry, 66(4), 645-817. ELAN Berry, TEJ Blackwood, & Tanvir Francisco MLONA. (2004) Assessment of post-stroke quality of life in cost-effectiveness studies: The usefulness of the Barthel Index and the EuroQoL-5D. Quality of Life Research, 15, 554-74     Occupational Therapy Evaluation Charge Determination   History Examination Decision-Making   LOW Complexity : Brief history review  LOW Complexity : 1-3 performance deficits relating to physical, cognitive , or psychosocial skils that result in activity limitations and / or participation restrictions  LOW Complexity : No comorbidities that affect functional and no verbal or physical assistance needed to complete eval tasks       Based on the above components, the patient evaluation is determined to be of the following complexity level: LOW   Pain Ratin/10    Activity Tolerance:   Poor    After treatment patient left in no apparent distress:    Sitting in chair, Call bell within reach, and Bed / chair alarm activated    COMMUNICATION/EDUCATION:   The patients plan of care was discussed with: Physical therapist and Case management. Patient/family have participated as able in goal setting and plan of care. This patients plan of care is appropriate for delegation to \[facility] [place] [facility]\"".     Thank you for this referral.  Brooke Troy, OT  Time Calculation: 24 mins

## 2021-12-06 NOTE — PROGRESS NOTES
Problem: Mobility Impaired (Adult and Pediatric)  Goal: *Acute Goals and Plan of Care (Insert Text)  Description: FUNCTIONAL STATUS PRIOR TO ADMISSION: Patient was modified independent using a rolling walker for functional mobility. HOME SUPPORT PRIOR TO ADMISSION: The patient lived alone with home care 6 hours daily to provide assistance. Physical Therapy Goals  Initiated 12/6/2021  1. Patient will move from supine to sit and sit to supine  in bed with modified independence within 7 day(s). 2.  Patient will transfer from bed to chair and chair to bed with supervision/set-up using the least restrictive device within 7 day(s). 3.  Patient will perform sit to stand with supervision/set-up within 7 day(s). 4.  Patient will ambulate with supervision/set-up for 30 feet with the least restrictive device within 7 day(s). 5.  Patient will ascend/descend 3 stairs with 2 handrail(s) with minimal assistance/contact guard assist within 7 day(s). Outcome: Progressing Towards Goal   PHYSICAL THERAPY EVALUATION  Patient: Chetan Velarde (2 y.o. male)  Date: 12/6/2021  Primary Diagnosis: CAP (community acquired pneumonia) [J18.9]  Rhabdomyolysis [M62.82]        Precautions: fall       ASSESSMENT  Based on the objective data described below, the patient presents with requiring moderate assist of 2 to transfer from the bed to chair. Sp02 on 2 liters 02 92% rest, and decreases to 86% with mobility. Patient reports he is not on 02 at home. .    Current Level of Function Impacting Discharge (mobility/balance): moderate assist for mobility, pt lives alone, caregiver 6 hours /day. Functional Outcome Measure: The patient scored 1+/5 on the [address] [place] [address] standing balance outcome measure which is indicative of requiring assistance for standing balance.       Other factors to consider for discharge: lives alone, h/o falls     Patient will benefit from skilled therapy intervention to address the above noted impairments. PLAN :  Recommendations and Planned Interventions: bed mobility training, transfer training, gait training, therapeutic exercises, neuromuscular re-education, patient and family training/education, and therapeutic activities      Frequency/Duration: Patient will be followed by physical therapy:  4-6 days/wk, 1-2 times/day to address goals. Recommendation for discharge: (in order for the patient to meet his/her long term goals)  Therapy up to 5 days/week in SNF setting or intensive home health therapy program    This discharge recommendation:  A follow-up discussion with the attending provider and/or case management is planned    IF patient discharges home will need the following DME: to be determined (TBD)         SUBJECTIVE:   Patient stated I do not need oxygen.     OBJECTIVE DATA SUMMARY:   HISTORY:  /5  Past Medical History:   Diagnosis Date    Arrhythmia     atrial fibrillation, cardioversion 2010    CKD (chronic kidney disease) stage 3, GFR 30-59 ml/min ([facility]     Hyperlipemia     Hypertension     Iron deficiency anemia due to chronic blood loss 2/9/2021    PAF (paroxysmal atrial fibrillation) ([facility] 2/9/2021    PVD (peripheral vascular disease) ([facility]     Thyroid disease      Past Surgical History:   Procedure Laterality Date    HX COLONOSCOPY  2010    WNL SIS 10 y    HX HERNIA REPAIR Bilateral     Inguinal    HX ORTHOPAEDIC      right shoulder fracture repair    HX OTHER SURGICAL  03/09/2019    groin lymph gland excision    HX VASCULAR ACCESS      IR INSERT TUNL CVC W PORT OVER 5 YEARS  3/5/2020    UT CHEST SURGERY PROCEDURE UNLISTED Left     lung surgery age 17, lobectomy    VASCULAR SURGERY PROCEDURE UNLIST      bypass numerous surgeries both legs       Personal factors and/or comorbidities impacting plan of care: did not seem aware that he needed to use the bathroom, SOB, requires assistance with mobility    Home Situation  Home Environment: Private residence  One/Two Story Residence: Two story  Living Alone: Yes  Support Systems: Caregiver/Home Care Staff  Patient Expects to be Discharged to[de-identified] House  Current DME Used/Available at Home: Lisa Reagan, straight, Walker, Wheelchair    EXAMINATION/PRESENTATION/DECISION MAKING:   Critical Behavior:  Neurologic State: Alert  Orientation Level: Oriented to person, Oriented to place, Disoriented to time, Disoriented to situation        Hearing: Auditory  Auditory Impairment: None    Range Of Motion:      WFL bilateral LEs                    Strength:           Grossly 3/5 LEs             Functional Mobility:  Bed Mobility:     Supine to Sit: Minimum assistance; Moderate assistance     Scooting: Minimum assistance  Transfers:  Sit to Stand: Moderate assistance; Assist x2     Stand Pivot Transfers: Moderate assistance; Assist x2                    Balance:   Sitting: With support; Impaired  Standing: Impaired; With support  Ambulation/Gait Training:  Distance (ft): 2 Feet (ft)  Assistive Device: Gait belt; Walker, rolling  Ambulation - Level of Assistance:  Moderate assistance; Assist x2     Gait Description (WDL): Exceptions to WDL  Gait Abnormalities: Path deviations; Decreased step clearance        Base of Support: Narrowed     Speed/Joy: Pace decreased (<100 feet/min)                     Physical Therapy Evaluation Charge Determination   History Examination Presentation Decision-Making   MEDIUM  Complexity : 1-2 comorbidities / personal factors will impact the outcome/ POC  MEDIUM Complexity : 3 Standardized tests and measures addressing body structure, function, activity limitation and / or participation in recreation  LOW Complexity : Stable, uncomplicated  LOW Complexity : FOTO score of       Based on the above components, the patient evaluation is determined to be of the following complexity level: LOW     Pain Ratin    Activity Tolerance:   Fair    After treatment patient left in no apparent distress:   Sitting in chair, Call bell within reach, and Bed / chair alarm activated    COMMUNICATION/EDUCATION:   The patients plan of care was discussed with: Occupational therapist.     Fall prevention education was provided and the patient/caregiver indicated understanding. and Patient/family have participated as able in goal setting and plan of care.     Thank you for this referral.  Víctor Cerda, PT   Time Calculation: 20 mins

## 2021-12-06 NOTE — PROGRESS NOTES
Pharmacy: Warfarin  Dosing    Indication: afib  Goal INR: 2-3  Today's Dose: warfarin 2mg   Labs:  Recent Labs     12/06/21  0559 12/05/21  0755 12/04/21  0550 12/03/21  1441 12/03/21  1302   INR 2.6* 2.6*  --  2.3*  --    HGB  --  8.6* 8.5*  --  10.4*   HCT  --  27.4* 26.6*  --  32.9*   PLT  --  169 175  --  230       Home Dose: Warfarin 3 mg on Thursday and 2.5 mg all other days  Drug Interactions: amiodarone; azithromycin  Dietary Intake: regular - low fat, low cholesterol    Impression/Plan: INR today = 2.6. Today's dose to be warfarin 2mg.         Thanks,  Yumiko Estrella

## 2021-12-06 NOTE — PROGRESS NOTES
IDR Team; MD, Care Manager, Physical therapy, OT, , Nursing Supervisor, Pharmacy and Dietician, met to review patient's plan of care. Discussed goals, interventions, barriers and progress. Team will continue to monitor progress and report any concerns to the physician and care management as indicated. Transition of Care Plan: Per MD, patient was found on floor by someone. Was admitted for CAP. MD will order COVID test. CT didn't show pneumonia. Is on antibiotics. Per OT, patient has unsafe sitting balance. Has been refusing therapy over weekend.  PT evaluated today

## 2021-12-07 ENCOUNTER — APPOINTMENT (OUTPATIENT)
Dept: GENERAL RADIOLOGY | Age: 81
DRG: 564 | End: 2021-12-07
Attending: INTERNAL MEDICINE
Payer: MEDICARE

## 2021-12-07 LAB
ANION GAP SERPL CALC-SCNC: 11 MMOL/L (ref 5–15)
BASOPHILS # BLD: 0.1 K/UL (ref 0–0.1)
BASOPHILS NFR BLD: 1 % (ref 0–1)
BUN SERPL-MCNC: 43 MG/DL (ref 6–20)
BUN/CREAT SERPL: 16 (ref 12–20)
CALCIUM SERPL-MCNC: 8.3 MG/DL (ref 8.5–10.1)
CHLORIDE SERPL-SCNC: 110 MMOL/L (ref 97–108)
CO2 SERPL-SCNC: 20 MMOL/L (ref 21–32)
CREAT SERPL-MCNC: 2.61 MG/DL (ref 0.7–1.3)
DIFFERENTIAL METHOD BLD: ABNORMAL
EOSINOPHIL # BLD: 0.1 K/UL (ref 0–0.4)
EOSINOPHIL NFR BLD: 2 % (ref 0–7)
ERYTHROCYTE [DISTWIDTH] IN BLOOD BY AUTOMATED COUNT: 14.2 % (ref 11.5–14.5)
GLUCOSE SERPL-MCNC: 95 MG/DL (ref 65–100)
HCT VFR BLD AUTO: 27.6 % (ref 36.6–50.3)
HGB BLD-MCNC: 8.5 G/DL (ref 12.1–17)
IMM GRANULOCYTES # BLD AUTO: 0.3 K/UL (ref 0–0.04)
IMM GRANULOCYTES NFR BLD AUTO: 4 % (ref 0–0.5)
INR PPP: 2.7 (ref 0.9–1.1)
LYMPHOCYTES # BLD: 0.8 K/UL (ref 0.8–3.5)
LYMPHOCYTES NFR BLD: 11 % (ref 12–49)
MCH RBC QN AUTO: 30.7 PG (ref 26–34)
MCHC RBC AUTO-ENTMCNC: 30.8 G/DL (ref 30–36.5)
MCV RBC AUTO: 99.6 FL (ref 80–99)
MONOCYTES # BLD: 0.8 K/UL (ref 0–1)
MONOCYTES NFR BLD: 11 % (ref 5–13)
NEUTS SEG # BLD: 5.1 K/UL (ref 1.8–8)
NEUTS SEG NFR BLD: 71 % (ref 32–75)
NRBC # BLD: 0 K/UL (ref 0–0.01)
NRBC BLD-RTO: 0 PER 100 WBC
PLATELET # BLD AUTO: 150 K/UL (ref 150–400)
PLATELET COMMENTS,PCOM: ABNORMAL
PMV BLD AUTO: 12.7 FL (ref 8.9–12.9)
POTASSIUM SERPL-SCNC: 4.4 MMOL/L (ref 3.5–5.1)
PROTHROMBIN TIME: 25.9 SEC (ref 9–11.1)
RBC # BLD AUTO: 2.77 M/UL (ref 4.1–5.7)
RBC MORPH BLD: ABNORMAL
SODIUM SERPL-SCNC: 141 MMOL/L (ref 136–145)
WBC # BLD AUTO: 7.2 K/UL (ref 4.1–11.1)

## 2021-12-07 PROCEDURE — 80048 BASIC METABOLIC PNL TOTAL CA: CPT

## 2021-12-07 PROCEDURE — 94760 N-INVAS EAR/PLS OXIMETRY 1: CPT

## 2021-12-07 PROCEDURE — 97530 THERAPEUTIC ACTIVITIES: CPT

## 2021-12-07 PROCEDURE — 71046 X-RAY EXAM CHEST 2 VIEWS: CPT

## 2021-12-07 PROCEDURE — 85025 COMPLETE CBC W/AUTO DIFF WBC: CPT

## 2021-12-07 PROCEDURE — 74011250637 HC RX REV CODE- 250/637: Performed by: INTERNAL MEDICINE

## 2021-12-07 PROCEDURE — 74011000258 HC RX REV CODE- 258: Performed by: INTERNAL MEDICINE

## 2021-12-07 PROCEDURE — 65270000029 HC RM PRIVATE

## 2021-12-07 PROCEDURE — 36415 COLL VENOUS BLD VENIPUNCTURE: CPT

## 2021-12-07 PROCEDURE — 74011250636 HC RX REV CODE- 250/636: Performed by: INTERNAL MEDICINE

## 2021-12-07 PROCEDURE — 97535 SELF CARE MNGMENT TRAINING: CPT

## 2021-12-07 PROCEDURE — 85610 PROTHROMBIN TIME: CPT

## 2021-12-07 RX ORDER — WARFARIN 2 MG/1
2 TABLET ORAL ONCE
Status: COMPLETED | OUTPATIENT
Start: 2021-12-07 | End: 2021-12-07

## 2021-12-07 RX ADMIN — AMIODARONE HYDROCHLORIDE 200 MG: 200 TABLET ORAL at 09:34

## 2021-12-07 RX ADMIN — LOSARTAN POTASSIUM 50 MG: 50 TABLET, FILM COATED ORAL at 09:34

## 2021-12-07 RX ADMIN — DILTIAZEM HYDROCHLORIDE 60 MG: 60 TABLET, FILM COATED ORAL at 17:40

## 2021-12-07 RX ADMIN — DILTIAZEM HYDROCHLORIDE 60 MG: 60 TABLET, FILM COATED ORAL at 09:34

## 2021-12-07 RX ADMIN — CEFTRIAXONE SODIUM 2 G: 2 INJECTION, POWDER, FOR SOLUTION INTRAMUSCULAR; INTRAVENOUS at 15:46

## 2021-12-07 RX ADMIN — Medication 10 ML: at 22:00

## 2021-12-07 RX ADMIN — POLYETHYLENE GLYCOL 3350 17 G: 17 POWDER, FOR SOLUTION ORAL at 09:34

## 2021-12-07 RX ADMIN — FAMOTIDINE 20 MG: 20 TABLET, FILM COATED ORAL at 17:40

## 2021-12-07 RX ADMIN — WARFARIN SODIUM 2 MG: 2 TABLET ORAL at 17:40

## 2021-12-07 RX ADMIN — LEVOTHYROXINE SODIUM 100 MCG: 0.1 TABLET ORAL at 07:01

## 2021-12-07 RX ADMIN — AZITHROMYCIN MONOHYDRATE 500 MG: 500 INJECTION, POWDER, LYOPHILIZED, FOR SOLUTION INTRAVENOUS at 16:34

## 2021-12-07 NOTE — PROGRESS NOTES
Problem: Pressure Injury - Risk of  Goal: *Prevention of pressure injury  Description: Document Alexander Scale and appropriate interventions in the flowsheet. Outcome: Progressing Towards Goal  Note: Pressure Injury Interventions:  Sensory Interventions: Assess changes in LOC    Moisture Interventions: Absorbent underpads    Activity Interventions: PT/OT evaluation, Increase time out of bed    Mobility Interventions: HOB 30 degrees or less    Nutrition Interventions: Document food/fluid/supplement intake    Friction and Shear Interventions: HOB 30 degrees or less, Apply protective barrier, creams and emollients                Problem: Patient Education: Go to Patient Education Activity  Goal: Patient/Family Education  Outcome: Progressing Towards Goal     Problem: Falls - Risk of  Goal: *Absence of Falls  Description: Document Ellie Fall Risk and appropriate interventions in the flowsheet.   Outcome: Progressing Towards Goal  Note: Fall Risk Interventions:  Mobility Interventions: Bed/chair exit alarm    Mentation Interventions: Adequate sleep, hydration, pain control, Bed/chair exit alarm, Door open when patient unattended    Medication Interventions: Bed/chair exit alarm, Patient to call before getting OOB    Elimination Interventions: Bed/chair exit alarm, Call light in reach    History of Falls Interventions: Bed/chair exit alarm, Door open when patient unattended         Problem: Patient Education: Go to Patient Education Activity  Goal: Patient/Family Education  Outcome: Progressing Towards Goal     Problem: Fluid Volume - Risk of, Imbalanced  Goal: *Balanced intake and output  Outcome: Progressing Towards Goal

## 2021-12-07 NOTE — PROGRESS NOTES
Bedside shift change report given to ZAYDA Wilson RN (oncoming nurse) by Eunice Olivera RN   (offgoing nurse). Report included the following information Kardex, Intake/Output and Recent Results.

## 2021-12-07 NOTE — PROGRESS NOTES
Pharmacy: Warfarin  Dosing    Indication: afib  Goal INR: 2-3  Today's Dose: warfarin 2mg   Labs:  Recent Labs     12/07/21  0556 12/06/21  0559 12/05/21  0755   INR 2.7* 2.6* 2.6*   HGB 8.5*  --  8.6*   HCT 27.6*  --  27.4*     --  169       Home Dose: Warfarin 3 mg on Thursday and 2.5 mg all other days  Drug Interactions: amiodarone; azithromycin  Dietary Intake: regular - low fat, low cholesterol    Impression/Plan: Today's dose to be warfarin 2mg.         Thanks,  Lynne Garcia, PHARMD

## 2021-12-07 NOTE — PROGRESS NOTES
Problem: Pressure Injury - Risk of  Goal: *Prevention of pressure injury  Description: Document Alexander Scale and appropriate interventions in the flowsheet. Outcome: Progressing Towards Goal  Note: Pressure Injury Interventions:  Sensory Interventions: Assess changes in LOC, Check visual cues for pain, Maintain/enhance activity level, Minimize linen layers    Moisture Interventions: Absorbent underpads, Apply protective barrier, creams and emollients    Activity Interventions: Increase time out of bed    Mobility Interventions: Turn and reposition approx. every two hours(pillow and wedges)    Nutrition Interventions: Document food/fluid/supplement intake, Discuss nutritional consult with provider    Friction and Shear Interventions: HOB 30 degrees or less, Apply protective barrier, creams and emollients                Problem: Patient Education: Go to Patient Education Activity  Goal: Patient/Family Education  Outcome: Progressing Towards Goal     Problem: Falls - Risk of  Goal: *Absence of Falls  Description: Document Ellie Fall Risk and appropriate interventions in the flowsheet.   Outcome: Progressing Towards Goal  Note: Fall Risk Interventions:  Mobility Interventions: Bed/chair exit alarm, Patient to call before getting OOB, PT Consult for mobility concerns, PT Consult for assist device competence, Strengthening exercises (ROM-active/passive), Utilize walker, cane, or other assistive device    Mentation Interventions: Bed/chair exit alarm, Door open when patient unattended    Medication Interventions: Bed/chair exit alarm, Patient to call before getting OOB    Elimination Interventions: Bed/chair exit alarm, Call light in reach    History of Falls Interventions: Bed/chair exit alarm, Door open when patient unattended         Problem: Patient Education: Go to Patient Education Activity  Goal: Patient/Family Education  Outcome: Progressing Towards Goal     Problem: Fluid Volume - Risk of, Imbalanced  Goal: *Balanced intake and output  Outcome: Progressing Towards Goal

## 2021-12-07 NOTE — PROGRESS NOTES
Comfortable and resting in bed with bedcheck in place. Congested cough, RA sat 92%. Pt wants O2 off for now.

## 2021-12-07 NOTE — PROGRESS NOTES
Problem: Self Care Deficits Care Plan (Adult)  Goal: *Acute Goals and Plan of Care (Insert Text)  Description:   FUNCTIONAL STATUS PRIOR TO ADMISSION: Pt was living alone, has reportedly some assistance at home but might be an unreliable . He has had increased falls at home with unknown causes. [employer] he was driving. HOME SUPPORT: lives alone and either had or has some assistance paid coming into the home    Occupational Therapy Goals  Initiated 12/6/2021  1. Patient will perform grooming with independence within 7 day(s). 2.  Patient will perform bathing with supervision/set-up within 7 day(s). 3.  Patient will perform lower body dressing with supervision/set-up within 7 day(s). 4.  Patient will perform toilet transfers with minimal assistance/contact guard assist within 7 day(s). 5.  Patient will perform all aspects of toileting with modified independence within 7 day(s). 6.  Patient will participate in upper extremity therapeutic exercise/activities with supervision/set-up for 8 minutes within 7 day(s). Outcome: Not Progressing Towards Goal   OCCUPATIONAL THERAPY TREATMENT  Patient: Kelsy Negro (33 y.o. male)  Date: 12/7/2021  Diagnosis: CAP (community acquired pneumonia) [J18.9]  Rhabdomyolysis [M62.82]   Traumatic rhabdomyolysis [facility])       Precautions: Fall, Bed Alarm  Chart, occupational therapy assessment, plan of care, and goals were reviewed. ASSESSMENT  Patient continues with skilled OT services and is not progressing towards goals. Pt was still in bed when OT/PT entered. He is confused. Thinks he's 81 yrs old. Said the chair we asked him to sit in was not his chair and where was his chair. He is argumentative with rehab tech. He gets agitated when told we need to use the walker. Refused to take a walk. Refused to listen to direction for hand placement on walker requiring physical assist to move from the lower bar to the hand . Is sit to stand with assist of 2.  Is unable to effectively take a full step forward and needed cues to shuffle. He was unable to ambulate approx 5 feet to the chair and had to be assisted by 2 to turn with walker so chair could be brought behind him. OTR and CM went back as pt refused to groom while in chair. O2 had been reapplied due stats dropping during transfer to chair to 84% on room air but rebounded quickly with 2L. CM able to get him to participate in seated grooming. R shoulder deficit noted with suspected RTC issue which pt states is pre-existing. OTR able to passively get him to approx 85 degrees in scaption plane but he cannot hold it. He still wants to use R hand with toothbrush but is not as effective as if he'd used the left which he refused. He was unable to get a sip for mouth rinse and was agitated OTR tried to get him to use the left. CM able to get him to use the left. Current Level of Function Impacting Discharge (ADLs): loss of full AROM in the R shoulder which  Is thought to be pre-existing. He is assist x 2 for transfer, unable to ambulate, assist x 1 to EOB. Dependent for cassandra-care, max assist LE dressing and resistant to do anything. Other factors to consider for discharge: lives alone but has had some degree of caregivers, reportedly 2 days per week. He has memory loss. PLAN :  Patient continues to benefit from skilled intervention to address the above impairments. Continue treatment per established plan of care to address goals. Recommend with staff: up in chair for meals    Recommend next OT session: transfer training, self care tasks    Recommendation for discharge: (in order for the patient to meet his/her long term goals)  Therapy up to 5 days/week in SNF setting    This discharge recommendation:  Has been made in collaboration with the attending provider and/or case management    IF patient discharges home will need the following DME: none       SUBJECTIVE:   Patient stated It doesn't work.  Re R shoulder    OBJECTIVE DATA SUMMARY:   Cognitive/Behavioral Status:  Neurologic State: Alert  Orientation Level: Disoriented X4 (thinks he's 81 yrs old)           Safety/Judgement: Decreased insight into deficits; Decreased awareness of need for safety; Decreased awareness of need for assistance    Functional Mobility and Transfers for ADLs:  Bed Mobility:  Rolling: Modified independent  Supine to Sit: Minimum assistance  Scooting: Modified independent    Transfers:  Sit to Stand: Assist x2     Bed to Chair: Assist x2; Moderate assistance    Balance:   EOB prop sits but without physical assist    ADL Intervention:       Grooming  Grooming Assistance: (P) Set-up; Minimum assistance  Position Performed: (P) Seated in chair  Washing Face: (P) Set-up  Brushing Teeth: (P) Minimum assistance      Cognitive Retraining  Safety/Judgement: Decreased insight into deficits; Decreased awareness of need for safety; Decreased awareness of need for assistance      Pain:  0/10    Activity Tolerance:   Poor    After treatment patient left in no apparent distress:   Sitting in chair, Call bell within reach and Bed / chair alarm activated    COMMUNICATION/COLLABORATION:   The patients plan of care was discussed with: Case management.      Jesús Obrien OT  Time Calculation: 24 mins

## 2021-12-07 NOTE — PROGRESS NOTES
Bedside and Verbal shift change report given to FAVIOLA Paredes RN (oncoming nurse) by Loly Rodriguez RN (offgoing nurse). Report included the following information SBAR, Kardex, MAR, Accordion and Recent Results.

## 2021-12-07 NOTE — PROGRESS NOTES
IDR Team; MD, Nursing, Care Manager, Physical therapy, Nursing Supervisor, OT, , Pharmacy and Dietician, met to review patient's plan of care. Discussed goals, interventions, barriers and progress. Team will continue to monitor progress and report any concerns to the physician and care management as indicated. Transition of Care Plan: Per MD, patient was found on floor Friday. Lives at home alone but has a sitter 2 days a week. Per RN patient is able to sit on side of the bed. Per MD, patient will need to see PT/OT for recommendations. Pharm says there needs to be end date on antibiotic.  DC end of the week likely

## 2021-12-07 NOTE — PROGRESS NOTES
[facility]  Hospitalist Progress Note    NAME: Ronni Seip   :  1940   MRN:  [id number]     Total duration of encounter: 4 days      Interim Hospital Summary: [de-identified] y.o. male who presented on 12/3/2021 with Traumatic rhabdomyolysis ([facility] [address] He has a past medical history of Arrhythmia, CKD (chronic kidney disease) stage 3, GFR 30-59 ml/min ([facility]), Hyperlipemia, Hypertension, Iron deficiency anemia due to chronic blood loss (2021), PAF (paroxysmal atrial fibrillation) ([facility] [address] (2021), PVD (peripheral vascular disease) ([facility] and Thyroid disease. .     Pt brought into ED by EMS after being found on the floor likely for several days. Recent h/o recurrent falls a/w weakness. Was active / driving independently one week ago. Some subjective fever, but not documented. No sputum production. Less pain and Improved strength. Resistant to work with PT/OT        Subjective:     Chief Complaint / Reason for Physician Visit  \"OK\".   Discussed with VIVIAN Hayward  Denies c/o pain  JONES better  Uncertain placement plan, patient primarily interested in d/c home  He however claims more help coming in then actual    Review of Systems:  Symptom Y/N Comments  Symptom Y/N Comments   Fever/Chills n   Chest Pain n    Poor Appetite n   Edema n    Cough n   Abdominal Pain n    Sputum n   Joint Pain n    SOB/JONES y   Pruritis/Rash n    Nausea/vomit n   Tolerating PT/OT y  to start   Diarrhea n   Tolerating Diet y    Constipation n   Other         Current Facility-Administered Medications:     WARFARIN DOSING PER PHARMACY, 1 Each, Other, Rx Dosing/Monitoring, Nasrin Rose MD    influenza vaccine  (6 mos+)(PF) (FLUARIX/FLULAVAL/FLUZONE QUAD) injection 0.5 mL, 1 Each, IntraMUSCular, PRIOR TO DISCHARGE, Nasrin Rose MD    bisacodyL (DULCOLAX) suppository 10 mg, 10 mg, Rectal, DAILY PRN, Nasrin Rose MD    polyethylene glycol (MIRALAX) packet 17 g, 17 g, Oral, DAILY, Nasrin Rose MD, 17 g at 21 3087    sodium chloride (NS) flush 5-10 mL, 5-10 mL, IntraVENous, PRN, Jeane Robertson MD    acetaminophen (TYLENOL) tablet 650 mg, 650 mg, Oral, Q6H PRN, Jeane Robertson MD    [facility]ans[facility]ron [facility]) injection 4 mg, 4 mg, IntraVENous, Q4H PRN, Jeane Robertson MD    amiodarone (CORDARONE) tablet 200 mg, 200 mg, Oral, DAILY, Jeane Robertson MD, 200 mg at 12/07/21 0934    dilTIAZem IR (CARDIZEM) tablet 60 mg, 60 mg, Oral, BID, Jeane Robertson MD, 60 mg at 12/07/21 1740    levothyroxine (SYNTHROID) tablet 100 mcg, 100 mcg, Oral, ACB, Jeane Robertson MD, 100 mcg at 12/07/21 0701    losartan (COZAAR) tablet 50 mg, 50 mg, Oral, DAILY, Jeane Robertson MD, 50 mg at 12/07/21 0934    sodium chloride (NS) flush 5-40 mL, 5-40 mL, IntraVENous, Q8H, Jeane Robertson MD, 10 mL at 12/06/21 2129    sodium chloride (NS) flush 5-40 mL, 5-40 mL, IntraVENous, PRN, Jeane Robertson MD    polyethylene glycol (MIRALAX) packet 17 g, 17 g, Oral, DAILY PRN, Jeane Robertson MD    famotidine (PEPCID) tablet 20 mg, 20 mg, Oral, QPM, Jeane Robertson MD, 20 mg at 12/07/21 1740    Objective:     VITALS:   Patient Vitals for the past 12 hrs:   Temp Pulse Resp BP SpO2   12/07/21 1625 97.6 °F (36.4 °C) 71 20 (!) 180/74 94 %   12/07/21 1159  65  (!) 184/83    12/07/21 0753 97.7 °F (36.5 °C) 72 20 (!) 191/77 94 %     No intake or output data in the 24 hours ending 12/07/21 1808     PHYSICAL EXAM:  General: WD, WN. Alert, cooperative, no acute distress    EENT:  EOMI. Anicteric sclerae. MMM  Resp:  CTA bilaterally, no wheezing or rales. No accessory muscle use  CV:  Regular  rhythm,  M apex  GI:  Soft, Non distended, Non tender. +Bowel sounds  Neurologic:  Alert and oriented X 3, mumbled speech, nonfocal  Psych:   Not anxious nor agitated  Skin:  No rashes. No jaundice    LABS:  I reviewed today's most current labs and imaging studies.   Pertinent labs include:  Recent Labs     12/07/21  0556 12/05/21  0755   WBC 7.2 10.8   HGB 8.5* 8.6*   HCT 27.6* 27.4*    169     Recent Labs     12/07/21  0556 12/06/21  0559 12/05/21  0755    140 138   K 4.4 4.2 4.1   * 109* 107   CO2 20* 20* 21   GLU 95 102* 111*   BUN 43* 54* 62*   CREA 2.61* 2.94* 3.40*   CA 8.3* 8.2* 8.3*   INR 2.7* 2.6* 2.6*     Results for Doreen Michelle (MRN [id number]) as of 12/4/2021 17:36   12/3/2021 13:02 12/3/2021 14:41 12/4/2021 05:50 12/5/2021   Lactic acid 1.8 1.1 0.6    CK   670 (H) 323 (H)   CK 1,170 (H)      CK - MB 11.0 (H)      CK-MB Index 0.9      Troponin-HS 63  59      Results for Doreen Michelle (MRN [id number]) as of 12/5/2021 13:32   Ref. Range 12/5/2021 07:55   TSH 0.36 - 3.74  0.31 (L)       RADIOLOGY:  pCXR 12/3:  The left chest wall is chronically indented at the level of the fifth and sixth  lateral ribs. Rounded pleural density adjacent this is new, however. An  underlying occult rib fracture is possible; a displaced fracture is not visualized.   Aside from atelectasis adjacent the loculated left pleural fluid, the lungs are  clear. The central airways are patent. No pneumothorax. A right IJ ported  catheter terminates in the SVC. Post right humeral ORIF.    IMPRESSION:  Loculated left pleural fluid adjacent left chest wall indentation. An underlying  occult rib fracture is possible. CT HEAD 12/3:  Periventricular white matter hypodensity is nonspecific, but  consistent with chronic small vessel ischemic disease. The ventricles and sulci  are appropriate in size and configuration for age. No loss of gray-white  differentiation to suggest late acute or early subacute infarction. No mass  effect or intracranial hemorrhage.   IMPRESSION:  No acute intracranial abnormality. CT C-SPINE 12/3:  1. No fracture. 2. Degenerative stenoses as described above. 3. Emphysema. CT CHEST 12/4:  CHEST: Chest wall/thoracic inlet: Right IJ port with its tip in the superior vena cava. .  Thyroid: Within normal limits. Mediastinum/matilde:  There are no pathologically enlarged mediastinal, hilar or axillary nodes. Heart/vessels: Heart is not enlarged. Aorta is normal in caliber. There are  coronary artery and atherosclerotic vascular calcifications. .  Lungs/Pleura: Partially loculated left pleural effusion with linear changes in  the left base and partial collapse of the left base. Overall lungs are  hyperinflated with emphysematous changes. .  MSK: Bones are osteopenic. There are multiple chronic left-sided rib fractures. Subacute fracture of the left 10th rib laterally  IMPRESSION  1. Partially loculated left pleural effusion with linear changes at the left base  2. Multiple chronic left-sided rib fractures. Subacute left 10th rib fracture. Gaby Jackie PA/LAT CXR 12/7:  2 views of the chest demonstrate the portacatheter in place. There is  atherosclerosis of the aorta. Heart size is mildly enlarged. There is a left  pleural effusion and bilateral lower lobe airspace opacification, greater on the  left. There are degenerative changes of the spine. There are left rib fractures.   IMPRESSION:  Left pleural effusion and bilateral lower lobe airspace opacification. EKG:  NSR 86 bpm, RBBB, NS inf TW inversion - new,  Progression anterior TW inversion  Procedures: see electronic medical records for all procedures/Xrays and details which were not copied into this note but were reviewed prior to creation of Plan. Assessment / Plan:  Principal Problem:    Traumatic rhabdomyolysis ([address] (12/3/2021)  Suspected fall several days PTA - states was on floor for 2 nights until found  Presenting with multiple contusions abrasions.   Concern for loculated effusion/hematoma and rib fractures on the left chest.  Confirmed by CT 12/4  Admitted for hydration and monitoring of acute on chronic renal insufficiency  Reconsider advisability of anticoagulation with frequent falls - up to PCP  Will wean NS to 50cc/hr for 24-48 hrs, stop this evening  F/u BMP in AM     Active Problems:    Loculated pleural effusion (12/3/2021)  Was diagnosed with pneumonia in the ED  This seems more consistent with a rib fracture and associated hematoma on anticoagulation  Further assessment with chest CT confirms this suspicion   F/u CXR today is concerning for B LL consolidation / pneumonia  Will cont Rocephin / Azithromycin additional 2 doses       Leukocytosis (12/3/2021)  No clear history for infection/fever,  WBC down fom 21 to 7 since admission  Concern for possible B lower lobe pneumonia - not apparent on CT CHEST   Have initiated Rocephin/Azithromycin for 5-day course 12/3 - 7, extend till 12/9  Cultures note 1 or 2 bottles + GPC clusters - ID pending  Possible contaminant GPC on BC - one bottle        Fall (2/8/2021)    Generalized weakness (2/8/2021)  Functional decline over the past week PTA  Etiology not fully clarified  Concern with underlying history of lymphoma untreated in the past  Sed rate/CRP - 70 SR and CRP pending  CT CHEST w/o lymphadenopathy       Anticoagulant long-term use (2/28/2017)    PAF (paroxysmal atrial fibrillation) ([address] (2/9/2021)  Appears to be in sinus rhythm on presentation  Uncertain advisability of ongoing anticoagulation with history of falls  Heart rate controlled, maintain current treatments for now  Coumadin tapered to 2mg   Long term use to be decided by PCP       Stage 3 chronic kidney disease (8/5/2019)    Acute renal failure superimposed on stage 3 chronic kidney disease ([address] (12/3/2021)  Some jump in the BUN and creatinine from prior  Easily accounted for by the fall and poor fluid intake  And admitted for hydration and treatment of acute rhabdo  Elevation worse today, advance IVF NS 100cc/hr       Hyperlipemia ()  Hold statin therapy until CK elevation resolved       Hypothyroid (8/5/2019)  Maintain thyroid replacement,  follow-up TSH mildly suppressed       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 ESR  elevated        SAFETY:   Code Status:Full  DVT prophylaxis:Coumadin  Stress Ulcer prophylaxis: Pepcid  Bladder catheter:no  Family Contact Info:  Primary Emergency Contact: [address] [place] [address], Home Phone: [phone]  Bedded: [facility] Room 126/01  Disposition: TBD, likely needs rehab on d/c  Admission status:  Inpatient       Reviewed most current lab test results and cultures  YES  Reviewed most current radiology test results   YES  Review and summation of old records today    NO  Reviewed patient's current orders and MAR    YES  PMH/SH reviewed - no change compared to H&P    Care Plan discussed with:                                   Comments  Patient x     Family      RN x     Care Manager x      Consultant                           Multidiciplinary team rounds were held today with , nursing, pharmacist and clinical coordinator. Patient's plan of care was discussed; medications were reviewed and discharge planning was addressed.         ____________________________________________    Total NON Critical Care TIME:  35  Minutes        Comments   >50% of visit spent in counseling and coordination of care   x      Signed: Angel May MD  [facility] Hospitalist  [phone]

## 2021-12-07 NOTE — PROGRESS NOTES
[facility]  Hospitalist Progress Note    NAME: Carolin Preciado   :  1940   MRN:  [id number]     Total duration of encounter: 3 days      Interim Hospital Summary: [de-identified] y.o. male who presented on 12/3/2021 with Traumatic rhabdomyolysis ([facility] [address] He has a past medical history of Arrhythmia, CKD (chronic kidney disease) stage 3, GFR 30-59 ml/min ([facility]), Hyperlipemia, Hypertension, Iron deficiency anemia due to chronic blood loss (2021), PAF (paroxysmal atrial fibrillation) ([facility] [address] (2021), PVD (peripheral vascular disease) ([facility] and Thyroid disease. .     Pt brought into ED by EMS after being found on the floor likely for several days. Recent h/o recurrent falls a/w weakness. Was active / driving independently one week ago. Some subjective fever, but not documented. No sputum production. Less pain and Improved strength. Subjective:     Chief Complaint / Reason for Physician Visit  \"improved\".   Discussed with VIVIAN Hayward  Denies c/o pain  JONES better  Uncertain placement plan, patient primarily interested in d/c home  He however claims for help coming in then actual    Review of Systems:  Symptom Y/N Comments  Symptom Y/N Comments   Fever/Chills n   Chest Pain n    Poor Appetite n   Edema n    Cough n   Abdominal Pain n    Sputum n   Joint Pain n    SOB/JONES y   Pruritis/Rash n    Nausea/vomit n   Tolerating PT/OT y  to start   Diarrhea n   Tolerating Diet y    Constipation n   Other         Current Facility-Administered Medications:     WARFARIN DOSING PER PHARMACY, 1 Each, Other, Rx Dosing/Monitoring, Wilbert Newberry MD    influenza vaccine  (6 mos+)(PF) (FLUARIX/FLULAVAL/FLUZONE QUAD) injection 0.5 mL, 1 Each, IntraMUSCular, PRIOR TO DISCHARGE, Wilbert Newberry MD    bisacodyL (DULCOLAX) suppository 10 mg, 10 mg, Rectal, DAILY PRN, Wilbert Newberry MD    polyethylene glycol (MIRALAX) packet 17 g, 17 g, Oral, DAILY, Wilbert Newberry MD, 17 g at 21 1035    sodium chloride (NS) flush 5-10 mL, 5-10 mL, IntraVENous, PRN, Talib Hewitt MD    acetaminophen (TYLENOL) tablet 650 mg, 650 mg, Oral, Q6H PRN, Talib Hewitt MD    ondansetron [facility]) injection 4 mg, 4 mg, IntraVENous, Q4H PRN, Talib Hewitt MD    azithromycin (ZITHROMAX) 500 mg in 0.9% sodium chloride 250 mL (VIAL-MATE), 500 mg, IntraVENous, Q24H, Talib Hewitt MD, Last Rate: 250 mL/hr at 12/06/21 1533, 500 mg at 12/06/21 1533    cefTRIAXone (ROCEPHIN) 2 g in 0.9% sodium chloride (MBP/ADV) 50 mL MBP, 2 g, IntraVENous, Q24H, Talib Hewitt MD, Last Rate: 100 mL/hr at 12/06/21 1532, 2 g at 12/06/21 1532    amiodarone (CORDARONE) tablet 200 mg, 200 mg, Oral, DAILY, Talib Hewitt MD, 200 mg at 12/06/21 1035    dilTIAZem IR (CARDIZEM) tablet 60 mg, 60 mg, Oral, BID, Talib Hewitt MD, 60 mg at 12/06/21 1755    levothyroxine (SYNTHROID) tablet 100 mcg, 100 mcg, Oral, ACB, Talib Hewitt MD, 100 mcg at 12/06/21 0706    losartan (COZAAR) tablet 50 mg, 50 mg, Oral, DAILY, Talib Hewitt MD, 50 mg at 12/06/21 1034    sodium chloride (NS) flush 5-40 mL, 5-40 mL, IntraVENous, Q8H, Talib Hewitt MD, 10 mL at 12/06/21 1533    sodium chloride (NS) flush 5-40 mL, 5-40 mL, IntraVENous, PRN, Talib Hewitt MD    polyethylene glycol (MIRALAX) packet 17 g, 17 g, Oral, DAILY PRN, Talib Hewitt MD    famotidine (PEPCID) tablet 20 mg, 20 mg, Oral, QPM, Talib Hewitt MD, 20 mg at 12/06/21 1755    Objective:     VITALS:   Patient Vitals for the past 12 hrs:   Temp Pulse Resp BP SpO2   12/06/21 1507 97.4 °F (36.3 °C) 68 20 (!) 176/80 92 %   12/06/21 1200  75  (!) 180/81        Intake/Output Summary (Last 24 hours) at 12/6/2021 2056  Last data filed at 12/6/2021 0830  Gross per 24 hour   Intake 1097 ml   Output    Net 1097 ml        PHYSICAL EXAM:  General: WD, WN. Alert, cooperative, no acute distress    EENT:  EOMI. Anicteric sclerae. MMM  Resp:  CTA bilaterally, no wheezing or rales.   No accessory muscle use  CV:  Regular  rhythm,  M apex  GI:  Soft, Non distended, Non tender. +Bowel sounds  Neurologic:  Alert and oriented X 3, mumbled speech, nonfocal  Psych:   Not anxious nor agitated  Skin:  No rashes. No jaundice    LABS:  I reviewed today's most current labs and imaging studies. Pertinent labs include:  Recent Labs     12/05/21  0755 12/04/21  0550   WBC 10.8 13.2*   HGB 8.6* 8.5*   HCT 27.4* 26.6*    175     Recent Labs     12/06/21  0559 12/05/21  0755 12/04/21  0550    138 141   K 4.2 4.1 4.3   * 107 109*   CO2 20* 21 23   * 111* 89   BUN 54* 62* 52*   CREA 2.94* 3.40* 2.90*   CA 8.2* 8.3* 7.9*   MG  --   --  2.2   INR 2.6* 2.6*  --      Results for Parish Solis (MRN [id number]) as of 12/4/2021 17:36   12/3/2021 13:02 12/3/2021 14:41 12/4/2021 05:50 12/5/2021   Lactic acid 1.8 1.1 0.6    CK   670 (H) 323 (H)   CK 1,170 (H)      CK - MB 11.0 (H)      CK-MB Index 0.9      Troponin-HS 63  59      Results for Parish Solis (MRN [id number]) as of 12/5/2021 13:32   Ref. Range 12/5/2021 07:55   TSH 0.36 - 3.74  0.31 (L)       RADIOLOGY:  pCXR 12/3:  The left chest wall is chronically indented at the level of the fifth and sixth  lateral ribs. Rounded pleural density adjacent this is new, however. An  underlying occult rib fracture is possible; a displaced fracture is not visualized.   Aside from atelectasis adjacent the loculated left pleural fluid, the lungs are  clear. The central airways are patent. No pneumothorax. A right IJ ported  catheter terminates in the SVC. Post right humeral ORIF.    IMPRESSION:  Loculated left pleural fluid adjacent left chest wall indentation. An underlying  occult rib fracture is possible. CT HEAD 12/3:  Periventricular white matter hypodensity is nonspecific, but  consistent with chronic small vessel ischemic disease. The ventricles and sulci  are appropriate in size and configuration for age.  No loss of gray-white  differentiation to suggest late acute or early subacute infarction. No mass  effect or intracranial hemorrhage.   IMPRESSION:  No acute intracranial abnormality. CT C-SPINE 12/3:  1. No fracture. 2. Degenerative stenoses as described above. 3. Emphysema. CT CHEST 12/4:  CHEST: Chest wall/thoracic inlet: Right IJ port with its tip in the superior vena cava. .  Thyroid: Within normal limits. Mediastinum/matilde: There are no pathologically enlarged mediastinal, hilar or axillary nodes. Heart/vessels: Heart is not enlarged. Aorta is normal in caliber. There are  coronary artery and atherosclerotic vascular calcifications. .  Lungs/Pleura: Partially loculated left pleural effusion with linear changes in  the left base and partial collapse of the left base. Overall lungs are  hyperinflated with emphysematous changes. .  MSK: Bones are osteopenic. There are multiple chronic left-sided rib fractures. Subacute fracture of the left 10th rib laterally  IMPRESSION  1. Partially loculated left pleural effusion with linear changes at the left base  2. Multiple chronic left-sided rib fractures. Subacute left 10th rib fracture. .    EKG:  NSR 86 bpm, RBBB, NS inf TW inversion - new,  Progression anterior TW inversion  Procedures: see electronic medical records for all procedures/Xrays and details which were not copied into this note but were reviewed prior to creation of Plan. Assessment / Plan:  Principal Problem:    Traumatic rhabdomyolysis ([address] (12/3/2021)  Suspected fall several days PTA - states was on floor for 2 nights until found  Presenting with multiple contusions abrasions.   Concern for loculated effusion/hematoma and rib fractures on the left chest.  Confirmed by CT 12/4  Admitted for hydration and monitoring of acute on chronic renal insufficiency  Reconsider advisability of anticoagulation with frequent falls - up to PCP  Will wean NS to 50cc/hr for 24-48 hrs     Active Problems:    Loculated pleural effusion (12/3/2021)  Was diagnosed with pneumonia in the ED  This seems more consistent with a rib fracture and associated hematoma on anticoagulation  Further assessment with chest CT confirms this suspicion   F/u CXR        Leukocytosis (12/3/2021)  No clear history for infection/fever  Concern for possible left chest pneumonia - not apparent on CT CHEST   Have initiated Rocephin/Azithromycin for 5-day course 12/3 - 7  Cultures note 1 or 2 bottles + GPC clusters - ID pending  Possible contaminant GPC on BC - one bottle        Fall (2/8/2021)    Generalized weakness (2/8/2021)  Functional decline over the past week PTA  Etiology not fully clarified  Concern with underlying history of lymphoma untreated in the past  Sed rate/CRP - 70 SR and CRP pending  CT CHEST w/o lymphadenopathy       Anticoagulant long-term use (2/28/2017)    PAF (paroxysmal atrial fibrillation) ([facility] [address] (2/9/2021)  Appears to be in sinus rhythm on presentation  Uncertain advisability of ongoing anticoagulation with history of falls  Heart rate controlled, maintain current treatments for now  Coumadin tapered to 2mg   Long term use to be decided by PCP       Stage 3 chronic kidney disease (8/5/2019)    Acute renal failure superimposed on stage 3 chronic kidney disease ([address] (12/3/2021)  Some jump in the BUN and creatinine from prior  Easily accounted for by the fall and poor fluid intake  And admitted for hydration and treatment of acute rhabdo  Elevation worse today, advance IVF NS 100cc/hr       Hyperlipemia ()  Hold statin therapy until CK elevation resolved       Hypothyroid (8/5/2019)  Maintain thyroid replacement,  follow-up TSH mildly suppressed       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 Evie Lozada  Pt had refused treatment in the past  CT w/o lymphadenopathy.    ESR  elevated        SAFETY:   Code Status:Full  DVT prophylaxis:Coumadin  Stress Ulcer prophylaxis: Pepcid  Bladder catheter:no  Family Contact Info:  Primary Emergency Contact: [address] [place] [address], [place] Phone: [phone]  Bedded: [facility] Room CARITO/CARITO  Disposition: TBD, likely home when stable  Admission status:  Inpatient       Reviewed most current lab test results and cultures  YES  Reviewed most current radiology test results   YES  Review and summation of old records today    NO  Reviewed patient's current orders and MAR    YES  PMH/SH reviewed - no change compared to H&P    Care Plan discussed with:                                   Comments  Patient x     Family      RN x     Care Manager x      Consultant                           Multidiciplinary team rounds were held today with , nursing, pharmacist and clinical coordinator. Patient's plan of care was discussed; medications were reviewed and discharge planning was addressed.         ____________________________________________    Total NON Critical Care TIME:  35  Minutes        Comments   >50% of visit spent in counseling and coordination of care   x      Signed: Kathie Reagan MD  [facility] Hospitalist  [phone]

## 2021-12-08 ENCOUNTER — TELEPHONE (OUTPATIENT)
Dept: FAMILY MEDICINE CLINIC | Age: 81
End: 2021-12-08

## 2021-12-08 LAB
ANION GAP SERPL CALC-SCNC: 9 MMOL/L (ref 5–15)
BUN SERPL-MCNC: 33 MG/DL (ref 6–20)
BUN/CREAT SERPL: 14 (ref 12–20)
CALCIUM SERPL-MCNC: 8.7 MG/DL (ref 8.5–10.1)
CHLORIDE SERPL-SCNC: 112 MMOL/L (ref 97–108)
CO2 SERPL-SCNC: 23 MMOL/L (ref 21–32)
COMMENT, HOLDF: NORMAL
CREAT SERPL-MCNC: 2.34 MG/DL (ref 0.7–1.3)
GLUCOSE SERPL-MCNC: 104 MG/DL (ref 65–100)
INR PPP: 3.1 (ref 0.9–1.1)
POTASSIUM SERPL-SCNC: 4.8 MMOL/L (ref 3.5–5.1)
PROTHROMBIN TIME: 30.2 SEC (ref 9–11.1)
SAMPLES BEING HELD,HOLD: NORMAL
SODIUM SERPL-SCNC: 144 MMOL/L (ref 136–145)

## 2021-12-08 PROCEDURE — 97530 THERAPEUTIC ACTIVITIES: CPT

## 2021-12-08 PROCEDURE — 74011250637 HC RX REV CODE- 250/637: Performed by: INTERNAL MEDICINE

## 2021-12-08 PROCEDURE — 74011000258 HC RX REV CODE- 258: Performed by: INTERNAL MEDICINE

## 2021-12-08 PROCEDURE — 36415 COLL VENOUS BLD VENIPUNCTURE: CPT

## 2021-12-08 PROCEDURE — 65270000029 HC RM PRIVATE

## 2021-12-08 PROCEDURE — 77010033678 HC OXYGEN DAILY

## 2021-12-08 PROCEDURE — 85610 PROTHROMBIN TIME: CPT

## 2021-12-08 PROCEDURE — 94760 N-INVAS EAR/PLS OXIMETRY 1: CPT

## 2021-12-08 PROCEDURE — 80048 BASIC METABOLIC PNL TOTAL CA: CPT

## 2021-12-08 PROCEDURE — 74011250636 HC RX REV CODE- 250/636: Performed by: INTERNAL MEDICINE

## 2021-12-08 PROCEDURE — 97535 SELF CARE MNGMENT TRAINING: CPT

## 2021-12-08 RX ORDER — FUROSEMIDE 20 MG/1
20 TABLET ORAL DAILY
Status: DISCONTINUED | OUTPATIENT
Start: 2021-12-09 | End: 2021-12-09 | Stop reason: HOSPADM

## 2021-12-08 RX ORDER — DILTIAZEM HYDROCHLORIDE 180 MG/1
180 CAPSULE, COATED, EXTENDED RELEASE ORAL
Status: DISCONTINUED | OUTPATIENT
Start: 2021-12-08 | End: 2021-12-09 | Stop reason: HOSPADM

## 2021-12-08 RX ORDER — WARFARIN 1 MG/1
1 TABLET ORAL ONCE
Status: COMPLETED | OUTPATIENT
Start: 2021-12-08 | End: 2021-12-08

## 2021-12-08 RX ORDER — FUROSEMIDE 10 MG/ML
20 INJECTION INTRAMUSCULAR; INTRAVENOUS
Status: COMPLETED | OUTPATIENT
Start: 2021-12-08 | End: 2021-12-08

## 2021-12-08 RX ORDER — LOSARTAN POTASSIUM 100 MG/1
100 TABLET ORAL DAILY
Status: DISCONTINUED | OUTPATIENT
Start: 2021-12-09 | End: 2021-12-09 | Stop reason: HOSPADM

## 2021-12-08 RX ADMIN — POLYETHYLENE GLYCOL 3350 17 G: 17 POWDER, FOR SOLUTION ORAL at 08:25

## 2021-12-08 RX ADMIN — Medication 10 ML: at 22:00

## 2021-12-08 RX ADMIN — Medication 10 ML: at 06:00

## 2021-12-08 RX ADMIN — FUROSEMIDE 20 MG: 10 INJECTION, SOLUTION INTRAMUSCULAR; INTRAVENOUS at 14:05

## 2021-12-08 RX ADMIN — CEFTRIAXONE SODIUM 2 G: 2 INJECTION, POWDER, FOR SOLUTION INTRAMUSCULAR; INTRAVENOUS at 14:23

## 2021-12-08 RX ADMIN — DILTIAZEM HYDROCHLORIDE 60 MG: 60 TABLET, FILM COATED ORAL at 08:25

## 2021-12-08 RX ADMIN — AZITHROMYCIN MONOHYDRATE 500 MG: 500 INJECTION, POWDER, LYOPHILIZED, FOR SOLUTION INTRAVENOUS at 15:01

## 2021-12-08 RX ADMIN — AMIODARONE HYDROCHLORIDE 200 MG: 200 TABLET ORAL at 08:25

## 2021-12-08 RX ADMIN — Medication 10 ML: at 14:22

## 2021-12-08 RX ADMIN — LEVOTHYROXINE SODIUM 100 MCG: 0.1 TABLET ORAL at 06:35

## 2021-12-08 RX ADMIN — FAMOTIDINE 20 MG: 20 TABLET, FILM COATED ORAL at 17:21

## 2021-12-08 RX ADMIN — DILTIAZEM HYDROCHLORIDE 180 MG: 180 CAPSULE, COATED, EXTENDED RELEASE ORAL at 21:24

## 2021-12-08 RX ADMIN — LOSARTAN POTASSIUM 50 MG: 50 TABLET, FILM COATED ORAL at 08:25

## 2021-12-08 RX ADMIN — WARFARIN SODIUM 1 MG: 1 TABLET ORAL at 17:21

## 2021-12-08 NOTE — PROGRESS NOTES
Problem: Pressure Injury - Risk of  Goal: *Prevention of pressure injury  Description: Document Alexander Scale and appropriate interventions in the flowsheet.   Outcome: Progressing Towards Goal  Note: Pressure Injury Interventions:  Sensory Interventions: Assess changes in LOC    Moisture Interventions: Absorbent underpads    Activity Interventions: Increase time out of bed    Mobility Interventions: HOB 30 degrees or less    Nutrition Interventions: Document food/fluid/supplement intake    Friction and Shear Interventions: HOB 30 degrees or less                Problem: Patient Education: Go to Patient Education Activity  Goal: Patient/Family Education  Outcome: Progressing Towards Goal     Problem: Patient Education: Go to Patient Education Activity  Goal: Patient/Family Education  Outcome: Progressing Towards Goal     Problem: Fluid Volume - Risk of, Imbalanced  Goal: *Balanced intake and output  Outcome: Progressing Towards Goal

## 2021-12-08 NOTE — PROGRESS NOTES
Problem: Mobility Impaired (Adult and Pediatric)  Goal: *Acute Goals and Plan of Care (Insert Text)  Description: FUNCTIONAL STATUS PRIOR TO ADMISSION: Patient was modified independent using a rolling walker for functional mobility. HOME SUPPORT PRIOR TO ADMISSION: The patient lived alone with home care 6 hours daily to provide assistance. Physical Therapy Goals  Initiated 12/6/2021  1. Patient will move from supine to sit and sit to supine  in bed with modified independence within 7 day(s). 2.  Patient will transfer from bed to chair and chair to bed with supervision/set-up using the least restrictive device within 7 day(s). 3.  Patient will perform sit to stand with supervision/set-up within 7 day(s). 4.  Patient will ambulate with supervision/set-up for 30 feet with the least restrictive device within 7 day(s). 5.  Patient will ascend/descend 3 stairs with 2 handrail(s) with minimal assistance/contact guard assist within 7 day(s). Outcome: Progressing Towards Goal     PHYSICAL THERAPY TREATMENT  Patient: Chen Garnett (61 y.o. male)  Date: 12/8/2021  Diagnosis: CAP (community acquired pneumonia) [J18.9]  Rhabdomyolysis [M62.82]   Traumatic rhabdomyolysis [facility])       Precautions: Fall, Bed Alarm  Chart, physical therapy assessment, plan of care and goals were reviewed. ASSESSMENT  Patient continues with skilled PT services and is progressing towards goals. Pt reluctant but willing to participate with PTA to get OOB and into chair for lunch. Pt able to initiate legs over EOB, however due to decreased core strength, required use of R UE to pull himself to sitting EOB. Pt performed sit to stand transfer to RW with CGA. Pt was impulsive and had decreased awareness for RW, therefore required additional assistance transferring to cheryl chair. Pt performed sit to/from stands from cheryl chair with increased difficulty, unsure if from weakness or overall fatigue of transfer.  Pt required min A with posterior lean to stand. Pt required CGA-min A to help stand for 1 minute to use urinal. Pt left with all needs met. Current Level of Function Impacting Discharge (mobility/balance): standing balance fair-occasional with RW, min A transfers    Other factors to consider for discharge: safety and proper use of RW, pt continued maintenance with activity to decrease generalized weakness         PLAN :  Patient continues to benefit from skilled intervention to address the above impairments. Continue treatment per established plan of care. to address goals. Recommendation for discharge: (in order for the patient to meet his/her long term goals)  To be determined: SNF vs LTC vs home with [facility]    This discharge recommendation:  A follow-up discussion with the attending provider and/or case management is planned    IF patient discharges home will need the following DME: patient owns DME required for discharge       SUBJECTIVE:   Patient stated i'll ask you for help when I need it.     OBJECTIVE DATA SUMMARY:   Critical Behavior:  Neurologic State: Alert  Orientation Level: Oriented to person, Oriented to place     Safety/Judgement: Decreased insight into deficits, Decreased awareness of need for safety, Decreased awareness of need for assistance  Functional Mobility Training:  Bed Mobility:     Supine to Sit: Contact guard assistance     Scooting: Modified independent        Transfers:  Sit to Stand: Minimum assistance; Assist x1; Contact guard assistance  Stand to Sit: Contact guard assistance; Minimum assistance; Assist x1        Bed to Chair: Minimum assistance; Assist x1; Additional time                    Balance:  Sitting: Intact  Sitting - Static: Good (unsupported)  Standing: Impaired  Standing - Static: Constant support; Occasional; Fair  Standing - Dynamic : Constant support; Occasional  Ambulation/Gait Training:                                  Pain Ratin/10    Activity Tolerance:   Fair, Poor, desaturates with exertion and requires oxygen, and observed SOB with activity    After treatment patient left in no apparent distress:   Sitting in chair, Heels elevated for pressure relief, Call bell within reach, and Bed / chair alarm activated    COMMUNICATION/COLLABORATION:   The patients plan of care was discussed with: Physical therapist, Occupational therapist, Case management, and Rehabilitation technician.      Millie Dandy, PTA   Time Calculation: 16 mins

## 2021-12-08 NOTE — PROGRESS NOTES
IDR Team; MD,  Care Manager, Physical therapy,  OT, Pharmacy and , met to review patient's plan of care. Discussed goals, interventions, barriers and progress. Team will continue to monitor progress and report any concerns to the physician and care management as indicated. Transition of Care Plan: Per MD, patient has had multiple falls and needs skilled care. Was in skilled care at this facility about a year ago and did well with a lot of encouragement. Per PT/OT, patient does not participate and is not a candidate for swing bed here. Will refer to a skilled nursing facility if he agrees.

## 2021-12-08 NOTE — PROGRESS NOTES
Spoke with the patient regarding the need for skilled care and going to a facility for rehab. Patient adamantly refuses and has refused to participate with therapy. He states he is going home and will increase his help at home. I have made his son aware and asked him to call his father to try to get him to change his mind. Will f/u with the patient tomorrow. Will try to get in touch with his caregivers to discuss the need for more hours to assist the patient.

## 2021-12-08 NOTE — PROGRESS NOTES
[facility]  Hospitalist Progress Note    NAME: Marylouise Severs   :  1940   MRN:  [id number]     Total duration of encounter: 5 days      Interim Hospital Summary: [de-identified] y.o. male who presented on 12/3/2021 with Traumatic rhabdomyolysis ([facility] [address] He has a past medical history of Arrhythmia, CKD (chronic kidney disease) stage 3, GFR 30-59 ml/min ([facility]), Hyperlipemia, Hypertension, Iron deficiency anemia due to chronic blood loss (2021), PAF (paroxysmal atrial fibrillation) ([facility] [address] (2021), PVD (peripheral vascular disease) ([facility] and Thyroid disease. .     Pt brought into ED by EMS after being found on the floor likely for several days. Recent h/o recurrent falls a/w weakness. Was active / driving independently one week ago. Some subjective fever, but not documented. No sputum production. Less pain and Improved strength. Resistant to work with PT/OT. Denies c/o CP or SOB. Subjective:     Chief Complaint / Reason for Physician Visit  \"I am going home\".   Discussed with RN   More agitated and confused today  States he is going home    Denies c/o pain  Denies SOB  Uncertain placement plan, patient primarily interested in d/c home  He however claims more help coming in then actual    If he is home alone he will certainly fall again  Trying to reach family to discuss the plan - son vs daughter in law    Review of Systems:  Symptom Y/N Comments  Symptom Y/N Comments   Fever/Chills n   Chest Pain n    Poor Appetite n   Edema n    Cough n   Abdominal Pain n    Sputum n   Joint Pain n    SOB/JONES y   Pruritis/Rash n    Nausea/vomit n   Tolerating PT/OT y  resists / weak   Diarrhea n   Tolerating Diet y    Constipation n   Other         Current Facility-Administered Medications:     warfarin (COUMADIN) tablet 1 mg, 1 mg, Oral, ONCE, Amber Mcneil MD    [START ON 2021] furosemide (LASIX) tablet 20 mg, 20 mg, Oral, DAILY, Amber Mcneil MD    azithromycin (ZITHROMAX) 500 mg in 0.9% sodium chloride 250 mL (VIAL-MATE), 500 mg, IntraVENous, Q24H, Yogesh Turner MD    cefTRIAXone (ROCEPHIN) 2 g in 0.9% sodium chloride (MBP/ADV) 50 mL MBP, 2 g, IntraVENous, Q24H, Yogesh Turner MD    WARFARIN DOSING PER PHARMACY, 1 Each, Other, Rx Dosing/Monitoring, Yogesh Turner MD    influenza vaccine 2021-22 (6 mos+)(PF) (FLUARIX/FLULAVAL/FLUZONE QUAD) injection 0.5 mL, 1 Each, IntraMUSCular, PRIOR TO DISCHARGE, Yogesh Turner MD    bisacodyL (DULCOLAX) suppository 10 mg, 10 mg, Rectal, DAILY PRN, Yogesh Turner MD    polyethylene glycol (MIRALAX) packet 17 g, 17 g, Oral, DAILY, Yogesh Turner MD, 17 g at 12/08/21 0825    sodium chloride (NS) flush 5-10 mL, 5-10 mL, IntraVENous, PRN, Yogesh Turner MD    acetaminophen (TYLENOL) tablet 650 mg, 650 mg, Oral, Q6H PRN, Yogesh Turner MD    ondansetron [facility]) injection 4 mg, 4 mg, IntraVENous, Q4H PRN, Yogesh Turner MD    amiodarone (CORDARONE) tablet 200 mg, 200 mg, Oral, DAILY, Yogesh Turner MD, 200 mg at 12/08/21 0825    dilTIAZem IR (CARDIZEM) tablet 60 mg, 60 mg, Oral, BID, Yogesh Turner MD, 60 mg at 12/08/21 0825    levothyroxine (SYNTHROID) tablet 100 mcg, 100 mcg, Oral, ACB, Ygoesh Turner MD, 100 mcg at 12/08/21 4934    losartan (COZAAR) tablet 50 mg, 50 mg, Oral, DAILY, Yogesh Turner MD, 50 mg at 12/08/21 0825    sodium chloride (NS) flush 5-40 mL, 5-40 mL, IntraVENous, Q8H, Fady SMITH MD, 10 mL at 12/08/21 0600    sodium chloride (NS) flush 5-40 mL, 5-40 mL, IntraVENous, PRN, Yogesh Turner MD    polyethylene glycol (MIRALAX) packet 17 g, 17 g, Oral, DAILY PRN, Yogesh Turner MD    famotidine (PEPCID) tablet 20 mg, 20 mg, Oral, QPM, Yogesh Turner MD, 20 mg at 12/07/21 1740    Objective:     VITALS:   Patient Vitals for the past 12 hrs:   Temp Pulse Resp BP SpO2   12/08/21 1240  67 28 (!) 189/84 96 %   12/08/21 1215 98.2 °F (36.8 °C) 67 28 (!) 190/81 96 %   12/08/21 0807 98 °F (36.7 °C) 75 24 (!) 193/82 93 %     No intake or output data in the 24 hours ending 12/08/21 1414     PHYSICAL EXAM:  General: WD, WN. Alert, cooperative, no acute distress    EENT:  EOMI. Anicteric sclerae. MMM  Resp:  CTA bilaterally, no wheezing or rales. No accessory muscle use  CV:  Regular  rhythm,  M apex  GI:  Soft, Non distended, Non tender. +Bowel sounds  Neurologic:  Alert and oriented X 3, mumbled speech, nonfocal  Psych:   Not anxious nor agitated  Skin:  No rashes. No jaundice    LABS:  I reviewed today's most current labs and imaging studies. Pertinent labs include:  Recent Labs     12/07/21  0556   WBC 7.2   HGB 8.5*   HCT 27.6*        Recent Labs     12/08/21  0559 12/07/21  0556 12/06/21  0559    141 140   K 4.8 4.4 4.2   * 110* 109*   CO2 23 20* 20*   * 95 102*   BUN 33* 43* 54*   CREA 2.34* 2.61* 2.94*   CA 8.7 8.3* 8.2*   INR 3.1* 2.7* 2.6*     Results for Felipe Santamariaron (MRN [id number]) as of 12/4/2021 17:36   12/3/2021 13:02 12/3/2021 14:41 12/4/2021 05:50 12/5/2021 12/6/2021   Lactic acid 1.8 1.1 0.6     CK   670 (H) 323 (H) 224 (wnl)   CK 1,170 (H)       CK - MB 11.0 (H)       CK-MB Index 0.9       Troponin-HS 63  59       Results for Felipe North (MRN [id number]) as of 12/5/2021 13:32   Ref. Range 12/5/2021 07:55   TSH 0.36 - 3.74  0.31 (L)       RADIOLOGY:  pCXR 12/3:  The left chest wall is chronically indented at the level of the fifth and sixth  lateral ribs. Rounded pleural density adjacent this is new, however. An  underlying occult rib fracture is possible; a displaced fracture is not visualized.   Aside from atelectasis adjacent the loculated left pleural fluid, the lungs are  clear. The central airways are patent. No pneumothorax. A right IJ ported  catheter terminates in the SVC. Post right humeral ORIF.    IMPRESSION:  Loculated left pleural fluid adjacent left chest wall indentation. An underlying  occult rib fracture is possible.     CT HEAD 12/3:  Periventricular white matter hypodensity is nonspecific, but  consistent with chronic small vessel ischemic disease. The ventricles and sulci  are appropriate in size and configuration for age. No loss of gray-white  differentiation to suggest late acute or early subacute infarction. No mass  effect or intracranial hemorrhage.   IMPRESSION:  No acute intracranial abnormality. CT C-SPINE 12/3:  1. No fracture. 2. Degenerative stenoses as described above. 3. Emphysema. CT CHEST 12/4:  CHEST: Chest wall/thoracic inlet: Right IJ port with its tip in the superior vena cava. .  Thyroid: Within normal limits. Mediastinum/matilde: There are no pathologically enlarged mediastinal, hilar or axillary nodes. Heart/vessels: Heart is not enlarged. Aorta is normal in caliber. There are  coronary artery and atherosclerotic vascular calcifications. .  Lungs/Pleura: Partially loculated left pleural effusion with linear changes in  the left base and partial collapse of the left base. Overall lungs are  hyperinflated with emphysematous changes. .  MSK: Bones are osteopenic. There are multiple chronic left-sided rib fractures. Subacute fracture of the left 10th rib laterally  IMPRESSION  1. Partially loculated left pleural effusion with linear changes at the left base  2. Multiple chronic left-sided rib fractures. Subacute left 10th rib fracture. [place] PA/LAT CXR 12/7:  2 views of the chest demonstrate the portacatheter in place. There is  atherosclerosis of the aorta. Heart size is mildly enlarged. There is a left  pleural effusion and bilateral lower lobe airspace opacification, greater on the  left. There are degenerative changes of the spine. There are left rib fractures.   IMPRESSION:  Left pleural effusion and bilateral lower lobe airspace opacification.     EKG:  NSR 86 bpm, RBBB, NS inf TW inversion - new,  Progression anterior TW inversion  Procedures: see electronic medical records for all procedures/Xrays and details which were not copied into this note but were reviewed prior to creation of Plan. Assessment / Plan:  Principal Problem:    Traumatic rhabdomyolysis ([facility] [address] (12/3/2021)  Suspected fall several days PTA - states was on floor for 2 nights until found  Presenting with multiple contusions abrasions.   Concern for loculated effusion/hematoma and rib fractures on the left chest.  Confirmed by CT 12/4  Admitted for hydration and monitoring of acute on chronic renal insufficiency  Cr down to baseline (or better)  Reconsider advisability of anticoagulation with frequent falls - up to PCP  Has been hydrated with NS IVF, but this was stopped 2d ago  Worsening effusions / consolidation B bases - cont antibiotic to complete 7d,  Resume Lasix     Active Problems:    Loculated pleural effusion (12/3/2021)  Was diagnosed with pneumonia in the ED  This seems more consistent with a rib fracture and associated hematoma on anticoagulation  Further assessment with chest CT confirms this suspicion   F/u CXR today is concerning for B LL consolidation / pneumonia  Will cont Rocephin / Azithromycin additional 2 doses (full 7 days)       Leukocytosis (12/3/2021)  No clear history for infection/fever,  WBC down fom 21 to 7 since admission  Concern for possible B lower lobe pneumonia - not apparent on CT CHEST however  Rocephin/Azithromycin for 7-day course 12/3 - 9  Cultures note 1 or 2 bottles + GPC clusters - Staph coag neg  Possible contaminant GPC on BC - one bottle        Fall (2/8/2021)    Generalized weakness (2/8/2021)  Functional decline over one week PTA  Etiology not fully clarified  Concern with underlying history of lymphoma untreated in the past  Sed rate/CRP - ESR 70  and CRP 26.8 (very elevated)  CT CHEST w/o lymphadenopathy       Anticoagulant long-term use (2/28/2017)    PAF (paroxysmal atrial fibrillation) ([facility] [address] (2/9/2021)  Appears to be in sinus rhythm on presentation  Uncertain advisability of ongoing anticoagulation with history of falls  Heart rate controlled, maintain current treatments for now  Coumadin tapered to 2mg (on hold today with recent jump in INR a/w antibiotics)  Long term use to be decided by PCP, with agitation / confusion would not advise at present       Stage 3 chronic kidney disease (8/5/2019)    Acute renal failure superimposed on stage 3 chronic kidney disease (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) (2/11/2020)  Consider for reassessment with Dr. Melvin Diallo  Pt had refused treatment in the past  CT w/o lymphadenopathy. ESR/CRP  elevated        SAFETY:   Code Status:Full  DVT prophylaxis:Coumadin  Stress Ulcer prophylaxis: Pepcid  Bladder catheter:no  Family Contact Info:  Primary Emergency Contact: [address] [place] [address], [place] Phone: [phone]  Bedded: [facility] Room 126/01  Disposition: TBD, likely needs rehab on d/c (but is refusing)  Admission status:  Inpatient       Reviewed most current lab test results and cultures  YES  Reviewed most current radiology test results   YES  Review and summation of old records today    NO  Reviewed patient's current orders and MAR    YES  PMH/SH reviewed - no change compared to H&P    Care Plan discussed with:                                   Comments  Patient x     Family  x Trying to contact / call   RN x     Care Manager x      Consultant                           Multidiciplinary team rounds were held today with , nursing, pharmacist and clinical coordinator. Patient's plan of care was discussed; medications were reviewed and discharge planning was addressed. ____________________________________________    Total NON Critical Care TIME:  35  Minutes        Comments   >50% of visit spent in counseling and coordination of care   x      Signed: Raheem Honeycutt MD  [facility] Hospitalist  [phone]

## 2021-12-08 NOTE — PROGRESS NOTES
Pharmacy: Warfarin  Dosing    Indication: afib  Goal INR: 2-3  Today's Dose: warfarin 1  mg   Labs:  Recent Labs     12/08/21  0559 12/07/21  0556 12/06/21 0559   INR 3.1* 2.7* 2.6*   HGB  --  8.5*  --    HCT  --  27.6*  --    PLT  --  150  --        Home Dose: Warfarin 3 mg on Thursday and 2.5 mg all other days  Drug Interactions: amiodarone; azithromycin  Dietary Intake: regular - low fat, low cholesterol    Impression/Plan: Today's dose to be warfarin 1 mg.           Thanks,  Angel Keene, PHARMD

## 2021-12-08 NOTE — TELEPHONE ENCOUNTER
----- Message from Kal Shaver sent at 12/8/2021  3:10 PM EST -----  Subject: Message to Provider    QUESTIONS  Information for Provider? Stephen Patino is the patients caregiver and she   called in today to let us know that the patient has been in the hospital   since 12/3. Leny said that she will not be sending over the patients INR   due to his hospitalization and that she will let us know once he is home. Leny said if we have any questions to give her a call.   ---------------------------------------------------------------------------  --------------  CALL BACK INFO  What is the best way for the office to contact you? OK to leave message on   voicemail  Preferred Call Back Phone Number? [phone]  ---------------------------------------------------------------------------  --------------  SCRIPT ANSWERS  Relationship to Patient? Third Party  Representative Name?  Stephen Patino

## 2021-12-08 NOTE — PROGRESS NOTES
Problem: Self Care Deficits Care Plan (Adult)  Goal: *Acute Goals and Plan of Care (Insert Text)  Description:   FUNCTIONAL STATUS PRIOR TO ADMISSION: Pt was living alone, has reportedly some assistance at home but might be an unreliable . He has had increased falls at home with unknown causes. Mariah Tim he was driving. HOME SUPPORT: lives alone and either had or has some assistance paid coming into the home    Occupational Therapy Goals  Initiated 12/6/2021  1. Patient will perform grooming with independence within 7 day(s). 2.  Patient will perform bathing with supervision/set-up within 7 day(s). 3.  Patient will perform lower body dressing with supervision/set-up within 7 day(s). 4.  Patient will perform toilet transfers with minimal assistance/contact guard assist within 7 day(s). 5.  Patient will perform all aspects of toileting with modified independence within 7 day(s). 6.  Patient will participate in upper extremity therapeutic exercise/activities with supervision/set-up for 8 minutes within 7 day(s). Outcome: Not Progressing Towards Goal   OCCUPATIONAL THERAPY TREATMENT  Patient: Flip Rene (35 y.o. male)  Date: 12/8/2021  Diagnosis: CAP (community acquired pneumonia) [J18.9]  Rhabdomyolysis [M62.82]   Traumatic rhabdomyolysis [facility])       Precautions: Fall, Bed Alarm  Chart, occupational therapy assessment, plan of care, and goals were reviewed. ASSESSMENT  Patient continues with skilled OT services and is not progressing towards goals. Pt is sitting in chair, had already had a wash up. He is willing to brush teeth but resistant still to recommendations to use the LUE as cannot flex R shoulder to fully get toothbrush in mouth without leaning forward. He does use the L hand with wash cloth to wash face. Refused to practice sit to stands and only wanted to get back into bed, which OTR denied.      Current Level of Function Impacting Discharge (ADLs): set-up with extra time for grooming. Refusing transfer training    Other factors to consider for discharge: lives with some assist but not 24/7         PLAN :  Patient continues to benefit from skilled intervention to address the above impairments. Continue treatment per established plan of care to address goals. Recommend with staff: up for meals    Recommend next OT session: continue pushing transfer training as still takes 2 people to get him up    Recommendation for discharge: (in order for the patient to meet his/her long term goals)  Therapy up to 5 days/week in SNF setting    This discharge recommendation:  Has been made in collaboration with the attending provider and/or case management    IF patient discharges home will need the following DME: none       SUBJECTIVE:   Patient stated no.  response to sit to stand practice    OBJECTIVE DATA SUMMARY:   Cognitive/Behavioral Status:  Neurologic State: Alert  Orientation Level: Oriented to person; Oriented to place       Functional Mobility and Transfers for ADLs:  Bed Mobility:  Supine to Sit: Contact guard assistance  Scooting: Modified independent    Transfers:  Sit to Stand: Minimum assistance; Assist x1; Contact guard assistance     Bed to Chair: Minimum assistance; Assist x1; Additional time    Balance:  Sitting: Intact  Sitting - Static: Good (unsupported)  Standing: Impaired  Standing - Static: Constant support; Occasional; Fair  Standing - Dynamic : Constant support; Occasional    ADL Intervention:       Grooming  Grooming Assistance: Set-up  Position Performed: Seated in chair  Washing Face: Set-up  Washing Hands: Set-up  Brushing Teeth: Set-up      Pain:  0/10    Activity Tolerance:   fair    After treatment patient left in no apparent distress:   Sitting in chair, Call bell within reach, and Bed / chair alarm activated    COMMUNICATION/COLLABORATION:   The patients plan of care was discussed with: Physical therapy assistant.      Alex Alvarado OT  Time Calculation: 13 mins

## 2021-12-09 ENCOUNTER — HOSPITAL ENCOUNTER (INPATIENT)
Age: 81
LOS: 15 days | Discharge: HOME HEALTH | DRG: 948 | End: 2021-12-24
Attending: INTERNAL MEDICINE | Admitting: INTERNAL MEDICINE
Payer: MEDICARE

## 2021-12-09 VITALS
SYSTOLIC BLOOD PRESSURE: 179 MMHG | BODY MASS INDEX: 23.16 KG/M2 | TEMPERATURE: 99.1 F | HEIGHT: 70 IN | HEART RATE: 74 BPM | WEIGHT: 161.8 LBS | RESPIRATION RATE: 20 BRPM | OXYGEN SATURATION: 93 % | DIASTOLIC BLOOD PRESSURE: 85 MMHG

## 2021-12-09 LAB
ANION GAP SERPL CALC-SCNC: 7 MMOL/L (ref 5–15)
BASOPHILS # BLD: 0.1 K/UL (ref 0–0.1)
BASOPHILS NFR BLD: 1 % (ref 0–1)
BUN SERPL-MCNC: 31 MG/DL (ref 6–20)
BUN/CREAT SERPL: 13 (ref 12–20)
CALCIUM SERPL-MCNC: 8.5 MG/DL (ref 8.5–10.1)
CHLORIDE SERPL-SCNC: 109 MMOL/L (ref 97–108)
CO2 SERPL-SCNC: 26 MMOL/L (ref 21–32)
COMMENT, HOLDF: NORMAL
CREAT SERPL-MCNC: 2.36 MG/DL (ref 0.7–1.3)
DIFFERENTIAL METHOD BLD: ABNORMAL
EOSINOPHIL # BLD: 0.2 K/UL (ref 0–0.4)
EOSINOPHIL NFR BLD: 2 % (ref 0–7)
ERYTHROCYTE [DISTWIDTH] IN BLOOD BY AUTOMATED COUNT: 14.4 % (ref 11.5–14.5)
FERRITIN SERPL-MCNC: 362 NG/ML (ref 26–388)
FOLATE SERPL-MCNC: 10.2 NG/ML (ref 5–21)
GLUCOSE SERPL-MCNC: 146 MG/DL (ref 65–100)
HCT VFR BLD AUTO: 26.1 % (ref 36.6–50.3)
HGB BLD-MCNC: 8 G/DL (ref 12.1–17)
IMM GRANULOCYTES # BLD AUTO: 0.2 K/UL (ref 0–0.04)
IMM GRANULOCYTES NFR BLD AUTO: 2 % (ref 0–0.5)
INR PPP: 3.5 (ref 0.9–1.1)
IRON SATN MFR SERPL: 18 % (ref 20–50)
IRON SERPL-MCNC: 30 UG/DL (ref 65–175)
LYMPHOCYTES # BLD: 0.8 K/UL (ref 0.8–3.5)
LYMPHOCYTES NFR BLD: 8 % (ref 12–49)
MAGNESIUM SERPL-MCNC: 1.9 MG/DL (ref 1.6–2.4)
MCH RBC QN AUTO: 30.9 PG (ref 26–34)
MCHC RBC AUTO-ENTMCNC: 30.7 G/DL (ref 30–36.5)
MCV RBC AUTO: 100.8 FL (ref 80–99)
MONOCYTES # BLD: 0.8 K/UL (ref 0–1)
MONOCYTES NFR BLD: 8 % (ref 5–13)
NEUTS SEG # BLD: 7.5 K/UL (ref 1.8–8)
NEUTS SEG NFR BLD: 79 % (ref 32–75)
NRBC # BLD: 0 K/UL (ref 0–0.01)
NRBC BLD-RTO: 0 PER 100 WBC
PLATELET # BLD AUTO: 203 K/UL (ref 150–400)
PMV BLD AUTO: 10.9 FL (ref 8.9–12.9)
POTASSIUM SERPL-SCNC: 4.6 MMOL/L (ref 3.5–5.1)
PROTHROMBIN TIME: 34.4 SEC (ref 9–11.1)
RBC # BLD AUTO: 2.59 M/UL (ref 4.1–5.7)
RBC MORPH BLD: ABNORMAL
RBC MORPH BLD: ABNORMAL
SAMPLES BEING HELD,HOLD: NORMAL
SODIUM SERPL-SCNC: 142 MMOL/L (ref 136–145)
TIBC SERPL-MCNC: 164 UG/DL (ref 250–450)
VIT B12 SERPL-MCNC: 182 PG/ML (ref 193–986)
WBC # BLD AUTO: 9.6 K/UL (ref 4.1–11.1)

## 2021-12-09 PROCEDURE — 94760 N-INVAS EAR/PLS OXIMETRY 1: CPT

## 2021-12-09 PROCEDURE — 83735 ASSAY OF MAGNESIUM: CPT

## 2021-12-09 PROCEDURE — 74011250637 HC RX REV CODE- 250/637: Performed by: INTERNAL MEDICINE

## 2021-12-09 PROCEDURE — 85025 COMPLETE CBC W/AUTO DIFF WBC: CPT

## 2021-12-09 PROCEDURE — 65270000011 HC RM PRIVATE SNF

## 2021-12-09 PROCEDURE — 74011000258 HC RX REV CODE- 258: Performed by: INTERNAL MEDICINE

## 2021-12-09 PROCEDURE — 77010033678 HC OXYGEN DAILY

## 2021-12-09 PROCEDURE — 82668 ASSAY OF ERYTHROPOIETIN: CPT

## 2021-12-09 PROCEDURE — 74011250636 HC RX REV CODE- 250/636: Performed by: INTERNAL MEDICINE

## 2021-12-09 PROCEDURE — 82728 ASSAY OF FERRITIN: CPT

## 2021-12-09 PROCEDURE — 36415 COLL VENOUS BLD VENIPUNCTURE: CPT

## 2021-12-09 PROCEDURE — 97530 THERAPEUTIC ACTIVITIES: CPT

## 2021-12-09 PROCEDURE — 97535 SELF CARE MNGMENT TRAINING: CPT

## 2021-12-09 PROCEDURE — 82607 VITAMIN B-12: CPT

## 2021-12-09 PROCEDURE — 83540 ASSAY OF IRON: CPT

## 2021-12-09 PROCEDURE — 85610 PROTHROMBIN TIME: CPT

## 2021-12-09 PROCEDURE — 80048 BASIC METABOLIC PNL TOTAL CA: CPT

## 2021-12-09 RX ORDER — ACETAMINOPHEN 325 MG/1
650 TABLET ORAL
Status: CANCELLED | OUTPATIENT
Start: 2021-12-09

## 2021-12-09 RX ORDER — POLYETHYLENE GLYCOL 3350 17 G/17G
17 POWDER, FOR SOLUTION ORAL DAILY PRN
Status: CANCELLED | OUTPATIENT
Start: 2021-12-09

## 2021-12-09 RX ORDER — ONDANSETRON 2 MG/ML
4 INJECTION INTRAMUSCULAR; INTRAVENOUS
Status: CANCELLED | OUTPATIENT
Start: 2021-12-09

## 2021-12-09 RX ORDER — POLYETHYLENE GLYCOL 3350 17 G/17G
17 POWDER, FOR SOLUTION ORAL DAILY PRN
Status: DISCONTINUED | OUTPATIENT
Start: 2021-12-09 | End: 2021-12-24 | Stop reason: HOSPADM

## 2021-12-09 RX ORDER — LOSARTAN POTASSIUM 100 MG/1
100 TABLET ORAL DAILY
Status: CANCELLED | OUTPATIENT
Start: 2021-12-10

## 2021-12-09 RX ORDER — LEVOTHYROXINE SODIUM 100 UG/1
100 TABLET ORAL
Status: CANCELLED | OUTPATIENT
Start: 2021-12-10

## 2021-12-09 RX ORDER — FAMOTIDINE 20 MG/1
20 TABLET, FILM COATED ORAL EVERY EVENING
Status: DISCONTINUED | OUTPATIENT
Start: 2021-12-09 | End: 2021-12-13

## 2021-12-09 RX ORDER — SODIUM CHLORIDE 0.9 % (FLUSH) 0.9 %
5-40 SYRINGE (ML) INJECTION AS NEEDED
Status: CANCELLED | OUTPATIENT
Start: 2021-12-09

## 2021-12-09 RX ORDER — AMIODARONE HYDROCHLORIDE 200 MG/1
200 TABLET ORAL DAILY
Status: DISCONTINUED | OUTPATIENT
Start: 2021-12-10 | End: 2021-12-24 | Stop reason: HOSPADM

## 2021-12-09 RX ORDER — FUROSEMIDE 20 MG/1
20 TABLET ORAL DAILY
Status: CANCELLED | OUTPATIENT
Start: 2021-12-10

## 2021-12-09 RX ORDER — LOSARTAN POTASSIUM 100 MG/1
100 TABLET ORAL DAILY
Status: DISCONTINUED | OUTPATIENT
Start: 2021-12-10 | End: 2021-12-19

## 2021-12-09 RX ORDER — DILTIAZEM HYDROCHLORIDE 180 MG/1
180 CAPSULE, COATED, EXTENDED RELEASE ORAL
Status: DISCONTINUED | OUTPATIENT
Start: 2021-12-09 | End: 2021-12-24 | Stop reason: HOSPADM

## 2021-12-09 RX ORDER — SODIUM CHLORIDE 0.9 % (FLUSH) 0.9 %
5-40 SYRINGE (ML) INJECTION AS NEEDED
Status: DISCONTINUED | OUTPATIENT
Start: 2021-12-09 | End: 2021-12-15

## 2021-12-09 RX ORDER — POLYETHYLENE GLYCOL 3350 17 G/17G
17 POWDER, FOR SOLUTION ORAL DAILY
Status: CANCELLED | OUTPATIENT
Start: 2021-12-10

## 2021-12-09 RX ORDER — AMIODARONE HYDROCHLORIDE 200 MG/1
200 TABLET ORAL DAILY
Status: CANCELLED | OUTPATIENT
Start: 2021-12-10

## 2021-12-09 RX ORDER — DILTIAZEM HYDROCHLORIDE 180 MG/1
180 CAPSULE, COATED, EXTENDED RELEASE ORAL
Status: CANCELLED | OUTPATIENT
Start: 2021-12-09

## 2021-12-09 RX ORDER — FACIAL-BODY WIPES
10 EACH TOPICAL DAILY PRN
Status: DISCONTINUED | OUTPATIENT
Start: 2021-12-09 | End: 2021-12-24 | Stop reason: HOSPADM

## 2021-12-09 RX ORDER — FAMOTIDINE 20 MG/1
20 TABLET, FILM COATED ORAL EVERY EVENING
Status: CANCELLED | OUTPATIENT
Start: 2021-12-09

## 2021-12-09 RX ORDER — LEVOTHYROXINE SODIUM 100 UG/1
100 TABLET ORAL
Status: DISCONTINUED | OUTPATIENT
Start: 2021-12-10 | End: 2021-12-24 | Stop reason: HOSPADM

## 2021-12-09 RX ORDER — FACIAL-BODY WIPES
10 EACH TOPICAL DAILY PRN
Status: CANCELLED | OUTPATIENT
Start: 2021-12-09

## 2021-12-09 RX ORDER — FUROSEMIDE 20 MG/1
20 TABLET ORAL DAILY
Status: DISCONTINUED | OUTPATIENT
Start: 2021-12-10 | End: 2021-12-19

## 2021-12-09 RX ORDER — POLYETHYLENE GLYCOL 3350 17 G/17G
17 POWDER, FOR SOLUTION ORAL DAILY
Status: DISCONTINUED | OUTPATIENT
Start: 2021-12-10 | End: 2021-12-24 | Stop reason: HOSPADM

## 2021-12-09 RX ORDER — ONDANSETRON 2 MG/ML
4 INJECTION INTRAMUSCULAR; INTRAVENOUS
Status: DISCONTINUED | OUTPATIENT
Start: 2021-12-09 | End: 2021-12-19

## 2021-12-09 RX ORDER — ACETAMINOPHEN 325 MG/1
650 TABLET ORAL
Status: DISCONTINUED | OUTPATIENT
Start: 2021-12-09 | End: 2021-12-24 | Stop reason: HOSPADM

## 2021-12-09 RX ADMIN — Medication 10 ML: at 14:04

## 2021-12-09 RX ADMIN — LOSARTAN POTASSIUM 100 MG: 100 TABLET, FILM COATED ORAL at 09:46

## 2021-12-09 RX ADMIN — AZITHROMYCIN MONOHYDRATE 500 MG: 500 INJECTION, POWDER, LYOPHILIZED, FOR SOLUTION INTRAVENOUS at 14:57

## 2021-12-09 RX ADMIN — DILTIAZEM HYDROCHLORIDE 180 MG: 180 CAPSULE, COATED, EXTENDED RELEASE ORAL at 22:49

## 2021-12-09 RX ADMIN — FAMOTIDINE 20 MG: 20 TABLET, FILM COATED ORAL at 17:17

## 2021-12-09 RX ADMIN — AMIODARONE HYDROCHLORIDE 200 MG: 200 TABLET ORAL at 09:46

## 2021-12-09 RX ADMIN — POLYETHYLENE GLYCOL 3350 17 G: 17 POWDER, FOR SOLUTION ORAL at 09:46

## 2021-12-09 RX ADMIN — FUROSEMIDE 20 MG: 20 TABLET ORAL at 09:46

## 2021-12-09 RX ADMIN — Medication 10 ML: at 06:22

## 2021-12-09 RX ADMIN — LEVOTHYROXINE SODIUM 100 MCG: 0.1 TABLET ORAL at 06:30

## 2021-12-09 RX ADMIN — CEFTRIAXONE SODIUM 2 G: 2 INJECTION, POWDER, FOR SOLUTION INTRAMUSCULAR; INTRAVENOUS at 14:05

## 2021-12-09 NOTE — PROGRESS NOTES
Care Management Interventions  PCP Verified by CM:  Jeremi Randhawa NP)  Last Visit to PCP: 10/11/21  Palliative Care Criteria Met (RRAT>21 & CHF Dx)?: No (No MD order for Palliative Care)  Mode of Transport at Discharge: Other (see comment)  Transition of Care Consult (CM Consult): SNF, Discharge Planning, [address]: No  Reason Outside [place]: Unable to staff case, Out of service area  Discharge Durable Medical Equipment: No  Physical Therapy Consult: Yes  Occupational Therapy Consult: Yes  Speech Therapy Consult: No  Support Systems: Child(nanci)  [address] Provided?: No  Discharge Location  Discharge Placement: Skilled nursing facility [facility])     Patient has not been in a skilled care facility. 3 midnight qualifying stay: 12/3,4,5 and eligible for skilled care on 12/4/21. Patient lives in his home alone with sitters 2 days a week. Spoke with his son and he is in agreement w/ the skilled care plan prior to discharge. Transition of Care (JEOVANY) Plan:      RUR: 18%  Medium    Patient is being discharged to [place] [address] for skilled care services for strengthening, endurance,gait and mobility training. Patient and family in agreement with the plan. Transition of Care Plan to SNF/Rehab    SNF/Rehab Transition:  Patient has been accepted to [facility] and meets criteria for admission. Communication to Patient/Family:  Met with patient and spoke with his son (identified care giver) and they are agreeable to the transition plan. Communication to SNF/Rehab:  Bedside RN, Iron Garcia, has been notified to update the transition plan to the facility and call report   Discharge information has been updated on the AVS.       Nursing Please include all hard scripts for controlled substances, med rec and dc summary, and AVS in packet.      Reviewed and confirmed with facility, [facility], can manage the patient care needs for the following:     [place] with (X) only those applicable:    Medication:  [x]  Medications will be available at the facility  []  IV Antibiotics   []  Controlled Substance - hard copy to be sent with patient   []  Weekly Labs   Documents:  [] Hard RX  [] MAR  [x] Kardex  [x] AVS  []Transfer Summary  [x]Discharge   Equipment:  []  CPAP/BiPAP  []  Wound Vacuum  []  [place] or Urinary Device  []  PICC/Central Line  []  Nebulizer  []  Ventilator   Treatment:  []Isolation (for MRSA, VRE, etc.)  []Surgical Drain Management  []Tracheostomy Care  []Dressing Changes  []Dialysis with transportation and chair time   []PEG Care  []Oxygen  []Daily Weights for Heart Failure   Dietary:  []Any diet limitations  []Tube Feedings   []Total Parenteral Management (TPN)   Eligible for Medicaid Long Term Services and Supports  Yes:  [] Eligible for medical assistance or will become eligible within 180 days and UAI completed. [] Provider/Patient and/or support system has requested screening. [] UAI copy provided to patient or responsible party,   [] UAI unavailable at discharge will send once processed to SNF provider. [] UAI unavailable at discharged mailed to patient  No:   [x] Private pay and is not financially eligible for Medicaid within the next 180 days. [] Reside out-of-state.   [] A residents of a state owned/operated facility that is licensed  by [address] and [employer] or [facility]  [] Enrollment in [facility] hospice services  [] [address] [place] [address] [facility]  [] Patient /Family declines to have screening completed or provide financial information for screening     Financial Resources:  Medicaid    [] Initiated and application pending   [] Full coverage     Advanced Care Plan:  []Surrogate Decision Maker of Care  []POA  []Communicated Code Status  \"Full\")    Other

## 2021-12-09 NOTE — PROGRESS NOTES
IDR Team; MD, Nursing, Care Manager, Physical therapy, Nursing Supervisor, Pharmacy and Dietician, met to review patient's plan of care. Discussed goals, interventions, barriers and progress. RUR: 18%  MEDIUM    Team will continue to monitor progress and report any concerns to the physician and care management as indicated. Transition of Care Plan:     Patient will need skilled care prior to going home. Patient is at times uncooperative and therapy recommended that he be referred to another facility. I spoke with the patient and explained to him how important it was for him to participate with therapy. Spoke with the patient regarding skilled care here and list of other facilities given. He stated he did not want to go to a facility and that he would prefer staying here. Patient has not been in a facility. Spoke with the patient's son on yesterday regarding the plan of care with needing skilled care prior to going home. He agrees and have called the patient to encourage him as well.

## 2021-12-09 NOTE — PROGRESS NOTES
Pharmacy: Warfarin  Dosing    Indication: afib  Goal INR: 2-3  Today's Dose: HOLD warfarin  Labs:  Recent Labs     12/09/21  0531 12/08/21  0559 12/07/21  0556   INR 3.5* 3.1* 2.7*   HGB 8.0*  --  8.5*   HCT 26.1*  --  27.6*     --  150       Home Dose: Warfarin 3 mg on Thursday and 2.5 mg all other days  Drug Interactions: amiodarone; azithromycin  Dietary Intake: regular - low fat, low cholesterol    Impression/Plan:  HOLD warfarin       Thanks,  Indy Nassar, PHARMD

## 2021-12-09 NOTE — PROGRESS NOTES
[facility]    Psychosocial Assessment Swing Bed Resident    1. APPEARANCE  Resident is well groomed    2. SPEECH & COMMUNICATION SKILLS  Resident is able to converse and make needs known    3. SOCIAL STATUS  Resident desires interaction with others and seeks it out    4. EMOTIONAL STATUS  Which does the resident's normal mood convey? Contentment    5. MENTAL/COGNITIVE STATUS  Does resident understand what is happening to them? YES  Does resident comprehend what is being said to them? YES  Is there a particular time of day that the resident's comprehension level is better than other times? No  Is resident's short term memory accurate? YES  Does resident make decisions using objective information? YES  Is resident irrational in decision making? NO    6. CURRENT RELATIONSHIPS  Resident maintains supportive relationship with (state specific individuals & frequency & type of contact): [facility]     7. PLACEMENT & DISCHARGE  Does resident understand placement? YES  Does the resident understand diagnosis & intended length of placement? YES  Does the resident express concerns regarding previous residence and/or possessions? NO  Is there support (financial & emotional) when possible discharge occurs? YES    8. ADJUSTMENT DIFFICULTIES  Is resident experiencing any difficulty related to depression, loss, lack of control, uneasy feelings, grieving?  NO  If yes, explain: n/a       Hailee Martinez Date:12/9/2021

## 2021-12-09 NOTE — PROGRESS NOTES
Problem: Self Care Deficits Care Plan (Adult)  Goal: *Acute Goals and Plan of Care (Insert Text)  Description:   FUNCTIONAL STATUS PRIOR TO ADMISSION: Pt was living alone, has reportedly some assistance at home but might be an unreliable . He has had increased falls at home with unknown causes. [employer] he was driving. HOME SUPPORT: lives alone and either had or has some assistance paid coming into the home    Occupational Therapy Goals  Initiated 12/6/2021  1. Patient will perform grooming with independence within 7 day(s). 2.  Patient will perform bathing with supervision/set-up within 7 day(s). 3.  Patient will perform lower body dressing with supervision/set-up within 7 day(s). 4.  Patient will perform toilet transfers with minimal assistance/contact guard assist within 7 day(s). 5.  Patient will perform all aspects of toileting with modified independence within 7 day(s). 6.  Patient will participate in upper extremity therapeutic exercise/activities with supervision/set-up for 8 minutes within 7 day(s). Outcome: Progressing Towards Goal   OCCUPATIONAL THERAPY TREATMENT  Patient: Kelsy Negro (73 y.o. male)  Date: 12/9/2021  Diagnosis: CAP (community acquired pneumonia) [J18.9]  Rhabdomyolysis [M62.82]   Traumatic rhabdomyolysis [facility])       Precautions: Fall, Bed Alarm  Chart, occupational therapy assessment, plan of care, and goals were reviewed. ASSESSMENT  Patient continues with skilled OT services and is progressing towards goals. Pt cooperative today. He was able to wash body with set-up seated in chair except his toes, needed total assist for distal feet. He needed help getting undergarment off and new one on at toilet. He is able to perform cassandra-care seated on BS. He was mod assist x 1 to get to [facility] as he posteriorly leans. After BM he was assist of 2 for balance and making sure he was clean. He is depednet for socks on/off.   Grooming is set-up      Current Level of Function Impacting Discharge (ADLs): balance in standing impacting self care    Other factors to consider for discharge: lives alone with assistance 2 x week. PLAN :  Patient continues to benefit from skilled intervention to address the above impairments. Continue treatment per established plan of care to address goals. Recommend with staff: Pt needs to use BSC and not bedpan or allowing to go in protective undergarment    Recommend next OT session: continue balance/transfer training    Recommendation for discharge: (in order for the patient to meet his/her long term goals)  Therapy up to 5 days/week in SNF setting    This discharge recommendation:  Has been made in collaboration with the attending provider and/or case management    IF patient discharges home will need the following DME: none       SUBJECTIVE:   Patient stated i'm not going there.   OTR entered room as pt telling CM he was not going to a SNF for rehab  OBJECTIVE DATA SUMMARY:   Cognitive/Behavioral Status:  Neurologic State: Alert  Orientation Level: Disoriented to situation; Disoriented to time     Safety/Judgement: Decreased awareness of need for safety; Decreased insight into deficits; Decreased awareness of need for assistance    Functional Mobility and Transfers for ADLs:      Transfers:  Sit to Stand: Minimum assistance; Assist x1  Functional Transfers  Toilet Transfer : Moderate assistance  Bed to Chair: Minimum assistance    Balance:  Sitting: Intact  Standing: Impaired  Standing - Static: Constant support; Poor  Standing - Dynamic : Constant support; Poor    ADL Intervention:  Feeding  Container Management: Set-up  Cutting Food: Set-up  Food to Mouth:  (nurse reports difficulty but he could use L if he wanted)  Drink to Mouth:  Independent    Grooming  Position Performed: Seated in chair  Brushing Teeth: Set-up    Upper Body Bathing  Bathing Assistance: Set-up  Position Performed: Seated in chair    Lower Body Bathing  Perineal : Maximum assistance  Position Performed: Standing  Lower Body : Minimum assistance  Position Performed: Seated in chair    Upper [address]: Moderate assistance    Lower Body Dressing Assistance  Protective Undergarmet: Contact guard assistance  Socks: Total assistance (dependent)    Toileting  Bladder Hygiene: Set-up  Bowel Hygiene: Set-up  Clothing Management: Total assistance (dependent)  Adaptive Equipment:  [facility])    Cognitive Retraining  Following Commands: Follows one step commands/directions  Safety/Judgement: Decreased awareness of need for safety; Decreased insight into deficits;  Decreased awareness of need for assistance      Pain:  No complaints    Activity Tolerance:   Good    After treatment patient left in no apparent distress:   Sitting in chair, Call bell within reach, and Bed / chair alarm activated    COMMUNICATION/COLLABORATION:   The patients plan of care was discussed with: Physical therapist.     Jesús Obrien OT  Time Calculation: 26 mins

## 2021-12-09 NOTE — PROGRESS NOTES
Follow up visit with patient in Rm [id number]  Provided empathic listening and spiritual support  Advised of  Availability   [address] [place] [address]

## 2021-12-09 NOTE — SWING BED INTERDISCIPLINARY CARE PLAN DISCHARGE PLANNING NOTE
Discharge Planning:    Psychosocial concerns/family concerns, status of previous week; discharge plan (place, DME, services): Patient will gain strength, mobility and endurance     Discharge Plan Update: Home w/family member/other  Further anticipated LOS: 1 week or less  DME to order: Undetermined  Anticipated D/C services: Home Health  Need for patient/family conference: NO   If yes, planned for when: n/a     Upon review of the patients current care plan, the following issues, problems, or needs remain: Progress w/PT/OT. Increase care givers.        Aisha Candelario

## 2021-12-09 NOTE — PROGRESS NOTES
Problem: Pressure Injury - Risk of  Goal: *Prevention of pressure injury  Description: Document Alexander Scale and appropriate interventions in the flowsheet. Outcome: Progressing Towards Goal  Note: Pressure Injury Interventions:  Sensory Interventions: Assess changes in LOC, Check visual cues for pain    Moisture Interventions: Absorbent underpads    Activity Interventions: Increase time out of bed    Mobility Interventions: HOB 30 degrees or less    Nutrition Interventions: Document food/fluid/supplement intake    Friction and Shear Interventions: HOB 30 degrees or less, Lift sheet                Problem: Patient Education: Go to Patient Education Activity  Goal: Patient/Family Education  Outcome: Progressing Towards Goal     Problem: Falls - Risk of  Goal: *Absence of Falls  Description: Document Ellie Fall Risk and appropriate interventions in the flowsheet.   Outcome: Progressing Towards Goal  Note: Fall Risk Interventions:  Mobility Interventions: Bed/chair exit alarm, Patient to call before getting OOB    Mentation Interventions: Bed/chair exit alarm, Door open when patient unattended    Medication Interventions: Bed/chair exit alarm    Elimination Interventions: Bed/chair exit alarm, Call light in reach    History of Falls Interventions: Bed/chair exit alarm, Door open when patient unattended         Problem: Patient Education: Go to Patient Education Activity  Goal: Patient/Family Education  Outcome: Progressing Towards Goal

## 2021-12-09 NOTE — PROGRESS NOTES
Bedside and Verbal shift change report given to BRANDON Krueger RN (oncoming nurse) by Pepe Wiggins RN (offgoing nurse). Report included the following information SBAR, Kardex, MAR, Accordion and Recent Results.

## 2021-12-09 NOTE — DISCHARGE SUMMARY
[facility]  Hospitalist Discharge Summary    Patient ID:  Shawn Valentin  [id number]  [de-identified] y.o.  1940    PCP on record: Jo Ann Gonzalez NP    Admit date: 12/3/2021  Discharge date and time: 12/9/2021     DISCHARGE DIAGNOSIS:    Principal Problem:    Traumatic rhabdomyolysis ([address] (12/3/2021)    Active Problems:    Loculated pleural effusion (12/3/2021)    Leukocytosis (12/3/2021)    Fall (2/8/2021)    Generalized weakness (2/8/2021)    Anticoagulant long-term use (2/28/2017)    PAF (paroxysmal atrial fibrillation) ([address] (2/9/2021)    Stage 3 chronic kidney disease (8/5/2019)    Acute renal failure superimposed on stage 3 chronic kidney disease ([address] (12/3/2021)    Hyperlipemia ()    Hypothyroid (8/5/2019)    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 male presenting for admission to [facility] for further evaluation and treatment for Traumatic rhabdomyolysis ([address] He  has a past medical history of Arrhythmia, CKD (chronic kidney disease) stage 3, GFR 30-59 ml/min ([facility]), Hyperlipemia, Hypertension, Iron deficiency anemia due to chronic blood loss (2/9/2021), PAF (paroxysmal atrial fibrillation) ([address] (2/9/2021), PVD (peripheral vascular disease) ([address] and Thyroid disease.      Patient was found on the floor this morning by his son and was transported to the ED via EMS. The patient reports he has been extremely weak over the past 7 days. Was able to drive to a relatives home last Thursday for Thanksgiving. He fell several days ago and was not able to get up due to extreme weakness. He has had no access to nutrition or fluids. He was found to be incontinent of urine. He was noted to have increased tremor in both extremities but right greater than left. He has no prior history of tremor or Parkinson disease.   He denied symptoms of chest pain, shortness of breath, nausea or vomiting. He has fallen several times during the past week and he exam was noted for contusions to the right forehead and left chest.  Laboratory assessment was noted for leukocytosis with a white count of 21,000. CK elevation was present consistent with rhabdomyolysis. The patient has chronic renal insufficiency stage III which had progressed since previous testing. He was not found to be febrile, but he states he has had some chills and symptomatic fever during the past week. His chest x-ray was remarkable for a loculated effusion in the left lateral chest wall with left chest wall indentation concerning for occult rib fracture. CT of the head and neck was without evidence of injury. The patient was treated empirically for possible pneumonia with Rocephin and azithromycin in the ED. He is on chronic anticoagulation with Coumadin for paroxysmal atrial fib with a recent INR therapeutic. 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    ______________________________________________________________________  DISCHARGE SUMMARY/HOSPITAL COURSE:  for full details see H&P, daily progress notes, labs, consult notes. Pt brought into ED by EMS after being found on the floor likely for several days. Recent h/o recurrent falls a/w weakness. Was active / driving independently one week ago. Some subjective fever, but not documented. No sputum production. Less pain and Improved strength. Resistant to work with PT/OT. Denies c/o CP or SOB. Principal Problem:    Traumatic rhabdomyolysis ([facility] (12/3/2021)  Suspected fall several days PTA - states was on floor for 2 nights until found  Presenting with multiple contusions abrasions.   Concern for loculated effusion/hematoma and rib fractures on the left chest.  Confirmed by CT 12/4  Admitted for hydration and monitoring of acute on chronic renal insufficiency  Cr down to baseline (or better)  Reconsider advisability of anticoagulation with frequent falls - up to PCP  Has been hydrated with NS IVF, but this was stopped 2d ago  Worsening effusions / consolidation B bases - cont antibiotic to complete 7d,  Resume Lasix     Active Problems:    Loculated pleural effusion (12/3/2021)  Was diagnosed with pneumonia in the ED  This seems more consistent with a rib fracture and associated hematoma on anticoagulation  Further assessment with chest CT confirms this suspicion   F/u CXR today is concerning for B LL consolidation / pneumonia  Will cont Rocephin / Azithromycin additional 2 doses (full 7 days)       Leukocytosis (12/3/2021)  No clear history for infection/fever,  WBC down fom 21 to 7 since admission  Concern for possible B lower lobe pneumonia - not apparent on CT CHEST however  Rocephin/Azithromycin for 7-day course 12/3 - 9  Cultures note 1 or 2 bottles + GPC clusters - Staph coag neg  Possible contaminant GPC on BC - one bottle        Fall (2/8/2021)    Generalized weakness (2/8/2021)  Functional decline over one week PTA  Etiology not fully clarified  Concern with underlying history of lymphoma untreated in the past  Sed rate/CRP - ESR 70  and CRP 26.8 (very elevated)  CT CHEST w/o lymphadenopathy       Anticoagulant long-term use (2/28/2017)    PAF (paroxysmal atrial fibrillation) ([facility]) (2/9/2021)  Appears to be in sinus rhythm on presentation  Uncertain advisability of ongoing anticoagulation with history of falls  Heart rate controlled, maintain current treatments for now  Coumadin tapered to 2mg (on hold today with recent jump in INR a/w antibiotics)  Long term use to be decided by PCP, with agitation / confusion would not advise at present       Stage 3 chronic kidney disease (8/5/2019)    Acute renal failure superimposed on stage 3 chronic kidney disease ([facility] (12/3/2021)  Some jump in the BUN and creatinine from prior  Easily accounted for by the fall and poor fluid intake  And admitted for hydration and treatment of acute rhabdo  Initial climb, now gradual decline to present       Hyperlipemia ()  Hold statin therapy until CK elevation resolved  Resume now       Hypothyroid (8/5/2019)  Maintain thyroid replacement,  follow-up TSH mildly suppressed       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 Eleuteroi Lucas  Pt had refused treatment in the past  CT w/o lymphadenopathy. ESR/CRP  elevated         _______________________________________________________________________  Patient seen and examined by me on discharge day. Pertinent Findings:  Visit Vitals  BP (!) 179/85 (BP 1 Location: Left upper arm, BP Patient Position: At rest)   Pulse 74   Temp 99.1 °F (37.3 °C)   Resp 20   Ht 5' 10\" (1.778 m)   Wt 73.4 kg (161 lb 12.8 oz)   SpO2 93%   BMI 23.22 kg/m²     Gen:    Not in distress  Chest: Nonlabored respiration, Clear lungs  CVS:   Regular rhythm.   No edema  Abd:  Soft, not distended, not tender  Neuro:  Alert, nonfocal, weak    LABS:    Results for Brittney Renteria (MRN [id number]) as of 12/9/2021 22:18   12/3/2021 13:02 12/4/2021 05:50 12/5/2021 07:55 12/7/2021 05:56 12/9/2021 05:31   WBC 21.7 (H) 13.2 (H) 10.8 7.2 9.6   NRBC 0.1 (H) 0.0 0.0 0.0 0.0   RBC 3.31 (L) 2.71 (L) 2.76 (L) 2.77 (L) 2.59 (L)   HGB 10.4 (L) 8.5 (L) 8.6 (L) 8.5 (L) 8.0 (L)   HCT 32.9 (L) 26.6 (L) 27.4 (L) 27.6 (L) 26.1 (L)   MCV 99.4 (H) 98.2 99.3 (H) 99.6 (H) 100.8 (H)   MCH 31.4 31.4 31.2 30.7 30.9   MCHC 31.6 32.0 31.4 30.8 30.7   RDW 14.2 14.4 14.4 14.2 14.4   PLATELET 966 270 020 150 203   MPV 11.6 11.5 11.8 12.7 10.9   NEUTROPHILS 88 (H) 83 (H) 80 (H) 71 79 (H)   LYMPHOCYTE 3 (L) 6 (L) 8 (L) 11 (L) 8 (L)   MONOCYTES 8 10 10 11 8   EOSINOPHILS 0 0 1 2 2   BASOPHILS 0 0 0 1 1     Results for Brittney Renteria (MRN [id number]) as of 12/9/2021 22:18   12/3/2021 16:00   Color YELLOW/STRAW   Appearance CLEAR   Specific gravity 1.020   pH (UA) 5.5   Protein 100 (A)   Glucose Negative   Ketone Negative   Blood LARGE (A)   Bilirubin Negative   Urobilinogen 0.2   Nitrites Negative   Leukocyte Esterase Negative   Epithelial cells FEW   WBC 0-4   RBC 0-5   Bacteria Negative   Amorphous Crystals 1+ (A)     Results for Jessie Talbert (MRN [id number]) as of 12/9/2021 22:18   12/3/2021 14:41 12/5/2021 07:55 12/6/2021 05:59 12/7/2021 05:56 12/8/2021 05:59 12/9/2021 05:31   INR 2.3 (H) 2.6 (H) 2.6 (H) 2.7 (H) 3.1 (H) 3.5 (H)   Pro time 22.3 (H) 25.0 (H) 25.5 (H) 25.9 (H) 30.2 (H) 34.4 (H)     Results for Jessie Talbert (MRN [id number]) as of 12/9/2021 22:18   12/3/2021 13:02 12/3/2021 14:41 12/4/2021 05:50 12/5/2021 07:55 12/6/2021 05:59 12/7/2021 05:56 12/8/2021 05:59 12/9/2021 05:31   Sodium 141  141 138 140 141 144 142   Potassium 4.7  4.3 4.1 4.2 4.4 4.8 4.6   Chloride 104  109 (H) 107 109 (H) 110 (H) 112 (H) 109 (H)   CO2 26  23 21 20 (L) 20 (L) 23 26   Anion gap 11  9 10 11 11 9 7   Glucose 136 (H)  89 111 (H) 102 (H) 95 104 (H) 146 (H)   BUN 52 (H)  52 (H) 62 (H) 54 (H) 43 (H) 33 (H) 31 (H)   Creatinine 3.30 (H)  2.90 (H) 3.40 (H) 2.94 (H) 2.61 (H) 2.34 (H) 2.36 (H)   BUN/Cr ratio 16  18 18 18 16 14 13   Calcium 9.4  7.9 (L) 8.3 (L) 8.2 (L) 8.3 (L) 8.7 8.5   Magnesium 2.3  2.2     1.9   GFR  non-AA 18 (L)  21 (L) 18 (L) 21 (L) 24 (L) 27 (L) 27 (L)   Bilirubin, total 0.8          Protein, total 6.5          Albumin 2.9 (L)          Globulin 3.6          A-G Ratio 0.8 (L)          ALT 69          AST 61 (H)          Alk.  phos 80          Lactic acid 1.8 1.1 0.6        CK   670 (H) 323 (H) 224      CK 1,170 (H)          CK - MB 11.0 (H)          CK-MB Index 0.9          Troponin-HS 63  59        Vitamin B12        182 (L)   Folate        10.2   Iron        30 (L)   TIBC        164 (L)   Iron % sat        18 (L)   Ferritin        362   C-R protein   26.80 (H)          Results for ALEXIS AGUILAR (MRN [id number]) as of 12/4/2021 17:36    12/3/2021 13:02 12/3/2021 14:41 12/4/2021 05:50 12/5/2021 12/6/2021   Lactic acid 1.8 1.1 0.6       CK     670 (H) 323 (H) 224 (wnl)   CK 1,170 (H)           CK - MB 11.0 (H)           CK-MB Index 0.9           Troponin-HS 63   59          Results for Damian Smith (MRN [id number]) as of 12/5/2021 13:32    Ref. Range 12/5/2021 07:55   TSH 0.36 - 3.74  0.31 (L)      Results for Damian Smith (MRN [id number]) as of 12/9/2021 22:18   12/9/2021 05:31   ERYTHROPOIETIN PENDING       CULTURES:  Critical access hospital 12/3:  1 or 2 bottles positive G+C Staph, Coag Neg - no ID / Sens done (? Contaminant)     RADIOLOGY:  pCXR 12/3:The left chest wall is chronically indented at the level of the fifth and sixth  lateral ribs. Rounded pleural density adjacent this is new, however. An  underlying occult rib fracture is possible; a displaced fracture is not visualized.   Aside from atelectasis adjacent the loculated left pleural fluid, the lungs are  clear. The central airways are patent. No pneumothorax. A right IJ ported  catheter terminates in the SVC. Post right humeral ORIF.    IMPRESSION:  Loculated left pleural fluid adjacent left chest wall indentation. An underlying  occult rib fracture is possible.     CT HEAD 12/3:  Periventricular white matter hypodensity is nonspecific, but  consistent with chronic small vessel ischemic disease. The ventricles and sulci  are appropriate in size and configuration for age. No loss of gray-white  differentiation to suggest late acute or early subacute infarction. No mass  effect or intracranial hemorrhage.   IMPRESSION:  No acute intracranial abnormality.     CT C-SPINE 12/3:  1. No fracture. 2. Degenerative stenoses as described above. 3. Emphysema.     CT CHEST 12/4:  CHEST: Chest wall/thoracic inlet: Right IJ port with its tip in the superior vena cava. .  Thyroid: Within normal limits. Mediastinum/matilde:  There are no pathologically enlarged mediastinal, hilar or axillary nodes. Heart/vessels: Heart is not enlarged. Aorta is normal in caliber. There are  coronary artery and atherosclerotic vascular calcifications. .  Lungs/Pleura: Partially loculated left pleural effusion with linear changes in  the left base and partial collapse of the left base. Overall lungs are  hyperinflated with emphysematous changes. .  MSK: Bones are osteopenic. There are multiple chronic left-sided rib fractures. Subacute fracture of the left 10th rib laterally  IMPRESSION  1. Partially loculated left pleural effusion with linear changes at the left base  2. Multiple chronic left-sided rib fractures. Subacute left 10th rib fracture. .     PA/LAT CXR 12/7:  2 views of the chest demonstrate the portacatheter in place. There is  atherosclerosis of the aorta. Heart size is mildly enlarged. There is a left  pleural effusion and bilateral lower lobe airspace opacification, greater on the  left. There are degenerative changes of the spine.  There are left rib fractures.   IMPRESSION:  Left pleural effusion and bilateral lower lobe airspace opacification.     EKG:  NSR 86 bpm, RBBB, NS inf TW inversion - new,  Progression anterior TW inversion      Procedures: see electronic medical records for all procedures/Xrays and details which were not copied into this note but were reviewed prior to creation of Plan.         ______________________________________________________________________  DISCHARGE MEDICATIONS:   Current Discharge Medication List        Current Facility-Administered Medications:     WARFARIN on HOLD on 12/9/21 : [address], 1 Each, Other, ONCE, Aida Colvin MD    furosemide (LASIX) tablet 20 mg, 20 mg, Oral, DAILY, Aida Colvin MD, 20 mg at 12/09/21 0946    dilTIAZem ER (CARDIZEM CD) capsule 180 mg, 180 mg, Oral, QHS, Aida Colvin MD, 180 mg at 12/08/21 2124    losartan (COZAAR) tablet 100 mg, 100 mg, Oral, DAILY, Aida Colvin MD, 100 mg at 12/09/21 0946   azithromycin (ZITHROMAX) 500 mg in 0.9% sodium chloride 250 mL (VIAL-MATE), 500 mg, IntraVENous, Q24H, Dinah Carrera MD, Last Rate: 250 mL/hr at 12/08/21 1501, 500 mg at 12/08/21 1501    cefTRIAXone (ROCEPHIN) 2 g in 0.9% sodium chloride (MBP/ADV) 50 mL MBP, 2 g, IntraVENous, Q24H, Dinah Carrera MD, Last Rate: 100 mL/hr at 12/09/21 1405, 2 g at 12/09/21 1405    WARFARIN DOSING PER PHARMACY, 1 Each, Other, Rx Dosing/Monitoring, Dinah Carrera MD    influenza vaccine 2021-22 (6 mos+)(PF) (FLUARIX/FLULAVAL/FLUZONE QUAD) injection 0.5 mL, 1 Each, IntraMUSCular, PRIOR TO DISCHARGE, Dinah Carrera MD    bisacodyL (DULCOLAX) suppository 10 mg, 10 mg, Rectal, DAILY PRN, Dinah Carrera MD    polyethylene glycol (MIRALAX) packet 17 g, 17 g, Oral, DAILY, Dinah Carrera MD, 17 g at 12/09/21 0946    sodium chloride (NS) flush 5-10 mL, 5-10 mL, IntraVENous, PRN, Dinah Carrera MD    acetaminophen (TYLENOL) tablet 650 mg, 650 mg, Oral, Q6H PRN, Dinah Carrera MD    ondansetron [facility]) injection 4 mg, 4 mg, IntraVENous, Q4H PRN, Dinah Carrera MD    amiodarone (CORDARONE) tablet 200 mg, 200 mg, Oral, DAILY, Dinah Carrera MD, 200 mg at 12/09/21 0946    levothyroxine (SYNTHROID) tablet 100 mcg, 100 mcg, Oral, ACB, Dinah Carrera MD, 100 mcg at 12/09/21 0630    sodium chloride (NS) flush 5-40 mL, 5-40 mL, IntraVENous, Q8H, Debra SMITH MD, 10 mL at 12/09/21 1404    sodium chloride (NS) flush 5-40 mL, 5-40 mL, IntraVENous, PRN, Dinah Carrera MD    polyethylene glycol (MIRALAX) packet 17 g, 17 g, Oral, DAILY PRN, Dinah Carrera MD    famotidine (PEPCID) tablet 20 mg, 20 mg, Oral, QPM, Dinah Carrera MD, 20 mg at 12/08/21 1721      My Recommended  Diet: Cardiac  Activity: Up only with assistance  Wound Care: none  Follow-up labs: routine    ______________________________________________________________________  DISPOSITION:    Home with Family:    Home with HH/PT/OT/RN:    LORETA/LTC: [facility]   BENJY:    OTHER: Condition at Discharge:  Stable  _____________________________________________________________________  Follow up with:   PCP : Jalene Barthel, NP  Follow-up Information     Follow up With Specialties Details Why Contact Info    Jalene Barthel, NP Nurse Practitioner Plan f/u on d/c home  [address] [facility] [address]  [phone]          Total time in minutes spent coordinating this discharge (includes going over instructions, follow-up, prescriptions, and preparing report for sign off to her PCP) :35 minutes    Signed:  Adriana Alvarez MD  [facility] Hospitalist  [phone]

## 2021-12-09 NOTE — PROGRESS NOTES
Comprehensive Nutrition Assessment    Type and Reason for Visit: Initial    Nutrition Recommendations/Plan: regular diet low fat/low chol/high fiber/JCARLOS, encourage po intake     Nutrition Assessment:    Pt admitted with traumatic rhabdomyolysis, loculated pleural effusion, leukocytosis, fall, generalized weakness, PAF, hyperlipidemia, hypothyroid, diffuse large b-cell lymphoma of lymph nodes. Pt po intake % ranging. Pt states he eats well, if po intake is inconsistently <50% would encourage supplements. No wt changes. Labs: H/H 8.0/26.1 low, Cl-109 high, BUN/CR-31/2.36 high, glucose-. Encourage po intake. Malnutrition Assessment:  Malnutrition Status: Moderate malnutrition    Context:  Chronic illness     Findings of the 6 clinical characteristics of malnutrition:   Energy Intake:  Mild decrease in energy intake (specify)  Weight Loss:  No significant weight loss     Body Fat Loss:  1 - Mild body fat loss,     Muscle Mass Loss:  1 - Mild muscle mass loss,    Fluid Accumulation:  No significant fluid accumulation,     Strength:  Not performed     Estimated Daily Nutrient Needs:  Energy (kcal): 1879; Weight Used for Energy Requirements: Current  Protein (g): 88; Weight Used for Protein Requirements: Current  Fluid (ml/day): 1979; Method Used for Fluid Requirements: 1 ml/kcal      Nutrition Related Findings:     No data found.   Please document po intakes-po intakes while inpt %     Wounds:    None       Current Nutrition Therapies:  ADULT DIET Regular; Low Fat/Low Chol/High Fiber/JCARLOS    Anthropometric Measures:  · Height:  5' 10\" (177.8 cm)  · Current Body Wt:  73 kg (160 lb 15 oz)   · Admission Body Wt:       · Usual Body Wt:        · Ideal Body Wt:  166 lbs:  96.9 %   · Adjusted Body Weight:   ; Weight Adjustment for:     · Adjusted BMI:       · BMI Category:  Normal weight (BMI 22.0-24.9) age over 72       Weight Loss Metrics 12/9/2021 12/8/2021 10/11/2021 9/27/2021 8/9/2021 5/17/2021 4/12/2021   Today's Wt 161 lb 161 lb 12.8 oz 159 lb 9.6 oz 158 lb 6 oz - - 157 lb   BMI 23.1 kg/m2 23.22 kg/m2 22.9 kg/m2 28.05 kg/m2 27.81 kg/m2 22.53 kg/m2 22.53 kg/m2       Nutrition Diagnosis:   · Inadequate oral intake, Inadequate protein intake related to psychological cause or life stress as evidenced by intake 26-50%, intake 51-75%      Nutrition Interventions:   Food and/or Nutrient Delivery: Continue current diet  Nutrition Education and Counseling: No recommendations at this time  Coordination of Nutrition Care: Continue to monitor while inpatient, Interdisciplinary rounds    Goals:  po intake at least 50-75% of most meals x 5-7 days       Nutrition Monitoring and Evaluation:   Behavioral-Environmental Outcomes: None identified  Food/Nutrient Intake Outcomes: Food and nutrient intake  Physical Signs/Symptoms Outcomes: Weight, Meal time behavior    Discharge Planning:    Continue current diet     Electronically signed by Shade Marquez RD on 12/9/2021 at 5:54 PM

## 2021-12-09 NOTE — PROGRESS NOTES
Problem: Mobility Impaired (Adult and Pediatric)  Goal: *Acute Goals and Plan of Care (Insert Text)  Description: FUNCTIONAL STATUS PRIOR TO ADMISSION: Patient was modified independent using a rolling walker for functional mobility. HOME SUPPORT PRIOR TO ADMISSION: The patient lived alone with home care 6 hours daily to provide assistance. Physical Therapy Goals  Initiated 12/6/2021  1. Patient will move from supine to sit and sit to supine  in bed with modified independence within 7 day(s). 2.  Patient will transfer from bed to chair and chair to bed with supervision/set-up using the least restrictive device within 7 day(s). 3.  Patient will perform sit to stand with supervision/set-up within 7 day(s). 4.  Patient will ambulate with supervision/set-up for 30 feet with the least restrictive device within 7 day(s). 5.  Patient will ascend/descend 3 stairs with 2 handrail(s) with minimal assistance/contact guard assist within 7 day(s). Outcome: Progressing Towards Goal   PHYSICAL THERAPY TREATMENT  Patient: Kelsy Negro (28 y.o. male)  Date: 12/9/2021  Diagnosis: CAP (community acquired pneumonia) [J18.9]  Rhabdomyolysis [M62.82]   Traumatic rhabdomyolysis [facility])       Precautions: Fall, Bed Alarm  Chart, physical therapy assessment, plan of care and goals were reviewed. ASSESSMENT  Patient continues with skilled PT services and is progressing towards goals. Pt. Received upright in bed and agreeable to PT. Pt. Transferred from bed <>chair with minimal assistance x 1 with a RW. Pt. Educated on proper transferring techniques but declined with his own methods. Pt. Continued to be educated on proper/safe way to transfer and continued to decline technique. Pt. Did achieve bed <>chair with minimal assistance and future sessions will focus on completing tranfers with correct form and advancing ambulation.      Current Level of Function Impacting Discharge (mobility/balance): functional ambulation advancement, safe transfers    Other factors to consider for discharge: home set up         PLAN :  Patient continues to benefit from skilled intervention to address the above impairments. Continue treatment per established plan of care. to address goals. Recommendation for discharge: (in order for the patient to meet his/her long term goals)  Therapy up to 5 days/week in SNF setting    This discharge recommendation:  Has been made in collaboration with the attending provider and/or case management    IF patient discharges home will need the following DME: patient owns DME required for discharge       SUBJECTIVE:   Patient stated I dont want to do it that way.     OBJECTIVE DATA SUMMARY:   Critical Behavior:  Neurologic State: Alert  Orientation Level: Disoriented to situation, Disoriented to time     Safety/Judgement: Decreased awareness of need for safety, Decreased insight into deficits, Decreased awareness of need for assistance  Functional Mobility Training:    Transfers:  Sit to Stand: Minimum assistance; Assist x1  Stand to Sit: Minimum assistance; Assist x1        Bed to Chair: Minimum assistance    Balance:  Sitting: Intact  Standing: Impaired  Standing - Static: Constant support; Poor  Standing - Dynamic : Constant support; Poor    Activity Tolerance:   Good    After treatment patient left in no apparent distress:   Sitting in chair    COMMUNICATION/COLLABORATION:   The patients plan of care was discussed with: Physical therapist and Rehabilitation technician.      Robert Blake PT, DPT, EP-C   Time Calculation: 15 mins

## 2021-12-09 NOTE — PROGRESS NOTES
Care Management Interventions  PCP Verified by CM: Yes Merced Butler NP)  Last Visit to PCP: 10/11/21  Palliative Care Criteria Met (RRAT>21 & CHF Dx)?: No (No MD order)  Mode of Transport at Discharge: Other (see comment) (Squad vs POV)  Transition of Care Consult (CM Consult): Discharge [address]: No  [facility] Out [facility]  Discharge Durable Medical Equipment: No  Physical Therapy Consult: Yes  Occupational Therapy Consult: Yes  Speech Therapy Consult: No  Support Systems: Child(nanci)  Confirm Follow Up Transport: Family  The Plan for Transition of Care is Related to the Following Treatment Goals : Swing--PT/OT  Discharge Location  Discharge Placement: Home with home health    Patient was discharged from acute stay and admitted to swing bed. Patient is aware and agrees with plan. He will receive PT/OT and nursing. Will stay in swing bed until he no longer meets progress. May go to another skilled facility or go home with home health and increased personal care aides depending on progress here.        Reason for Admission:  Swing--PT/OT                     RUR Score:          18% MODERATE           Plan for utilizing home health:      Yes     PCP: First and Last name:  Rob Greenwood NP     Name of Practice: [facility]    Are you a current patient: Yes/No: Yes    Approximate date of last visit: 10/11/21   Can you participate in a virtual visit with your PCP: Yes                    Current Advanced Directive/Advance Care Plan: Full Code      Healthcare Decision Maker:   Click here to complete [address] including selection of the [address] Relationship (ie \"Primary\")             Primary Decision MakerBohdabailey Diazer - Other Relative - [phone]    Secondary Decision Maker: Castillo Bateman II - Son                  Transition of Care Plan:                  Home vs SNF

## 2021-12-10 LAB
COMMENT, HOLDF: NORMAL
INR PPP: 2.7 (ref 0.9–1.1)
PROTHROMBIN TIME: 26.1 SEC (ref 9–11.1)
SAMPLES BEING HELD,HOLD: NORMAL

## 2021-12-10 PROCEDURE — 97116 GAIT TRAINING THERAPY: CPT

## 2021-12-10 PROCEDURE — 97165 OT EVAL LOW COMPLEX 30 MIN: CPT

## 2021-12-10 PROCEDURE — 65270000011 HC RM PRIVATE SNF

## 2021-12-10 PROCEDURE — 85610 PROTHROMBIN TIME: CPT

## 2021-12-10 PROCEDURE — 97161 PT EVAL LOW COMPLEX 20 MIN: CPT

## 2021-12-10 PROCEDURE — 74011250637 HC RX REV CODE- 250/637: Performed by: STUDENT IN AN ORGANIZED HEALTH CARE EDUCATION/TRAINING PROGRAM

## 2021-12-10 PROCEDURE — 36415 COLL VENOUS BLD VENIPUNCTURE: CPT

## 2021-12-10 PROCEDURE — 77010033678 HC OXYGEN DAILY

## 2021-12-10 PROCEDURE — 94760 N-INVAS EAR/PLS OXIMETRY 1: CPT

## 2021-12-10 PROCEDURE — 74011250637 HC RX REV CODE- 250/637: Performed by: INTERNAL MEDICINE

## 2021-12-10 RX ORDER — WARFARIN 1 MG/1
1 TABLET ORAL ONCE
Status: COMPLETED | OUTPATIENT
Start: 2021-12-10 | End: 2021-12-10

## 2021-12-10 RX ADMIN — FUROSEMIDE 20 MG: 20 TABLET ORAL at 09:52

## 2021-12-10 RX ADMIN — DILTIAZEM HYDROCHLORIDE 180 MG: 180 CAPSULE, COATED, EXTENDED RELEASE ORAL at 21:23

## 2021-12-10 RX ADMIN — FAMOTIDINE 20 MG: 20 TABLET, FILM COATED ORAL at 18:36

## 2021-12-10 RX ADMIN — LEVOTHYROXINE SODIUM 100 MCG: 0.1 TABLET ORAL at 06:45

## 2021-12-10 RX ADMIN — AMIODARONE HYDROCHLORIDE 200 MG: 200 TABLET ORAL at 09:52

## 2021-12-10 RX ADMIN — LOSARTAN POTASSIUM 100 MG: 100 TABLET, FILM COATED ORAL at 09:52

## 2021-12-10 RX ADMIN — WARFARIN SODIUM 1 MG: 1 TABLET ORAL at 18:36

## 2021-12-10 NOTE — PROGRESS NOTES
0745:  Received patient on room air. He does not appear well-rested this morning. He states \"I did not sleep well last night\". O2 sat this morning is 86% on room air while supine (HOB 30). Nasal canula (2 liters) placed. He did much better while sitting up in recliner chair yesterday - did not require oxygen. The problem is - he does not want to stay in chair.

## 2021-12-10 NOTE — SWING BED INTERDISCIPLINARY CARE PLAN MASTER NOTE
Interdisciplinary Plan of Care Master Note:    Date: 12/10/21  Admit Date: 12/9/2021  Patient: Kelsy Negro    The Interdisciplinary Team met to review the patient's plan of care progress. Goals, inventions, barriers and progress were discussed. Team members disciplines in attendance were:  _x_Physician  _x_Physical Therapist  _x_Occupational Therapist  _x_Speech Therapist  _x_Nurse  _x_Registered Dietician  __Respiratory Therapy  __Admission/Discharge Nurse  _x_Care Manager  __Social Worker  x__Pharmacist  __Infection Control  __Chaplain  __Other:     Team will continue to monitor the patient's progress and report any concerns to the Physician and Care Management as indicated. Summary (To be shared with patient and family): Patient continues to agree to participate with therapy. Ambulated out in the hallway today. Son present at bedside. Discussed the plan for therapy for a week, reassess and make recommendations to extend length of stay, home with home health or a skilled care facility. Patient wants to go home. Care team Notes:  Swing Bed Interdisciplinary Care Plan Nurse Note by Sudha Andrade LPN at 30/30/99 2543  Version 1 of 1       Nursing:    Week #1: Date 12/10/2021  Medical status (changes from previous week): No change  Treatment changes this shift: none  Cognition: Present status: oriented          Is cueing needed?: Yes          Frequency of cueing needs: occasional           Type of cueing used: verbal  ADL (general): Moderate assistance  Activity type: Social leisure skills  Participation: Daily  Level of participation: Improving  Pain status: No c/o pain  Patient/Family Education needs: safety and increase mobility    Upon review of the patients current care plan, the following issues, problems, or needs remain: increase mobility and strengthing    Matilda Jiménez LPN           Interdisciplinary Care Plan RT Note    No notes of this type exist for this encounter.        Interdisciplinary Care Plan Dietary Note    No notes of this type exist for this encounter. [facility] Interdisciplinary Care Plan Nurse Note by Rowena Maddox LPN at 93/25/45 0625  Version 1 of 1       Nursing:    Week #1: Date 12/10/2021  Medical status (changes from previous week): No change  Treatment changes this shift: none  Cognition: Present status: oriented          Is cueing needed?: Yes          Frequency of cueing needs: occasional           Type of cueing used: verbal  ADL (general): Moderate assistance  Activity type: Social leisure skills  Participation: Daily  Level of participation: Improving  Pain status: No c/o pain  Patient/Family Education needs: safety and increase mobility    Upon review of the patients current care plan, the following issues, problems, or needs remain: increase mobility and strengthing    Juventino Anderson LPN         Interdisciplinary Care Plan PT Note    No notes of this type exist for this encounter. Interdisciplinary Care Plan OT Note    No notes of this type exist for this encounter. Interdisciplinary Care Plan SLP Note    No notes of this type exist for this encounter. Interdisciplinary Care Plan D/C Planning Note      Swing Bed Interdisciplinary Care Plan Discharge Planning Note by Chyna Mike at 12/09/21 1531  Version 1 of 1       Discharge Planning:    Psychosocial concerns/family concerns, status of previous week; discharge plan (place, DME, services): Patient will gain strength, mobility and endurance     Discharge Plan Update: Home w/family member/other  Further anticipated LOS: 1 week or less  DME to order: Undetermined  Anticipated D/C services: Home Health  Need for patient/family conference: NO   If yes, planned for when: n/a     Upon review of the patients current care plan, the following issues, problems, or needs remain: Progress w/PT/OT. Increase care givers.        Parris Ramey

## 2021-12-10 NOTE — PROGRESS NOTES
Pharmacy: Warfarin  Dosing    Indication: AFib  Goal INR: 2-3  Today's Dose: warfarin 1mg   Labs:  Recent Labs     12/10/21  0539 12/09/21  0531 12/08/21  0559   INR 2.7* 3.5* 3.1*   HGB  --  8.0*  --    HCT  --  26.1*  --    PLT  --  203  --        Home Dose:Warfarin 3 mg on Thursday and 2.5 mg all other days  Drug Interactions: amiodarone (PTA med); azithromycin   Dietary Intake: adult regular    Impression/Plan: INR today = 2.7. Today's dose to be warfarin 1mg.         Thanks,  Srinivasan Guerrero

## 2021-12-10 NOTE — PROGRESS NOTES
Bedside shift change report given to BRANDON Kaur RN (oncoming nurse) by [employer]. PUAR Timmons (offgoing nurse). Report included the following information SBAR, Kardex and Recent Results.

## 2021-12-10 NOTE — PROGRESS NOTES
Nutrition Note    Pt educated on coumadin and vit k and food/drug interaction r/t to this. Handout provided and reviewed-consistent vit k intake-dark green leafy foods. He cooks for himself and lives alone.       Electronically signed by Marcelina Gil RD on 12/10/2021 at 1:23 PM

## 2021-12-10 NOTE — SWING BED INTERDISCIPLINARY CARE PLAN NURSE NOTE
Nursing:    Week #1: Date 12/10/2021  Medical status (changes from previous week): No change  Treatment changes this shift: none  Cognition: Present status: oriented          Is cueing needed?: Yes          Frequency of cueing needs: occasional           Type of cueing used: verbal  ADL (general):  Moderate assistance  Activity type: Social leisure skills  Participation: Daily  Level of participation: Improving  Pain status: No c/o pain  Patient/Family Education needs: safety and increase mobility    Upon review of the patients current care plan, the following issues, problems, or needs remain: increase mobility and strengthing    Jacquelyn Street LPN

## 2021-12-10 NOTE — PROGRESS NOTES
Problem: Mobility Impaired (Adult and Pediatric)  Goal: *Acute Goals and Plan of Care (Insert Text)  Description: FUNCTIONAL STATUS PRIOR TO ADMISSION: Patient was modified independent using a rolling walker for functional mobility. HOME SUPPORT PRIOR TO ADMISSION: The patient lived alone with no local support. Physical Therapy Goals  Initiated 12/10/2021  1. Patient will move from supine to sit and sit to supine  in bed with minimal assistance/contact guard assist within 7 day(s). 2.  Patient will transfer from bed to chair and chair to bed with supervision/set-up using the least restrictive device within 7 day(s). 3.  Patient will perform sit to stand with minimal assistance/contact guard assist within 7 day(s). 4.  Patient will ambulate with minimal assistance/contact guard assist for 150 feet with the least restrictive device within 7 day(s). 5.  Patient will ascend/descend 4 stairs with bilateral handrail(s) with moderate assistance  within 7 day(s). Outcome: Progressing Towards Goal   PHYSICAL THERAPY EVALUATION  Patient: Janalee Lesches (52 y.o. male)  Date: 12/10/2021  Primary Diagnosis: Encounter for rehabilitation [Z51.89]        Precautions: Fall       ASSESSMENT  Pt. Received upright and agreeable to PT. Pt. Minimal assistance with sit <>stand transfer with a rolling walker with cues for hand placement and forward progression as well as hip extension in standing. Pt. Demonstrate improved static standing balance with the RW with CGA only. Pt. Ambulated 50ft outside of room with CGA/min A with a RW with decreased step clearance and stride length bilaterally as well as steppage gait pattern throughout. Pt. Educated on importance of hip flexion for forward progression and unable to change form with verbal cueing. Pt. Became short of breath after 50ft and assisted into chair and left with all needs met and nursing hand off made.     Current Level of Function Impacting Discharge (mobility/balance): functional advancement of ambulation/transfers. Other factors to consider for discharge: home set up     Patient will benefit from skilled therapy intervention to address the above noted impairments. PLAN :  Recommendations and Planned Interventions: bed mobility training, transfer training, gait training, therapeutic exercises, patient and family training/education, and therapeutic activities      Frequency/Duration: Patient will be followed by physical therapy:  5-6 times a week to address goals, 1-2 times per day    Recommendation for discharge: (in order for the patient to meet his/her long term goals)  Therapy up to 5 days/week in SNF setting    This discharge recommendation:  Has been made in collaboration with the attending provider and/or case management    IF patient discharges home will need the following DME: patient owns DME required for discharge         SUBJECTIVE:   Patient stated im feeling a little weaker today.     OBJECTIVE DATA SUMMARY:   HISTORY:    Past Medical History:   Diagnosis Date    Arrhythmia     atrial fibrillation, cardioversion 2010    CKD (chronic kidney disease) stage 3, GFR 30-59 ml/min ([facility])     Hyperlipemia     Hypertension     Iron deficiency anemia due to chronic blood loss 2/9/2021    PAF (paroxysmal atrial fibrillation) ([facility] [address] 2/9/2021    PVD (peripheral vascular disease) ([facility] [address]     Thyroid disease      Past Surgical History:   Procedure Laterality Date    HX COLONOSCOPY  2010    WNL SIS 10 y    HX HERNIA REPAIR Bilateral     Inguinal    HX ORTHOPAEDIC      right shoulder fracture repair    HX OTHER SURGICAL  03/09/2019    groin lymph gland excision    HX VASCULAR ACCESS      IR INSERT TUNL CVC W PORT OVER 5 YEARS  3/5/2020    [place] CHEST SURGERY PROCEDURE UNLISTED Left     lung surgery age 17, lobectomy    VASCULAR SURGERY PROCEDURE UNLIST      bypass numerous surgeries both legs       Home Situation  Home Environment: Private residence  # Steps to Enter: 4  One/Two Story Residence: Two story  Living Alone: Yes  Support Systems: Child(nanci)  Patient Expects to be Discharged to[de-identified] [employer]  Current DME Used/Available at Home: angelina Gleason, Walker, Wheelchair    EXAMINATION/PRESENTATION/DECISION MAKING:   Critical Behavior:  Neurologic State: Alert (does not know date, time, or day of week)  Orientation Level: Disoriented to time, Oriented to person, Oriented to place  Cognition: Follows commands     Hearing: Auditory  Auditory Impairment: None    Functional Mobility:  Bed Mobility:              Transfers:  Sit to Stand: Minimum assistance  Stand to Sit: Contact guard assistance        Bed to Chair: Minimum assistance              Balance:   Sitting: Intact  Standing: Impaired  Standing - Static: Constant support; Occasional  Standing - Dynamic : Constant support; Poor  Ambulation/Gait Training:  Distance (ft): 50 Feet (ft)  Assistive Device: Walker, rolling; Gait belt  Ambulation - Level of Assistance: Contact guard assistance; Minimal assistance; Assist x1        Gait Abnormalities: Decreased step clearance; Shuffling gait;  Path deviations        Base of Support: Narrowed     Speed/Joy: Pace decreased (<100 feet/min)  Step Length: Left shortened; Right shortened       Physical Therapy Evaluation Charge Determination   History Examination Presentation Decision-Making   LOW Complexity : Zero comorbidities / personal factors that will impact the outcome / POC LOW Complexity : 1-2 Standardized tests and measures addressing body structure, function, activity limitation and / or participation in recreation  LOW Complexity : Stable, uncomplicated  LOW Complexity : FOTO score of       Based on the above components, the patient evaluation is determined to be of the following complexity level: LOW     Pain Ratin/10    Activity Tolerance:   Good    After treatment patient left in no apparent distress:   Sitting in chair    COMMUNICATION/EDUCATION:   The patients plan of care was discussed with: Physical therapist and Rehabilitation technician. Patient/family have participated as able in goal setting and plan of care.     Thank you for this referral.  Emani Wray, PT, DPT, EP-C   Time Calculation: 24 mins

## 2021-12-10 NOTE — PROGRESS NOTES
Problem: Self Care Deficits Care Plan (Adult)  Goal: *Acute Goals and Plan of Care (Insert Text)  Description: FUNCTIONAL STATUS PRIOR TO ADMISSION: Pt lived alone at home and had assistance 2 days per week. Used walker, help with bathing. Suspect he didn't change clothing on his own. HOME SUPPORT: The patient paid for help 2 x week    Occupational Therapy Goals  Initiated 12/10/2021  1. Patient will perform bathing with supervision/set-up within 7 day(s). 2.  Patient will perform lower body dressing with minimal assistance/contact guard assist within 7 day(s). 3.  Patient will perform toilet transfer with SBA within 7 day(s). 4.  Patient will perform all aspects of toileting with modified independence within 7 day(s). 6.  Patient will participate in upper extremity therapeutic exercise/activities with supervision/set-up for 10 minutes within 7 day(s). 7.  Patient will utilize energy conservation techniques during functional activities with verbal cues within 7 day(s). Outcome: Progressing Towards Goal   OCCUPATIONAL THERAPY EVALUATION  Patient: Scarlett Rothman (89 y.o. 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 He is getting skilled care eval to return home and has already been recommended for additional help. Current Level of Function Impacting Discharge (ADLs/self-care): bed features and min assist to EOB; min assist sit to stand and mod assist to get to chair this morning. Stand to sit is CGA with cues. Able to groom with set-up while seated. Able to wash all but his cassandra-area and feet fully while seated. No clothing to practice dressing but is dependent for socks and getting undergarment on/off, up and down for toileting.   Is min assist for cassandra area cleaning at toilet. Functional Outcome Measure: The patient scored Total: 40/100 on the Barthel Index outcome measure which is indicative of being moderately dependent in basic self-care. Other factors to consider for discharge: lives alone with some personal care assistance     Patient will benefit from skilled therapy intervention to address the above noted impairments. PLAN :  Recommendations and Planned Interventions: self care training, functional mobility training, therapeutic exercise, therapeutic activities, endurance activities, and patient education    Frequency/Duration: Patient will be followed by occupational therapy 3 -5 times a week 1-2 x dayto address goals. Recommendation for discharge: (in order for the patient to meet his/her long term goals)  Therapy up to 5 days/week in SNF setting    This discharge recommendation:  Has been made in collaboration with the attending provider and/or case management    IF patient discharges home will need the following DME: none       SUBJECTIVE:   Patient stated i'm not doing as well today.     OBJECTIVE DATA SUMMARY:   HISTORY:   Past Medical History:   Diagnosis Date    Arrhythmia     atrial fibrillation, cardioversion 2010    CKD (chronic kidney disease) stage 3, GFR 30-59 ml/min ([facility])     Hyperlipemia     Hypertension     Iron deficiency anemia due to chronic blood loss 2/9/2021    PAF (paroxysmal atrial fibrillation) ([facility] [address] 2/9/2021    PVD (peripheral vascular disease) ([facility] [address]     Thyroid disease      Past Surgical History:   Procedure Laterality Date    HX COLONOSCOPY  2010    WNL SIS 10 y    HX HERNIA REPAIR Bilateral     Inguinal    HX ORTHOPAEDIC      right shoulder fracture repair    HX OTHER SURGICAL  03/09/2019    groin lymph gland excision    HX VASCULAR ACCESS      IR INSERT TUNL CVC W PORT OVER 5 YEARS  3/5/2020    [place] CHEST SURGERY PROCEDURE UNLISTED Left     lung surgery age 17, lobectomy    VASCULAR SURGERY PROCEDURE UNLIST      bypass numerous surgeries both legs       Expanded or extensive additional review of patient history:     Home Situation  Home Environment: Private residence  # Steps to Enter: 4  One/Two Story Residence: Two story  Living Alone: Yes  Support Systems: Child(nanci)  Patient Expects to be Discharged to[de-identified] [employer]  Current DME Used/Available at Home: Trudell Organ, straight, Walker, Wheelchair    Hand dominance: Right    EXAMINATION OF PERFORMANCE DEFICITS:  Cognitive/Behavioral Status:  Neurologic State: Alert  Orientation Level: Oriented to person; Oriented to place; Oriented to situation  Cognition: Follows commands             Skin: bruising noted    Edema: none seen    Hearing: Auditory  Auditory Impairment: None    Vision/Perceptual:            intact       Range of Motion:  R shoulder has pre-existing limits, cannot actively flex. PROM less than 90 degrees, functional finger/elbow movement  LUE WFL        Strength:  Did not test RUE due to no active shoulder movement; LUE WFL for mobility                Coordination:        intact       Tone & Sensation:  Normal tone and sensation       Balance:  Sitting: Intact  Standing: Impaired  Standing - Static: Constant support; Fair  Standing - Dynamic : Constant support; Poor    Functional Mobility and Transfers for ADLs:  Bed Mobility:       Transfers:  Sit to Stand: Minimum assistance  Stand to Sit: Contact guard assistance  Bed to Chair: Minimum assistance    ADL Assessment:  Feeding: Setup    Oral Facial Hygiene/Grooming: Setup    Upper Body Dressing: Setup    Lower Body Dressing: Maximum assistance    ADL Intervention and task modifications:  Feeding  Container Management: Set-up  Drink to Mouth:  Independent        Toileting  Bladder Hygiene: Set-up  Clothing Management: Total assistance (dependent)    Functional Measure:    Barthel Index:  Bathin  Bladder: 5  Bowels: 5  Groomin  Dressin  Feedin  Mobility: 0  Stairs: 0  Toilet Use: 5  Transfer (Bed to Chair and Back): 10  Total: 40/100      The Barthel ADL Index: Guidelines  1. The index should be used as a record of what a patient does, not as a record of what a patient could do. 2. The main aim is to establish degree of independence from any help, physical or verbal, however minor and for whatever reason. 3. The need for supervision renders the patient not independent. 4. A patient's performance should be established using the best available evidence. Asking the patient, friends/relatives and nurses are the usual sources, but direct observation and common sense are also important. However direct testing is not needed. 5. Usually the patient's performance over the preceding 24-48 hours is important, but occasionally longer periods will be relevant. 6. Middle categories imply that the patient supplies over 50 per cent of the effort. 7. Use of aids to be independent is allowed. Score Interpretation (from [address] [facility] [address])    Independent   60-79 Minimally independent   40-59 Partially dependent   20-39 Very dependent   <20 Totally dependent     -Gavin Murillo., Barthel, D.W. (1965). Functional evaluation: the Barthel Index. [address] [facility] ([address] (1997). The Barthel activities of daily living index: self-reporting versus actual performance in the old (> or = 75 years). Journal [address] 45(7), [address] [facility], J.J.M.F, Tiffany Prieto., Mabel Felty. (1999). Measuring the change in disability after inpatient rehabilitation; comparison of the responsiveness of the Barthel Index and Functional Mason Measure. Journal of Neurology, Neurosurgery, and Psychiatry, 66(4), 199-588. Viviane Lord, N.J.A, TEJ Blackwood, & Livan Banuelos MLONA. (2004) Assessment of post-stroke quality of life in cost-effectiveness studies: The usefulness of the Barthel Index and the EuroQoL-5D.  Quality of Life Research, 15, 239-18     Occupational Therapy Evaluation Charge Determination   History Examination Decision-Making   LOW Complexity : Brief history review  LOW Complexity : 1-3 performance deficits relating to physical, cognitive , or psychosocial skils that result in activity limitations and / or participation restrictions  LOW Complexity : No comorbidities that affect functional and no verbal or physical assistance needed to complete eval tasks       Based on the above components, the patient evaluation is determined to be of the following complexity level: LOW   Pain Ratin/10    Activity Tolerance:   Fair    After treatment patient left in no apparent distress:    Sitting in chair, Call bell within reach, and Bed / chair alarm activated    COMMUNICATION/EDUCATION:   The patients plan of care was discussed with: Physical therapist, Physician, and Case management. Patient/family have participated as able in goal setting and plan of care. This patients plan of care is appropriate for delegation to [facility].     Thank you for this referral.  Katie Lees OT  Time Calculation: 13 mins

## 2021-12-10 NOTE — H&P
[facility]   Admission History & Physical        12/9/2021 7:52 PM  Patient: Alice Hudson 1940  PCP: Tenisha Coulter NP    HISTORY  Chief Complaint: No chief complaint on file. HPI: [de-identified] y.o. male presenting for admission to [facility] for further evaluation and treatment for Encounter for rehabilitation. He  has a past medical history of Arrhythmia, CKD (chronic kidney disease) stage 3, GFR 30-59 ml/min ([facility]), Hyperlipemia, Hypertension, Iron deficiency anemia due to chronic blood loss (2/9/2021), PAF (paroxysmal atrial fibrillation) ([facility] [address] (2/9/2021), PVD (peripheral vascular disease) [facility] and Thyroid disease. Syd Jim Pt presents in transfer from acute care at [facility] [place] [address] where he has been hospitalized from December 3 through December 9. Patient has been transferred from acute care to skilled rehab for attempted efforts at improving his functional status-with improved strength and balance. Patient has been markedly depleted with poor intake and activity for a period of time. He was admitted to acute care after being found on the floor in his home unable to get up. Patient has done well with functional rehab in the past, but currently the patient has had a need for extensive encouragement to attempt improving his poor functional status. On acute admission he was dehydrated with A/C renal failure. CK was elevated c/w traumatic rhabdomyolysis. With IV fluids his elevated CK and acute renal failure have improved to baseline. He has significant COPD with some abnormalities on his chest x-ray possibly associated with trauma prior to admission. With concern over possible pneumonia he has been treated with a 7-day course of Rocephin and azithromycin. Presentation he had some leukocytosis but he has not had any significant cough, sputum production, fever, chill. He has anemia of chronic disease with repeat anemia panel pending. Fecal occult blood was negative on testing today.   He is on Coumadin with some elevation in the INR following IV antibiotics-Coumadin currently on hold. Past Medical History:  Past Medical History:   Diagnosis Date    Arrhythmia     atrial fibrillation, cardioversion 2010    CKD (chronic kidney disease) stage 3, GFR 30-59 ml/min ([facility])     Hyperlipemia     Hypertension     Iron deficiency anemia due to chronic blood loss 2/9/2021    PAF (paroxysmal atrial fibrillation) ([facility] [address] 2/9/2021    PVD (peripheral vascular disease) ([facility] [address]     Thyroid disease        Past Surgical History:  Past Surgical History:   Procedure Laterality Date    HX COLONOSCOPY  2010    WNL SIS 10 y    HX HERNIA REPAIR Bilateral     Inguinal    HX ORTHOPAEDIC      right shoulder fracture repair    HX OTHER SURGICAL  03/09/2019    groin lymph gland excision    HX VASCULAR ACCESS      IR INSERT TUNL CVC W PORT OVER 5 YEARS  3/5/2020    SD CHEST SURGERY PROCEDURE UNLISTED Left     lung surgery age 17, lobectomy    VASCULAR SURGERY PROCEDURE UNLIST      bypass numerous surgeries both legs       Medication:  Prior to Admission medications    Medication Sig Start Date End Date Taking? Authorizing Provider   warfarin (COUMADIN) 3 mg tablet TAKE 1 TABLET BY MOUTH EVERY THURSDAY AND 2.5 MG ON ALL OTHER DAYS 11/22/21   Nicole Iron, NP   furosemide (LASIX) 20 mg tablet Take 1 Tablet by mouth daily. 10/21/21   Liliana Alston NP   levothyroxine (SYNTHROID) 100 mcg tablet Take 1 Tablet by mouth Daily (before breakfast). 10/21/21   Liliana Alston NP   atorvastatin (LIPITOR) 20 mg tablet Take 1 tablet by mouth once daily 10/13/21   Liliana Alston NP   amiodarone (CORDARONE) 200 mg tablet Take 1 tablet by mouth once daily 10/13/21   Liliana Alston NP   ciclopirox [facility]) 8 % solution Apply  to affected area nightly. As directed    Provider, Historical   mupirocin (BACTROBAN) 2 % ointment Apply  to affected area daily.  9/27/21   Donna Ch NP   dilTIAZem IR (CARDIZEM) 60 mg tablet Take 1 tablet by mouth twice daily 21   Enrique Sargent NP   losartan (COZAAR) 50 mg tablet Take 1 tablet by mouth once daily 21   Mayur Schultz NP   acetaminophen (TYLENOL) 325 mg tablet Take 2 Tabs by mouth every six (6) hours as needed for Pain. 21   Jude Wagoner MD   polyethylene glycol (MIRALAX) 17 gram/dose powder Take 17 g by mouth daily. 1 tablespoon with 8 oz of water daily 20   Vera Gorman MD       Allergies:   Allergies   Allergen Reactions    Ketamine Other (comments)     confusion       Social History:  Social History     Tobacco Use    Smoking status: Former Smoker     Packs/day: 1.00     Years: 54.00     Pack years: 54.00     Quit date: 3/15/2007     Years since quittin.7    Smokeless tobacco: Never Used   Vaping Use    Vaping Use: Never used   Substance Use Topics    Alcohol use: Yes     Comment: former drinker- beer   social    Drug use: Never       Family History:  Family History   Problem Relation Age of Onset    Cancer Mother         lung    Cancer Brother 66        lung    Cancer Maternal Uncle         cancer unknown       ROS:  Total of 12 systems reviewed as follows:  POSITIVE= bolded text  Negative = text not bolded       General:  fever, chills, sweats, generalized weakness, weight loss/gain, loss of appetite   Eyes:    blurred vision, eye pain, loss of vision, double vision  ENT:    rhinorrhea, pharyngitis   Respiratory:  cough, sputum production, SOB, JONES, wheezing, pleuritic pain   Cardiology:   chest pain, palpitations, orthopnea, PND, edema, syncope   Gastrointestinal:  abdominal pain , N/V, diarrhea, dysphagia, constipation, bleeding   Genitourinary:  frequency, urgency, dysuria, hematuria, incontinence, prostatism   Muskuloskeletal: arthralgia, myalgia, back pain  Hematology:   easy bruising, nose or gum bleeding, lymphadenopathy   Dermatological: rash, ulceration, pruritis, color change / jaundice  Endocrine:   hot flashes or polydipsia Neurological:  headache, dizziness, confusion, focal weakness, paresthesia, speech difficulties, memory loss, gait difficulty  Psychological: feelings of anxiety, depression, agitation      PHYSICAL EXAM:  Patient Vitals for the past 24 hrs:   Temp Pulse Resp BP SpO2   12/09/21 1614 -- -- -- -- 92 %   12/09/21 1600 98.2 °F (36.8 °C) 78 20 132/74 92 %       General:    Alert, occasionally cooperative, no distress, appears stated age. HEENT: Atraumatic, anicteric sclerae, pink conjunctivae     No oral ulcers, mucosa moist, throat clear, dentition fair    Hoarse speech  Neck:  Supple, symmetrical;   thyroid non tender  Lungs:   Reduced excursion. Occ nonprod cough. No wheezing or rhonchi. Chest wall:  Tenderness, Contusion in L ant chest on rib margins post Trauma PTA. No accessory muscle use. Heart:   Regular rhythm. No  murmur. No edema  Abdomen:   Soft, non-tender. Not distended. Bowel sounds normal  Extremities: No cyanosis. No clubbing. Weakness and Swelling in R UE (h/o injured R shoulder)     Capillary refill normal,  Radial pulse 2+,  DP 1+  Skin:     Not pale. Not jaundiced. No rashes. Contusion L ant chest   Psych:  Depressed. Anxious  Neurologic: EOMs intact. No facial asymmetry. No aphasia or slurred speech. Hoarse    Symmetrical strength, Sensation grossly intact. Alert and oriented X 4.     Lab Data Reviewed:    Recent Results (from the past 24 hour(s))   PROTHROMBIN TIME + INR    Collection Time: 12/09/21  5:31 AM   Result Value Ref Range    INR 3.5 (H) 0.9 - 1.1      Prothrombin time 34.4 (H) 9.0 - 87.2 sec   METABOLIC PANEL, BASIC    Collection Time: 12/09/21  5:31 AM   Result Value Ref Range    Sodium 142 136 - 145 mmol/L    Potassium 4.6 3.5 - 5.1 mmol/L    Chloride 109 (H) 97 - 108 mmol/L    CO2 26 21 - 32 mmol/L    Anion gap 7 5 - 15 mmol/L    Glucose 146 (H) 65 - 100 mg/dL    BUN 31 (H) 6 - 20 MG/DL    Creatinine 2.36 (H) 0.70 - 1.30 MG/DL    BUN/Creatinine ratio 13 12 - 20 GFR est AA 32 (L) >60 ml/min/1.73m2    GFR est non-AA 27 (L) >60 ml/min/1.73m2    Calcium 8.5 8.5 - 10.1 MG/DL   CBC WITH AUTOMATED DIFF    Collection Time: 12/09/21  5:31 AM   Result Value Ref Range    WBC 9.6 4.1 - 11.1 K/uL    RBC 2.59 (L) 4.10 - 5.70 M/uL    HGB 8.0 (L) 12.1 - 17.0 g/dL    HCT 26.1 (L) 36.6 - 50.3 %    .8 (H) 80.0 - 99.0 FL    MCH 30.9 26.0 - 34.0 PG    MCHC 30.7 30.0 - 36.5 g/dL    RDW 14.4 11.5 - 14.5 %    PLATELET 814 790 - 808 K/uL    MPV 10.9 8.9 - 12.9 FL    NRBC 0.0 0  WBC    ABSOLUTE NRBC 0.00 0.00 - 0.01 K/uL    NEUTROPHILS 79 (H) 32 - 75 %    LYMPHOCYTES 8 (L) 12 - 49 %    MONOCYTES 8 5 - 13 %    EOSINOPHILS 2 0 - 7 %    BASOPHILS 1 0 - 1 %    IMMATURE GRANULOCYTES 2 (H) 0.0 - 0.5 %    ABS. NEUTROPHILS 7.5 1.8 - 8.0 K/UL    ABS. LYMPHOCYTES 0.8 0.8 - 3.5 K/UL    ABS. MONOCYTES 0.8 0.0 - 1.0 K/UL    ABS. EOSINOPHILS 0.2 0.0 - 0.4 K/UL    ABS. BASOPHILS 0.1 0.0 - 0.1 K/UL    ABS. IMM. GRANS. 0.2 (H) 0.00 - 0.04 K/UL    DF AUTOMATED      RBC COMMENTS ANISOCYTOSIS  1+        RBC COMMENTS MACROCYTOSIS  1+       MAGNESIUM    Collection Time: 12/09/21  5:31 AM   Result Value Ref Range    Magnesium 1.9 1.6 - 2.4 mg/dL   SAMPLES BEING HELD    Collection Time: 12/09/21  5:31 AM   Result Value Ref Range    SAMPLES BEING HELD 1RED     COMMENT        Add-on orders for these samples will be processed based on acceptable specimen integrity and analyte stability, which may vary by analyte.    IRON PROFILE    Collection Time: 12/09/21  5:31 AM   Result Value Ref Range    Iron 30 (L) 65 - 175 ug/dL    TIBC 164 (L) 250 - 450 ug/dL    Iron % saturation 18 (L) 20 - 50 %   FERRITIN    Collection Time: 12/09/21  5:31 AM   Result Value Ref Range    Ferritin 362 26 - 388 NG/ML   VITAMIN B12 & FOLATE    Collection Time: 12/09/21  5:31 AM   Result Value Ref Range    Vitamin B12 182 (L) 193 - 986 pg/mL    Folate 10.2 5.0 - 21.0 ng/mL       Radiology:  PA/LAT CXR 12/7:  2 views of the chest demonstrate the portacatheter in place. There is  atherosclerosis of the aorta. Heart size is mildly enlarged. There is a left  pleural effusion and bilateral lower lobe airspace opacification, greater on the  left. There are degenerative changes of the spine. There are left rib fractures.   IMPRESSION:  Left pleural effusion and bilateral lower lobe airspace opacification.     EKG:  NSR 86 bpm, RBBB, NS inf TW inversion - new,  Progression anterior TW inversion    Care Plan discussed with:   Patient x    Family     RN x         Consultant      Expected  Disposition:   Home with Family x   HH/PT/OT/RN    SNF/LTC    BENJY      TOTAL TIME:  60 Minutes      Comments    x Reviewed previous records   >50% of visit spent in counseling and coordination of care x Discussion with patient and/or family and questions answered       _______________________________________________________     My assessment of this patient's clinical condition and my plan of care is as follows. ASSESSMENT / PLAN    Principal Problem:  Encounter for rehabilitation (2/13/2021)  Weakness associated with chronic illness as well as recent suspected acute pneumonia  Patient transferred to [address] [place] [address] rehab for further aggressive therapy for strengthening and balance  Patient's goal is to return home and be independent with some limited outside assistance  If patient fails to improve he will need to consider long-term care.   Patient's son and daughter-in-law are well aware of his situation and poor progress  Patient is not able to return home with independence at this time  Pt has loculate pleural effusion a/w suspected R rib fracture  Rhabdomyolysis and STEVEN have improved during acute care treatment  Pt has received 7d  Treatment IV for suspected pneumonia       Fall (2/8/2021)    Generalized weakness (2/8/2021)  Functional decline over one week PTA  Etiology not fully clarified  Concern with underlying history of lymphoma untreated in the past - not felt to be current problem however  Sed rate/CRP - ESR 70  and CRP 26.8 (very elevated)  CT CHEST w/o lymphadenopathy  Pt admitted to TCU for aggressive effort with Rehab       Anticoagulant long-term use (2/28/2017)    PAF (paroxysmal atrial fibrillation) ([facility] [address] (2/9/2021)  Appears to be in sinus rhythm on presentation  Uncertain advisability of ongoing anticoagulation with history of falls  Heart rate controlled, maintain current treatments for now  Coumadin tapered to 2mg (on hold today with recent jump in INR a/w antibiotics) on admission  Long term use to be decided by PCP  Coumadin on hold until INR begins a decline       Stage 3 chronic kidney disease (8/5/2019)    Acute renal failure superimposed on stage 3 chronic kidney disease ([facility] [address] (12/3/2021)  Some jump in the BUN and creatinine on acute admission  Easily accounted for by the fall and poor fluid intake  And admitted for hydration and treatment of acute rhabdomyolysis  Initial climb, now gradual decline to present       Hyperlipemia ()  Hold statin therapy until CK elevation resolved  Resume possibly on discharge       Hypothyroid (8/5/2019)  Maintain thyroid replacement,  follow-up TSH mildly suppressed      SAFETY:   Code Status:Full  DVT prophylaxis:Coumadin  Stress Ulcer prophylaxis: Pepcid  Bladder catheter:no  Family Contact Info:  Primary Emergency ContactLlana Eduarda, Home Phone: [phone]  Bedded: [facility] Room 126/01  Disposition: TBD, likely home pending progress with rehab  Admission status:  TCU    -Tentative plan of care discussed with patient / family, who demonstrated understanding and is in agreement to the above    Marisa Dunne MD  [facility] Hospitalist  [phone]

## 2021-12-10 NOTE — PROGRESS NOTES
Problem: Falls - Risk of  Goal: *Absence of Falls  Description: Document Andrew Mtz Fall Risk and appropriate interventions in the flowsheet.   Outcome: Progressing Towards Goal  Note: Fall Risk Interventions:  Mobility Interventions: Bed/chair exit alarm, Patient to call before getting OOB, Utilize walker, cane, or other assistive device    Mentation Interventions: Bed/chair exit alarm, Door open when patient unattended, Reorient patient    Medication Interventions: Bed/chair exit alarm, Patient to call before getting OOB, Teach patient to arise slowly    Elimination Interventions: Bed/chair exit alarm, Call light in reach, Patient to call for help with toileting needs, Stay With Me (per policy)    History of Falls Interventions: Bed/chair exit alarm, Room close to nurse's station

## 2021-12-11 LAB
INR PPP: 2.1 (ref 0.9–1.1)
PROTHROMBIN TIME: 20.2 SEC (ref 9–11.1)

## 2021-12-11 PROCEDURE — 85610 PROTHROMBIN TIME: CPT

## 2021-12-11 PROCEDURE — 77010033678 HC OXYGEN DAILY

## 2021-12-11 PROCEDURE — 65270000011 HC RM PRIVATE SNF

## 2021-12-11 PROCEDURE — 74011250637 HC RX REV CODE- 250/637: Performed by: INTERNAL MEDICINE

## 2021-12-11 PROCEDURE — 36415 COLL VENOUS BLD VENIPUNCTURE: CPT

## 2021-12-11 PROCEDURE — 97110 THERAPEUTIC EXERCISES: CPT

## 2021-12-11 PROCEDURE — 97530 THERAPEUTIC ACTIVITIES: CPT

## 2021-12-11 PROCEDURE — 94760 N-INVAS EAR/PLS OXIMETRY 1: CPT

## 2021-12-11 RX ORDER — WARFARIN 2.5 MG/1
2.5 TABLET ORAL ONCE
Status: COMPLETED | OUTPATIENT
Start: 2021-12-11 | End: 2021-12-11

## 2021-12-11 RX ORDER — HYDRALAZINE HYDROCHLORIDE 20 MG/ML
20 INJECTION INTRAMUSCULAR; INTRAVENOUS
Status: DISCONTINUED | OUTPATIENT
Start: 2021-12-11 | End: 2021-12-11

## 2021-12-11 RX ORDER — HYDRALAZINE HYDROCHLORIDE 25 MG/1
50 TABLET, FILM COATED ORAL 3 TIMES DAILY
Status: DISCONTINUED | OUTPATIENT
Start: 2021-12-11 | End: 2021-12-16

## 2021-12-11 RX ORDER — HYDRALAZINE HYDROCHLORIDE 20 MG/ML
10 INJECTION INTRAMUSCULAR; INTRAVENOUS
Status: DISCONTINUED | OUTPATIENT
Start: 2021-12-11 | End: 2021-12-19

## 2021-12-11 RX ADMIN — AMIODARONE HYDROCHLORIDE 200 MG: 200 TABLET ORAL at 11:06

## 2021-12-11 RX ADMIN — FAMOTIDINE 20 MG: 20 TABLET, FILM COATED ORAL at 17:31

## 2021-12-11 RX ADMIN — LOSARTAN POTASSIUM 100 MG: 100 TABLET, FILM COATED ORAL at 11:06

## 2021-12-11 RX ADMIN — FUROSEMIDE 20 MG: 20 TABLET ORAL at 11:05

## 2021-12-11 RX ADMIN — DILTIAZEM HYDROCHLORIDE 180 MG: 180 CAPSULE, COATED, EXTENDED RELEASE ORAL at 21:47

## 2021-12-11 RX ADMIN — WARFARIN SODIUM 2.5 MG: 2.5 TABLET ORAL at 17:31

## 2021-12-11 RX ADMIN — HYDRALAZINE HYDROCHLORIDE 50 MG: 25 TABLET, FILM COATED ORAL at 17:30

## 2021-12-11 RX ADMIN — HYDRALAZINE HYDROCHLORIDE 50 MG: 25 TABLET, FILM COATED ORAL at 21:48

## 2021-12-11 RX ADMIN — LEVOTHYROXINE SODIUM 100 MCG: 0.1 TABLET ORAL at 06:30

## 2021-12-11 NOTE — PROGRESS NOTES
Problem: Mobility Impaired (Adult and Pediatric)  Goal: *Acute Goals and Plan of Care (Insert Text)  Description: FUNCTIONAL STATUS PRIOR TO ADMISSION: Patient was modified independent using a rolling walker for functional mobility. HOME SUPPORT PRIOR TO ADMISSION: The patient lived alone with no local support. Physical Therapy Goals  Initiated 12/10/2021  1. Patient will move from supine to sit and sit to supine  in bed with minimal assistance/contact guard assist within 7 day(s). 2.  Patient will transfer from bed to chair and chair to bed with supervision/set-up using the least restrictive device within 7 day(s). 3.  Patient will perform sit to stand with minimal assistance/contact guard assist within 7 day(s). 4.  Patient will ambulate with minimal assistance/contact guard assist for 150 feet with the least restrictive device within 7 day(s). 5.  Patient will ascend/descend 4 stairs with bilateral handrail(s) with moderate assistance  within 7 day(s). Outcome: Progressing Towards Goal   PHYSICAL THERAPY TREATMENT  Patient: Ba Gorman (20 y.o. male)  Date: 12/11/2021  Diagnosis: Encounter for rehabilitation [Z51.89]   Encounter for rehabilitation       Precautions: Fall, Bed Alarm  Chart, physical therapy assessment, plan of care and goals were reviewed. ASSESSMENT  Patient continues with skilled PT services and is progressing towards goals. Pt. Received upright in bed and agreeable to PT. Pt with improvements in bed mobility with contact guard assistance to reach the edge of the bed as well as lateral scooting with verbal cueing only. Pt. Requires verbal cueing for hand placement and forward propulsion from bed to sit <>stand with minimal assistance x 1 with a RW. Pt. Demonstrates forward trunk leaning postures as well as kyphotic presence and handles RW away from his FOSTER. Pt. Cued on RW management and corrects.  Pt. Ambulated 25ft in room with CGA/min A x 1 with decreased step clearance bilaterally as well as decreased stride length with shuffled steps throughout. Pt. Assisted into chair and repositioned upright for comfort, reviewed seated exercises, and left with all needs met and nursing hand off made. Current Level of Function Impacting Discharge (mobility/balance): functional ambulation/transfer advancement    Other factors to consider for discharge: home set up         PLAN :  Patient continues to benefit from skilled intervention to address the above impairments. Continue treatment per established plan of care. to address goals. Recommendation for discharge: (in order for the patient to meet his/her long term goals)  Therapy up to 5 days/week in SNF setting    This discharge recommendation:  Has been made in collaboration with the attending provider and/or case management    IF patient discharges home will need the following DME: patient owns DME required for discharge       SUBJECTIVE:   Patient stated im feeling weaker today but I can get up.     OBJECTIVE DATA SUMMARY:   Critical Behavior:  Neurologic State: Alert  Orientation Level: Oriented to person, Oriented to place, Disoriented to situation, Disoriented to time  Cognition: Follows commands      Activity Tolerance:   Good    After treatment patient left in no apparent distress:   Sitting in chair    COMMUNICATION/COLLABORATION:   The patients plan of care was discussed with: Physical therapist.     Codey Chavez, PT, DPT, EP-C   Time Calculation: 23 mins

## 2021-12-11 NOTE — PROGRESS NOTES
Problem: Falls - Risk of  Goal: *Absence of Falls  Description: Document Madeline Alcaraz Fall Risk and appropriate interventions in the flowsheet. Outcome: Progressing Towards Goal  Note: Fall Risk Interventions:  Mobility Interventions: Bed/chair exit alarm, Patient to call before getting OOB    Mentation Interventions: Adequate sleep, hydration, pain control, Bed/chair exit alarm, Door open when patient unattended    Medication Interventions: Bed/chair exit alarm, Patient to call before getting OOB    Elimination Interventions: Bed/chair exit alarm, Call light in reach, Toileting schedule/hourly rounds    History of Falls Interventions: Bed/chair exit alarm, Door open when patient unattended         Problem: Hypertension  Goal: *Blood pressure within specified parameters  Outcome: Progressing Towards Goal  Goal: *Labs within defined limits  Outcome: Progressing Towards Goal     Problem: Pressure Injury - Risk of  Goal: *Prevention of pressure injury  Description: Document Alexander Scale and appropriate interventions in the flowsheet.   Outcome: Progressing Towards Goal  Note: Pressure Injury Interventions:  Sensory Interventions: Keep linens dry and wrinkle-free    Moisture Interventions: Absorbent underpads    Activity Interventions: Increase time out of bed    Mobility Interventions: HOB 30 degrees or less    Nutrition Interventions: Document food/fluid/supplement intake    Friction and Shear Interventions: HOB 30 degrees or less

## 2021-12-11 NOTE — PROGRESS NOTES
Pharmacy: Warfarin  Dosing    Indication: AFib  Goal INR: 2-3  Today's Dose: warfarin 2.5 mg   Labs:  Recent Labs     12/11/21  0706 12/10/21  0539 12/09/21  0531   INR 2.1* 2.7* 3.5*   HGB  --   --  8.0*   HCT  --   --  26.1*   PLT  --   --  203       Home Dose:Warfarin 3 mg on Thursday and 2.5 mg all other days  Drug Interactions: amiodarone (PTA med); azithromycin   Dietary Intake: adult regular    Impression/Plan: INR today = 2.1.  Today's dose 2.5 mg       Thanks,  Indira Ramirez, PHARMD

## 2021-12-11 NOTE — ROUTINE PROCESS
Bedside and Verbal shift change report given to SANPA Newman (oncoming nurse) by ANUEL Tobias RN (offgoing nurse). Report included the following information SBAR, Kardex, Intake/Output, MAR and Quality Measures. Uribe score 5  Bed/chair alarm are in use. If in use it is set at the highest volume. Patient Vitals for the past 12 hrs:   Temp Pulse Resp BP SpO2   12/11/21 0724 98.3 °F (36.8 °C) 61 20 (!) 175/77 96 %   12/11/21 0106 97.9 °F (36.6 °C) 63 20 (!) 173/68 95 %   12/10/21 2114 98 °F (36.7 °C) 62 20 (!) 181/80 95 %   12/10/21 2005 -- -- -- -- 96 %     No flowsheet data found. All lab results for the last 24 hours reviewed.

## 2021-12-11 NOTE — SWING BED INTERDISCIPLINARY CARE PLAN NURSE NOTE
Nursing:    Week #1: Date 12/11/2021  Medical status (changes from previous week):Progressing  Treatment changes this shift: none  Cognition: Present status: oriented name,place          Is cueing needed?: Yes          Frequency of cueing needs: occasional           Type of cueing used: verbal  ADL (general):  Minimum assistance  Activity type: Social leisure skills  Participation: Daily  Level of participation: Improving slowly  Pain status: No c/o pain  Patient/Family Education needs: safety    Upon review of the patients current care plan, the following issues, problems, or needs remain: strengthening/mobility    Mala Balderas LPN

## 2021-12-11 NOTE — PROGRESS NOTES
Problem: Falls - Risk of  Goal: *Absence of Falls  Description: Document Felipe Wheat Fall Risk and appropriate interventions in the flowsheet.   Outcome: Progressing Towards Goal  Note: Fall Risk Interventions:  Mobility Interventions: Utilize walker, cane, or other assistive device, Bed/chair exit alarm, Patient to call before getting OOB    Mentation Interventions: Bed/chair exit alarm, Door open when patient unattended, Room close to nurse's station, Toileting rounds    Medication Interventions: Patient to call before getting OOB, Bed/chair exit alarm    Elimination Interventions: Bed/chair exit alarm, Call light in reach, Toileting schedule/hourly rounds    History of Falls Interventions: Bed/chair exit alarm, Door open when patient unattended, Room close to nurse's station

## 2021-12-12 LAB
COMMENT, HOLDF: NORMAL
EPO SERPL-ACNC: NORMAL MIU/ML
INR PPP: 1.8 (ref 0.9–1.1)
PROTHROMBIN TIME: 17.4 SEC (ref 9–11.1)
SAMPLES BEING HELD,HOLD: NORMAL

## 2021-12-12 PROCEDURE — 74011250637 HC RX REV CODE- 250/637: Performed by: INTERNAL MEDICINE

## 2021-12-12 PROCEDURE — 65270000011 HC RM PRIVATE SNF

## 2021-12-12 PROCEDURE — 36415 COLL VENOUS BLD VENIPUNCTURE: CPT

## 2021-12-12 PROCEDURE — 94760 N-INVAS EAR/PLS OXIMETRY 1: CPT

## 2021-12-12 PROCEDURE — 77010033678 HC OXYGEN DAILY

## 2021-12-12 PROCEDURE — 85610 PROTHROMBIN TIME: CPT

## 2021-12-12 RX ORDER — WARFARIN 2.5 MG/1
2.5 TABLET ORAL ONCE
Status: COMPLETED | OUTPATIENT
Start: 2021-12-12 | End: 2021-12-12

## 2021-12-12 RX ADMIN — WARFARIN SODIUM 2.5 MG: 2.5 TABLET ORAL at 17:33

## 2021-12-12 RX ADMIN — DILTIAZEM HYDROCHLORIDE 180 MG: 180 CAPSULE, COATED, EXTENDED RELEASE ORAL at 22:16

## 2021-12-12 RX ADMIN — FUROSEMIDE 20 MG: 20 TABLET ORAL at 08:19

## 2021-12-12 RX ADMIN — HYDRALAZINE HYDROCHLORIDE 50 MG: 25 TABLET, FILM COATED ORAL at 08:19

## 2021-12-12 RX ADMIN — FAMOTIDINE 20 MG: 20 TABLET, FILM COATED ORAL at 17:33

## 2021-12-12 RX ADMIN — AMIODARONE HYDROCHLORIDE 200 MG: 200 TABLET ORAL at 08:20

## 2021-12-12 RX ADMIN — LOSARTAN POTASSIUM 100 MG: 100 TABLET, FILM COATED ORAL at 08:19

## 2021-12-12 RX ADMIN — LEVOTHYROXINE SODIUM 100 MCG: 0.1 TABLET ORAL at 07:02

## 2021-12-12 RX ADMIN — POLYETHYLENE GLYCOL 3350 17 G: 17 POWDER, FOR SOLUTION ORAL at 08:19

## 2021-12-12 RX ADMIN — HYDRALAZINE HYDROCHLORIDE 50 MG: 25 TABLET, FILM COATED ORAL at 17:33

## 2021-12-12 RX ADMIN — HYDRALAZINE HYDROCHLORIDE 50 MG: 25 TABLET, FILM COATED ORAL at 22:16

## 2021-12-12 NOTE — PROGRESS NOTES
Bedside shift change report given to Tamia (oncoming nurse) by Yuliet Donald (offgoing nurse). Report included the following information SBAR, Kardex and MAR.

## 2021-12-12 NOTE — PROGRESS NOTES
Bedside and Verbal shift change report given to Jerald Mike RN (oncoming nurse) by Tiera Nugent LPN (offgoing nurse). Report included the following information SBAR, Kardex and Recent Results.

## 2021-12-12 NOTE — PROGRESS NOTES
Problem: Falls - Risk of  Goal: *Absence of Falls  Description: Document Steincasa Willams Fall Risk and appropriate interventions in the flowsheet. Outcome: Progressing Towards Goal  Note: Fall Risk Interventions:  Mobility Interventions: Utilize walker, cane, or other assistive device    Mentation Interventions: Adequate sleep, hydration, pain control, Bed/chair exit alarm    Medication Interventions: Bed/chair exit alarm    Elimination Interventions: Bed/chair exit alarm, Call light in reach    History of Falls Interventions: Door open when patient unattended, Bed/chair exit alarm         Problem: Hypertension  Goal: *Blood pressure within specified parameters  Outcome: Progressing Towards Goal     Problem: Pressure Injury - Risk of  Goal: *Prevention of pressure injury  Description: Document Alexander Scale and appropriate interventions in the flowsheet.   Outcome: Progressing Towards Goal  Note: Pressure Injury Interventions:  Sensory Interventions: Assess changes in LOC, Minimize linen layers    Moisture Interventions: Absorbent underpads, Apply protective barrier, creams and emollients    Activity Interventions: Increase time out of bed    Mobility Interventions: Chair cushion    Nutrition Interventions: Document food/fluid/supplement intake    Friction and Shear Interventions: Apply protective barrier, creams and emollients

## 2021-12-12 NOTE — PROGRESS NOTES
Pharmacy: Warfarin  Dosing    Indication: AFib  Goal INR: 2-3  Today's Dose: warfarin 2.5 mg   Labs:  Recent Labs     12/12/21  0658 12/11/21  0706 12/10/21  0539   INR 1.8* 2.1* 2.7*       Home Dose:Warfarin 3 mg on Thursday and 2.5 mg all other days  Drug Interactions: amiodarone (PTA med); azithromycin   Dietary Intake: adult regular    Impression/Plan: INR today = 1.8  Today's dose 2.5 mg       Thanks,  SUZY Zapata

## 2021-12-12 NOTE — SWING BED INTERDISCIPLINARY CARE PLAN NURSE NOTE
Nursing:    Week #1: Date 12/12/2021  Medical status (changes from previous week):Progressing  Treatment changes this shift: none  Cognition: Present status: oriented name ,place          Is cueing needed?: Yes          Frequency of cueing needs: occasional           Type of cueing used: verbal  ADL (general):  Minimum assistance  Activity type: Social leisure skills  Participation: Daily  Level of participation: Improving slowly  Pain status: No c/o pain  Patient/Family Education needs: safety    Upon review of the patients current care plan, the following issues, problems, or needs remain: strengthening and mobility    Climmie Kehr, RN

## 2021-12-13 LAB
COMMENT, HOLDF: NORMAL
INR PPP: 1.7 (ref 0.9–1.1)
PROTHROMBIN TIME: 16.8 SEC (ref 9–11.1)
SAMPLES BEING HELD,HOLD: NORMAL

## 2021-12-13 PROCEDURE — 97530 THERAPEUTIC ACTIVITIES: CPT

## 2021-12-13 PROCEDURE — 74011250637 HC RX REV CODE- 250/637: Performed by: STUDENT IN AN ORGANIZED HEALTH CARE EDUCATION/TRAINING PROGRAM

## 2021-12-13 PROCEDURE — 97535 SELF CARE MNGMENT TRAINING: CPT

## 2021-12-13 PROCEDURE — 97110 THERAPEUTIC EXERCISES: CPT

## 2021-12-13 PROCEDURE — 74011250637 HC RX REV CODE- 250/637: Performed by: INTERNAL MEDICINE

## 2021-12-13 PROCEDURE — 85610 PROTHROMBIN TIME: CPT

## 2021-12-13 PROCEDURE — 77010033678 HC OXYGEN DAILY

## 2021-12-13 PROCEDURE — 97116 GAIT TRAINING THERAPY: CPT

## 2021-12-13 PROCEDURE — 94760 N-INVAS EAR/PLS OXIMETRY 1: CPT

## 2021-12-13 PROCEDURE — 65270000011 HC RM PRIVATE SNF

## 2021-12-13 PROCEDURE — 36415 COLL VENOUS BLD VENIPUNCTURE: CPT

## 2021-12-13 RX ORDER — ATORVASTATIN CALCIUM 20 MG/1
20 TABLET, FILM COATED ORAL DAILY
Status: DISCONTINUED | OUTPATIENT
Start: 2021-12-14 | End: 2021-12-24 | Stop reason: HOSPADM

## 2021-12-13 RX ADMIN — HYDRALAZINE HYDROCHLORIDE 50 MG: 25 TABLET, FILM COATED ORAL at 09:52

## 2021-12-13 RX ADMIN — LEVOTHYROXINE SODIUM 100 MCG: 0.1 TABLET ORAL at 06:30

## 2021-12-13 RX ADMIN — FUROSEMIDE 20 MG: 20 TABLET ORAL at 09:52

## 2021-12-13 RX ADMIN — WARFARIN SODIUM 3 MG: 2 TABLET ORAL at 17:39

## 2021-12-13 RX ADMIN — HYDRALAZINE HYDROCHLORIDE 50 MG: 25 TABLET, FILM COATED ORAL at 17:28

## 2021-12-13 RX ADMIN — HYDRALAZINE HYDROCHLORIDE 50 MG: 25 TABLET, FILM COATED ORAL at 21:22

## 2021-12-13 RX ADMIN — LOSARTAN POTASSIUM 100 MG: 100 TABLET, FILM COATED ORAL at 09:51

## 2021-12-13 RX ADMIN — DILTIAZEM HYDROCHLORIDE 180 MG: 180 CAPSULE, COATED, EXTENDED RELEASE ORAL at 21:22

## 2021-12-13 RX ADMIN — AMIODARONE HYDROCHLORIDE 200 MG: 200 TABLET ORAL at 09:51

## 2021-12-13 NOTE — PROGRESS NOTES
Problem: Self Care Deficits Care Plan (Adult)  Goal: *Acute Goals and Plan of Care (Insert Text)  Description: FUNCTIONAL STATUS PRIOR TO ADMISSION: Pt lived alone at home and had assistance 2 days per week. Used walker, help with bathing. Suspect he didn't change clothing on his own. HOME SUPPORT: The patient paid for help 2 x week    Occupational Therapy Goals  Initiated 12/10/2021  1. Patient will perform bathing with supervision/set-up within 7 day(s). 2.  Patient will perform lower body dressing with minimal assistance/contact guard assist within 7 day(s). 3.  Patient will perform toilet transfer with SBA within 7 day(s). 4.  Patient will perform all aspects of toileting with modified independence within 7 day(s). 6.  Patient will participate in upper extremity therapeutic exercise/activities with supervision/set-up for 10 minutes within 7 day(s). 7.  Patient will utilize energy conservation techniques during functional activities with verbal cues within 7 day(s). 12/13/2021 1057 by [facility]   Outcome: Progressing Towards Goal   OCCUPATIONAL THERAPY TREATMENT  Patient: Brad Brito (17 y.o. male)  Date: 12/13/2021  Diagnosis: Encounter for rehabilitation [Z51.89]   Encounter for rehabilitation       Precautions: Fall, Bed Alarm  Chart, occupational therapy assessment, plan of care, and goals were reviewed. ASSESSMENT  Patient continues with skilled OT services and is progressing towards goals. OTR stopped by for second visit today. He was able to tolerate red band strengthening but only for some motions in low ranges due to R and L RTC issues. He then asked to go back to bed and was assisted. Current Level of Function Impacting Discharge (ADLs): CGA transfers from sitting to EOB. Able to use bed features to lay down and OTR stabilized his feet and he was able to help scoot himself up in bed.     Other factors to consider for discharge: pt has caregiver assist some days at home.         PLAN :  Patient continues to benefit from skilled intervention to address the above impairments. Continue treatment per established plan of care to address goals. Recommend with staff: up in chair as tolerated, use regular toilet w/BSC frame over top    Recommend next OT session: continue working on endurance and strength building    Recommendation for discharge: (in order for the patient to meet his/her long term goals)  Occupational therapy at least 2 days/week in the home AND ensure assist and/or supervision for safety with ambulation/transfers    This discharge recommendation:  Has been made in collaboration with the attending provider and/or case management    IF patient discharges home will need the following DME: none       SUBJECTIVE:   Patient stated Oleksandr Ratna I go to bed now? Gloria Ricketts    OBJECTIVE DATA SUMMARY:   Cognitive/Behavioral Status:         Cooperative             Functional Mobility and Transfers for ADLs:  Bed Mobility:  Sit to Supine: Modified independent  Scooting: Minimum assistance    Transfers:  Sit to Stand: Contact guard assistance     Bed to Chair: Contact guard assistance    Balance:  Sitting - Static: Good (unsupported)  Sitting - Dynamic: Fair (occasional)  Standing: Impaired  Standing - Static: Constant support; Fair  Standing - Dynamic : Constant support; Fair    ADL Intervention:       Grooming  Position Performed: Seated in chair  Washing Face: Set-up  Washing Hands: Set-up  Brushing Teeth: Set-up              Upper Body Dressing Assistance  Pullover Shirt: Minimum assistance    Lower Body Dressing Assistance  Pants With Elastic Waist: Minimum assistance  Socks: Total assistance (dependent)         Cognitive Retraining  Following Commands: Follows one step commands/directions    Therapeutic Exercises:   Red band: 8 reps instructed/performed bicep curls, tricep extension and horizontal abduction.   Attempted L shoulder flexion but he could not perform, lacks strength to perform resisted task    Pain:  No complaints    Activity Tolerance:   Fair    After treatment patient left in no apparent distress:   Supine in bed, Call bell within reach, Bed / chair alarm activated, and Side rails x 3    COMMUNICATION/COLLABORATION:   The patients plan of care was discussed with: Physical therapist.     Oma Simeon OT  Time Calculation: 15 mins

## 2021-12-13 NOTE — SWING BED INTERDISCIPLINARY CARE PLAN NURSE NOTE
Nursing:    Week #1: Date 12/12/2021  Medical status (changes from previous week): No change  Treatment changes this shift: none  Cognition: Present status: forgetful          Is cueing needed?: Yes          Frequency of cueing needs: frequent           Type of cueing used: verbal  ADL (general):  Moderate assistance  Activity type: Reality awareness  Participation: Daily  Level of participation: Decreasing  Pain status: No c/o pain  Patient/Family Education needs: continue PT    Upon review of the patients current care plan, the following issues, problems, or needs remain: continue PT    Camelia Archer RN

## 2021-12-13 NOTE — PROGRESS NOTES
Pt up in chair most of shift. Appetite fair. No c/o pain. Ambulated in the room a few steps. Very SOB with any exertion. Activity poorly tolerated.

## 2021-12-13 NOTE — PROGRESS NOTES
Bedside and Verbal shift change report given to Mark Jernigan RN (oncoming nurse) by Vi Leo RN (offgoing nurse). Report included the following information SBAR, Kardex, MAR, Accordion and Recent Results.

## 2021-12-13 NOTE — SWING BED INTERDISCIPLINARY CARE PLAN NURSE NOTE
CORINNA @ 1747 - 3402 -  RN Robyn @ 6818 COLUMBA Martinez @ 7635     Nursing:    Week #1: Date 12/13/2021  Medical status (changes from previous week): No change  Treatment changes this shift: none  Cognition: Present status: confused at times          Is cueing needed?: Yes          Frequency of cueing needs: frequent           Type of cueing used: verbal  ADL (general):  Moderate assistance  Activity type: Social leisure skills and Reality awareness  Participation: Daily  Level of participation: No change  Pain status: No c/o pain  Patient/Family Education needs: safety, increased mobility        Roseanne Lockhart RN

## 2021-12-13 NOTE — PROGRESS NOTES
Problem: Self Care Deficits Care Plan (Adult)  Goal: *Acute Goals and Plan of Care (Insert Text)  Description: FUNCTIONAL STATUS PRIOR TO ADMISSION: Pt lived alone at home and had assistance 2 days per week. Used walker, help with bathing. Suspect he didn't change clothing on his own. HOME SUPPORT: The patient paid for help 2 x week    Occupational Therapy Goals  Initiated 12/10/2021  1. Patient will perform bathing with supervision/set-up within 7 day(s). 2.  Patient will perform lower body dressing with minimal assistance/contact guard assist within 7 day(s). 3.  Patient will perform toilet transfer with SBA within 7 day(s). 4.  Patient will perform all aspects of toileting with modified independence within 7 day(s). 6.  Patient will participate in upper extremity therapeutic exercise/activities with supervision/set-up for 10 minutes within 7 day(s). 7.  Patient will utilize energy conservation techniques during functional activities with verbal cues within 7 day(s). Outcome: Progressing Towards Goal   OCCUPATIONAL THERAPY TREATMENT  Patient: Dm Oliveira (58 y.o. male)  Date: 12/13/2021  Diagnosis: Encounter for rehabilitation [Z51.89]   Encounter for rehabilitation       Precautions: Fall, Bed Alarm  Chart, occupational therapy assessment, plan of care, and goals were reviewed. ASSESSMENT  Patient continues with skilled OT services and is progressing towards goals. Pt was willing; to practice dressing but has not had clothing brought from home yet. He was min assist getting on scrub top and bottom for seated portions and was min assist in standing to get clothing pulled up. Current Level of Function Impacting Discharge (ADLs): min assist dressing pants and shirt. Set-up grooming seated in chair. Practiced sit to stand CGA.     Other factors to consider for discharge: has limited assistance at home at this time         PLAN :  Patient continues to benefit from skilled intervention to address the above impairments. Continue treatment per established plan of care to address goals. Recommend with staff: encourage participation in self care    Recommend next OT session: introduce UE strengthening    Recommendation for discharge: (in order for the patient to meet his/her long term goals)  Occupational therapy at least 2 days/week in the home AND ensure assist and/or supervision for safety with transfers/ambulation    This discharge recommendation:  Has been made in collaboration with the attending provider and/or case management    IF patient discharges home will need the following DME: none       SUBJECTIVE:   Patient stated you're going to be mad at me. Judy Cabrera' have clothes here yet.     OBJECTIVE DATA SUMMARY:   Cognitive/Behavioral Status:      Alert, oriented, remembered conversation about getting clothing here from Friday. Functional Mobility and Transfers for ADLs:  Transfers:  Sit to Stand: Contact guard assistance     Balance:  Sitting - Static: Good (unsupported)  Sitting - Dynamic: Fair (occasional)  Standing: Impaired  Standing - Static: Constant support; Fair  Standing - Dynamic : Constant support; Fair    ADL Intervention:       Grooming  Position Performed: Seated in chair  Washing Face: Set-up  Washing Hands: Set-up  Brushing Teeth: Set-up         Upper Body Dressing Assistance  Pullover Shirt: Minimum assistance    Lower Body Dressing Assistance  Pants With Elastic Waist: Minimum assistance  Socks:  Total assistance (dependent)        Pain:  No complaints    Activity Tolerance:   Good    After treatment patient left in no apparent distress:   Sitting in chair, Call bell within reach, Bed / chair alarm activated, and still using O2    COMMUNICATION/COLLABORATION:   The patients plan of care was discussed with: Physical therapist.     Donte Kumar OT  Time Calculation: 15 mins

## 2021-12-13 NOTE — PROGRESS NOTES
Problem: Falls - Risk of  Goal: *Absence of Falls  Description: Document Leafy De La Torre Fall Risk and appropriate interventions in the flowsheet. Outcome: Progressing Towards Goal  Note: Fall Risk Interventions:  Mobility Interventions: Bed/chair exit alarm    Mentation Interventions: Bed/chair exit alarm, Door open when patient unattended    Medication Interventions: Bed/chair exit alarm    Elimination Interventions: Bed/chair exit alarm, Call light in reach    History of Falls Interventions: Bed/chair exit alarm, Door open when patient unattended         Problem: Patient Education: Go to Patient Education Activity  Goal: Patient/Family Education  Outcome: Progressing Towards Goal     Problem: Hypertension  Goal: *Blood pressure within specified parameters  Outcome: Progressing Towards Goal     Problem: Patient Education: Go to Patient Education Activity  Goal: Patient/Family Education  Outcome: Progressing Towards Goal     Problem: Pressure Injury - Risk of  Goal: *Prevention of pressure injury  Description: Document Alexander Scale and appropriate interventions in the flowsheet.   Outcome: Progressing Towards Goal  Note: Pressure Injury Interventions:  Sensory Interventions: Assess changes in LOC    Moisture Interventions: Absorbent underpads    Activity Interventions: Increase time out of bed    Mobility Interventions: HOB 30 degrees or less    Nutrition Interventions: Document food/fluid/supplement intake    Friction and Shear Interventions: Apply protective barrier, creams and emollients, HOB 30 degrees or less                Problem: Patient Education: Go to Patient Education Activity  Goal: Patient/Family Education  Outcome: Progressing Towards Goal

## 2021-12-13 NOTE — PROGRESS NOTES
Problem: Mobility Impaired (Adult and Pediatric)  Goal: *Acute Goals and Plan of Care (Insert Text)  Description: FUNCTIONAL STATUS PRIOR TO ADMISSION: Patient was modified independent using a rolling walker for functional mobility. HOME SUPPORT PRIOR TO ADMISSION: The patient lived alone with no local support. Physical Therapy Goals  Initiated 12/10/2021  1. Patient will move from supine to sit and sit to supine  in bed with minimal assistance/contact guard assist within 7 day(s). 2.  Patient will transfer from bed to chair and chair to bed with supervision/set-up using the least restrictive device within 7 day(s). 3.  Patient will perform sit to stand with minimal assistance/contact guard assist within 7 day(s). 4.  Patient will ambulate with minimal assistance/contact guard assist for 150 feet with the least restrictive device within 7 day(s). 5.  Patient will ascend/descend 4 stairs with bilateral handrail(s) with moderate assistance  within 7 day(s). Outcome: Progressing Towards Goal   PHYSICAL THERAPY TREATMENT  Patient: Marylouise Severs (31 y.o. male)  Date: 12/13/2021  Diagnosis: Encounter for rehabilitation [Z51.89]   Encounter for rehabilitation       Precautions: Fall, Bed Alarm  Chart, physical therapy assessment, plan of care and goals were reviewed. ASSESSMENT  Patient continues with skilled PT services and is progressing towards goals. Pt. Seen in 2 sessions. First session focussed on upward ambulation with a RW of 75ft outside of room with CGA with decreased step clearance bilaterally and narrowed based gait pattern. Pt. Did not correct form/performance with verbal cueing stating he was unable to lift his legs high enough to increase stride length. Pt. Second session focused on functional transfers/endurance and pt. Performed 5 sit <>stands x 3 with CGA with a RW and pt. Demonstrated shortness of breath and pt.  Repositioned upright in chair for comfort and left with all needs met and nursing hand off made. Current Level of Function Impacting Discharge (mobility/balance): functional ambulation advancement    Other factors to consider for discharge: home set up         PLAN :  Patient continues to benefit from skilled intervention to address the above impairments. Continue treatment per established plan of care. to address goals. Recommendation for discharge: (in order for the patient to meet his/her long term goals)  Therapy up to 5 days/week in SNF setting    This discharge recommendation:  Has been made in collaboration with the attending provider and/or case management    IF patient discharges home will need the following DME: bedside commode       SUBJECTIVE:   Patient stated im doing okay.     OBJECTIVE DATA SUMMARY:   Critical Behavior:  Neurologic State: Alert  Orientation Level: Oriented to person, Oriented to place, Disoriented to time  Cognition: Impulsive, Follows commands     Functional Mobility Training:  Bed Mobility:        Sit to Supine: Modified independent  Scooting: Minimum assistance        Transfers:  Sit to Stand: Contact guard assistance  Stand to Sit: Contact guard assistance        Bed to Chair: Contact guard assistance                    Balance:  Sitting - Static: Good (unsupported)  Sitting - Dynamic: Fair (occasional)  Standing: Impaired  Standing - Static: Constant support; Fair  Standing - Dynamic : Constant support;  Fair  Ambulation/Gait Training:  Distance (ft): 75 Feet (ft)  Assistive Device: Walker, rolling; Gait belt  Ambulation - Level of Assistance: Contact guard assistance        Gait Abnormalities: Decreased step clearance; Shuffling gait        Base of Support: Narrowed     Speed/Joy: Pace decreased (<100 feet/min)  Step Length: Left shortened; Right shortened    Activity Tolerance:   Good    After treatment patient left in no apparent distress:   Sitting in chair    COMMUNICATION/COLLABORATION:   The patients plan of care was discussed with: Physical therapist, Occupational therapist, and Rehabilitation technician.      Maegan Figueroa, PT, DPT, EP-C   Time Calculation: 25 mins, 15mins

## 2021-12-13 NOTE — PROGRESS NOTES
Bedside shift change report given to ZAYDA Wilson RN (oncoming nurse) by Inez Dominique RN   (offgoing nurse). Report included the following information Kardex.

## 2021-12-13 NOTE — PROGRESS NOTES
Hospitalist Progress Note               Daily Progress Note: 12/13/2021      Subjective:   46-year-old male with a past medical history of CKD, hypertension, hyperlipidemia, iron deficiency anemia, paroxysmal atrial fibrillation, PAD, and recent admission at [facility] for STEVEN and traumatic rhabdomyolysis, who was transferred to skilled rehab on 12/9. Patient found sitting up in the chair comfortably. He had no acute complaints. Says that rehab is going well and his appetite is good. Denies any pain. Does not recall exactly when his last bowel movement was.     Medications reviewed  Current Facility-Administered Medications   Medication Dose Route Frequency    warfarin (COUMADIN) tablet 3 mg  3 mg Oral ONCE    hydrALAZINE (APRESOLINE) tablet 50 mg  50 mg Oral TID    hydrALAZINE (APRESOLINE) 20 mg/mL injection 10 mg  10 mg IntraVENous Q6H PRN    polyethylene glycol (MIRALAX) packet 17 g  17 g Oral DAILY PRN    sodium chloride (NS) flush 5-40 mL  5-40 mL IntraVENous PRN    acetaminophen (TYLENOL) tablet 650 mg  650 mg Oral Q6H PRN    amiodarone (CORDARONE) tablet 200 mg  200 mg Oral DAILY    bisacodyL (DULCOLAX) suppository 10 mg  10 mg Rectal DAILY PRN    dilTIAZem ER (CARDIZEM CD) capsule 180 mg  180 mg Oral QHS    famotidine (PEPCID) tablet 20 mg  20 mg Oral QPM    furosemide (LASIX) tablet 20 mg  20 mg Oral DAILY    influenza vaccine 2021-22 (6 mos+)(PF) (FLUARIX/FLULAVAL/FLUZONE QUAD) injection 0.5 mL  1 Each IntraMUSCular PRIOR TO DISCHARGE    levothyroxine (SYNTHROID) tablet 100 mcg  100 mcg Oral ACB    losartan (COZAAR) tablet 100 mg  100 mg Oral DAILY    ondansetron (ZOFRAN) injection 4 mg  4 mg IntraVENous Q4H PRN    polyethylene glycol (MIRALAX) packet 17 g  17 g Oral DAILY    WARFARIN DOSING PER PHARMACY  1 Each Other Rx Dosing/Monitoring         Objective:   Physical Exam:     Visit Vitals  /78 (BP 1 Location: Right upper arm, BP Patient Position: At rest)   Pulse 73   Temp 97.6 °F (36.4 °C)   Resp 20   Ht 5' 10\" (1.778 m)   Wt 73 kg (161 lb)   SpO2 92%   BMI 23.10 kg/m²    O2 Flow Rate (L/min): 2 l/min O2 Device: Nasal cannula    Temp (24hrs), Av.6 °F (36.4 °C), Min:97.5 °F (36.4 °C), Max:97.6 °F (36.4 °C)    701 - 1900  In: 480 [P.O.:480]  Out: 125 [Urine:125]   1901 -  0700  In: 360 [P.O.:360]  Out: 350 [Urine:350]    PHYSICAL EXAM:  Gen: Sitting in chair in NAD  HEENT: EOMI, sclera anicteric  CV: Normal rate, RR, no r/m/g, brisk cap refill  Pulm: Nl WOB, CTAB  GI: Abd soft, nontender and nondistended  Ext: WWP, 1+ LE pitting edema mostly around the ankles   Neuro: No gross focal deficits, AAOx3        Data Review:       Recent Days:  No results for input(s): WBC, HGB, HCT, PLT, HGBEXT, HCTEXT, PLTEXT in the last 72 hours. Recent Labs     21  0547 21  0658 21  0706   INR 1.7* 1.8* 2.1*     No results for input(s): PH, PCO2, PO2, HCO3, FIO2 in the last 72 hours. CBC:   Lab Results   Component Value Date/Time    WBC 9.6 2021 05:31 AM    RBC 2.59 (L) 2021 05:31 AM    HGB 8.0 (L) 2021 05:31 AM    HCT 26.1 (L) 2021 05:31 AM    PLATELET [id number] 05:31 AM       No orders to display          Assessment/     81-year-old male with a past medical history of CKD, hypertension, hyperlipidemia, iron deficiency anemia, paroxysmal atrial fibrillation, PAD, and recent [facility] for STEVEN and traumatic rhabdomyolysis, who was transferred to skilled rehab on . Plan:    Encounter for rehabilitation (2021)  One week functional decline prior to recent hospitalization for STEVEN, rhabdomyolysis, and community-acquired pneumonia.  -Continue to work with physical therapy and Occupational Therapy to improve strength and functional ability  -If patient fails to improve he will need to consider long-term care.   -Per previous provider, patient's son and daughter-in-law are well aware of his situation and poor progress     Paroxysmal atrial fibrillation  -Rate controlled  -Cont amiodarone and outpatient cards f/u, cont home dilt   -Continue warfarin, dosed by pharmacy  -F/u outpatient with PCP -- may consider ASA instead of warfarin if risk of bleeding felt to outweigh risk of CVA     Stage IIIB CKD  -Avoid nephrotoxins and renally dose medications  -Proteinuria noted, patient is on losartan    HTN  -Cont hydralazine 50 mg 3 times daily, diltiazem 180 mg daily, Lasix 20 mg daily, losartan 100 mg daily     Hyperlipemia   Cont home atorvastatin 20mg daily     Hypothyroid (8/5/2019)  Maintain thyroid replacement  TSH 0.31 on 12/5 but difficult to interpret in setting of acute illness. Repeat TFTs as an outpatient        SAFETY:   Code Status:Full  DVT prophylaxis:Coumadin  Stress Ulcer prophylaxis:  Not indicated  Bladder catheter:no  Family Contact Info:  Primary Emergency ContactMacy Wasserman Jonatan Phone: [phone]  Bedded: [facility] Room 126/01  Disposition: TBD, likely home pending progress with rehab  Admission status:  [address] [place] [address] [facility] discussed with: Patient/Family    Total time spent with patient: 35 minutes.     Maverick Arellano MD

## 2021-12-13 NOTE — PROGRESS NOTES
Pharmacy: Warfarin  Dosing    Indication: Afib  Goal INR: 2-3  Today's Dose: warfarin 3mg   Labs:  Recent Labs     12/13/21  0547 12/12/21  0658 12/11/21  0706   INR 1.7* 1.8* 2.1*       Home Dose: Warfarin 3 mg on Thursday and 2.5 mg all other days  Drug Interactions: amiodarone (pta)  Dietary Intake: adult regular    Impression/Plan: INR today = 1.7. Today's dose to be warfarin 3mg.         Thanks,  Derrick Luu

## 2021-12-14 LAB
COMMENT, HOLDF: NORMAL
INR PPP: 1.8 (ref 0.9–1.1)
PROTHROMBIN TIME: 18.1 SEC (ref 9–11.1)
SAMPLES BEING HELD,HOLD: NORMAL

## 2021-12-14 PROCEDURE — 97530 THERAPEUTIC ACTIVITIES: CPT

## 2021-12-14 PROCEDURE — 65270000011 HC RM PRIVATE SNF

## 2021-12-14 PROCEDURE — 97116 GAIT TRAINING THERAPY: CPT

## 2021-12-14 PROCEDURE — 97535 SELF CARE MNGMENT TRAINING: CPT

## 2021-12-14 PROCEDURE — 77010033678 HC OXYGEN DAILY

## 2021-12-14 PROCEDURE — 74011250637 HC RX REV CODE- 250/637: Performed by: INTERNAL MEDICINE

## 2021-12-14 PROCEDURE — 97110 THERAPEUTIC EXERCISES: CPT

## 2021-12-14 PROCEDURE — 36415 COLL VENOUS BLD VENIPUNCTURE: CPT

## 2021-12-14 PROCEDURE — 85610 PROTHROMBIN TIME: CPT

## 2021-12-14 PROCEDURE — 94760 N-INVAS EAR/PLS OXIMETRY 1: CPT

## 2021-12-14 PROCEDURE — 74011250637 HC RX REV CODE- 250/637: Performed by: STUDENT IN AN ORGANIZED HEALTH CARE EDUCATION/TRAINING PROGRAM

## 2021-12-14 RX ADMIN — LEVOTHYROXINE SODIUM 100 MCG: 0.1 TABLET ORAL at 06:30

## 2021-12-14 RX ADMIN — HYDRALAZINE HYDROCHLORIDE 50 MG: 25 TABLET, FILM COATED ORAL at 16:50

## 2021-12-14 RX ADMIN — HYDRALAZINE HYDROCHLORIDE 50 MG: 25 TABLET, FILM COATED ORAL at 09:53

## 2021-12-14 RX ADMIN — ATORVASTATIN CALCIUM 20 MG: 20 TABLET, FILM COATED ORAL at 09:53

## 2021-12-14 RX ADMIN — AMIODARONE HYDROCHLORIDE 200 MG: 200 TABLET ORAL at 09:53

## 2021-12-14 RX ADMIN — HYDRALAZINE HYDROCHLORIDE 50 MG: 25 TABLET, FILM COATED ORAL at 21:38

## 2021-12-14 RX ADMIN — FUROSEMIDE 20 MG: 20 TABLET ORAL at 09:53

## 2021-12-14 RX ADMIN — LOSARTAN POTASSIUM 100 MG: 100 TABLET, FILM COATED ORAL at 09:53

## 2021-12-14 RX ADMIN — WARFARIN SODIUM 3 MG: 2 TABLET ORAL at 17:58

## 2021-12-14 RX ADMIN — DILTIAZEM HYDROCHLORIDE 180 MG: 180 CAPSULE, COATED, EXTENDED RELEASE ORAL at 21:38

## 2021-12-14 NOTE — SWING BED INTERDISCIPLINARY CARE PLAN NURSE NOTE
Nursing:    Week #2: Date 12/13/2021  Medical status (changes from previous week):Progressing  Treatment changes this shift: Md discontinued Pepcid 20 mg tablet. Cognition: Present status: oriented          Is cueing needed?: Yes          Frequency of cueing needs: frequent           Type of cueing used: verbal  ADL (general):  Moderate assistance  Activity type: Social leisure skills  Participation: Daily  Level of participation: Improving  Pain status: No c/o pain  Patient/Family Education needs: safety    Upon review of the patients current care plan, the following issues, problems, or needs remain: increase mobility    Mickey Cerda RN

## 2021-12-14 NOTE — PROGRESS NOTES
Bedside and Verbal shift change report given to Everette Verduzco RN (oncoming nurse) by Flip Nguyen RN   (offgoing nurse). Report included the following information SBAR, Kardex, Procedure Summary, Intake/Output, MAR, Accordion, Recent Results and Med Rec Status. Uribe score 4  Bed/chair alarm yes in use. If in use it is set at the highest volume. Intravenous fluids were administered, none No IV  Patient Vitals for the past 12 hrs:   Temp Pulse Resp BP SpO2   12/13/21 2029 97.8 °F (36.6 °C) 71 18 (!) 168/74 94 %   12/13/21 1728 -- 73 -- -- --   12/13/21 1700 97.9 °F (36.6 °C) 68 18 133/60 95 %   12/13/21 0952 -- 73 -- -- --     No flowsheet data found. Lab results reviewed. For significant abnormal values and values requiring intervention, see assessment and plan.

## 2021-12-14 NOTE — PROGRESS NOTES
Problem: Falls - Risk of  Goal: *Absence of Falls  Description: Document [place] Fall Risk and appropriate interventions in the flowsheet. Outcome: Progressing Towards Goal  Note: Fall Risk Interventions:  Mobility Interventions: Bed/chair exit alarm    Mentation Interventions: Bed/chair exit alarm, Increase mobility, Update white board    Medication Interventions: Bed/chair exit alarm, Patient to call before getting OOB    Elimination Interventions: Bed/chair exit alarm, Call light in reach, Patient to call for help with toileting needs, Stay With Me (per policy)    History of Falls Interventions: Bed/chair exit alarm, Door open when patient unattended, Room close to nurse's station         Problem: Hypertension  Goal: *Blood pressure within specified parameters  Outcome: Progressing Towards Goal  Goal: *Labs within defined limits  Outcome: Progressing Towards Goal     Problem: Pressure Injury - Risk of  Goal: *Prevention of pressure injury  Description: Document Alexander Scale and appropriate interventions in the flowsheet.   Outcome: Progressing Towards Goal  Note: Pressure Injury Interventions:  Sensory Interventions: Assess changes in LOC    Moisture Interventions: Absorbent underpads    Activity Interventions: Increase time out of bed    Mobility Interventions: Float heels, HOB 30 degrees or less    Nutrition Interventions: Document food/fluid/supplement intake    Friction and Shear Interventions: Lift sheet, Minimize layers

## 2021-12-14 NOTE — SWING BED INTERDISCIPLINARY CARE PLAN NURSE NOTE
Nursing:    Week #1: Date 12/14/2021  Medical status (changes from previous week):Progressing  Treatment changes this shift: none  Cognition: Present status: oriented          Is cueing needed?: Yes          Frequency of cueing needs: occasional           Type of cueing used: verbal  ADL (general):  Moderate assistance  Activity type: Social leisure skills  Participation: Daily  Level of participation: Improving  Pain status: No c/o pain  Patient/Family Education needs: safety, increased mobility      Yesenia Vance RN

## 2021-12-14 NOTE — PROGRESS NOTES
Bedside and Verbal shift change report given to DEAN Pennington RN (oncoming nurse) by Carlos Sutherland RN (offgoing nurse). Report included the following information SBAR, Kardex, MAR, Accordion and Recent Results.

## 2021-12-14 NOTE — PROGRESS NOTES
Problem: Self Care Deficits Care Plan (Adult)  Goal: *Acute Goals and Plan of Care (Insert Text)  Description: FUNCTIONAL STATUS PRIOR TO ADMISSION: Pt lived alone at home and had assistance 2 days per week. Used walker, help with bathing. Suspect he didn't change clothing on his own. HOME SUPPORT: The patient paid for help 2 x week    Occupational Therapy Goals  Initiated 12/10/2021  1. Patient will perform bathing with supervision/set-up within 7 day(s). 2.  Patient will perform lower body dressing with minimal assistance/contact guard assist within 7 day(s). 3.  Patient will perform toilet transfer with SBA within 7 day(s). 4.  Patient will perform all aspects of toileting with modified independence within 7 day(s). 6.  Patient will participate in upper extremity therapeutic exercise/activities with supervision/set-up for 10 minutes within 7 day(s). 7.  Patient will utilize energy conservation techniques during functional activities with verbal cues within 7 day(s). Outcome: Progressing Towards Goal   OCCUPATIONAL THERAPY TREATMENT  Patient: Isaac Roy (18 y.o. male)  Date: 12/14/2021  Diagnosis: Encounter for rehabilitation [Z51.89]   Encounter for rehabilitation       Precautions: Fall, Bed Alarm  Chart, occupational therapy assessment, plan of care, and goals were reviewed. ASSESSMENT  Patient continues with skilled OT services and is progressing towards goals. Pt; was seen for 2 morning sessions. He initially participated in seated grooming and review of HEP. He practiced transfers from chair to W/C and standing from W/C several times with SBA. His balance upon initial standing has significantly improved to only SBA. He admitted to not performing HEP as instructed yesterday so OTR reviewed with him. He then was with PT and needed assist to use urinal. He was in standing and needed both hands on the walker.  OTR held urinal but with firm instructed to place himself into the urinal. OTR did have to hold the urinal for him. He was dependent for clothing up/down in standing. Current Level of Function Impacting Discharge (ADLs): unable to use urinal in standing without major assist. Unable to manage clothing in standing. Transfers improved to SBA. Other factors to consider for discharge: will have help at home. PLAN :  Patient continues to benefit from skilled intervention to address the above impairments. Continue treatment per established plan of care to address goals. Recommend with staff: encourage seated urinal use. Recommend next OT session: continue working on transfers/strength/endurance    Recommendation for discharge: (in order for the patient to meet his/her long term goals)  Occupational therapy at least 2 days/week in the home AND ensure assist and/or supervision for safety with ambulation    This discharge recommendation:  Has been made in collaboration with the attending provider and/or case management    IF patient discharges home will need the following DME: none       SUBJECTIVE:   Patient stated i'll try.  regarding holding urinal but he could not    OBJECTIVE DATA SUMMARY:   Cognitive/Behavioral Status:  Neurologic State: Alert  Orientation Level: Oriented to person; Oriented to place  Cognition: Follows commands    Functional Mobility and Transfers for ADLs:    Transfers:  Sit to Stand: Stand-by assistance     Bed to Chair: Contact guard assistance    Balance:  Sitting: Intact  Sitting - Static: Good (unsupported)  Sitting - Dynamic: Fair (occasional)  Standing: Impaired  Standing - Static: Constant support; Fair  Standing - Dynamic : Constant support; Poor    ADL Intervention:       Grooming  Grooming Assistance: Set-up  Position Performed: Seated in chair  Washing Face: Set-up  Washing Hands: Set-up  Brushing Teeth: Set-up         Toileting  Toileting Assistance: Total assistance(dependent)  Bladder Hygiene:  Total assistance (dependent)  Clothing Management: Total assistance (dependent)  Adaptive Equipment:  (urinal in standing)         Therapeutic Exercises:   Reviewed red band exercises, tricep, bicep and low horizontal abd    Pain:  No reported pain    Activity Tolerance:   Good    After treatment patient left in no apparent distress:   Sitting in chair, Call bell within reach, and Bed / chair alarm activated    COMMUNICATION/COLLABORATION:   The patients plan of care was discussed with: Physical therapist.     Oma Simeon OT  Time Calculation: 26 mins

## 2021-12-14 NOTE — PROGRESS NOTES
Spoke with the son(Jez [facility] [phone]) this evening. Gave him an update on his father's progress. He was wondering if the discharge date was going to be Friday, 12/17/21. I informed him that the date was a tentative date set but that it was dependent on the recommendation of the therapy department whether or not he needs more therapy past Friday. Suggested to  [facility] that he ask the sitters that assist the patient in the home to pay a visit and observe the patient in therapy. Did not know for sure the best time to tell her to come(morning or afternoon) but suggested coming in the morning. Suggested to  [facility] that he call tomorrow after rounds to get an idea of the patient's actual discharge date according to therapy.

## 2021-12-14 NOTE — PROGRESS NOTES
Called patient's personal care aide Geno Ramos [phone]. Made her aware that PT had to leave before she could get here.  Was going to have her see patient and PT work together to see his progress

## 2021-12-14 NOTE — PROGRESS NOTES
Follow up visit with patient in Rm [id number]  Provided empathic listening and spiritual support  Advised of  Availability   [address] [place] [address]

## 2021-12-14 NOTE — SWING BED INTERDISCIPLINARY CARE PLAN NURSE NOTE
Nursing:    Week #1: Date 12/14/2021  Medical status (changes from previous week):Progressing  Treatment changes this shift: none  Cognition: Present status: oriented          Is cueing needed?: Yes          Frequency of cueing needs: occasional           Type of cueing used: verbal  ADL (general):  Moderate assistance  Activity type: Social leisure skills  Participation: Daily  Level of participation: Improving  Pain status: No c/o pain  Patient/Family Education needs: increase strength    Upon review of the patients current care plan, the following issues, problems, or needs remain: increase strength    Eugenio Ca RN

## 2021-12-14 NOTE — PROGRESS NOTES
Problem: Falls - Risk of  Goal: *Absence of Falls  Description: Document [place] Fall Risk and appropriate interventions in the flowsheet. Outcome: Progressing Towards Goal  Note: Fall Risk Interventions:  Mobility Interventions: Bed/chair exit alarm    Mentation Interventions: Bed/chair exit alarm, Door open when patient unattended    Medication Interventions: Bed/chair exit alarm    Elimination Interventions: Bed/chair exit alarm, Call light in reach    History of Falls Interventions: Bed/chair exit alarm, Door open when patient unattended         Problem: Patient Education: Go to Patient Education Activity  Goal: Patient/Family Education  Outcome: Progressing Towards Goal     Problem: Patient Education: Go to Patient Education Activity  Goal: Patient/Family Education  Outcome: Progressing Towards Goal     Problem: Pressure Injury - Risk of  Goal: *Prevention of pressure injury  Description: Document Alexander Scale and appropriate interventions in the flowsheet.   Outcome: Progressing Towards Goal  Note: Pressure Injury Interventions:  Sensory Interventions: Assess changes in LOC, Check visual cues for pain    Moisture Interventions: Absorbent underpads    Activity Interventions: Increase time out of bed    Mobility Interventions: HOB 30 degrees or less    Nutrition Interventions: Document food/fluid/supplement intake    Friction and Shear Interventions: HOB 30 degrees or less                Problem: Patient Education: Go to Patient Education Activity  Goal: Patient/Family Education  Outcome: Progressing Towards Goal

## 2021-12-14 NOTE — PROGRESS NOTES
Nutrition Assessment     Type and Reason for Visit: Reassess    Nutrition Recommendations/Plan: continue regular lowfat/low chol/high fiber/JCARLOS, ice cream 2x day, ensure 2x day, encourage po intake     Nutrition Assessment:   Chart reviewed. Pt is on lasix-no new wt taken since admission. Coumadin ed-completed. Pt would like ice cream, he is also willing to try some ensure. Po intake poor to fair. No new labs in past 5 days. Malnutrition Assessment:  Malnutrition Status:  Moderate malnutrition     Estimated Daily Nutrient Needs:  Energy (kcal):  1879  Protein (g):  88       Fluid (ml/day):  1979    Nutrition Related Findings:       Patient Vitals for the past 168 hrs:   % Diet Eaten   12/14/21 1200 26 - 50%   12/14/21 0800 26 - 50%   12/13/21 1728 26 - 50%   12/13/21 1200 26 - 50%   12/13/21 0800 76 - 100%   12/12/21 1300 1 - 25%   12/12/21 0932 1 - 25%   12/11/21 1800 26 - 50%   12/11/21 1300 76 - 100%   12/11/21 0910 26 - 50%   12/10/21 1200 0%   12/10/21 0900 26 - 50%   12/10/21 0800 1 - 25%   12/09/21 1800 26 - 50%         Current Nutrition Therapies:  ADULT ORAL NUTRITION SUPPLEMENT Breakfast, Dinner; Standard High Calorie/High Protein  ADULT DIET Regular; Low Fat/Low Chol/High Fiber/JCARLOS    Anthropometric Measures:  · Height:  5' 10\" (177.8 cm)  · Current Body Wt:  73 kg (160 lb 15 oz)  · BMI: 23.1    Nutrition Diagnosis:   · Inadequate oral intake, Inadequate protein intake related to psychological cause or life stress as evidenced by intake 26-50%, intake 51-75%      Nutrition Intervention:  Food and/or Nutrient Delivery: Continue current diet, Start oral nutrition supplement  Nutrition Education and Counseling: No recommendations at this time  Coordination of Nutrition Care: Continue to monitor while inpatient, Interdisciplinary rounds    Goals:  po intake at least 50-75% of most meals x 5-7 days       Nutrition Monitoring and Evaluation:   Behavioral-Environmental Outcomes: None identified  Food/Nutrient Intake Outcomes: Food and nutrient intake  Physical Signs/Symptoms Outcomes: Weight, Meal time behavior    Discharge Planning:    Continue current diet, Continue oral nutrition supplement     Electronically signed by Karan Blizzard, RD on 12/14/2021

## 2021-12-14 NOTE — PROGRESS NOTES
Pharmacy: Warfarin  Dosing    Indication: Afib  Goal INR: 2-3  Today's Dose: warfarin 3mg   Labs:  Recent Labs     12/14/21  0536 12/13/21  0547 12/12/21  0658   INR 1.8* 1.7* 1.8*       Home Dose: Warfarin 3 mg on Thursday and 2.5 mg all other days  Drug Interactions: amiodarone (pta)  Dietary Intake: adult regular    Impression/Plan: INR today = 1.8. Today's dose to be warfarin 3mg.         Thanks,  Nivia Wahl, PHARMD

## 2021-12-14 NOTE — PROGRESS NOTES
Problem: Mobility Impaired (Adult and Pediatric)  Goal: *Acute Goals and Plan of Care (Insert Text)  Description: FUNCTIONAL STATUS PRIOR TO ADMISSION: Patient was modified independent using a rolling walker for functional mobility. HOME SUPPORT PRIOR TO ADMISSION: The patient lived alone with no local support. Physical Therapy Goals  Initiated 12/10/2021  1. Patient will move from supine to sit and sit to supine  in bed with minimal assistance/contact guard assist within 7 day(s). 2.  Patient will transfer from bed to chair and chair to bed with supervision/set-up using the least restrictive device within 7 day(s). 3.  Patient will perform sit to stand with minimal assistance/contact guard assist within 7 day(s). 4.  Patient will ambulate with minimal assistance/contact guard assist for 150 feet with the least restrictive device within 7 day(s). 5.  Patient will ascend/descend 4 stairs with bilateral handrail(s) with moderate assistance  within 7 day(s). Outcome: Progressing Towards Goal   PHYSICAL THERAPY TREATMENT  Patient: Flip Rene (23 y.o. male)  Date: 12/14/2021  Diagnosis: Encounter for rehabilitation [Z51.89]   Encounter for rehabilitation       Precautions: Fall, Bed Alarm  Chart, physical therapy assessment, plan of care and goals were reviewed. ASSESSMENT  Patient continues with skilled PT services and is progressing towards goals. Pt. Received upright in chair and agreeable to PT. Pt. SBA for sit <>stand transfers from RW throughout session and ambulated 50-75ft x 3 in session with decreased step clearance bilaterally and shuffled gait progression. Pt. Ascended/descended 5 steps x 2 with R sided handrail with a lateral approach. Pt. Educated on lateral approach for safer ascension/declension at home since his home is only equipped with a R sided rail. Pt. Struggled with eccentric descension and required minimal A x 1 as well as verbal cueing for step clearance throughout.  Pt. Left with all needs met upright in chair and nursing hand off made. Current Level of Function Impacting Discharge (mobility/balance): functional ambulation advancement/stair training    Other factors to consider for discharge: home set up         PLAN :  Patient continues to benefit from skilled intervention to address the above impairments. Continue treatment per established plan of care. to address goals. Recommendation for discharge: (in order for the patient to meet his/her long term goals)  Therapy up to 5 days/week in SNF setting    This discharge recommendation:  Has been made in collaboration with the attending provider and/or case management    IF patient discharges home will need the following DME: patient owns DME required for discharge       SUBJECTIVE:   Patient stated im doing just fine.     OBJECTIVE DATA SUMMARY:   Critical Behavior:  Neurologic State: Alert  Orientation Level: Oriented to person, Oriented to place  Cognition: Follows commands     Functional Mobility Training:    Transfers:  Sit to Stand: Stand-by assistance  Stand to Sit: Contact guard assistance        Bed to Chair: Contact guard assistance    Balance:  Sitting: Intact  Sitting - Static: Good (unsupported)  Sitting - Dynamic: Fair (occasional)  Standing: Impaired  Standing - Static: Constant support;  Fair  Standing - Dynamic : Constant support; Poor  Ambulation/Gait Training:  Distance (ft): 50 Feet (ft)  Assistive Device: Gait belt; Walker, rolling  Ambulation - Level of Assistance: Contact guard assistance        Gait Abnormalities: Decreased step clearance; Shuffling gait        Base of Support: Narrowed     Speed/Joy: Pace decreased (<100 feet/min)  Step Length: Left shortened; Right shortened    Stairs:  Number of Stairs Trained: 5 (x2)  Stairs - Level of Assistance: Minimum assistance; Assist X1   Rail Use: Right     Activity Tolerance:   Good    After treatment patient left in no apparent distress:   Sitting in chair    COMMUNICATION/COLLABORATION:   The patients plan of care was discussed with: Physical therapist, Occupational therapist, and Registered nurse.      April Pan PT, DPT, EP-C   Time Calculation: 46 mins

## 2021-12-15 LAB
COMMENT, HOLDF: NORMAL
INR PPP: 2.1 (ref 0.9–1.1)
PROTHROMBIN TIME: 20.6 SEC (ref 9–11.1)
SAMPLES BEING HELD,HOLD: NORMAL

## 2021-12-15 PROCEDURE — 74011250637 HC RX REV CODE- 250/637: Performed by: INTERNAL MEDICINE

## 2021-12-15 PROCEDURE — 97110 THERAPEUTIC EXERCISES: CPT

## 2021-12-15 PROCEDURE — 65270000011 HC RM PRIVATE SNF

## 2021-12-15 PROCEDURE — 82668 ASSAY OF ERYTHROPOIETIN: CPT

## 2021-12-15 PROCEDURE — 77030037878 HC DRSG MEPILEX >48IN BORD MOLN -B

## 2021-12-15 PROCEDURE — 97535 SELF CARE MNGMENT TRAINING: CPT

## 2021-12-15 PROCEDURE — 85610 PROTHROMBIN TIME: CPT

## 2021-12-15 PROCEDURE — 36415 COLL VENOUS BLD VENIPUNCTURE: CPT

## 2021-12-15 PROCEDURE — 97530 THERAPEUTIC ACTIVITIES: CPT

## 2021-12-15 PROCEDURE — 74011250637 HC RX REV CODE- 250/637: Performed by: STUDENT IN AN ORGANIZED HEALTH CARE EDUCATION/TRAINING PROGRAM

## 2021-12-15 PROCEDURE — 97116 GAIT TRAINING THERAPY: CPT

## 2021-12-15 PROCEDURE — 86340 INTRINSIC FACTOR ANTIBODY: CPT

## 2021-12-15 RX ORDER — WARFARIN 2.5 MG/1
2.5 TABLET ORAL ONCE
Status: COMPLETED | OUTPATIENT
Start: 2021-12-15 | End: 2021-12-15

## 2021-12-15 RX ADMIN — LEVOTHYROXINE SODIUM 100 MCG: 0.1 TABLET ORAL at 06:41

## 2021-12-15 RX ADMIN — HYDRALAZINE HYDROCHLORIDE 50 MG: 25 TABLET, FILM COATED ORAL at 16:33

## 2021-12-15 RX ADMIN — DILTIAZEM HYDROCHLORIDE 180 MG: 180 CAPSULE, COATED, EXTENDED RELEASE ORAL at 21:40

## 2021-12-15 RX ADMIN — HYDRALAZINE HYDROCHLORIDE 50 MG: 25 TABLET, FILM COATED ORAL at 08:08

## 2021-12-15 RX ADMIN — WARFARIN SODIUM 2.5 MG: 2.5 TABLET ORAL at 17:38

## 2021-12-15 RX ADMIN — AMIODARONE HYDROCHLORIDE 200 MG: 200 TABLET ORAL at 08:09

## 2021-12-15 RX ADMIN — HYDRALAZINE HYDROCHLORIDE 50 MG: 25 TABLET, FILM COATED ORAL at 21:40

## 2021-12-15 RX ADMIN — ATORVASTATIN CALCIUM 20 MG: 20 TABLET, FILM COATED ORAL at 08:09

## 2021-12-15 RX ADMIN — FUROSEMIDE 20 MG: 20 TABLET ORAL at 08:10

## 2021-12-15 RX ADMIN — LOSARTAN POTASSIUM 100 MG: 100 TABLET, FILM COATED ORAL at 08:09

## 2021-12-15 NOTE — PROGRESS NOTES
Problem: Falls - Risk of  Goal: *Absence of Falls  Description: Document [place] Fall Risk and appropriate interventions in the flowsheet. Outcome: Progressing Towards Goal  Note: Fall Risk Interventions:  Mobility Interventions: Bed/chair exit alarm    Mentation Interventions: Bed/chair exit alarm, Door open when patient unattended, Room close to nurse's station    Medication Interventions: Bed/chair exit alarm, Patient to call before getting OOB, Teach patient to arise slowly    Elimination Interventions: Bed/chair exit alarm, Call light in reach    History of Falls Interventions: Bed/chair exit alarm, Door open when patient unattended, Room close to nurse's station         Problem: Patient Education: Go to Patient Education Activity  Goal: Patient/Family Education  Outcome: Progressing Towards Goal     Problem: Pressure Injury - Risk of  Goal: *Prevention of pressure injury  Description: Document Alexander Scale and appropriate interventions in the flowsheet.   Outcome: Progressing Towards Goal  Note: Pressure Injury Interventions:  Sensory Interventions: Assess changes in LOC    Moisture Interventions: Absorbent underpads, Check for incontinence Q2 hours and as needed    Activity Interventions: Increase time out of bed    Mobility Interventions: HOB 30 degrees or less    Nutrition Interventions: Document food/fluid/supplement intake    Friction and Shear Interventions: HOB 30 degrees or less                Problem: Patient Education: Go to Patient Education Activity  Goal: Patient/Family Education  Outcome: Progressing Towards Goal

## 2021-12-15 NOTE — PROGRESS NOTES
PHYSICAL THERAPY TREATMENT  Patient: Arnoldo Jeffery (65 y.o. male)  Date: 12/15/2021  Diagnosis: Encounter for rehabilitation [Z51.89] Encounter for rehabilitation       Precautions: Fall,Bed Alarm  Chart, physical therapy assessment, plan of care and goals were reviewed. ASSESSMENT  Patient continues with skilled PT services and is progressing towards goals as evidenced by improved bed mobility and balance. He demonstrates improved balance, bed mobility and strength compared to previous assessment. He continues with low endurance and increased fall risk with fatigue. Current Level of Function Impacting Discharge (mobility/balance): low endurance    Other factors to consider for discharge: solitary living status during night         PLAN :  Patient continues to benefit from skilled intervention to address the above impairments. Continue treatment per established plan of care. to address goals. Recommendation for discharge: (in order for the patient to meet his/her long term goals)  Physical therapy at least 2 days/week in the home     This discharge recommendation:  Has not yet been discussed the attending provider and/or case management    IF patient discharges home will need the following DME: bedside commode for night       SUBJECTIVE:   Patient stated agreeable to rx. Lesa Fontenot    OBJECTIVE DATA SUMMARY:   Critical Behavior:  Neurologic State: Alert  Orientation Level: Oriented X4  Cognition: Appropriate safety awareness,Follows commands     Functional Mobility Training:  Bed Mobility:     Supine to Sit: Contact guard assistance  Sit to Supine: Contact guard assistance  Scooting: Stand-by assistance        Transfers:  Sit to Stand: Supervision  Stand to Sit: Modified independent                             Balance:  Sitting - Static: Good (unsupported)  Sitting - Dynamic: Good (unsupported)  Standing - Static: Fair  Standing - Dynamic : Fair                           Therapeutic Exercises:   Laq, sit to stand reps, bending,  Reaching from seated position, push pulls, seated slr and glut stomp, alternate propping on elbows,   Pain Ratin/10    Activity Tolerance:   Fair    After treatment patient left in no apparent distress:   Supine in bed, Call bell within reach and Bed / chair alarm activated    COMMUNICATION/COLLABORATION:   The patients plan of care was discussed with: Physical therapist, Physician and Case management.      Rick Valverde, PT   Time Calculation: 37 mins

## 2021-12-15 NOTE — PROGRESS NOTES
Received patient in bed wearing 2 liters nasal canula. Encouraged OOB to chair for bfast. He initially declined, but then agreed after great encouragement. He also declines bath, but will ask Nursing Student to try again this afternoon. He does not want to wear the oxygen. He appears to only need at night, as O2 sats remain WNL in day when sitting up. Removed nasal canula and will re-check vitals later this morning. Mr. Debbie Fisher gives a pain score of 2. He does not provide a location - states chronic overall discomfort. Declines tylenol as offered.

## 2021-12-15 NOTE — PROGRESS NOTES
Problem: Mobility Impaired (Adult and Pediatric)  Goal: *Acute Goals and Plan of Care (Insert Text)  Description: FUNCTIONAL STATUS PRIOR TO ADMISSION: Patient was modified independent using a rolling walker for functional mobility. HOME SUPPORT PRIOR TO ADMISSION: The patient lived alone with no local support. Physical Therapy Goals  Initiated 12/10/2021  1. Patient will move from supine to sit and sit to supine  in bed with minimal assistance/contact guard assist within 7 day(s). 2.  Patient will transfer from bed to chair and chair to bed with supervision/set-up using the least restrictive device within 7 day(s). 3.  Patient will perform sit to stand with minimal assistance/contact guard assist within 7 day(s). 4.  Patient will ambulate with minimal assistance/contact guard assist for 150 feet with the least restrictive device within 7 day(s). 5.  Patient will ascend/descend 4 stairs with bilateral handrail(s) with moderate assistance  within 7 day(s). 12/15/2021 1532 by Bethanie Schneider, PT  Outcome: Progressing Towards Goal  12/15/2021 0925 by Bethanie Schneider, PT  Outcome: Progressing Towards Goal   PHYSICAL THERAPY TREATMENT  Patient: Christian Jackson (89 y.o. male)  Date: 12/15/2021  Diagnosis: Encounter for rehabilitation [Z51.89] Encounter for rehabilitation       Precautions: Fall,Bed Alarm  Chart, physical therapy assessment, plan of care and goals were reviewed. ASSESSMENT  Patient continues with skilled PT services and is progressing towards goals. Pt. Received upright in chair and agreeable to PT. Pt. Ambulated 75ft on RA and Sp02 dropped to 87 and recovered to 90-92 after short rest break. Pt. Les Finley to progress toward expected d/c on Friday 12/17/21. .     Current Level of Function Impacting Discharge (mobility/balance): functional ambulation/endurance    Other factors to consider for discharge: home setup         PLAN :  Patient continues to benefit from skilled intervention to address the above impairments. Continue treatment per established plan of care. to address goals. Recommendation for discharge: (in order for the patient to meet his/her long term goals)  Physical therapy at least 2 days/week in the home     This discharge recommendation:  Has been made in collaboration with the attending provider and/or case management    IF patient discharges home will need the following DME: bedside commode       SUBJECTIVE:   Patient stated Karolyn Cullen do anything you need.     OBJECTIVE DATA SUMMARY:   Critical Behavior:  Neurologic State: Alert  Orientation Level: Oriented X4  Cognition: Appropriate safety awareness,Follows commands     Functional Mobility Training:  Bed Mobility:     Supine to Sit: Contact guard assistance  Sit to Supine: Contact guard assistance  Scooting: Stand-by assistance        Transfers:  Sit to Stand: Supervision  Stand to Sit: Modified independent    Balance:  Sitting - Static: Good (unsupported)  Sitting - Dynamic: Good (unsupported)  Standing - Static: Fair  Standing - Dynamic : Fair  Ambulation/Gait Training:  Distance (ft): 75 Feet (ft)  Assistive Device: Walker, rolling;Gait belt  Ambulation - Level of Assistance: Contact guard assistance                 Base of Support: Narrowed     Speed/Joy: Slow  Step Length: Left shortened;Right shortened      Activity Tolerance:   Good    After treatment patient left in no apparent distress:   Sitting in chair    COMMUNICATION/COLLABORATION:   The patients plan of care was discussed with: Physical therapist and Rehabilitation technician.      Jacek Foss PT, DPT, EP-C   Time Calculation: 24 mins

## 2021-12-15 NOTE — PROGRESS NOTES
Patient does qualify for home o2. Order placed through [facility]. Spoke with Jose Egan from [employer]. Told her patient is due to be discharged on Friday 12/17. She is going to reach out to [employer] [facility] to make sure it will be delivered Friday. She will keep me informed. Also attempted to call patient's son Rosario Nazario II with no answer. Left him a voicemail to call me back.

## 2021-12-15 NOTE — RT PROTOCOL NOTE
Evaluation for Home Oxygen    Resting (Room Air)  SpO2:  90  HR:    61  Resting (On O2)  SpO2:   96  HR:   64 O2 Device:  NC  O2 Flow Rate (L/min):  2  FIO2 (%):  28  Comments:      During Walk (Room Air)  SpO2:  84  HR:  83  Walk/Assistance Device:  walker  Rate of Dyspnea:  1  Symptoms:  SOB  Comments:  Patient very weak and slightly unstable  HR:  68    After Walk  SpO2:  90  HR:  75  O2 Device:  NC  O2 Flow Rate (L/min):  2  FIO2 (%):  28  Rate of Dyspnea:   1Symptoms:  Tachypneic  Comments:    Does the Patient Qualify for Home O2:  Yes  Liter Flow at Rest:  2  Liter Flow on Exertion:  3  Does the Patient Need Portable Oxygen Tanks: Yes      Additional Comments: Patient very winded after short walk on Room Air Saturation dropped to 84%. Walk stopped and patient placed back in room in chair on [address]. Oxygen saturation improved to 96% after 5 minutes on O2 after the walk. Test stopped.     Electronically signed by Laverne Apgar, RT on 12/15/2021 at 12:47 PM

## 2021-12-15 NOTE — PROGRESS NOTES
IDR Team; MD, Care Manager, Physical therapy, OT, , Pharmacy and Dietician, met to review patient's plan of care. Discussed goals, interventions, barriers and progress. Team will continue to monitor progress and report any concerns to the physician and care management as indicated. Transition of Care Plan: Patient still due to go on Friday 12/17. Will need to order walking oximetry test to see if he qualifies for home o2. Per PT, dropped down to 87% on RA. Personal care aide Robert would like to see patient walk if possible. She said she couldn't do it today 12/15.

## 2021-12-15 NOTE — PROGRESS NOTES
Bedside and Verbal shift change report given to BRANDON Grover RN (oncoming nurse) by Deon Gilliam LPN(offgoing nurse). Report included the following information SBAR, Kardex, Intake/Output and MAR.

## 2021-12-15 NOTE — PROGRESS NOTES
Spoke with patient's son Delia Johansen. Updated him on patient's need for home o2 and progress with PT. Patient's son thinks he needs more help in the home. Patient has a few sitters that come in twice a week. He also was worried about his incontinence. Told him he is incontinent at times. Told him he has been doing much better with PT/OT. Walked the steps yesterday. Per PT/OT is ready to be discharged on Friday 12/17.       1355: spoke with patient. He agrees he needs more help in the home at least for a week or two. He states he will talk to [facility] about this. Told him he will need oxygen. CM called Britany and updated her on patient's condition and the need for increased sitters. No answer. Told her to call me back.

## 2021-12-15 NOTE — PROGRESS NOTES
The documentation for this period is being entered following the guidelines as defined in the [facility] downtime policy by [place] [facility]. Downtime was from 1-2:30 a.m.

## 2021-12-15 NOTE — PROGRESS NOTES
Pharmacy: Warfarin  Dosing    Indication: Afib  Goal INR: 2-3  Today's Dose: warfarin 2.5 mg   Labs:  Recent Labs     12/15/21  0541 12/14/21  0536 12/13/21  0547   INR 2.1* 1.8* 1.7*       Home Dose: Warfarin 3 mg on Thursday and 2.5 mg all other days  Drug Interactions: amiodarone (pta)  Dietary Intake: adult regular    Impression/Plan: INR today = 2.1. Today's dose to be warfarin 2.5 mg.         Thanks,  Peg Minaya, PHARMD

## 2021-12-15 NOTE — PROGRESS NOTES
Bedside and Verbal shift change report given to Holly NEVES (oncoming nurse) by DEAN Rinaldi (offgoing nurse). Report included the following information SBAR and Kardex. Uribe score 3  Bed/chair alarm yes in use. If in use it is set at the highest volume. Intravenous fluids were administered, none   Patient Vitals for the past 12 hrs:   Pulse Resp BP SpO2   12/14/21 1643 69 20 (!) 159/71 92 %   12/14/21 1131 -- -- -- (!) 85 %   12/14/21 1100 -- -- -- (!) 88 %     No flowsheet data found.    BMP: No results found for: NA, K, CL, CO2, AGAP, GLU, BUN, CREA, GFRAA, GFRNA                       {BSI BED

## 2021-12-15 NOTE — PROGRESS NOTES
Problem: Self Care Deficits Care Plan (Adult)  Goal: *Acute Goals and Plan of Care (Insert Text)  Description: FUNCTIONAL STATUS PRIOR TO ADMISSION: Pt lived alone at home and had assistance 2 days per week. Used walker, help with bathing. Suspect he didn't change clothing on his own. HOME SUPPORT: The patient paid for help 2 x week    Occupational Therapy Goals  Initiated 12/10/2021  1. Patient will perform bathing with supervision/set-up within 7 day(s). 2.  Patient will perform lower body dressing with minimal assistance/contact guard assist within 7 day(s). 3.  Patient will perform toilet transfer with SBA within 7 day(s). 4.  Patient will perform all aspects of toileting with modified independence within 7 day(s). 6.  Patient will participate in upper extremity therapeutic exercise/activities with supervision/set-up for 10 minutes within 7 day(s). 7.  Patient will utilize energy conservation techniques during functional activities with verbal cues within 7 day(s). Outcome: Progressing Towards Goal   OCCUPATIONAL THERAPY TREATMENT  Patient: Isaac Roy (86 y.o. male)  Date: 12/15/2021  Diagnosis: Encounter for rehabilitation [Z51.89] Encounter for rehabilitation       Precautions: Fall,Bed Alarm  Chart, occupational therapy assessment, plan of care, and goals were reviewed. ASSESSMENT  Patient continues with skilled OT services and is progressing towards goals. Pt was up in the chair and had eaten breakfast. He  was willing to participate in activities. Needed set-up only for upper body wash/grooming, washing down to ankles. He needed SBA for sit to stand and stand to sit transfers. He needed set-up for using urinal and emptying and set-up with cloth to his hand to wash cassandra-area. He remains total assist for socks and getting clothing over feet but today in standing is able to assist to pull undergarment and pants up/down.     Current Level of Function Impacting Discharge (ADLs): assisting today with getting pants up/down in standing and pulling up undergarment. Able to stand and hold walker with one hand while using urinal but dependent for emptying. Set-up for UE bathing/dressing and grooming. OTR assisted with hair washing due to pre-existing B shoulder limits. BASELINE ADL    Other factors to consider for discharge: will have assist at home. On track for Friday D/C         PLAN :  Patient continues to benefit from skilled intervention to address the above impairments. Continue treatment per established plan of care to address goals. Recommend with staff: encourage independence    Recommend next OT session: continue working on standing tasks. Recommendation for discharge: (in order for the patient to meet his/her long term goals)  Occupational therapy at least 2 days/week in the home AND ensure assist and/or supervision for safety with ambulation/transfers    This discharge recommendation:  Has been made in collaboration with the attending provider and/or case management    IF patient discharges home will need the following DME: none       SUBJECTIVE:   Patient stated I guess so.  when asked if he wanted to wash his hair    OBJECTIVE DATA SUMMARY:   Cognitive/Behavioral Status:  Neurologic State: Alert  Orientation Level: Oriented X4  Cognition: Appropriate for age attention/concentration     Functional Mobility and Transfers for ADLs:  Transfers:  Sit to Stand: Stand-by assistance      Balance:  Sitting - Dynamic: Fair (occasional)  Standing - Static: Occasional;Fair  Standing - Dynamic : Constant support; Fair    ADL Intervention:       Grooming  Grooming Assistance: Set-up  Position Performed: Seated in chair  Washing Face: Set-up  Washing Hands: Set-up  Brushing Teeth: Set-up  Brushing/Combing Hair: Set-up    Upper Body Bathing  Bathing Assistance: Set-up  Position Performed: Seated in chair    Lower Body Bathing  Perineal  : Contact guard assistance  Position Performed: Standing  Lower Body : Moderate assistance  Position Performed: Seated in chair    Upper Body Dressing Assistance  Pullover Shirt: Set-up    Lower Body Dressing Assistance  Protective Undergarmet: Moderate assistance  Pants With Elastic Waist: Moderate assistance  Socks: Total assistance (dependent)    Toileting  Bladder Hygiene: Maximum assistance  Clothing Management: Moderate assistance    Cognitive Retraining  Following Commands: Follows one step commands/directions        Pain:  0/10    Activity Tolerance:   Good    After treatment patient left in no apparent distress:   Sitting in chair, Call bell within reach, Bed / chair alarm activated and Caregiver / family present    COMMUNICATION/COLLABORATION:   The patients plan of care was discussed with: Physical therapist and Case management.      Donte Kumar OT  Time Calculation: 28 mins

## 2021-12-15 NOTE — SWING BED INTERDISCIPLINARY CARE PLAN NURSE NOTE
Nursing:    Week # 2: Date 12/15/2021  Medical status (changes from previous week):Progressing  Treatment changes this shift: None  Cognition: Present status: oriented          Is cueing needed?: Yes          Frequency of cueing needs: occasional           Type of cueing used: verbal  ADL (general):  Moderate assistance  Activity type: Social leisure skills  Participation: Daily  Level of participation: Improving  Pain status: No c/o pain  Patient/Family Education needs: continue with strengthening/mobility exercises    Upon review of the patients current care plan, the following issues, problems, or needs remain: Safety    Kevin Zaragoza LPN

## 2021-12-16 LAB
COMMENT, HOLDF: NORMAL
INR PPP: 2.3 (ref 0.9–1.1)
PROTHROMBIN TIME: 22.6 SEC (ref 9–11.1)
SAMPLES BEING HELD,HOLD: NORMAL

## 2021-12-16 PROCEDURE — 74011250637 HC RX REV CODE- 250/637: Performed by: INTERNAL MEDICINE

## 2021-12-16 PROCEDURE — 94760 N-INVAS EAR/PLS OXIMETRY 1: CPT

## 2021-12-16 PROCEDURE — 85610 PROTHROMBIN TIME: CPT

## 2021-12-16 PROCEDURE — 36415 COLL VENOUS BLD VENIPUNCTURE: CPT

## 2021-12-16 PROCEDURE — 65270000011 HC RM PRIVATE SNF

## 2021-12-16 PROCEDURE — 97116 GAIT TRAINING THERAPY: CPT

## 2021-12-16 PROCEDURE — 97535 SELF CARE MNGMENT TRAINING: CPT

## 2021-12-16 PROCEDURE — 77010033678 HC OXYGEN DAILY

## 2021-12-16 PROCEDURE — 74011250637 HC RX REV CODE- 250/637: Performed by: STUDENT IN AN ORGANIZED HEALTH CARE EDUCATION/TRAINING PROGRAM

## 2021-12-16 RX ORDER — WARFARIN 2.5 MG/1
2.5 TABLET ORAL ONCE
Status: COMPLETED | OUTPATIENT
Start: 2021-12-16 | End: 2021-12-16

## 2021-12-16 RX ADMIN — WARFARIN SODIUM 2.5 MG: 2.5 TABLET ORAL at 18:39

## 2021-12-16 RX ADMIN — LEVOTHYROXINE SODIUM 100 MCG: 0.1 TABLET ORAL at 07:17

## 2021-12-16 RX ADMIN — FUROSEMIDE 20 MG: 20 TABLET ORAL at 10:42

## 2021-12-16 RX ADMIN — ATORVASTATIN CALCIUM 20 MG: 20 TABLET, FILM COATED ORAL at 10:42

## 2021-12-16 RX ADMIN — DILTIAZEM HYDROCHLORIDE 180 MG: 180 CAPSULE, COATED, EXTENDED RELEASE ORAL at 21:29

## 2021-12-16 RX ADMIN — HYDRALAZINE HYDROCHLORIDE 50 MG: 25 TABLET, FILM COATED ORAL at 15:35

## 2021-12-16 RX ADMIN — AMIODARONE HYDROCHLORIDE 200 MG: 200 TABLET ORAL at 10:42

## 2021-12-16 NOTE — PROGRESS NOTES
Problem: Falls - Risk of  Goal: *Absence of Falls  Description: Document Chanel Neema Fall Risk and appropriate interventions in the flowsheet. Outcome: Progressing Towards Goal  Note: Fall Risk Interventions:  Mobility Interventions: Bed/chair exit alarm,Patient to call before getting OOB    Mentation Interventions: Adequate sleep, hydration, pain control,Bed/chair exit alarm,Door open when patient unattended    Medication Interventions: Bed/chair exit alarm    Elimination Interventions: Bed/chair exit alarm,Call light in reach,Patient to call for help with toileting needs    History of Falls Interventions: Bed/chair exit alarm,Door open when patient unattended         Problem: Hypertension  Goal: *Blood pressure within specified parameters  Outcome: Progressing Towards Goal  Goal: *Labs within defined limits  Outcome: Progressing Towards Goal     Problem: Pressure Injury - Risk of  Goal: *Prevention of pressure injury  Description: Document Alexander Scale and appropriate interventions in the flowsheet.   Outcome: Progressing Towards Goal  Note: Pressure Injury Interventions:  Sensory Interventions: Assess changes in LOC    Moisture Interventions: Absorbent underpads    Activity Interventions: Increase time out of bed    Mobility Interventions: HOB 30 degrees or less    Nutrition Interventions: Document food/fluid/supplement intake    Friction and Shear Interventions: HOB 30 degrees or less

## 2021-12-16 NOTE — PROGRESS NOTES
MD made aware pt's blood pressure 114/49. Orders received to hold scheduled  hydralazine+Losartan and give scheduled Amiodarone. Will continue to monitor b/p.

## 2021-12-16 NOTE — PROGRESS NOTES
Mr. Castro was approached for therapy this PM. He adamantly declines PT opportunity ascribing the delay in discharge as the cause. No PM rx rendered.

## 2021-12-16 NOTE — PROGRESS NOTES
Problem: Falls - Risk of  Goal: *Absence of Falls  Description: Document Noelle Blood Fall Risk and appropriate interventions in the flowsheet. Outcome: Progressing Towards Goal  Note: Fall Risk Interventions:  Mobility Interventions: Bed/chair exit alarm    Mentation Interventions: Bed/chair exit alarm,Door open when patient unattended    Medication Interventions: Bed/chair exit alarm    Elimination Interventions: Bed/chair exit alarm,Call light in reach    History of Falls Interventions: Bed/chair exit alarm         Problem: Patient Education: Go to Patient Education Activity  Goal: Patient/Family Education  Outcome: Progressing Towards Goal     Problem: Hypertension  Goal: *Blood pressure within specified parameters  Outcome: Progressing Towards Goal     Problem: Patient Education: Go to Patient Education Activity  Goal: Patient/Family Education  Outcome: Progressing Towards Goal     Problem: Pressure Injury - Risk of  Goal: *Prevention of pressure injury  Description: Document Alexander Scale and appropriate interventions in the flowsheet.   Outcome: Progressing Towards Goal  Note: Pressure Injury Interventions:  Sensory Interventions: Assess changes in LOC,Check visual cues for pain    Moisture Interventions: Absorbent underpads    Activity Interventions: Increase time out of bed    Mobility Interventions: HOB 30 degrees or less    Nutrition Interventions: Document food/fluid/supplement intake    Friction and Shear Interventions: HOB 30 degrees or less,Lift sheet                Problem: Patient Education: Go to Patient Education Activity  Goal: Patient/Family Education  Outcome: Progressing Towards Goal

## 2021-12-16 NOTE — SWING BED INTERDISCIPLINARY CARE PLAN NURSE NOTE
Nursing:    Week #1: Date 12/15/2021  Medical status (changes from previous week):Progressing  Treatment changes this shift: none  Cognition: Present status: oriented          Is cueing needed?: Yes          Frequency of cueing needs: occasional           Type of cueing used: verbal  ADL (general):  Moderate assistance  Activity type: Social leisure skills  Participation: Daily  Level of participation: Improving  Pain status: No c/o pain  Patient/Family Education needs: safety    Upon review of the patients current care plan, the following issues, problems, or needs remain: strengthening/mobility    Jah Harris LPN

## 2021-12-16 NOTE — PROGRESS NOTES
Problem: Self Care Deficits Care Plan (Adult)  Goal: *Acute Goals and Plan of Care (Insert Text)  Description: FUNCTIONAL STATUS PRIOR TO ADMISSION: Pt lived alone at home and had assistance 2 days per week. Used walker, help with bathing. Suspect he didn't change clothing on his own. HOME SUPPORT: The patient paid for help 2 x week    Occupational Therapy Goals  Initiated 12/10/2021  1. Patient will perform bathing with supervision/set-up within 7 day(s). MET  2. Patient will perform lower body dressing with minimal assistance/contact guard assist within 7 day(s). 3.  Patient will perform toilet transfer with SBA within 7 day(s). MET  4. Patient will perform all aspects of toileting with modified independence within 7 day(s). 6.  Patient will participate in upper extremity therapeutic exercise/activities with supervision/set-up for 10 minutes within 7 day(s). MET  7. Patient will utilize energy conservation techniques during functional activities with verbal cues within 7 day(s). MET    Outcome: Progressing Towards Goal   OCCUPATIONAL THERAPY TREATMENT  Patient: Dani Valdez (37 y.o. male)  Date: 12/16/2021  Diagnosis: Encounter for rehabilitation [Z51.89] Encounter for rehabilitation       Precautions: Fall,Bed Alarm  Chart, occupational therapy assessment, plan of care, and goals were reviewed. ASSESSMENT  Patient continues with skilled OT services and is progressing towards goals. Pt able to transfer with SBA and ambulate a few feet to the [facility]. Trasnfers on/off BSC with SBA. Able to stand and perform cassandra-care. Set-up for grooming, set-up for wash-up but unable to wash his feet. Dependent for clothing on/off feet and over feet which is baseline. Current Level of Function Impacting Discharge (ADLs): improving transfers. Stands with SBA for urinal use and to clean after toileting. Able to get clothing up and down now without assist but is still SBA for balance on standing tasks.     Other factors to consider for discharge: PT feels pt needs additional time. OTR feels pt is at baseline for self care except for urinal use as he wants to stand to do it. Will modify goals. PLAN :  Patient continues to benefit from skilled intervention to address the above impairments. Continue treatment per established plan of care to address goals. Recommend with staff: encourage participation    Recommend next OT session: continue working on standing balance tasks    Recommendation for discharge: (in order for the patient to meet his/her long term goals)  Occupational therapy at least 2 days/week in the home AND ensure assist and/or supervision for safety with transfers/standing balance    This discharge recommendation:  Has been made in collaboration with the attending provider and/or case management    IF patient discharges home will need the following DME: none       SUBJECTIVE:   Patient stated i'm suppose to go home tomorrow.     OBJECTIVE DATA SUMMARY:   Cognitive/Behavioral Status:                      Functional Mobility and Transfers for ADLs:  Bed Mobility:       Transfers:     Functional Transfers  Toilet Transfer : Stand-by assistance       Balance:  Sitting: Intact  Standing: Impaired  Standing - Static: Occasional;Fair  Standing - Dynamic : Constant support; Fair    ADL Intervention:       Grooming  Grooming Assistance: Set-up  Position Performed: Seated in chair  Washing Face: Independent  Washing Hands: Independent  Brushing Teeth: Independent    Upper Body Bathing  Bathing Assistance: Set-up  Position Performed: Seated in chair    Lower Body Bathing  Perineal  : Stand-by assistance  Position Performed: Standing  Lower Body : Moderate assistance  Position Performed: Seated in chair    Upper Body Dressing Assistance  Pullover Shirt: Set-up    Lower Body Dressing Assistance  Pants With Elastic Waist: Moderate assistance  Socks:  Total assistance (dependent)    Toileting  Bladder Hygiene: Contact guard assistance  Clothing Management: Contact guard assistance           Pain:  0/10    Activity Tolerance:   Good    After treatment patient left in no apparent distress:   Sitting in chair, Call bell within reach, and Bed / chair alarm activated    COMMUNICATION/COLLABORATION:   The patients plan of care was discussed with: Physical therapist, Physician, and Case management.      Jesús Obrien, OT  Time Calculation: 25 mins

## 2021-12-16 NOTE — PROGRESS NOTES
1508: Had a long discussion with patient about discharge plans. Told him he would benefit with more therapy with an LUIGI of 12/24. He states he wants to go home ASAP. Cal Figueredo from physical therapy was in the room and states patient needs 24/7 care. Told Mr. MENDEZ [facility] as long as he agreed to have 24/7 care in the home when he is discharged CM would feel comfortable with  Him going home. Cal Figueredo (who also hires his personal care aides) is going to set up patient with 24/7 care. Just needs to find another person for backup. Mr. MENDEZ [facility] would like to be discharged on Monday. That will allow more time to establish aides and more therapy time. CM will set up home health with PT/OT and nursing--as much help as possible. In the meantime, encouraged patient to work hard in therapy. LUIGI 12/20 with home health and 24/7 personal care (pay out of pocket)      1524: Spoke with patients son Simón Mccarthy II made him aware of the plan. Also called patient's Clayton Solomon. No answer. Left message.

## 2021-12-16 NOTE — PROGRESS NOTES
IDR Team; MD,, Care Manager, Physical therapy, OT, , and Pharmacy met to review patient's plan of care. Discussed goals, interventions, barriers and progress. Team will continue to monitor progress and report any concerns to the physician and care management as indicated. Transition of Care Plan: PT said she met with patient's personal care aide yesterday who had concerns with patient being able to use urinal. CM spoke with patient's son earlier this morning and he is requesting patient stay another week. PT believes he needs another week. OT says he is at baseline but agrees to have him stay another week. This will give more time to make sure patient/family have enough help ready when discharged. CM will check with patient to see if he needs a [facility].       1119: spoke with patient's son and gave him update. LUIGI 12/24. Enxertos 30 due 12/22.

## 2021-12-16 NOTE — PROGRESS NOTES
Pharmacy: Warfarin  Dosing    Indication: Afib  Goal INR: 2-3  Today's Dose: warfarin 2.5 mg   Labs:  Recent Labs     12/16/21  0518 12/15/21  0541 12/14/21  0536   INR 2.3* 2.1* 1.8*       Home Dose: Warfarin 3 mg on Thursday and 2.5 mg all other days  Drug Interactions: amiodarone (pta)  Dietary Intake: adult regular    Impression/Plan: INR today = 2.3. Today's dose to be warfarin 2.5 mg.         Thanks,  Odette Saavedra, PHARMD

## 2021-12-16 NOTE — PROGRESS NOTES
Bedside and Verbal shift change report given to SAPNA Horn LPN (oncoming nurse) by Kalpana Carreno RN (offgoing nurse). Report included the following information SBAR, Kardex, MAR, Accordion and Recent Results. Complex Repair And Melolabial Flap Text: The defect edges were debeveled with a #15 scalpel blade.  The primary defect was closed partially with a complex linear closure.  Given the location of the remaining defect, shape of the defect and the proximity to free margins a melolabial flap was deemed most appropriate for complete closure of the defect.  Using a sterile surgical marker, an appropriate advancement flap was drawn incorporating the defect and placing the expected incisions within the relaxed skin tension lines where possible.    The area thus outlined was incised deep to adipose tissue with a #15 scalpel blade.  The skin margins were undermined to an appropriate distance in all directions utilizing iris scissors.

## 2021-12-16 NOTE — PROGRESS NOTES
Spoke with [facility]. He said he was concerned about his father going home tomorrow. Does NOT think it will be safe. Told him I spoke with patient yesterday and he said he was going to hire more help. Wanted to confirm this with personal care aide Britany. She did answer her phone. Left her a message yesterday. Will discuss with PT/OT if he could stay another week to make sure patient will be strong enough to be home. Will also give more time to get personal care aides lined up.

## 2021-12-16 NOTE — PROGRESS NOTES
PHYSICAL THERAPY TREATMENT  Patient: Marylouise Severs (73 y.o. male)  Date: 12/16/2021  Diagnosis: Encounter for rehabilitation [Z51.89] Encounter for rehabilitation       Precautions: Fall,Bed Alarm  Chart, physical therapy assessment, plan of care and goals were reviewed. ASSESSMENT  Patient continues with skilled PT services and is progressing towards goals. He increased his amb distance from 75 ft to 110 ft this date, and includes use of supplemental 02 with amb. Of note is an initial stance hesitancy prior to initiation of amb indicating a subtle loss of balance potential.     Current Level of Function Impacting Discharge (mobility/balance): balance upon initial stance    Other factors to consider for discharge: solitary night time living status,          PLAN :  Patient continues to benefit from skilled intervention to address the above impairments. Continue treatment per established plan of care. to address goals. Recommendation for discharge: (in order for the patient to meet his/her long term goals)  Physical therapy at least 2 days/week in the home     This discharge recommendation:  Has not yet been discussed the attending provider and/or case management    IF patient discharges home will need the following DME: patient owns DME required for discharge       SUBJECTIVE:   Patient isagreeable to session. OBJECTIVE DATA SUMMARY:   Critical Behavior:  Neurologic State: Alert  Orientation Level: Oriented X4  Cognition: Appropriate decision making,Follows commands     Functional Mobility Training:     Transfers:  Sit to Stand: Supervision  Stand to Sit: Supervision                             Balance:  Sitting: Intact  Standing: Impaired  Standing - Static: Constant support;Good  Standing - Dynamic : Constant support; Fair  Ambulation/Gait Training:  Distance (ft): 110 Feet (ft) x 2  Assistive Device: Walker, rolling  Ambulation - Level of Assistance: Contact guard assistance                 Base of Support: Narrowed     Speed/Joy: Shuffled; Slow  Step Length: Left shortened;Right shortened                    Stairs:  Number of Stairs Trained: 5 (5)  Stairs - Level of Assistance: Contact guard assistance   Rail Use: Both    T  Activity Tolerance:   Fair    After treatment patient left in no apparent distress:   Sitting in chair and Call bell within reach    COMMUNICATION/COLLABORATION:   The patients plan of care was discussed with: Registered nurse.      Angelina Parker, PT   Time Calculation: 30 mins

## 2021-12-16 NOTE — PROGRESS NOTES
Bedside and Verbal shift change report given to Yesenia Arvizu RN (oncoming nurse) by Jah Harris LPN (offgoing nurse). Report included the following information SBAR, Kardex and Recent Results.

## 2021-12-16 NOTE — PROGRESS NOTES
Patient with isolated low-normal blood pressure reading this morning. He was asymptomatic. Holding losartan and discontinued hydralazine.   Reassess in the coming days

## 2021-12-16 NOTE — SWING BED INTERDISCIPLINARY CARE PLAN DIETARY NOTE
Dietary:    Diet: regular,low fat/low chol/high fiber/JCARLOS       Admit wt. : 160#  Current weight/date: 161#  Type/Amount of Supplement taken: ensure   Significant wt. Loss: No  Significant wt.  Gain: NO  Intake: <50% Inadequate    Upon review of the patients current care plan, the following issues, problems, or needs remain: pt po intake is poor, encourage supplement use and po intake     Patient Vitals for the past 168 hrs:   % Diet Eaten   12/15/21 1746 1 - 25%   12/14/21 1200 26 - 50%   12/14/21 0800 26 - 50%   12/13/21 1728 26 - 50%   12/13/21 1200 26 - 50%   12/13/21 0800 76 - 100%   12/12/21 1300 1 - 25%   12/12/21 0932 1 - 25%   12/11/21 1800 26 - 50%   12/11/21 1300 76 - 100%   12/11/21 0910 26 - 50%   12/10/21 1200 0%   12/10/21 0900 26 - 50%   12/10/21 0800 1 - 25%   12/09/21 1800 26 - 50%         Merle Kramer RD

## 2021-12-17 LAB
INR PPP: 2.4 (ref 0.9–1.1)
PROTHROMBIN TIME: 23.5 SEC (ref 9–11.1)

## 2021-12-17 PROCEDURE — 74011250637 HC RX REV CODE- 250/637: Performed by: INTERNAL MEDICINE

## 2021-12-17 PROCEDURE — 97530 THERAPEUTIC ACTIVITIES: CPT

## 2021-12-17 PROCEDURE — 74011250637 HC RX REV CODE- 250/637: Performed by: STUDENT IN AN ORGANIZED HEALTH CARE EDUCATION/TRAINING PROGRAM

## 2021-12-17 PROCEDURE — 65270000011 HC RM PRIVATE SNF

## 2021-12-17 PROCEDURE — 36415 COLL VENOUS BLD VENIPUNCTURE: CPT

## 2021-12-17 PROCEDURE — 94760 N-INVAS EAR/PLS OXIMETRY 1: CPT

## 2021-12-17 PROCEDURE — 97116 GAIT TRAINING THERAPY: CPT

## 2021-12-17 PROCEDURE — 77010033678 HC OXYGEN DAILY

## 2021-12-17 PROCEDURE — 85610 PROTHROMBIN TIME: CPT

## 2021-12-17 RX ORDER — WARFARIN 2.5 MG/1
2.5 TABLET ORAL ONCE
Status: COMPLETED | OUTPATIENT
Start: 2021-12-17 | End: 2021-12-17

## 2021-12-17 RX ADMIN — WARFARIN SODIUM 2.5 MG: 2.5 TABLET ORAL at 17:45

## 2021-12-17 RX ADMIN — LEVOTHYROXINE SODIUM 100 MCG: 0.1 TABLET ORAL at 08:31

## 2021-12-17 RX ADMIN — FUROSEMIDE 20 MG: 20 TABLET ORAL at 08:48

## 2021-12-17 RX ADMIN — ATORVASTATIN CALCIUM 20 MG: 20 TABLET, FILM COATED ORAL at 08:48

## 2021-12-17 RX ADMIN — DILTIAZEM HYDROCHLORIDE 180 MG: 180 CAPSULE, COATED, EXTENDED RELEASE ORAL at 22:16

## 2021-12-17 RX ADMIN — AMIODARONE HYDROCHLORIDE 200 MG: 200 TABLET ORAL at 08:48

## 2021-12-17 RX ADMIN — LOSARTAN POTASSIUM 100 MG: 100 TABLET, FILM COATED ORAL at 08:48

## 2021-12-17 RX ADMIN — POLYETHYLENE GLYCOL 3350 17 G: 17 POWDER, FOR SOLUTION ORAL at 08:47

## 2021-12-17 NOTE — SWING BED INTERDISCIPLINARY CARE PLAN NURSE NOTE
Nursing:    Week #1: Date 12/16/2021  Medical status (changes from previous week):Progressing  Treatment changes this shift: none  Cognition: Present status: oriented          Is cueing needed?: Yes          Frequency of cueing needs: occasional           Type of cueing used: verbal  ADL (general):  Minimum assistance  Activity type: Social leisure skills  Participation: Daily  Level of participation: Improving  Pain status: No c/o pain  Patient/Family Education needs: safety    Upon review of the patients current care plan, the following issues, problems, or needs remain: strengthening/mobility    Jah Harris LPN

## 2021-12-17 NOTE — PROGRESS NOTES
OTR entered room and pt was back in bed laying down. He said he didn't sleep last night and when asked if he wanted to do therapy he said \"I'm too tired\". OTR will not be available later so therapy will not be provided today.

## 2021-12-17 NOTE — PROGRESS NOTES
1: Spoke with patient's son Angie King. Britany called him last night and explained him the cost--$20/hour. Patient agreed last night for 24/7 care yesterday afternoon until he apparently heard the price. Explained to patient's son that a nursing home would be much cheaper but unsure if patient will agree to this. Son is wanting to know if 12 hours a day would be okay. Told him that patient has been having trouble using his urinal which may cause him to get up in the middle of the night and fall. As long as he would be compliant with not getting out of bed at night. Will discuss with therapy team. Would be ideal to have 24/7 care either in home or nursing home. [id number]: Info faxed to [facility]: Spoke with patient again. He said he cannot afford the price for personal care. Told him we worried about him being impulsive in the middle of the night and falling when needing to go to the bathroom. Told him his safest options would be a nursing home or 24/7 personal care. Patient wants to know if he can stay here till Friday 12/24. Told him that would be his last day here and he needs to have a plan in place when he is discharged. Told him it may be okay if he were to go home with 12 hour care as long as he promised not to get up out of the bed. May be okay with bedside commode but best option would be 24/7 care but patient does not agree. 1055: Spoke with patient's son again. Explained skilled care further to him. Told him he did not agree to go to nursing home even for just skilled care. He wants to stay here till Friday 12/24. Asked his son if he would be willing to take him home with him. He said he is not and patient would NOT be willing to stay with his son. Told him it would be nice for him to he close if at all possible. Told his son to speak with his father again about skilled care at a nursing home or increasing personal care. He said he will.     1154: Allie Harris from [place] and updated her on discharge date of Friday 12/24.  No answer but left msg

## 2021-12-17 NOTE — PROGRESS NOTES
Follow up visit with patient in Rm 126  Provided empathic listening and spiritual support  Advised of  Availability   [address] [place] [address]

## 2021-12-17 NOTE — PROGRESS NOTES
Bedside and Verbal shift change report given to 2230 Luis Alberto Baker (oncoming nurse) by Liliana Parker LPN (offgoing nurse). Report included the following information SBAR, Kardex and Recent Results Ellie 4.

## 2021-12-17 NOTE — PROGRESS NOTES
Pharmacy: Warfarin  Dosing    Indication: Afib  Goal INR: 2-3  Today's Dose: warfarin 2.5mg  Labs:  Recent Labs     12/17/21  0445 12/16/21  0518 12/15/21  0541   INR 2.4* 2.3* 2.1*       Home Dose:Warfarin 3 mg on Thursday and 2.5 mg all other days  Drug Interactions: amiodarone (pta med)  Dietary Intake: adult regular    Impression/Plan: INR today = 2.4. Today's dose to be warfarin 2.5mg.         ThanksPrakash

## 2021-12-17 NOTE — PROGRESS NOTES
PHYSICAL THERAPY TREATMENT  Patient: Flip Rene (36 y.o. male)  Date: 12/17/2021  Diagnosis: Encounter for rehabilitation [Z51.89] Encounter for rehabilitation       Precautions: Bed Alarm,Fall  Chart, physical therapy assessment, plan of care and goals were reviewed. ASSESSMENT  Patient continues with skilled PT services and is progressing towards goals. Performance in PM is improved over performace of AM. Balance and endurance remain primary limiting factors. .     Current Level of Function Impacting Discharge (mobility/balance): fair standing balance with support    Other factors to consider for discharge: alone in PM's         PLAN :  Patient continues to benefit from skilled intervention to address the above impairments. Continue treatment per established plan of care. to address goals. Recommendation for discharge: (in order for the patient to meet his/her long term goals)  Physical therapy at least 2 days/week in the home AND ensure assist and/or supervision for safety with mobility    This discharge recommendation:  Has been made in collaboration with the attending provider and/or case management    IF patient discharges home will need the following DME: bedside commode       SUBJECTIVE:   Patient stated that he had a nap and is now ready to work. Marina Henning    OBJECTIVE DATA SUMMARY:   Critical Behavior:  Neurologic State: Alert  Orientation Level: Oriented X4  Cognition: Appropriate decision making  Safety/Judgement: Decreased awareness of need for safety,Decreased awareness of need for assistance  Functional Mobility Training:  Bed Mobility:     Supine to Sit: Contact guard assistance  Sit to Supine: Contact guard assistance  Scooting: Stand-by assistance        Transfers:  Sit to Stand: Setup;Contact guard assistance  Stand to Sit: Contact guard assistance                             Balance:  Standing: Impaired  Standing - Static: Constant support;Good  Standing - Dynamic : Constant support; Fair  Ambulation/Gait Training:  Distance (ft):  (125 with four trials)  Assistive Device: Walker, rolling  Ambulation - Level of Assistance: Contact guard assistance                 Base of Support: Narrowed     Speed/Joy: Shuffled; Slow                      Stairs:     Stairs - Level of Assistance: Contact guard assistance          Activity Tolerance:   Poor    After treatment patient left in no apparent distress:   Sitting in chair, Call bell within reach, Bed / chair alarm activated, and tray table in juany    COMMUNICATION/COLLABORATION:   The patients plan of care was discussed with: Registered nurse and Rehabilitation technician.      Angelina Parker, PT   Time Calculation: 29 mins

## 2021-12-17 NOTE — SWING BED INTERDISCIPLINARY CARE PLAN OT NOTE
Occupational Therapy:    Progress: Progressing  Eating: Independent  Pt has better AROM in the LUE and is encouraged to use  Grooming (Gen): Independent seated in chair, often refuses to use L with toothbrush but should as has limited shoulder movement in R  Grooming (Standing at sink): Not tested  Bathing (Gen): Limited assistance  Bathing (Upper body): Independent  Bathing (Lower body): Limited assistance  Toileting: Supervision  Upper body dressing: Supervision  Lower body dressing: Limited assistance  Transfers (BSC/Toilet): Supervision  Transfers Shower: Not tested  Homemaking: Not tested  Balance: SBA  Upper extremity function: old bilateral RTC issues with limited movement especially in R shoulder. Activity tolerated: fair to good most days  Safety status: supervision to SBA when up and moving    Upon review of the patients current care plan, the following issues, problems, or needs remain: needs to be able to use urinal sitting EOB.     Jacqueline Montero, OT

## 2021-12-17 NOTE — PROGRESS NOTES
PHYSICAL THERAPY TREATMENT  Patient: Héctor Gann (33 y.o. male)  Date: 12/17/2021  Diagnosis: Encounter for rehabilitation [Z51.89] Encounter for rehabilitation       Precautions: Bed Alarm,Fall  Chart, physical therapy assessment, plan of care and goals were reviewed. ASSESSMENT    This man is seen for am PT session emphasizing small area negotiation. He demonstrates increased difficulty with mobility today, and decreased endurance. It is anticipated that he will need further emphasis on this area. Patient continues with skilled PT services and is progressing towards goals, as is evidenced by increasingly difficult tasks. Current Level of Function Impacting Discharge (mobility/balance): standing balance with constant support rated as Poor (1/5) today    Other factors to consider for discharge: solitary status at night, home      need     PLAN :  Patient continues to benefit from skilled intervention to address the above impairments. Continue treatment per established plan of care. to address goals. Recommendation for discharge: (in order for the patient to meet his/her long term goals)  Physical therapy at least 2 days/week in the home AND ensure assist and/or supervision for safety with mobility    This discharge recommendation:  Has been made in collaboration with the attending provider and/or case management    IF patient discharges home will need the following DME: bedside commode and urinal       SUBJECTIVE:   Patient stated I am having a harder time rbeathing today.     OBJECTIVE DATA SUMMARY:   Critical Behavior:  Neurologic State: Alert  Orientation Level: Oriented X4  Cognition: Appropriate decision making     Functional Mobility Training:  Bed Mobility:     Supine to Sit: Contact guard assistance     Scooting: Stand-by assistance        Transfers:  Sit to Stand: Setup;Contact guard assistance  Stand to Sit: Contact guard assistance;Minimum assistance Balance:  Standing: Impaired  Standing - Static: Constant support; Fair  Standing - Dynamic : Constant support;Poor  Ambulation/Gait Training:     Assistive Device: Walker, rolling  Ambulation - Level of Assistance: Contact guard assistance                 Base of Support: Narrowed     Speed/Joy: Shuffled; Slow       Pain Ratin/10    Activity Tolerance:   Poor    After treatment patient left in no apparent distress:   Sitting in chair, Call bell within reach, and tray table in reach, chair alarm in place    COMMUNICATION/COLLABORATION:   The patients plan of care was discussed with: Occupational therapist, Physician, and Case management.      Princess Jaime PT   Time Calculation: 30 mins

## 2021-12-17 NOTE — PROGRESS NOTES
Bedside shift change report given to jeffery (oncoming nurse) by Pawan (offgoing nurse). Report included the following information SBAR, Kardex and MAR.

## 2021-12-18 LAB
INR PPP: 2.7 (ref 0.9–1.1)
PROTHROMBIN TIME: 26.1 SEC (ref 9–11.1)

## 2021-12-18 PROCEDURE — 77010033678 HC OXYGEN DAILY

## 2021-12-18 PROCEDURE — 74011250637 HC RX REV CODE- 250/637: Performed by: INTERNAL MEDICINE

## 2021-12-18 PROCEDURE — 65270000011 HC RM PRIVATE SNF

## 2021-12-18 PROCEDURE — 85610 PROTHROMBIN TIME: CPT

## 2021-12-18 PROCEDURE — 74011250637 HC RX REV CODE- 250/637: Performed by: STUDENT IN AN ORGANIZED HEALTH CARE EDUCATION/TRAINING PROGRAM

## 2021-12-18 PROCEDURE — 97116 GAIT TRAINING THERAPY: CPT

## 2021-12-18 PROCEDURE — 36415 COLL VENOUS BLD VENIPUNCTURE: CPT

## 2021-12-18 PROCEDURE — 94760 N-INVAS EAR/PLS OXIMETRY 1: CPT

## 2021-12-18 RX ORDER — WARFARIN 2 MG/1
2 TABLET ORAL ONCE
Status: COMPLETED | OUTPATIENT
Start: 2021-12-18 | End: 2021-12-18

## 2021-12-18 RX ADMIN — DILTIAZEM HYDROCHLORIDE 180 MG: 180 CAPSULE, COATED, EXTENDED RELEASE ORAL at 21:01

## 2021-12-18 RX ADMIN — AMIODARONE HYDROCHLORIDE 200 MG: 200 TABLET ORAL at 09:56

## 2021-12-18 RX ADMIN — WARFARIN SODIUM 2 MG: 2 TABLET ORAL at 18:20

## 2021-12-18 RX ADMIN — FUROSEMIDE 20 MG: 20 TABLET ORAL at 09:56

## 2021-12-18 RX ADMIN — LOSARTAN POTASSIUM 100 MG: 100 TABLET, FILM COATED ORAL at 09:56

## 2021-12-18 RX ADMIN — ATORVASTATIN CALCIUM 20 MG: 20 TABLET, FILM COATED ORAL at 09:56

## 2021-12-18 RX ADMIN — LEVOTHYROXINE SODIUM 100 MCG: 0.1 TABLET ORAL at 07:03

## 2021-12-18 NOTE — PROGRESS NOTES
Problem: Mobility Impaired (Adult and Pediatric)  Goal: *Acute Goals and Plan of Care (Insert Text)  Description: FUNCTIONAL STATUS PRIOR TO ADMISSION: Patient was modified independent using a rolling walker for functional mobility. HOME SUPPORT PRIOR TO ADMISSION: The patient lived alone with no local support. Physical Therapy Goals  Initiated 12/10/2021  1. Patient will move from supine to sit and sit to supine  in bed with minimal assistance/contact guard assist within 7 day(s). 2.  Patient will transfer from bed to chair and chair to bed with supervision/set-up using the least restrictive device within 7 day(s). 3.  Patient will perform sit to stand with minimal assistance/contact guard assist within 7 day(s). 4.  Patient will ambulate with minimal assistance/contact guard assist for 150 feet with the least restrictive device within 7 day(s). 5.  Patient will ascend/descend 4 stairs with bilateral handrail(s) with moderate assistance  within 7 day(s). Outcome: Progressing Towards Goal    Sitting up in chair. Needed encouragement to perform walking. Sit to stand with min assist of one. Ambulated with walker in hallway for 100+ feet with CGA. Cuing to stand closer to walker but having difficulty time doing this. Back to room and sat down with mod assist of one to prevent patient from missing seat. Call bell in reach after session.

## 2021-12-18 NOTE — PROGRESS NOTES
Problem: Falls - Risk of  Goal: *Absence of Falls  Description: Document Leafy De La Torre Fall Risk and appropriate interventions in the flowsheet. Outcome: Progressing Towards Goal  Note: Fall Risk Interventions:  Mobility Interventions: Bed/chair exit alarm    Mentation Interventions: Adequate sleep, hydration, pain control,Bed/chair exit alarm,Door open when patient unattended    Medication Interventions: Patient to call before getting OOB    Elimination Interventions: Stay With Me (per policy)    History of Falls Interventions: Bed/chair exit alarm         Problem: Hypertension  Goal: *Blood pressure within specified parameters  Outcome: Progressing Towards Goal     Problem: Pressure Injury - Risk of  Goal: *Prevention of pressure injury  Description: Document Alexander Scale and appropriate interventions in the flowsheet.   Outcome: Progressing Towards Goal  Note: Pressure Injury Interventions:  Sensory Interventions: Assess changes in LOC    Moisture Interventions: Absorbent underpads,Minimize layers    Activity Interventions: Increase time out of bed,PT/OT evaluation,Chair cushion    Mobility Interventions: HOB 30 degrees or less    Nutrition Interventions: Document food/fluid/supplement intake    Friction and Shear Interventions: Minimize layers,HOB 30 degrees or less

## 2021-12-18 NOTE — PROGRESS NOTES
Problem: Falls - Risk of  Goal: *Absence of Falls  Description: Document Danney Mess Fall Risk and appropriate interventions in the flowsheet.   Note: Fall Risk Interventions:  Mobility Interventions: Bed/chair exit alarm    Mentation Interventions: Adequate sleep, hydration, pain control,Bed/chair exit alarm,Door open when patient unattended    Medication Interventions: Patient to call before getting OOB    Elimination Interventions: Call light in reach,Patient to call for help with toileting needs,Stay With Me (per policy),Urinal in reach    History of Falls Interventions: Bed/chair exit alarm

## 2021-12-18 NOTE — PROGRESS NOTES
Problem: Falls - Risk of  Goal: *Absence of Falls  Description: Document Tatyana Hills Fall Risk and appropriate interventions in the flowsheet. Outcome: Progressing Towards Goal  Note: Fall Risk Interventions:  Mobility Interventions: Bed/chair exit alarm,Patient to call before getting OOB    Mentation Interventions: Adequate sleep, hydration, pain control,Bed/chair exit alarm,Door open when patient unattended,Room close to nurse's station    Medication Interventions: Bed/chair exit alarm,Patient to call before getting OOB    Elimination Interventions: Bed/chair exit alarm,Call light in reach,Patient to call for help with toileting needs,Stay With Me (per policy)    History of Falls Interventions: Bed/chair exit alarm,Door open when patient unattended,Room close to nurse's station         Problem: Patient Education: Go to Patient Education Activity  Goal: Patient/Family Education  Outcome: Progressing Towards Goal     Problem: Hypertension  Goal: *Blood pressure within specified parameters  Outcome: Progressing Towards Goal  Goal: *Labs within defined limits  Outcome: Progressing Towards Goal     Problem: Patient Education: Go to Patient Education Activity  Goal: Patient/Family Education  Outcome: Progressing Towards Goal     Problem: Pressure Injury - Risk of  Goal: *Prevention of pressure injury  Description: Document Alexander Scale and appropriate interventions in the flowsheet. Outcome: Progressing Towards Goal  Note: Pressure Injury Interventions:  Sensory Interventions: Assess changes in LOC,Check visual cues for pain,Keep linens dry and wrinkle-free,Minimize linen layers    Moisture Interventions: Minimize layers    Activity Interventions: Increase time out of bed    Mobility Interventions: HOB 30 degrees or less    Nutrition Interventions: Offer support with meals,snacks and hydration,Document food/fluid/supplement intake    Friction and Shear Interventions: HOB 30 degrees or less,Minimize layers  Pt.  Sat up in chair this AM. He wanted to go back to bed at 11 but was encouraged to stay in chair until after lunch. Pt. Agreed and tolerated well.

## 2021-12-18 NOTE — SWING BED INTERDISCIPLINARY CARE PLAN NURSE NOTE
Nursing:    Week #:1 Date 12/18/2021  Medical status (changes from previous week):Progressing  Treatment changes this shift: NONE  Cognition: Present status: oriented          Is cueing needed?: Yes          Frequency of cueing needs: occasional           Type of cueing used: verbal  ADL (general):  Moderate assistance  Activity type: Social leisure skills  Participation: Daily  Level of participation: Improving  Pain status: No c/o pain  Patient/Family Education needs:safety     Upon review of the patients current care plan, the following issues, problems, or needs remain: increase mobility and strength    Reece Wright RN

## 2021-12-18 NOTE — SWING BED INTERDISCIPLINARY CARE PLAN NURSE NOTE
Nursing:    Week #2: Date 12/18/2021  Medical status (changes from previous week):Progressing  Treatment changes this shift: none  Cognition: Present status: oriented          Is cueing needed?: Yes          Frequency of cueing needs: occasional           Type of cueing used: verbal  ADL (general):  Minimum assistance  Activity type: Social leisure skills  Participation: Daily and Individual  Level of participation: Improving  Pain status: No c/o pain  Patient/Family Education needs: discharge planning    Upon review of the patients current care plan, the following issues, problems, or needs remain: discharge planning    Job Griffiths RN

## 2021-12-18 NOTE — PROGRESS NOTES
Pharmacy: Warfarin  Dosing    Indication: Afib  Goal INR: 2-3  Today's Dose: warfarin 2mg  Labs:  Recent Labs     12/18/21  0610 12/17/21  0445 12/16/21  0518   INR 2.7* 2.4* 2.3*       Home Dose:Warfarin 3 mg on Thursday and 2.5 mg all other days  Drug Interactions: amiodarone (pta med)  Dietary Intake: adult regular    Impression/Plan: INR today = 2.7. Today's dose to be warfarin 2mg.         Thanks,  Zahira Gann

## 2021-12-18 NOTE — PROGRESS NOTES
Bedside and Verbal shift change report given to ZO Owens (oncoming nurse) by Abdulaziz King RN   (offgoing nurse). Report included the following information SBAR, Kardex, Procedure Summary, Intake/Output, MAR, Accordion, Recent Results and Med Rec Status. Uribe score 4  Bed/chair alarm yes in use. If in use it is set at the highest volume. Intravenous fluids were administered, none no IV  Patient Vitals for the past 12 hrs:   SpO2   12/17/21 0901 95 %     No flowsheet data found. Lab results reviewed. For significant abnormal values and values requiring intervention, see assessment and plan.

## 2021-12-18 NOTE — SWING BED INTERDISCIPLINARY CARE PLAN NURSE NOTE
Nursing:    Week #1: Date 12/17/2021  Medical status (changes from previous week):Progressing  Treatment changes this shift: none  Cognition: Present status: oriented          Is cueing needed?: Yes          Frequency of cueing needs: occasional           Type of cueing used: verbal  ADL (general):  Moderate assistance  Activity type: Social leisure skills  Participation: Daily  Level of participation: Improving  Pain status: No c/o pain  Patient/Family Education needs: safety    Upon review of the patients current care plan, the following issues, problems, or needs remain: increase mobility    Isidro Serrano RN

## 2021-12-19 LAB
ALBUMIN SERPL-MCNC: 2.3 G/DL (ref 3.5–5)
ALBUMIN/GLOB SERPL: 0.8 {RATIO} (ref 1.1–2.2)
ALP SERPL-CCNC: 83 U/L (ref 45–117)
ALT SERPL-CCNC: 53 U/L (ref 12–78)
ANION GAP SERPL CALC-SCNC: 6 MMOL/L (ref 5–15)
AST SERPL-CCNC: 44 U/L (ref 15–37)
BACTERIA SPEC CULT: NORMAL
BASOPHILS # BLD: 0.1 K/UL (ref 0–0.1)
BASOPHILS NFR BLD: 1 % (ref 0–1)
BILIRUB SERPL-MCNC: 0.3 MG/DL (ref 0.2–1)
BUN SERPL-MCNC: 30 MG/DL (ref 6–20)
BUN/CREAT SERPL: 10 (ref 12–20)
CALCIUM SERPL-MCNC: 8.3 MG/DL (ref 8.5–10.1)
CHLORIDE SERPL-SCNC: 103 MMOL/L (ref 97–108)
CO2 SERPL-SCNC: 29 MMOL/L (ref 21–32)
CREAT SERPL-MCNC: 3 MG/DL (ref 0.7–1.3)
DIFFERENTIAL METHOD BLD: ABNORMAL
EOSINOPHIL # BLD: 0.1 K/UL (ref 0–0.4)
EOSINOPHIL NFR BLD: 2 % (ref 0–7)
ERYTHROCYTE [DISTWIDTH] IN BLOOD BY AUTOMATED COUNT: 14.8 % (ref 11.5–14.5)
GLOBULIN SER CALC-MCNC: 2.9 G/DL (ref 2–4)
GLUCOSE SERPL-MCNC: 75 MG/DL (ref 65–100)
HCT VFR BLD AUTO: 26.1 % (ref 36.6–50.3)
HGB BLD-MCNC: 8.1 G/DL (ref 12.1–17)
IMM GRANULOCYTES # BLD AUTO: 0 K/UL (ref 0–0.04)
IMM GRANULOCYTES NFR BLD AUTO: 0 % (ref 0–0.5)
INR PPP: 2.7 (ref 0.9–1.1)
LYMPHOCYTES # BLD: 1.2 K/UL (ref 0.8–3.5)
LYMPHOCYTES NFR BLD: 22 % (ref 12–49)
MCH RBC QN AUTO: 31.2 PG (ref 26–34)
MCHC RBC AUTO-ENTMCNC: 31 G/DL (ref 30–36.5)
MCV RBC AUTO: 100.4 FL (ref 80–99)
MONOCYTES # BLD: 0.7 K/UL (ref 0–1)
MONOCYTES NFR BLD: 14 % (ref 5–13)
NEUTS SEG # BLD: 3.3 K/UL (ref 1.8–8)
NEUTS SEG NFR BLD: 61 % (ref 32–75)
NRBC # BLD: 0 K/UL (ref 0–0.01)
NRBC BLD-RTO: 0 PER 100 WBC
PLATELET # BLD AUTO: 281 K/UL (ref 150–400)
PMV BLD AUTO: 11 FL (ref 8.9–12.9)
POTASSIUM SERPL-SCNC: 6 MMOL/L (ref 3.5–5.1)
PROT SERPL-MCNC: 5.2 G/DL (ref 6.4–8.2)
PROTHROMBIN TIME: 26.4 SEC (ref 9–11.1)
RBC # BLD AUTO: 2.6 M/UL (ref 4.1–5.7)
SERVICE CMNT-IMP: NORMAL
SODIUM SERPL-SCNC: 138 MMOL/L (ref 136–145)
WBC # BLD AUTO: 5.3 K/UL (ref 4.1–11.1)

## 2021-12-19 PROCEDURE — 65270000011 HC RM PRIVATE SNF

## 2021-12-19 PROCEDURE — 97530 THERAPEUTIC ACTIVITIES: CPT

## 2021-12-19 PROCEDURE — 85610 PROTHROMBIN TIME: CPT

## 2021-12-19 PROCEDURE — 74011250636 HC RX REV CODE- 250/636: Performed by: INTERNAL MEDICINE

## 2021-12-19 PROCEDURE — 74011250637 HC RX REV CODE- 250/637: Performed by: STUDENT IN AN ORGANIZED HEALTH CARE EDUCATION/TRAINING PROGRAM

## 2021-12-19 PROCEDURE — 74011250637 HC RX REV CODE- 250/637: Performed by: INTERNAL MEDICINE

## 2021-12-19 PROCEDURE — 85025 COMPLETE CBC W/AUTO DIFF WBC: CPT

## 2021-12-19 PROCEDURE — 77010033678 HC OXYGEN DAILY

## 2021-12-19 PROCEDURE — 94760 N-INVAS EAR/PLS OXIMETRY 1: CPT

## 2021-12-19 PROCEDURE — 80053 COMPREHEN METABOLIC PANEL: CPT

## 2021-12-19 PROCEDURE — 36415 COLL VENOUS BLD VENIPUNCTURE: CPT

## 2021-12-19 RX ORDER — HYDRALAZINE HYDROCHLORIDE 25 MG/1
25 TABLET, FILM COATED ORAL 3 TIMES DAILY
Status: DISCONTINUED | OUTPATIENT
Start: 2021-12-19 | End: 2021-12-24 | Stop reason: HOSPADM

## 2021-12-19 RX ORDER — CYANOCOBALAMIN 1000 UG/ML
1000 INJECTION, SOLUTION INTRAMUSCULAR; SUBCUTANEOUS DAILY
Status: COMPLETED | OUTPATIENT
Start: 2021-12-19 | End: 2021-12-23

## 2021-12-19 RX ORDER — WARFARIN 2 MG/1
2 TABLET ORAL ONCE
Status: COMPLETED | OUTPATIENT
Start: 2021-12-19 | End: 2021-12-19

## 2021-12-19 RX ORDER — SODIUM POLYSTYRENE SULFONATE 15 G/60ML
15 SUSPENSION ORAL; RECTAL
Status: COMPLETED | OUTPATIENT
Start: 2021-12-19 | End: 2021-12-19

## 2021-12-19 RX ADMIN — HYDRALAZINE HYDROCHLORIDE 25 MG: 25 TABLET, FILM COATED ORAL at 21:36

## 2021-12-19 RX ADMIN — HYDRALAZINE HYDROCHLORIDE 25 MG: 25 TABLET, FILM COATED ORAL at 15:18

## 2021-12-19 RX ADMIN — CYANOCOBALAMIN 1000 MCG: 1000 INJECTION, SOLUTION INTRAMUSCULAR; SUBCUTANEOUS at 10:44

## 2021-12-19 RX ADMIN — ATORVASTATIN CALCIUM 20 MG: 20 TABLET, FILM COATED ORAL at 10:43

## 2021-12-19 RX ADMIN — HYDRALAZINE HYDROCHLORIDE 25 MG: 25 TABLET, FILM COATED ORAL at 10:43

## 2021-12-19 RX ADMIN — AMIODARONE HYDROCHLORIDE 200 MG: 200 TABLET ORAL at 10:43

## 2021-12-19 RX ADMIN — LEVOTHYROXINE SODIUM 100 MCG: 0.1 TABLET ORAL at 06:32

## 2021-12-19 RX ADMIN — SODIUM POLYSTYRENE SULFONATE 15 G: 15 SUSPENSION ORAL; RECTAL at 10:46

## 2021-12-19 RX ADMIN — DILTIAZEM HYDROCHLORIDE 180 MG: 180 CAPSULE, COATED, EXTENDED RELEASE ORAL at 21:36

## 2021-12-19 RX ADMIN — WARFARIN SODIUM 2 MG: 2 TABLET ORAL at 17:33

## 2021-12-19 NOTE — PROGRESS NOTES
Bedside shift change report given to ZO Pugh RN (oncoming nurse) by Mony Guadalupe RN   (offgoing nurse). Report included the following information SBAR, Kardex, Intake/Output, MAR and Recent Results. Uribe score 3  Bed/chair alarm is in use. If in use it is set at the highest volume. Intravenous fluids were administered, none 0 ml/hr  No data found. No flowsheet data found. Lab results reviewed. For significant abnormal values and values requiring intervention, see assessment and plan.

## 2021-12-19 NOTE — SWING BED INTERDISCIPLINARY CARE PLAN NURSE NOTE
Nursing:    Week #2: Date 12/19/2021  Medical status (changes from previous week):Progressing  Treatment changes this shift: none  Cognition: Present status: oriented          Is cueing needed?: Yes          Frequency of cueing needs: occasional           Type of cueing used: verbal  ADL (general):  Minimum assistance  Activity type: Social leisure skills  Participation: Daily and Individual  Level of participation: Improving  Pain status: No c/o pain  Patient/Family Education needs: discharge planning    Upon review of the patients current care plan, the following issues, problems, or needs remain: discharge planning    Alexandro Thornton RN

## 2021-12-19 NOTE — PROGRESS NOTES
Problem: Falls - Risk of  Goal: *Absence of Falls  Description: Document Maryam Freeze Fall Risk and appropriate interventions in the flowsheet.   Outcome: Progressing Towards Goal  Note: Fall Risk Interventions:  Mobility Interventions: Bed/chair exit alarm    Mentation Interventions: Adequate sleep, hydration, pain control,Bed/chair exit alarm,Door open when patient unattended    Medication Interventions: Bed/chair exit alarm    Elimination Interventions: Call light in reach,Patient to call for help with toileting needs,Urinal in reach    History of Falls Interventions: Bed/chair exit alarm

## 2021-12-19 NOTE — SWING BED INTERDISCIPLINARY CARE PLAN NURSE NOTE
Nursing:    Week #2: Date 12/19/2021  Medical status (changes from previous week):Progressing  Treatment changes this shift: none  Cognition: Present status: oriented          Is cueing needed?: Yes          Frequency of cueing needs: occasional           Type of cueing used: verbal  ADL (general):  Minimum assistance  Activity type: Social leisure skills  Participation: Daily  Level of participation: Improving  Pain status: No c/o pain  Patient/Family Education needs: discharge planning        Mary Lou Ceballos RN

## 2021-12-19 NOTE — PROGRESS NOTES
Problem: Falls - Risk of  Goal: *Absence of Falls  Description: Document Britni Hadley Fall Risk and appropriate interventions in the flowsheet. Outcome: Progressing Towards Goal  Note: Fall Risk Interventions:  Mobility Interventions: Bed/chair exit alarm,Patient to call before getting OOB,Utilize walker, cane, or other assistive device    Mentation Interventions: Adequate sleep, hydration, pain control,Bed/chair exit alarm,Door open when patient unattended,Room close to nurse's station    Medication Interventions: Bed/chair exit alarm,Patient to call before getting OOB    Elimination Interventions: Bed/chair exit alarm,Call light in reach,Patient to call for help with toileting needs,Stay With Me (per policy)    History of Falls Interventions: Bed/chair exit alarm,Door open when patient unattended,Room close to nurse's station         Problem: Patient Education: Go to Patient Education Activity  Goal: Patient/Family Education  Outcome: Progressing Towards Goal     Problem: Hypertension  Goal: *Blood pressure within specified parameters  Outcome: Progressing Towards Goal  Goal: *Labs within defined limits  Outcome: Not Progressing Towards Goal     Problem: Pressure Injury - Risk of  Goal: *Prevention of pressure injury  Description: Document Alexander Scale and appropriate interventions in the flowsheet.   Outcome: Progressing Towards Goal  Note: Pressure Injury Interventions:  Sensory Interventions: Assess changes in LOC,Check visual cues for pain,Keep linens dry and wrinkle-free,Minimize linen layers    Moisture Interventions: Apply protective barrier, creams and emollients,Minimize layers    Activity Interventions: Increase time out of bed    Mobility Interventions: HOB 30 degrees or less    Nutrition Interventions: Offer support with meals,snacks and hydration,Document food/fluid/supplement intake    Friction and Shear Interventions: Minimize layers     Pt.'s Potassium level 6 this AM. Kayexalate given as ordered and pt. Stooling frequently this afternoon.

## 2021-12-19 NOTE — SWING BED INTERDISCIPLINARY CARE PLAN PT NOTE
Physical Therapy:    Progress: Progressing  Wt.  Bearing: No WB restrictions  Subjective: I can walk  Assistive Device: RW  Bed Mobility: Limited assistance  Supine to sit: Limited assistance  Ambulation: # of feet 100  Supervision  Balance: Sitting static:good       Sitting dynamic:good       Standing static: good  W/C Mobility: Not tested  Sit to stand: Supervision  Transfers: Supervision  Surface: Even  Safety status: poor safety awareness, requires verbal cues, reminders  ADL Support- record highest level in each ADL activity over last 7 days   Bed mobility: supervision   Transfer: supervision   Eating: ind   Toilet use: min    Upon review of the patients current care plan, the following issues, problems, or needs remain: Patient requires assistance/supervision for mobility and daily activities, he has poor safety awareneemely Matthew, PT

## 2021-12-19 NOTE — PROGRESS NOTES
[facility]  Hospitalist Progress Note    NAME: Manus Najjar   :  1940   MRN:  [id number]     Total duration of encounter: 10 days      Interim Hospital Summary: [de-identified] y.o. male who presented on 2021 with Encounter for rehabilitation. He has a past medical history of Arrhythmia, CKD (chronic kidney disease) stage 3, GFR 30-59 ml/min ([facility]), Hyperlipemia, Hypertension, Iron deficiency anemia due to chronic blood loss (2021), PAF (paroxysmal atrial fibrillation) ([facility] [address] (2021), PVD (peripheral vascular disease) ([facility] [address] and Thyroid disease. .    Admitted with h/o falls  Acute care on 12/3,  Was found on floor by friend. Tx for STEVEN with Rhabdomyolysis. Still weak. TCU for rehab  - slow progress. Has been more cooperative with staff. Subjective:     Chief Complaint / Reason for Physician Visit  \"weak\".   Discussed with RN   Ambulating with walker and close observation into hallway  Will not go to facility  Has TCU extended till this Friday, planning to arrange for help with paid sitters  Also with [facility] and PT/OT  Son has been informed       Review of Systems:  Symptom Y/N Comments  Symptom Y/N Comments   Fever/Chills n   Chest Pain n    Poor Appetite n  fair  Edema n    Cough n   Abdominal Pain n    Sputum n   Joint Pain y    SOB/JONES y   Pruritis/Rash n    Nausea/vomit n   Tolerating PT/OT y    Diarrhea n   Tolerating Diet y    Constipation n   Other         Current Facility-Administered Medications:     cyanocobalamin (VITAMIN B12) injection 1,000 mcg, 1,000 mcg, IntraMUSCular, DAILY, Jeane Robertson MD, 1,000 mcg at 21 1044    hydrALAZINE (APRESOLINE) tablet 25 mg, 25 mg, Oral, TID, Jeane Robertson MD, 25 mg at 21 1518    atorvastatin (LIPITOR) tablet 20 mg, 20 mg, Oral, DAILY, Ruth Sandhu MD, 20 mg at 21 1043    polyethylene glycol (MIRALAX) packet 17 g, 17 g, Oral, DAILY PRN, Jeane Robertson MD    acetaminophen (TYLENOL) tablet 650 mg, 650 mg, Oral, Q6H PRN, Gareld Habermann, MD    amiodarone (CORDARONE) tablet 200 mg, 200 mg, Oral, DAILY, Gareld Habermann, MD, 200 mg at 12/19/21 1043    bisacodyL (DULCOLAX) suppository 10 mg, 10 mg, Rectal, DAILY PRN, Gareld Habermann, MD    dilTIAZem ER (CARDIZEM CD) capsule 180 mg, 180 mg, Oral, QHS, Gareld Habermann, MD, 180 mg at 12/18/21 2101    influenza vaccine 2021-22 (6 mos+)(PF) (FLUARIX/FLULAVAL/FLUZONE QUAD) injection 0.5 mL, 1 Each, IntraMUSCular, PRIOR TO DISCHARGE, Gareld Habermann, MD    levothyroxine (SYNTHROID) tablet 100 mcg, 100 mcg, Oral, ACB, Gareld Habermann, MD, 100 mcg at 12/19/21 3227    polyethylene glycol (MIRALAX) packet 17 g, 17 g, Oral, DAILY, Gareld Habermann, MD, 17 g at 12/17/21 0847    WARFARIN DOSING PER PHARMACY, 1 Each, Other, Rx Dosing/Monitoring, Gareld Habermann, MD    Objective:     VITALS:   Patient Vitals for the past 12 hrs:   Temp Pulse Resp BP SpO2   12/19/21 1518 -- -- -- (!) 147/72 --   12/19/21 0712 98.2 °F (36.8 °C) 70 20 136/88 96 %       Intake/Output Summary (Last 24 hours) at 12/19/2021 1802  Last data filed at 12/19/2021 1700  Gross per 24 hour   Intake 720 ml   Output 775 ml   Net -55 ml        PHYSICAL EXAM:  General: WD, WN. Alert, cooperative, no acute distress    EENT:  EOMI. Anicteric sclerae. MMM  Resp:  Nonlabored at rest,  Mild cough,  no wheezing or rales. No accessory muscle use  CV:  Regular  rhythm,  No edema  GI:  Soft, Non distended, Non tender. +Bowel sounds  Neurologic:  Alert and oriented X 3, normal speech, nonfocal  Psych:   Not anxious nor agitated  Skin:  No rashes. No jaundice    LABS:  I reviewed today's most current labs and imaging studies.   Pertinent labs include:  Recent Labs     12/19/21  0649   WBC 5.3   HGB 8.1*   HCT 26.1*        Recent Labs     12/19/21  0649 12/18/21  0610 12/17/21  6135     --   --    K 6.0*  --   --      --   --    CO2 29  --   --    GLU 75  --   --    BUN 30*  --   --    CREA 3.00*  --   --    CA 8.3*  -- --    ALB 2.3*  --   --    TBILI 0.3  --   --    ALT 53  --   --    INR 2.7* 2.7* 2.4*       RADIOLOGY:  CT CHEST 12/4:  1. Partially loculated left pleural effusion with linear changes at the left base  2. Multiple chronic left-sided rib fractures. Subacute left 10th rib fracture      Procedures: see electronic medical records for all procedures/Xrays and details which were not copied into this note but were reviewed prior to creation of Plan.         Assessment / Plan:    Principal Problem:    Encounter for rehabilitation (2/13/2021)  Ambulates in hallway with walker during PT  Gaining some strength  Working towards d/c home with aids - more hours then in the recent past  Pt addiment about refusal of NH placement,  Son does not pust to change his wishes     Active Problems:    Fall (2/8/2021)    Generalized weakness (2/8/2021)  PT/OT for improved functional status  Home has risks as noted falls PTA  TCU was extended till Friday      Anticoagulant long-term use (2/28/2017)    PAF (paroxysmal atrial fibrillation) ([address] (2/9/2021)  Rate controlled  Cont Coumadin and Metoprolol      Stage 3 chronic kidney disease (8/5/2019)    Acute renal failure superimposed on stage 3 chronic kidney disease ([address] (12/3/2021)  Cr jump since 12/9 with Cr 3.00 (up from 2.36 12/9)  Avoid toxins  D/c Cozaar and Lasix  Given 15g Kayexalate this AM (K was up to 6.0)  F/u in AM      Hyperlipemia ()  Cont tx Lipitor 20mg daily      Hypothyroid (8/5/2019)  Cont tx Levothyroxine 100mcg daily        ______________________________________________________________________  SAFETY:   Code Status:Full  DVT prophylaxis:Coumadin  Stress Ulcer prophylaxis: Not Indicated  Bladder catheter:no  Family Contact Info:  Primary Emergency ContactLaJonatan Ham Phone: [phone]  Bedded: [facility] Room 126/01  Disposition: TBD, likely home when stable  Admission status:  TCU    Reviewed most current lab test results and cultures  YES  Reviewed most current radiology test results   YES  Review and summation of old records today    NO  Reviewed patient's current orders and MAR    YES  PMH/SH reviewed - no change compared to H&P    Care Plan discussed with:                                   Comments  Patient x     Family      RN x     Care Manager       Consultant                           Multidiciplinary team rounds were held today with , nursing, pharmacist and clinical coordinator. Patient's plan of care was discussed; medications were reviewed and discharge planning was addressed.         ____________________________________________    Total NON Critical Care TIME:  35   Minutes        Comments   >50% of visit spent in counseling and coordination of care   x      Signed: Leroy Yeager MD  [facility] Hospitalist  [phone]

## 2021-12-19 NOTE — PROGRESS NOTES
Pharmacy: Warfarin  Dosing    Indication: Afib  Goal INR: 2-3  Today's Dose: warfarin 2mg  Labs:  Recent Labs     12/19/21  0649 12/18/21  0610 12/17/21  0445   INR 2.7* 2.7* 2.4*   HGB 8.1*  --   --    HCT 26.1*  --   --      --   --        Home Dose:Warfarin 3 mg on Thursday and 2.5 mg all other days  Drug Interactions: amiodarone (pta med)  Dietary Intake: adult regular    Impression/Plan: INR today = 2.7. Today's dose to be warfarin 2mg.        Thanks,  Lg Dinero

## 2021-12-20 LAB
ALBUMIN SERPL-MCNC: 2.4 G/DL (ref 3.5–5)
ALBUMIN/GLOB SERPL: 0.9 {RATIO} (ref 1.1–2.2)
ALP SERPL-CCNC: 84 U/L (ref 45–117)
ALT SERPL-CCNC: 47 U/L (ref 12–78)
ANION GAP SERPL CALC-SCNC: 5 MMOL/L (ref 5–15)
AST SERPL-CCNC: 45 U/L (ref 15–37)
BILIRUB SERPL-MCNC: 0.3 MG/DL (ref 0.2–1)
BUN SERPL-MCNC: 27 MG/DL (ref 6–20)
BUN/CREAT SERPL: 10 (ref 12–20)
CALCIUM SERPL-MCNC: 7.9 MG/DL (ref 8.5–10.1)
CHLORIDE SERPL-SCNC: 104 MMOL/L (ref 97–108)
CO2 SERPL-SCNC: 29 MMOL/L (ref 21–32)
CREAT SERPL-MCNC: 2.81 MG/DL (ref 0.7–1.3)
GLOBULIN SER CALC-MCNC: 2.8 G/DL (ref 2–4)
GLUCOSE SERPL-MCNC: 75 MG/DL (ref 65–100)
INR PPP: 2.5 (ref 0.9–1.1)
POTASSIUM SERPL-SCNC: 5.3 MMOL/L (ref 3.5–5.1)
PROT SERPL-MCNC: 5.2 G/DL (ref 6.4–8.2)
PROTHROMBIN TIME: 24.9 SEC (ref 9–11.1)
SODIUM SERPL-SCNC: 138 MMOL/L (ref 136–145)

## 2021-12-20 PROCEDURE — 80053 COMPREHEN METABOLIC PANEL: CPT

## 2021-12-20 PROCEDURE — 65270000011 HC RM PRIVATE SNF

## 2021-12-20 PROCEDURE — 97110 THERAPEUTIC EXERCISES: CPT

## 2021-12-20 PROCEDURE — 94760 N-INVAS EAR/PLS OXIMETRY 1: CPT

## 2021-12-20 PROCEDURE — 36415 COLL VENOUS BLD VENIPUNCTURE: CPT

## 2021-12-20 PROCEDURE — 85610 PROTHROMBIN TIME: CPT

## 2021-12-20 PROCEDURE — 97530 THERAPEUTIC ACTIVITIES: CPT

## 2021-12-20 PROCEDURE — 97116 GAIT TRAINING THERAPY: CPT

## 2021-12-20 PROCEDURE — 74011250636 HC RX REV CODE- 250/636: Performed by: INTERNAL MEDICINE

## 2021-12-20 PROCEDURE — 77010033678 HC OXYGEN DAILY

## 2021-12-20 PROCEDURE — 74011250637 HC RX REV CODE- 250/637: Performed by: STUDENT IN AN ORGANIZED HEALTH CARE EDUCATION/TRAINING PROGRAM

## 2021-12-20 PROCEDURE — 74011250637 HC RX REV CODE- 250/637: Performed by: INTERNAL MEDICINE

## 2021-12-20 RX ORDER — WARFARIN 2 MG/1
2 TABLET ORAL ONCE
Status: COMPLETED | OUTPATIENT
Start: 2021-12-20 | End: 2021-12-20

## 2021-12-20 RX ADMIN — HYDRALAZINE HYDROCHLORIDE 25 MG: 25 TABLET, FILM COATED ORAL at 21:46

## 2021-12-20 RX ADMIN — CYANOCOBALAMIN 1000 MCG: 1000 INJECTION, SOLUTION INTRAMUSCULAR; SUBCUTANEOUS at 10:10

## 2021-12-20 RX ADMIN — HYDRALAZINE HYDROCHLORIDE 25 MG: 25 TABLET, FILM COATED ORAL at 10:10

## 2021-12-20 RX ADMIN — HYDRALAZINE HYDROCHLORIDE 25 MG: 25 TABLET, FILM COATED ORAL at 17:47

## 2021-12-20 RX ADMIN — ATORVASTATIN CALCIUM 20 MG: 20 TABLET, FILM COATED ORAL at 10:10

## 2021-12-20 RX ADMIN — LEVOTHYROXINE SODIUM 100 MCG: 0.1 TABLET ORAL at 06:39

## 2021-12-20 RX ADMIN — WARFARIN SODIUM 2 MG: 2 TABLET ORAL at 17:47

## 2021-12-20 RX ADMIN — DILTIAZEM HYDROCHLORIDE 180 MG: 180 CAPSULE, COATED, EXTENDED RELEASE ORAL at 21:47

## 2021-12-20 RX ADMIN — AMIODARONE HYDROCHLORIDE 200 MG: 200 TABLET ORAL at 10:10

## 2021-12-20 NOTE — PROGRESS NOTES
Follow up vist with patient in Rm 126  Provided empathic listening and spiritual support  Advised of  Availability   [address] [place] [address]

## 2021-12-20 NOTE — PROGRESS NOTES
Pharmacy: Warfarin  Dosing    Indication: Afib  Goal INR: 2-3  Today's Dose: warfarin 2 mg  Labs:  Recent Labs     12/20/21  0530 12/19/21  0649 12/18/21  0610   INR 2.5* 2.7* 2.7*   HGB  --  8.1*  --    HCT  --  26.1*  --    PLT  --  281  --        Home Dose:Warfarin 3 mg on Thursday and 2.5 mg all other days  Drug Interactions: amiodarone (pta med)  Dietary Intake: adult regular    Impression/Plan: INR today = 2.5. Today's dose to be warfarin 2mg.        Thanks,  Debra Beck, PHARMD

## 2021-12-20 NOTE — PROGRESS NOTES
Problem: Falls - Risk of  Goal: *Absence of Falls  Description: Document Andrew Moreaumanjeet Fall Risk and appropriate interventions in the flowsheet.   Outcome: Progressing Towards Goal  Note: Fall Risk Interventions:  Mobility Interventions: Bed/chair exit alarm    Mentation Interventions: Adequate sleep, hydration, pain control    Medication Interventions: Bed/chair exit alarm    Elimination Interventions: Call light in reach,Bed/chair exit alarm    History of Falls Interventions: Bed/chair exit alarm

## 2021-12-20 NOTE — SWING BED INTERDISCIPLINARY CARE PLAN NURSE NOTE
Nursing:    Week #2: Date 12/20/2021  Medical status (changes from previous week):Progressing  Treatment changes this shift: none  Cognition: Present status: oriented          Is cueing needed?: Yes          Frequency of cueing needs: occasional           Type of cueing used: verbal  ADL (general): Minimum assistance  Activity type: Reality awareness  Participation: Daily  Level of participation: Decreasing  Pain status: No c/o pain  Patient/Family Education needs: Need for assistance at home for safety. Pt forgets and attempts to walk away from the walker with very unsteady gait.       Anyi Pepper RN

## 2021-12-20 NOTE — PROGRESS NOTES
Problem: Mobility Impaired (Adult and Pediatric)  Goal: *Acute Goals and Plan of Care (Insert Text)  Description: FUNCTIONAL STATUS PRIOR TO ADMISSION: Patient was modified independent using a rolling walker for functional mobility. HOME SUPPORT PRIOR TO ADMISSION: The patient lived alone with no local support. Physical Therapy Goals  Initiated 12/10/2021  1. Patient will move from supine to sit and sit to supine  in bed with minimal assistance/contact guard assist within 7 day(s). 2.  Patient will transfer from bed to chair and chair to bed with supervision/set-up using the least restrictive device within 7 day(s). 3.  Patient will perform sit to stand with minimal assistance/contact guard assist within 7 day(s). 4.  Patient will ambulate with minimal assistance/contact guard assist for 150 feet with the least restrictive device within 7 day(s). 5.  Patient will ascend/descend 4 stairs with bilateral handrail(s) with moderate assistance  within 7 day(s). Outcome: Progressing Towards Goal   PHYSICAL THERAPY TREATMENT  Patient: Isaac Roy (2 y.o. male)  Date: 12/20/2021  Diagnosis: Encounter for rehabilitation [Z51.89] Encounter for rehabilitation       Precautions: Bed Alarm,Fall  Chart, physical therapy assessment, plan of care and goals were reviewed. ASSESSMENT  Patient continues with skilled PT services and is progressing towards goals. Pt. Received upright and agreeable to PT. Pt. Seen in two sessions. First session focussed on functional ambulation of 175ft with CGA with RW with decreased step clearance and shuffled gait pattern. Second session focussed on therapeutic exercises in chair to build functional strength and endurance. Pt. Responded well to both treatments.      Current Level of Function Impacting Discharge (mobility/balance): functional advancement of ambulation    Other factors to consider for discharge: home set up         PLAN :  Patient continues to benefit from skilled intervention to address the above impairments. Continue treatment per established plan of care. to address goals. Recommendation for discharge: (in order for the patient to meet his/her long term goals)  Physical therapy at least 2 days/week in the home AND ensure assist and/or supervision for safety with ambulation/transfers    This discharge recommendation:  Has been made in collaboration with the attending provider and/or case management    IF patient discharges home will need the following DME: bedside commode       SUBJECTIVE:   Patient stated im doing better today.     OBJECTIVE DATA SUMMARY:   Critical Behavior:  Neurologic State: Alert  Orientation Level: Oriented X4  Cognition: Follows commands  Safety/Judgement: Decreased awareness of need for safety,Decreased awareness of need for assistance  Functional Mobility Training:    Transfers:  Sit to Stand: Supervision  Stand to Sit: Supervision        Bed to Chair: Supervision    Ambulation/Gait Training:  Distance (ft): 175 Feet (ft)  Assistive Device: Walker, rolling;Gait belt  Ambulation - Level of Assistance: Stand-by assistance        Gait Abnormalities: Decreased step clearance;Shuffling gait        Base of Support: Narrowed     Speed/Joy: Pace decreased (<100 feet/min)  Step Length: Left shortened;Right shortened    Pain Ratin/10    Activity Tolerance:   Good    After treatment patient left in no apparent distress:   Sitting in chair and Supine in bed    COMMUNICATION/COLLABORATION:   The patients plan of care was discussed with: Physical therapist and Rehabilitation technician.      Emani Wray PT, DPT, EP-C   Time Calculation: 15 mins, 17mins

## 2021-12-20 NOTE — SWING BED INTERDISCIPLINARY CARE PLAN NURSE NOTE
Nursing:    Week #2: Date 12/20/2021  Medical status (changes from previous week):Progressing  Treatment changes this shift: none  Cognition: Present status: oriented          Is cueing needed?: Yes          Frequency of cueing needs: occasional           Type of cueing used: verbal  ADL (general):  Minimum assistance  Activity type: Social leisure skills  Participation: Daily  Level of participation: Improving  Pain status: No c/o pain  Patient/Family Education needs: discharge planning    Upon review of the patients current care plan, the following issues, problems, or needs remain: discharge planning    Chelita Tobias RN

## 2021-12-20 NOTE — PROGRESS NOTES
Bedside shift change report given to ZO Lay RN (oncoming nurse) by Cristhian Dickens RN   (offgoing nurse). Report included the following information SBAR, Kardex, Intake/Output and MAR. Uribe score 3  Bed/chair alarm is in use. If in use it is set at the highest volume. Intravenous fluids were administered, none 0 ml/hr  Patient Vitals for the past 12 hrs:   BP   12/19/21 1518 (!) 147/72     No flowsheet data found. Lab results reviewed. For significant abnormal values and values requiring intervention, see assessment and plan.

## 2021-12-20 NOTE — PROGRESS NOTES
IDR Team; MD, Nursing, Care Manager, Physical therapy, Nursing Supervisor, Pharmacy and Dietician, met to review patient's plan of care. Discussed goals, interventions, barriers and progress. 21%  HIGH    Team will continue to monitor progress and report any concerns to the physician and care management as indicated. Transition of Care Plan:   Judge Garay, RN BSN spoke with the patient's son this morning requesting that if the patient is ready for discharge, could he be discharged on Wednesday, 12/23/21. He will be able to provided transport for him on that day. Will contact the [employer], [address] [place] [address] to deliver the oxygen tomorrow if at all possible. [facility] has been arranged and notified of the discharge date for Wednesday. Spoke with the patient who agrees with the plan for discharge on Wednesday. He is aware that he is going to have to increase his  service help to 12 hours a day 9am to 9pm. CM will continue to follow and assist with any disposition need.

## 2021-12-20 NOTE — PROGRESS NOTES
Spoke with patient's son Rhianna Cannon. He said that he would not be able to get help for his father by Wednesday. Apparently his personal care aide wouldn't be able to care for him on Wednesday. Spoke with [facility] on the phone. She said she couldn't get assistance with patient till Monday 12/27. PT states they cannot extend his stay any further.  Discharge date by 12/24

## 2021-12-21 LAB
COMMENT, HOLDF: NORMAL
EPO SERPL-ACNC: 8.1 MIU/ML (ref 2.6–18.5)
IF BLOCK AB SER-ACNC: 1 AU/ML (ref 0–1.1)
INR PPP: 2.4 (ref 0.9–1.1)
PROTHROMBIN TIME: 23.1 SEC (ref 9–11.1)
SAMPLES BEING HELD,HOLD: NORMAL

## 2021-12-21 PROCEDURE — 97535 SELF CARE MNGMENT TRAINING: CPT

## 2021-12-21 PROCEDURE — 74011250637 HC RX REV CODE- 250/637: Performed by: STUDENT IN AN ORGANIZED HEALTH CARE EDUCATION/TRAINING PROGRAM

## 2021-12-21 PROCEDURE — 36415 COLL VENOUS BLD VENIPUNCTURE: CPT

## 2021-12-21 PROCEDURE — 97110 THERAPEUTIC EXERCISES: CPT

## 2021-12-21 PROCEDURE — 74011250637 HC RX REV CODE- 250/637: Performed by: INTERNAL MEDICINE

## 2021-12-21 PROCEDURE — 94760 N-INVAS EAR/PLS OXIMETRY 1: CPT

## 2021-12-21 PROCEDURE — 97530 THERAPEUTIC ACTIVITIES: CPT

## 2021-12-21 PROCEDURE — 85610 PROTHROMBIN TIME: CPT

## 2021-12-21 PROCEDURE — 65270000011 HC RM PRIVATE SNF

## 2021-12-21 PROCEDURE — 74011250636 HC RX REV CODE- 250/636: Performed by: INTERNAL MEDICINE

## 2021-12-21 PROCEDURE — 97116 GAIT TRAINING THERAPY: CPT

## 2021-12-21 PROCEDURE — 77010033678 HC OXYGEN DAILY

## 2021-12-21 RX ORDER — WARFARIN 2.5 MG/1
2.5 TABLET ORAL ONCE
Status: COMPLETED | OUTPATIENT
Start: 2021-12-21 | End: 2021-12-21

## 2021-12-21 RX ADMIN — ATORVASTATIN CALCIUM 20 MG: 20 TABLET, FILM COATED ORAL at 09:17

## 2021-12-21 RX ADMIN — LEVOTHYROXINE SODIUM 100 MCG: 0.1 TABLET ORAL at 06:56

## 2021-12-21 RX ADMIN — CYANOCOBALAMIN 1000 MCG: 1000 INJECTION, SOLUTION INTRAMUSCULAR; SUBCUTANEOUS at 09:17

## 2021-12-21 RX ADMIN — AMIODARONE HYDROCHLORIDE 200 MG: 200 TABLET ORAL at 09:17

## 2021-12-21 RX ADMIN — HYDRALAZINE HYDROCHLORIDE 25 MG: 25 TABLET, FILM COATED ORAL at 09:17

## 2021-12-21 RX ADMIN — WARFARIN SODIUM 2.5 MG: 2.5 TABLET ORAL at 17:35

## 2021-12-21 RX ADMIN — DILTIAZEM HYDROCHLORIDE 180 MG: 180 CAPSULE, COATED, EXTENDED RELEASE ORAL at 21:30

## 2021-12-21 RX ADMIN — HYDRALAZINE HYDROCHLORIDE 25 MG: 25 TABLET, FILM COATED ORAL at 21:30

## 2021-12-21 RX ADMIN — HYDRALAZINE HYDROCHLORIDE 25 MG: 25 TABLET, FILM COATED ORAL at 17:35

## 2021-12-21 NOTE — PROGRESS NOTES
Comprehensive Nutrition Assessment    Type and Reason for Visit: Reassess    Nutrition Recommendations/Plan: continue regular lowfat/low chol/high fiber/JCARLOS, ice cream 2x day, ensure 2x day, encourage po intake     Nutrition Assessment:   Chart reviewed. Weight is stable since admission. Po intake continues to be poor to fair. Potassium high 12/20-5.3 and 12/19- 6.0. BUN/CR-27/2.81 high, Ca-7.9 low, H/H 8.1/26.1 (12/19). Per MD pt ate well for breakfast this am. Pt with no complaints. Pt feeds himself. Encourage po intake. Malnutrition Assessment:  Malnutrition Status:   Moderate malnutrition    Context:  Chronic illness     Findings of the 6 clinical characteristics of malnutrition:   Energy Intake:  Mild decrease in energy intake (specify)  Weight Loss:  No significant weight loss     Body Fat Loss:  1 - Mild body fat loss,     Muscle Mass Loss:  1 - Mild muscle mass loss,    Fluid Accumulation:  No significant fluid accumulation,     Strength:  Not performed     Estimated Daily Nutrient Needs:  Energy (kcal): 1879; Weight Used for Energy Requirements: Current  Protein (g): 88; Weight Used for Protein Requirements: Current  Fluid (ml/day): 1979; Method Used for Fluid Requirements: 1 ml/kcal      Nutrition Related Findings:       Patient Vitals for the past 168 hrs:   % Diet Eaten   12/19/21 1700 0%   12/19/21 1200 1 - 25%   12/19/21 0900 1 - 25%   12/18/21 1700 1 - 25%   12/18/21 1200 26 - 50%   12/18/21 0900 51 - 75%   12/17/21 1745 26 - 50%   12/17/21 0830 26 - 50%   12/15/21 1746 1 - 25%       Wounds:    None       Current Nutrition Therapies:  ADULT ORAL NUTRITION SUPPLEMENT Breakfast, Dinner; Standard High Calorie/High Protein  ADULT DIET Regular; Low Fat/Low Chol/High Fiber/JCARLOS    Anthropometric Measures:  · Height:  5' 10\" (177.8 cm)  · Current Body Wt:  73 kg (160 lb 15 oz)   · Admission Body Wt:       · Usual Body Wt:        · Ideal Body Wt:  166 lbs:  96.9 %   · Adjusted Body Weight:   ; Weight Adjustment for:     · Adjusted BMI:       · BMI Category:  Normal weight (BMI 22.0-24.9) age over 72       Weight Loss Metrics 12/9/2021 12/8/2021 10/11/2021 9/27/2021 8/9/2021 5/17/2021 4/12/2021   Today's Wt 161 lb 161 lb 12.8 oz 159 lb 9.6 oz 158 lb 6 oz - - 157 lb   BMI 23.1 kg/m2 23.22 kg/m2 22.9 kg/m2 28.05 kg/m2 27.81 kg/m2 22.53 kg/m2 22.53 kg/m2       Nutrition Diagnosis:   · Inadequate oral intake,Inadequate protein intake related to psychological cause or life stress as evidenced by intake 26-50%,intake 51-75%      Nutrition Interventions:   Food and/or Nutrient Delivery: Continue current diet,Start oral nutrition supplement  Nutrition Education and Counseling: No recommendations at this time  Coordination of Nutrition Care: Continue to monitor while inpatient,Interdisciplinary rounds    Goals:  po intake at least 50-75% of most meals x 5-7 days       Nutrition Monitoring and Evaluation:   Behavioral-Environmental Outcomes: None identified  Food/Nutrient Intake Outcomes: Food and nutrient intake  Physical Signs/Symptoms Outcomes: Weight,Meal time behavior    Discharge Planning:    Continue current diet,Continue oral nutrition supplement     Electronically signed by Lorna Vargas RD on 12/21/2021

## 2021-12-21 NOTE — PROGRESS NOTES
IDR Team; MD, Nursing, Care Manager, Physical therapy, OT, , Pharmacy and Dietician, met to review patient's plan of care. Discussed goals, interventions, barriers and progress. Team will continue to monitor progress and report any concerns to the physician and care management as indicated. Transition of Care Plan: Per MD, he had a good appetite. Since patient was supposed to be discharged last Friday 12/17. Maybe he would NOT qualify for home o2??? MD will place order. CM reached out to personal care aide. She has NOT found help yet for patient. She apparently can't find help till 12/2721. CM reached out to another personal care aide. The personal care aide said she could be there either Wednesday or Friday. PT/OT team here said we may be able to extend LOS till Monday but will reassess and evaluate on Friday.

## 2021-12-21 NOTE — PROGRESS NOTES
PHYSICAL THERAPY TREATMENT  Patient: Prem Liu (16 y.o. male)  Date: 12/21/2021  Diagnosis: Encounter for rehabilitation [Z51.89] Encounter for rehabilitation       Precautions: Fall,Bed Alarm (SPO2)  Chart, physical therapy assessment, plan of care and goals were reviewed. ASSESSMENT  Patient continues with skilled PT services and is progressing towards goals He is able to negotiate stairs and amb 175 ft with r walker. He needs cues to stay close to the walker and to use arm rests to assist with arising and sitting. It is anticipated that he will need assistance upon dc to continue to re enforce these safety reminders. He will benefit from a course of small area negotiation to improve safety in bathroom and tight quarter situations. .     Current Level of Function Impacting Discharge (mobility/balance): cues needed for safety reminders, hip extensor and abd weakness. Other factors to consider for discharge: low endurance         PLAN :  Patient continues to benefit from skilled intervention to address the above impairments. Continue treatment per established plan of care. to address goals. Recommendation for discharge: (in order for the patient to meet his/her long term goals)  Physical therapy at least 2 days/week in the home AND ensure assist and/or supervision for safety with mobility    This discharge recommendation:  Has been made in collaboration with the attending provider and/or case management    IF patient discharges home will need the following DME: wheelchair       SUBJECTIVE:   Patient stated Ann Barton was reluctantly agreeable to session.     OBJECTIVE DATA SUMMARY:   Critical Behavior:  Neurologic State: Alert,Drowsy  Orientation Level: Oriented X4  Cognition: Impulsive,Poor safety awareness  Safety/Judgement: Decreased awareness of need for assistance  Functional Mobility Training:  Bed Mobility:     Supine to Sit: Contact guard assistance  Sit to Supine: Contact guard assistance  Scooting: Stand-by assistance        Transfers:  Sit to Stand: Contact guard assistance  Stand to Sit: Contact guard assistance        Bed to Chair: Contact guard assistance                    Balance:  Sitting: Intact  Standing: Impaired; With support  Ambulation/Gait Training:  Distance (ft): 175 Feet (ft)  Assistive Device: Walker, rolling  Ambulation - Level of Assistance: Contact guard assistance        Gait Abnormalities: Other (walker far ahead)        Base of Support: Narrowed     Speed/Joy: Shuffled; Slow  Step Length: Right shortened;Left shortened                      Stairs:  Number of Stairs Trained: 5  Stairs - Level of Assistance: Contact guard assistance   Rail Use: Both    Therapeutic Exercises: Toe raises x 15  Pain Rating:  Denies pain    Activity Tolerance:   Fair    After treatment patient left in no apparent distress:   Pt left on toilet with call bell in hand and nsg aware    COMMUNICATION/COLLABORATION:   The patients plan of care was discussed with: Occupational therapist, Physician, and Case management.      Anson Mckinney, PT   Time Calculation: 25 mins

## 2021-12-21 NOTE — PROGRESS NOTES
Problem: Self Care Deficits Care Plan (Adult)  Goal: *Acute Goals and Plan of Care (Insert Text)  Description: FUNCTIONAL STATUS PRIOR TO ADMISSION: Pt lived alone at home and had assistance 2 days per week. Used walker, help with bathing. Suspect he didn't change clothing on his own. HOME SUPPORT: The patient paid for help 2 x week    Occupational Therapy Goals  Initiated 12/10/2021 (reviewed 12/21/21 and continue to be appropriate)  1. Patient will perform bathing with supervision/set-up within 7 day(s). 2.  Patient will perform lower body dressing with minimal assistance/contact guard assist within 7 day(s). 3.  Patient will perform toilet transfer with SBA within 7 day(s). 4.  Patient will perform all aspects of toileting with modified independence within 7 day(s). 6.  Patient will participate in upper extremity therapeutic exercise/activities with supervision/set-up for 10 minutes within 7 day(s). 7.  Patient will utilize energy conservation techniques during functional activities with verbal cues within 7 day(s). 12/21/2021 1611 by Michel Jaimes  Outcome: Progressing Towards Goal    OCCUPATIONAL THERAPY TREATMENT  Patient: Sade Lugo (51 y.o. male)  Date: 12/21/2021  Diagnosis: Encounter for rehabilitation [Z51.89] Encounter for rehabilitation       Precautions: Fall,Bed Alarm (SPO2)  Chart, occupational therapy assessment, plan of care, and goals were reviewed. ASSESSMENT  Patient continues with skilled OT services and is progressing towards goals. Patient supine in bed with daughter-in-law at bedside with pt introducing as \"sister. \" DIL correcting patient. Pt requiring max encouragement to participate. Pt able to don O2 tubing correctly, but difficulty w/ sensory awareness when O2 tubing needing to be adjusted. Declined compensatory strategy of taping tubing to cheeks.  Attempted sock donning in long sitting w/ threading over toes and pt able to complete LLE, but assist with RLE d/t limited L shoulder ROM. Pt appears to have RTC deficits and instructed on isometric therex with moderate assist and cues as pt performs quickly and difficulty understanding concept of isometrics. Able to complete 1 set of 10 all planes. Current Level of Function Impacting Discharge (ADLs): moderate assist and cues for safety and pacing    Other factors to consider for discharge: lives alone; will be alone at night. Reports he has a urinal and uses at night         PLAN :  Patient continues to benefit from skilled intervention to address the above impairments. Continue treatment per established plan of care to address goals. Recommend with staff: up in chair for most of day; encourage walking to bathroom and cues for O2 tubing mgmt    Recommend next OT session: L shoulder isometrics and reinforcement of safety with O2 tubing; check SPO2 during activity    Recommendation for discharge: (in order for the patient to meet his/her long term goals)  Occupational therapy at least 2 days/week in the home AND ensure assist and/or supervision for safety with O2 tubing and pacing    This discharge recommendation:  A follow-up discussion with the attending provider and/or case management is planned    IF patient discharges home will need the following DME: To Be Determined (TBD) at next level of care        SUBJECTIVE:   Patient stated [employer].  (re: profession)    OBJECTIVE DATA SUMMARY:   Cognitive/Behavioral Status:  Neurologic State: Alert;Drowsy  Orientation Level: Oriented X4  Cognition: Impulsive;Poor safety awareness  Perception: Tactile;Visual;Verbal (cues for RW mgmt in bathroom/sm space)  Perseveration: Tactile cues provided;Verbal cues provided;Visual cues provided  Safety/Judgement: Decreased awareness of need for assistance    Functional Mobility and Transfers for ADLs:  Bed Mobility:  Supine to Sit: Contact guard assistance  Sit to Supine: Contact guard assistance  Scooting: Stand-by assistance    Transfers:  Sit to Stand: Contact guard assistance  Functional Transfers  Bathroom Mobility: Contact guard assistance  Bed to Chair: Contact guard assistance    Balance:  Sitting: Intact  Standing: Impaired; With support    ADL Intervention:  Feeding  Drink to Mouth: Modified independent    Grooming  Position Performed: Standing  Washing Hands: Contact guard assistance  Brushing Teeth: Contact guard assistance    Upper Body Dressing Assistance  Pullover Shirt: Set-up    Lower Body Dressing Assistance  Pants With Elastic Waist: Moderate assistance  Socks: Moderate assistance (greater assist w/ LLE d/t L shoulder)  Position Performed: Long sitting on bed    Cognitive Retraining  Problem Solving: Identifying the task; Identifying the problem;General alternative solution  Executive Functions: Executing cognitive plans;Regulating behavior  Organizing/Sequencing: Breaking task down;Prioritizing  Attention to Task: Single task;Distractibility  Safety/Judgement: Decreased awareness of need for assistance    Therapeutic Exercises:     EXERCISE   Sets   Reps   Active Active Assist   Passive   Comments   Isometric Shoulder Flexion 1 10 []               [x]               []               Constant cues & Hand-over-hand Assist    Isometric Shoulder Abduction 1 10 []               [x]               []               \"   Isometric Shoulder Extension 1 10 []               [x]               []               \"   Isometric Shoulder Adduction 1 10 []               [x]               []               \"       Pain:  Denies 0/10    Activity Tolerance:   desaturates with exertion and requires oxygen, requires frequent rest breaks, and observed SOB with activity    After treatment patient left in no apparent distress:   Supine in bed, Call bell within reach, and Caregiver / family present    COMMUNICATION/COLLABORATION:   The patients plan of care was discussed with: Physical therapist, Rehabilitation technician, and Respiratory therapist.     Theodore Riddles  Time Calculation: 23 mins

## 2021-12-21 NOTE — PROGRESS NOTES
Evaluation for Home Oxygen    Resting (Room Air)  SpO2:  92    HR:  75    Resting (On O2)  SpO2:   97  HR:  66  O2 Device:  NC  O2 Flow Rate (L/min):  2  FIO2 (%):   28    During Walk (Room Air)  SpO2:  85  HR:   96Walk/Assistance Device:  Walker  Rate of Dyspnea:  1  Symptoms:  SOB    After Walk  SpO2:  95  HR:   68O2 Device:  NC  O2 Flow Rate (L/min):  2  FIO2 (%):  28  Rate of Dyspnea:   Very mildSymptoms:  Slightly tachypneic  Does the Patient Qualify for Home O2: Y  Liter Flow at Rest:  2  Liter Flow on Exertion:  3  Does the Patient Need Portable Oxygen Tanks:   Y    Electronically signed by RT Loli on 12/21/2021 at 2:26 PM

## 2021-12-21 NOTE — PROGRESS NOTES
Problem: Self Care Deficits Care Plan (Adult)  Goal: *Acute Goals and Plan of Care (Insert Text)  Description: FUNCTIONAL STATUS PRIOR TO ADMISSION: Pt lived alone at home and had assistance 2 days per week. Used walker, help with bathing. Suspect he didn't change clothing on his own. HOME SUPPORT: The patient paid for help 2 x week    Occupational Therapy Goals  Initiated 12/10/2021 (reviewed 12/21/21 and continue to be appropriate)  1. Patient will perform bathing with supervision/set-up within 7 day(s). 2.  Patient will perform lower body dressing with minimal assistance/contact guard assist within 7 day(s). 3.  Patient will perform toilet transfer with SBA within 7 day(s). 4.  Patient will perform all aspects of toileting with modified independence within 7 day(s). 6.  Patient will participate in upper extremity therapeutic exercise/activities with supervision/set-up for 10 minutes within 7 day(s). 7.  Patient will utilize energy conservation techniques during functional activities with verbal cues within 7 day(s). Outcome: Not Met     OCCUPATIONAL THERAPY TREATMENT/WEEKLY RE-EVALUATION  Patient: Dm Oliveira (65 y.o. male)  Date: 12/21/2021  Diagnosis: Encounter for rehabilitation [Z51.89] Encounter for rehabilitation       Precautions: Fall,Bed Alarm (SPO2)  Chart, occupational therapy assessment, plan of care, and goals were reviewed. ASSESSMENT  Based on the objective data described below, patient making slow progress with ADLs. Pt somewhat uncooperative, but agreeable to simple, basic ADLs with encouragement. Pt able to perform Red T-band therex but max cues to perform slowly. Pt agreeable to standing ADLs. Pt stood sinkside for simple oral care. Pt rushes through activity w/ max cues for pacing. Pt resistant to Energy Conservation/Work Simplification Techniques. Pt unsafe w/ inability to manage O2 tubing and high trip hazard.  Pt running RW into objects and doorframes in bathroom w/ poor frustration tolerance. Assisted pt to reposition RW to clear objects. Pt pushing RW far in front, but declines instruction on proper use. Pt attempted sock don/doffing seated in chair w/ ability to perform ankle-over-knee technique but early termination d/t frustration w/ SOB during activity. Able to don sock to mid-foot and pt able to pull up and complete task. Current Level of Function Impacting Discharge (ADLs): currently moderate assist for ADLs; resistive to education    Other factors to consider for discharge: lives alone w/ plan to increase private care assist         PLAN :  Goals have been updated based on progression since last assessment. Patient continues to benefit from skilled intervention to address the above impairments. Continue to follow patient 3-5x/wk to address goals. Recommend with staff: up for all meals and walk to bathroom for toileting needs    Recommend next OT session: continued attempts for Energy Conservation/Work Simplification Techniques and ADLs for safety in discharge home    Recommendation for discharge: (in order for the patient to meet his/her long term goals)  Occupational therapy at least 2 days/week in the home AND ensure assist and/or supervision for safety with O2 tubing and fall prevention    This discharge recommendation:  A follow-up discussion with the attending provider and/or case management is planned    Equipment recommendations for successful discharge (if) home: To Be Determined (TBD) at next level of care (possible BSC, ? O2)       SUBJECTIVE:   Patient stated Magnus Lux do you want? Natalia Matthews    OBJECTIVE DATA SUMMARY:   Cognitive/Behavioral Status:  Neurologic State: Alert  Orientation Level: Oriented X4  Cognition: Decreased attention/concentration;Decreased command following; Impaired decision making; Impulsive;Memory loss;Poor safety awareness  Perception: Tactile;Visual;Verbal (cues for RW mgmt in bathroom/sm space)  Perseveration: Tactile cues provided;Verbal cues provided;Visual cues provided  Safety/Judgement: Decreased awareness of need for assistance;Decreased awareness of need for safety;Decreased insight into deficits    Functional Mobility and Transfers for ADLs:  Bed Mobility:   (Pt already up in chair)    Transfers:  Sit to Stand: Contact guard assistance  Functional Transfers  Bathroom Mobility: Contact guard assistance  Bed to Chair: Contact guard assistance    Balance:  Sitting: Intact  Standing: Impaired; With support    ADL Intervention:  Feeding  Drink to Mouth: Independent    Grooming  Position Performed: Standing  Washing Hands: Contact guard assistance  Brushing Teeth: Contact guard assistance    Upper Body Dressing Assistance  Pullover Shirt: Set-up    Lower Body Dressing Assistance  Pants With Elastic Waist: Moderate assistance  Socks: Moderate assistance (ankle-over-knee)  Position Performed: Seated in chair    Cognitive Retraining  Problem Solving: Identifying the task; Identifying the problem;General alternative solution  Executive Functions: Executing cognitive plans;Regulating behavior  Organizing/Sequencing: Breaking task down;Prioritizing  Attention to Task: Single task;Distractibility  Safety/Judgement: Decreased awareness of need for assistance;Decreased awareness of need for safety;Decreased insight into deficits    Pain:  0/10    Activity Tolerance:   requires frequent rest breaks and observed SOB with activity    After treatment patient left in no apparent distress:   Sitting in chair and Call bell within reach    COMMUNICATION/COLLABORATION:   The patients plan of care was discussed with: Physical Therapist and Registered Nurse    Rg Rowland  Time Calculation: 10 mins

## 2021-12-21 NOTE — PROGRESS NOTES
Pharmacy: Warfarin  Dosing    Indication: Afib  Goal INR: 2-3  Today's Dose: Warfarin 2.5mg  Labs:  Recent Labs     12/21/21  0529 12/20/21  0530 12/19/21  0649   INR 2.4* 2.5* 2.7*   HGB  --   --  8.1*   HCT  --   --  26.1*   PLT  --   --  281       Home Dose: Warfarin 3 mg on Thursday and 2.5 mg all other days  Drug Interactions: amiodarone (PTA med)  Dietary Intake: regular with supplements    Impression/Plan: INR dropping slightly.   Returning to home dose = 2.5mg today       Thanks,  Patti Vidales, [facility]

## 2021-12-21 NOTE — PROGRESS NOTES
PHYSICAL THERAPY TREATMENT  Patient: Arnoldo Jeffery (69 y.o. male)  Date: 12/21/2021  Diagnosis: Encounter for rehabilitation [Z51.89] Encounter for rehabilitation       Precautions: Fall,Bed Alarm  Chart, physical therapy assessment, plan of care and goals were reviewed. ASSESSMENT  Patient continues with skilled PT services and is progressing towards goals as is eidenced by impaired but adequate small area negotiation. Pt was able to figure 8 through obstacle course with three obstacles using rolling walker. Insufficiently tight turns were most common error. .     Current Level of Function Impacting Discharge (mobility/balance): weakness contributing to balance impairment    Other factors to consider for discharge: impulsivity and low endurance         PLAN :  Patient continues to benefit from skilled intervention to address the above impairments. Continue treatment per established plan of care. to address goals. Recommendation for discharge: (in order for the patient to meet his/her long term goals)  Physical therapy at least 2 days/week in the home AND ensure assist and/or supervision for safety with mobility    This discharge recommendation:  Has been made in collaboration with the attending provider and/or case management    IF patient discharges home will need the following DME: patient owns DME required for discharge       SUBJECTIVE:   Patient stated Deborah Board is agreeable to rx.     OBJECTIVE DATA SUMMARY:   Critical Behavior:  Neurologic State: Alert  Orientation Level: Oriented X4  Cognition: Impulsive,Memory loss,Poor safety awareness  Safety/Judgement: Decreased awareness of need for assistance  Functional Mobility Training:  Bed Mobility:     Supine to Sit: Contact guard assistance  Sit to Supine: Contact guard assistance  Scooting: Stand-by assistance        Transfers:  Sit to Stand: Contact guard assistance  Stand to Sit: Contact guard assistance        Bed to Chair: Contact guard assistance Balance:  Sitting: Intact  Sitting - Static: Good (unsupported)  Sitting - Dynamic: Good (unsupported)  Standing: Impaired  Ambulation/Gait Training:  Distance (ft): 35 Feet (ft)  Assistive Device: Walker, rolling  Ambulation - Level of Assistance: Contact guard assistance        Gait Abnormalities: Other (walker far ahead of center of gravity and base of support)        Base of Support: Narrowed     Speed/Joy: Shuffled; Slow  Step Length: Left shortened;Right shortened                  Exercises:  ankle pumps, marching, glut stomps, laq and isometric quad holds x 3 each  Stairs:  Number of Stairs Trained: 5  Stairs - Level of Assistance: Contact guard assistance   Rail Use: Both    Activity Tolerance:   Fair    After treatment patient left in no apparent distress:   Supine in bed, Heels elevated for pressure relief, Call bell within reach, Caregiver / family present, and Side rails x 3    COMMUNICATION/COLLABORATION:   The patients plan of care was discussed with: Occupational therapist, Physician, Case management, and Rehabilitation technician.      Renetta Ponce, PT   Time Calculation: 30 mins

## 2021-12-21 NOTE — SWING BED INTERDISCIPLINARY CARE PLAN NURSE NOTE
Nursing:    Week #1: Date 12/21/2021  Medical status (changes from previous week):Progressing  Treatment changes this shift: none  Cognition: Present status: oriented          Is cueing needed?: Yes          Frequency of cueing needs: occasional           Type of cueing used: verbal  ADL (general):  Moderate assistance  Activity type: Social leisure skills  Participation: Daily  Level of participation: Improving  Pain status: No c/o pain  Patient/Family Education needs: increase mobility    Upon review of the patients current care plan, the following issues, problems, or needs remain: increase PO intake for hydration    Viviana Kelley RN

## 2021-12-21 NOTE — PROGRESS NOTES
Spoke with the patient's son Kuldeep NEAL([phone]) regarding discharge plans. According to therapy, patient has done well with stair climbing and should be ready for discharge on Friday. Regular sitters can not provide coverage until Monday, 12/27/21 and according to the sitter, they have coverage for the entire month of January. Located a sitter that will be able to cover Friday,12/24/21 when the patient is discharged home. She will meet the patient and his son here at the hospital around 2pm to follow the the patient home. The hours will be from 9am - 9pm: Starting with Friday(afternoon to 9pm) Saturday: 9-9, Sunday 9-9. (Gemma Zepeda [phone]) met with the patient and phone conversation with the patient's son. Both the patient and his son is in agreement with the plan. Will contact the oxygen company [facility]) to inform them of the patient's discharge so oxygen can be delivered to the home. Found out today that 2 oxygen tanks were delivered to the patient's room some time last week(CM was not aware).

## 2021-12-21 NOTE — PROGRESS NOTES
Bedside and Verbal shift change report given to FAVIOLA Chase RN (oncoming nurse) by Kole Duran RN (offgoing nurse). Report included the following information SBAR, Kardex, Procedure Summary, Intake/Output, MAR, Accordion, Recent Results and Med Rec Status. Uribe score 4  Bed/chair alarm yes in use. If in use it is set at the highest volume. Intravenous fluids were administered, none   Patient Vitals for the past 12 hrs:   Temp Pulse Resp BP SpO2   12/21/21 0850 -- -- -- -- 95 %   12/21/21 0840 -- -- -- -- 95 %   12/21/21 0728 97.4 °F (36.3 °C) 65 20 (!) 159/89 95 %   12/20/21 2146 -- 68 -- (!) 141/67 --   12/20/21 2056 97.5 °F (36.4 °C) 65 20 (!) 141/67 97 %     No flowsheet data found. All lab results for the last 24 hours reviewed.

## 2021-12-21 NOTE — PROGRESS NOTES
Bedside and Verbal shift change report given to Grace Estrada RN (oncoming nurse) by DEAN Rinaldi (offgoing nurse). Report included the following information SBAR and Kardex. Uribe score 4  Bed/chair alarm yes in use. If in use it is set at the highest volume. Intravenous fluids were administered, none   Patient Vitals for the past 12 hrs:   Temp Pulse Resp BP SpO2   12/20/21 0845 -- -- -- -- 95 %   12/20/21 0807 98 °F (36.7 °C) 62 20 134/64 95 %     No flowsheet data found.      BMP:   Lab Results   Component Value Date/Time     12/20/2021 05:30 AM    K 5.3 (H) 12/20/2021 05:30 AM     12/20/2021 05:30 AM    CO2 29 12/20/2021 05:30 AM    AGAP 5 12/20/2021 05:30 AM    GLU 75 12/20/2021 05:30 AM    BUN 27 (H) 12/20/2021 05:30 AM    CREA 2.81 (H) 12/20/2021 05:30 AM    GFRAA 26 (L) 12/20/2021 05:30 AM    GFRNA 22 (L) 12/20/2021 05:30 AM

## 2021-12-21 NOTE — PROGRESS NOTES
Problem: Falls - Risk of  Goal: *Absence of Falls  Description: Document Madeline Alcaraz Fall Risk and appropriate interventions in the flowsheet. Outcome: Progressing Towards Goal  Note: Fall Risk Interventions:  Mobility Interventions: Patient to call before getting OOB    Mentation Interventions: Door open when patient unattended    Medication Interventions: Bed/chair exit alarm    Elimination Interventions: Bed/chair exit alarm,Call light in reach,Urinal in reach,Patient to call for help with toileting needs    History of Falls Interventions: Door open when patient unattended,Bed/chair exit alarm         Problem: Hypertension  Goal: *Blood pressure within specified parameters  Outcome: Progressing Towards Goal  Goal: *Labs within defined limits  Outcome: Progressing Towards Goal     Problem: Pressure Injury - Risk of  Goal: *Prevention of pressure injury  Description: Document Alexander Scale and appropriate interventions in the flowsheet.   Outcome: Progressing Towards Goal  Note: Pressure Injury Interventions:  Sensory Interventions: Assess changes in LOC    Moisture Interventions: Absorbent underpads,Apply protective barrier, creams and emollients,Maintain skin hydration (lotion/cream)    Activity Interventions: Increase time out of bed    Mobility Interventions: Float heels    Nutrition Interventions: Document food/fluid/supplement intake    Friction and Shear Interventions: Lift sheet,Minimize layers

## 2021-12-22 LAB
COMMENT, HOLDF: NORMAL
INR PPP: 2.4 (ref 0.9–1.1)
PROTHROMBIN TIME: 24 SEC (ref 9–11.1)
SAMPLES BEING HELD,HOLD: NORMAL

## 2021-12-22 PROCEDURE — 65270000011 HC RM PRIVATE SNF

## 2021-12-22 PROCEDURE — 77010033678 HC OXYGEN DAILY

## 2021-12-22 PROCEDURE — 97530 THERAPEUTIC ACTIVITIES: CPT

## 2021-12-22 PROCEDURE — 36415 COLL VENOUS BLD VENIPUNCTURE: CPT

## 2021-12-22 PROCEDURE — 74011250637 HC RX REV CODE- 250/637: Performed by: STUDENT IN AN ORGANIZED HEALTH CARE EDUCATION/TRAINING PROGRAM

## 2021-12-22 PROCEDURE — 74011250636 HC RX REV CODE- 250/636: Performed by: INTERNAL MEDICINE

## 2021-12-22 PROCEDURE — 97112 NEUROMUSCULAR REEDUCATION: CPT

## 2021-12-22 PROCEDURE — 74011250637 HC RX REV CODE- 250/637: Performed by: INTERNAL MEDICINE

## 2021-12-22 PROCEDURE — 94760 N-INVAS EAR/PLS OXIMETRY 1: CPT

## 2021-12-22 PROCEDURE — 97110 THERAPEUTIC EXERCISES: CPT

## 2021-12-22 PROCEDURE — 85610 PROTHROMBIN TIME: CPT

## 2021-12-22 RX ORDER — WARFARIN 2.5 MG/1
2.5 TABLET ORAL ONCE
Status: COMPLETED | OUTPATIENT
Start: 2021-12-22 | End: 2021-12-22

## 2021-12-22 RX ADMIN — HYDRALAZINE HYDROCHLORIDE 25 MG: 25 TABLET, FILM COATED ORAL at 09:34

## 2021-12-22 RX ADMIN — AMIODARONE HYDROCHLORIDE 200 MG: 200 TABLET ORAL at 09:35

## 2021-12-22 RX ADMIN — CYANOCOBALAMIN 1000 MCG: 1000 INJECTION, SOLUTION INTRAMUSCULAR; SUBCUTANEOUS at 09:35

## 2021-12-22 RX ADMIN — DILTIAZEM HYDROCHLORIDE 180 MG: 180 CAPSULE, COATED, EXTENDED RELEASE ORAL at 21:25

## 2021-12-22 RX ADMIN — ATORVASTATIN CALCIUM 20 MG: 20 TABLET, FILM COATED ORAL at 09:34

## 2021-12-22 RX ADMIN — HYDRALAZINE HYDROCHLORIDE 25 MG: 25 TABLET, FILM COATED ORAL at 21:25

## 2021-12-22 RX ADMIN — HYDRALAZINE HYDROCHLORIDE 25 MG: 25 TABLET, FILM COATED ORAL at 17:18

## 2021-12-22 RX ADMIN — LEVOTHYROXINE SODIUM 100 MCG: 0.1 TABLET ORAL at 06:42

## 2021-12-22 RX ADMIN — WARFARIN SODIUM 2.5 MG: 2.5 TABLET ORAL at 17:18

## 2021-12-22 NOTE — PROGRESS NOTES
Pharmacy: Warfarin  Dosing    Indication: AFib  Goal INR: 2-3  Today's Dose: Warfarin 2.5mg  Labs:  Recent Labs     12/22/21  0545 12/21/21  0529 12/20/21  0530   INR 2.4* 2.4* 2.5*       Home Dose: Warfarin 3 mg on Thursday and 2.5 mg all other days  Drug Interactions: amiodarone (PTA)  Dietary Intake: regular with supplements    Impression/Plan: Continuing 2.5mg daily dose.         Thanks,  Cat Braxton, [facility]

## 2021-12-22 NOTE — PROGRESS NOTES
IDR Team; MD,  Care Manager, Physical therapy, Nursing Supervisor, Pharmacy and  met to review patient's plan of care. Discussed goals, interventions, barriers and progress. RUR: 18% Medium    Team will continue to monitor progress and report any concerns to the physician and care management as indicated. Transition of Care Plan:     Plans to discharge the patient set for Friday. Everything has been arranged:  Home Health:  [facility] notified of d/c on Friday,12/24/21. [facility]:  [address] [place] [address], [address] has been notified of d/c on Friday  Sitters: BB and Diego Ayoub have been notified of patient d/c on Friday              Diego Ayoub will meet the patient here with his son and follow              Them to the home. She will be with the patient Friday evening until               9pm / Sat: 9a - 9pm and Sunday 9am - 9pm. BB will start on               Monday including the entire month of January coverage  [facility] DR BRAXTON ALEXANDER:  Will be open from [facility]. Son made aware of the time because he had concerns about the pharmacy being closed and not able to get his medications when discharged. Bedside Commode: Obtained a BSC from [facility] for the patient. [address] notification of discharge explained to the patient that Swing Bed/ Skilled Services will end on 12/24/21 and that the date of financial liability will begin on 12/25/21   Provided a copy of the notification to the patient and copy placed in the patients chart.

## 2021-12-22 NOTE — PROGRESS NOTES
PHYSICAL THERAPY TREATMENT  Patient: Ernestine James (12 y.o. male)  Date: 12/22/2021  Diagnosis: Encounter for rehabilitation [Z51.89] Encounter for rehabilitation       Precautions: Fall,Bed Alarm  Chart, physical therapy assessment, plan of care and goals were reviewed. ASSESSMENT  Patient continues with skilled PT services and is progressing towards goals. He demonstrates improving strength and improving balance in small areas. .     Current Level of Function Impacting Discharge (mobility/balance): remaining strength and therefore balance deficits    Other factors to consider for discharge: copd, newly oxygen dependent         PLAN :  Patient continues to benefit from skilled intervention to address the above impairments. Continue treatment per established plan of care. to address goals. Recommendation for discharge: (in order for the patient to meet his/her long term goals)  Physical therapy at least 2 days/week in the home AND ensure assist and/or supervision for safety with mobility    This discharge recommendation:  Has been made in collaboration with the attending provider and/or case management    IF patient discharges home will need the following DME: patient owns DME required for discharge       SUBJECTIVE:   Patient stated that he was agreeable to session.     OBJECTIVE DATA SUMMARY:   Critical Behavior:  Neurologic State: Alert  Orientation Level: Oriented X4  Cognition: Impulsive,Poor safety awareness,Follows commands  Safety/Judgement: Decreased awareness of need for assistance  Functional Mobility Training:  Bed Mobility:     Supine to Sit: Setup     Scooting: Stand-by assistance        Transfers:  Sit to Stand: Contact guard assistance  Stand to Sit: Setup  Stand Pivot Transfers: Contact guard assistance                          Balance:  Sitting: Intact  Sitting - Static: Good (unsupported)  Sitting - Dynamic: Good (unsupported)  Standing: Impaired  Standing - Static: Constant support;Good  Standing - Dynamic : Constant support; Fair  Ambulation/Gait Training:                          Base of Support: Narrowed     Speed/Joy: Shuffled; Slow                 Therapeutic Exercises:   Push/pull, standing marching, standing toe raises, standing minisquats, manually resisted sitting hip ab/ad  Pain Rating:  Denies pain    Activity Tolerance:   Fair    After treatment patient left in no apparent distress:   Sitting in chair and Call bell within reach    COMMUNICATION/COLLABORATION:   The patients plan of care was discussed with: Occupational therapist, Physician, and Case management.      [place], PT   Time Calculation: 27 mins

## 2021-12-22 NOTE — SWING BED INTERDISCIPLINARY CARE PLAN NURSE NOTE
Nursing:    Week #2: Date 12/22/2021  Medical status (changes from previous week):Regressing  Treatment changes this shift: none  Cognition: Present status: oriented          Is cueing needed?: Yes          Frequency of cueing needs: occasional           Type of cueing used: verbal  ADL (general):  Minimum assistance  Activity type: Sensory stimulation  Participation: Daily  Level of participation: Improving  Pain status: No c/o pain  Patient/Family Education needs: continue current treatment    Upon review of the patients current care plan, the following issues, problems, or needs remain: continue current treatment    Brionna Hinton RN

## 2021-12-22 NOTE — PROGRESS NOTES
Bedside and Verbal shift change report given to Cassidy Rockwell RN (oncoming nurse) by Dave Weeks LPN (offgoing nurse). Report included the following information SBAR, Kardex, Intake/Output and MAR.

## 2021-12-22 NOTE — PROGRESS NOTES
Problem: Self Care Deficits Care Plan (Adult)  Goal: *Acute Goals and Plan of Care (Insert Text)  Description: FUNCTIONAL STATUS PRIOR TO ADMISSION: Pt lived alone at home and had assistance 2 days per week. Used walker, help with bathing. Suspect he didn't change clothing on his own. HOME SUPPORT: The patient paid for help 2 x week    Occupational Therapy Goals  Initiated 12/10/2021 (reviewed 12/21/21 and continue to be appropriate)  1. Patient will perform bathing with supervision/set-up within 7 day(s). 2.  Patient will perform lower body dressing with minimal assistance/contact guard assist within 7 day(s). 3.  Patient will perform toilet transfer with SBA within 7 day(s). 4.  Patient will perform all aspects of toileting with modified independence within 7 day(s). 6.  Patient will participate in upper extremity therapeutic exercise/activities with supervision/set-up for 10 minutes within 7 day(s). 7.  Patient will utilize energy conservation techniques during functional activities with verbal cues within 7 day(s). Outcome: Progressing Towards Goal   OCCUPATIONAL THERAPY TREATMENT  Patient: Connor Snowden (70 y.o. male)  Date: 12/22/2021  Diagnosis: Encounter for rehabilitation [Z51.89] Encounter for rehabilitation       Precautions: Fall,Bed Alarm  Chart, occupational therapy assessment, plan of care, and goals were reviewed. ASSESSMENT  Patient continues with skilled OT services and is progressing towards goals. Pt was up in chair. Nursing indicates he has not been cleaned up but he said he had and that his clothes were clean and was unwilling to clean up with OTR. He does agree to ambulate to bathroom to groom. Sit to stand was mod independent with walker, ambulates with supervision, grooms at sink with supervision. Performed red band exercise. Denied need to toilet. Current Level of Function Impacting Discharge (ADLs): supervision with ambulation. Grooms at sink with supervision.  Is not performing HEP although he knows the exercises and has red band at his chair. Other factors to consider for discharge: will have assistance at home. PLAN :  Patient continues to benefit from skilled intervention to address the above impairments. Continue treatment per established plan of care to address goals. Recommend with staff: up for meals, ambulate to toilet, use of urinal at night    Recommend next OT session: continue with working on strength and standing activities    Recommendation for discharge: (in order for the patient to meet his/her long term goals)  Occupational therapy at least 2 days/week in the home AND ensure assist and/or supervision for safety with ambulation    This discharge recommendation:  Has been made in collaboration with the attending provider and/or case management    IF patient discharges home will need the following DME: none       SUBJECTIVE:   Patient stated I didn't do it.  Response when OTR asked if he performed exercises while she was out. He was asked what were the three exercises he was given and demonstrated. OBJECTIVE DATA SUMMARY:   Cognitive/Behavioral Status:         Alert, seemed agitated             Functional Mobility and Transfers for ADLs:      Transfers: Mod independent sit to stand and stand to sit chair       Balance:   Supervision in standing    ADL Intervention:       Grooming  Grooming Assistance: Supervision  Position Performed: Standing  Washing Hands: Supervision  Brushing Teeth: Supervision        Therapeutic Exercises:   Pt performed red band exercises for horizontal abduction with band at waist x 10, tricep extension x 10 each side and shoulder horizontal PNF D2 x 10 each side.     Pain:  No complaints    Activity Tolerance:   Good    After treatment patient left in no apparent distress:   Sitting in chair, Call bell within reach, and Bed / chair alarm activated    COMMUNICATION/COLLABORATION:   The patients plan of care was discussed with: Case management.      Bernadine Quintanilla OT  Time Calculation: 9 mins

## 2021-12-22 NOTE — PROGRESS NOTES
Bedside and Verbal shift change report given to Claire Mercado LPN (oncoming nurse) by Mary Ann Massey RN (offgoing nurse). Report included the following information SBAR, Kardex and Recent Results.

## 2021-12-22 NOTE — PROGRESS NOTES
Received a call from [facility] saying that Friday 12/24 wouldn't be a good time to have the patient discharged because the pharmacy isnt open. Spoke with patient who confirmed that [facility] OPEN on 12/24 from 9am-6pm and closed during a lunch break from 1:30pm-2pm. Called patient's son back. He said he was concerned about a bed pan?? Told him if he needs a bed pan he can just take one from here along with a urinal. Told him we already have a bed side commode [address] [place] [address]. Will leave in patient's room. Told him everything is set up including equipment, oxygen, home health and personal care aides. Patient will be discharged Friday 12/24.  Son says he will pick him up about 12pm.

## 2021-12-22 NOTE — PROGRESS NOTES
Problem: Falls - Risk of  Goal: *Absence of Falls  Description: Document Gemini Darby Fall Risk and appropriate interventions in the flowsheet. 12/22/2021 1825 by Jhonny Chang RN  Outcome: Progressing Towards Goal  Note: Fall Risk Interventions:  Mobility Interventions: Bed/chair exit alarm    Mentation Interventions: Adequate sleep, hydration, pain control,Bed/chair exit alarm    Medication Interventions: Bed/chair exit alarm    Elimination Interventions: Bed/chair exit alarm,Call light in reach    History of Falls Interventions: Bed/chair exit alarm,Door open when patient unattended      12/22/2021 1537 by Jhonny Chang RN  Outcome: Progressing Towards Goal  Note: Fall Risk Interventions:  Mobility Interventions: Utilize walker, cane, or other assistive device    Mentation Interventions: Adequate sleep, hydration, pain control,Door open when patient unattended,Bed/chair exit alarm    Medication Interventions: Patient to call before getting OOB,Bed/chair exit alarm    Elimination Interventions: Bed/chair exit alarm,Call light in reach    History of Falls Interventions: Door open when patient unattended,Bed/chair exit alarm         Problem: Hypertension  Goal: *Blood pressure within specified parameters  12/22/2021 1825 by Jhonny Chang RN  Outcome: Progressing Towards Goal  12/22/2021 1537 by Jhonny Chang RN  Outcome: Progressing Towards Goal     Problem: Pressure Injury - Risk of  Goal: *Prevention of pressure injury  Description: Document Alexander Scale and appropriate interventions in the flowsheet.   12/22/2021 1825 by Jhonny Chang RN  Outcome: Progressing Towards Goal  Note: Pressure Injury Interventions:  Sensory Interventions: Assess changes in LOC    Moisture Interventions: Absorbent underpads,Apply protective barrier, creams and emollients    Activity Interventions: Increase time out of bed    Mobility Interventions: Float heels    Nutrition Interventions: Document food/fluid/supplement intake    Friction and Shear Interventions: Apply protective barrier, creams and emollients             12/22/2021 1537 by Ezequiel Escalante RN  Outcome: Progressing Towards Goal  Note: Pressure Injury Interventions:  Sensory Interventions: Assess changes in LOC    Moisture Interventions: Absorbent underpads,Apply protective barrier, creams and emollients    Activity Interventions: Increase time out of bed    Mobility Interventions: Float heels    Nutrition Interventions: Document food/fluid/supplement intake    Friction and Shear Interventions: Apply protective barrier, creams and emollients

## 2021-12-22 NOTE — SWING BED INTERDISCIPLINARY CARE PLAN NURSE NOTE
Nursing:    Week # 2: Date 12/22/2021  Medical status (changes from previous week):Progressing  Treatment changes this shift: None  Cognition: Present status: oriented          Is cueing needed?: Yes          Frequency of cueing needs: occasional           Type of cueing used: verbal  ADL (general):  Moderate assistance  Activity type: Social leisure skills  Participation: Daily  Level of participation: Improving  Pain status: No c/o pain  Patient/Family Education needs: Continue with strengthening/mobility exercises    Upon review of the patients current care plan, the following issues, problems, or needs remain: Safety    Lord Camden LPN

## 2021-12-22 NOTE — PROGRESS NOTES
[address] [place] [address] Drive to inform them that the patient will be discharged on Friday, 12/24/21. Oxygen company was informed that the patient will be discharged on Friday as well.

## 2021-12-22 NOTE — PROGRESS NOTES
Problem: Falls - Risk of  Goal: *Absence of Falls  Description: Document Chanel Neema Fall Risk and appropriate interventions in the flowsheet. Outcome: Progressing Towards Goal  Note: Fall Risk Interventions:  Mobility Interventions: Bed/chair exit alarm,Patient to call before getting OOB    Mentation Interventions: Bed/chair exit alarm,Door open when patient unattended,Room close to nurse's station    Medication Interventions: Bed/chair exit alarm,Patient to call before getting OOB,Teach patient to arise slowly    Elimination Interventions: Bed/chair exit alarm,Call light in reach,Patient to call for help with toileting needs    History of Falls Interventions: Bed/chair exit alarm,Door open when patient unattended,Room close to nurse's station         Problem: Patient Education: Go to Patient Education Activity  Goal: Patient/Family Education  Outcome: Progressing Towards Goal     Problem: Pressure Injury - Risk of  Goal: *Prevention of pressure injury  Description: Document Alexander Scale and appropriate interventions in the flowsheet.   Outcome: Progressing Towards Goal  Note: Pressure Injury Interventions:  Sensory Interventions: Assess changes in LOC    Moisture Interventions: Absorbent underpads,Apply protective barrier, creams and emollients    Activity Interventions: Increase time out of bed    Mobility Interventions: HOB 30 degrees or less,Float heels    Nutrition Interventions: Document food/fluid/supplement intake    Friction and Shear Interventions: HOB 30 degrees or less                Problem: Patient Education: Go to Patient Education Activity  Goal: Patient/Family Education  Outcome: Progressing Towards Goal

## 2021-12-22 NOTE — PROGRESS NOTES
Problem: Falls - Risk of  Goal: *Absence of Falls  Description: Document Leafy De La Torre Fall Risk and appropriate interventions in the flowsheet. Outcome: Progressing Towards Goal  Note: Fall Risk Interventions:  Mobility Interventions: Utilize walker, cane, or other assistive device    Mentation Interventions: Adequate sleep, hydration, pain control,Door open when patient unattended,Bed/chair exit alarm    Medication Interventions: Patient to call before getting OOB,Bed/chair exit alarm    Elimination Interventions: Bed/chair exit alarm,Call light in reach    History of Falls Interventions: Door open when patient unattended,Bed/chair exit alarm         Problem: Hypertension  Goal: *Blood pressure within specified parameters  Outcome: Progressing Towards Goal     Problem: Pressure Injury - Risk of  Goal: *Prevention of pressure injury  Description: Document Alexander Scale and appropriate interventions in the flowsheet.   Outcome: Progressing Towards Goal  Note: Pressure Injury Interventions:  Sensory Interventions: Assess changes in LOC    Moisture Interventions: Absorbent underpads,Apply protective barrier, creams and emollients    Activity Interventions: Increase time out of bed    Mobility Interventions: Float heels    Nutrition Interventions: Document food/fluid/supplement intake    Friction and Shear Interventions: Apply protective barrier, creams and emollients

## 2021-12-23 LAB
COMMENT, HOLDF: NORMAL
INR PPP: 2.5 (ref 0.9–1.1)
PROTHROMBIN TIME: 24.2 SEC (ref 9–11.1)
SAMPLES BEING HELD,HOLD: NORMAL

## 2021-12-23 PROCEDURE — 65270000011 HC RM PRIVATE SNF

## 2021-12-23 PROCEDURE — 94760 N-INVAS EAR/PLS OXIMETRY 1: CPT

## 2021-12-23 PROCEDURE — 74011250636 HC RX REV CODE- 250/636: Performed by: INTERNAL MEDICINE

## 2021-12-23 PROCEDURE — 85610 PROTHROMBIN TIME: CPT

## 2021-12-23 PROCEDURE — 74011250637 HC RX REV CODE- 250/637: Performed by: INTERNAL MEDICINE

## 2021-12-23 PROCEDURE — 97116 GAIT TRAINING THERAPY: CPT

## 2021-12-23 PROCEDURE — 74011250637 HC RX REV CODE- 250/637: Performed by: STUDENT IN AN ORGANIZED HEALTH CARE EDUCATION/TRAINING PROGRAM

## 2021-12-23 PROCEDURE — 77010033678 HC OXYGEN DAILY

## 2021-12-23 PROCEDURE — 97535 SELF CARE MNGMENT TRAINING: CPT

## 2021-12-23 PROCEDURE — 97530 THERAPEUTIC ACTIVITIES: CPT

## 2021-12-23 PROCEDURE — 36415 COLL VENOUS BLD VENIPUNCTURE: CPT

## 2021-12-23 RX ORDER — WARFARIN 2.5 MG/1
2.5 TABLET ORAL ONCE
Status: COMPLETED | OUTPATIENT
Start: 2021-12-23 | End: 2021-12-23

## 2021-12-23 RX ADMIN — LEVOTHYROXINE SODIUM 100 MCG: 0.1 TABLET ORAL at 06:53

## 2021-12-23 RX ADMIN — DILTIAZEM HYDROCHLORIDE 180 MG: 180 CAPSULE, COATED, EXTENDED RELEASE ORAL at 21:49

## 2021-12-23 RX ADMIN — AMIODARONE HYDROCHLORIDE 200 MG: 200 TABLET ORAL at 09:02

## 2021-12-23 RX ADMIN — WARFARIN SODIUM 2.5 MG: 2.5 TABLET ORAL at 17:40

## 2021-12-23 RX ADMIN — CYANOCOBALAMIN 1000 MCG: 1000 INJECTION, SOLUTION INTRAMUSCULAR; SUBCUTANEOUS at 09:02

## 2021-12-23 RX ADMIN — HYDRALAZINE HYDROCHLORIDE 25 MG: 25 TABLET, FILM COATED ORAL at 21:49

## 2021-12-23 RX ADMIN — ATORVASTATIN CALCIUM 20 MG: 20 TABLET, FILM COATED ORAL at 09:02

## 2021-12-23 RX ADMIN — HYDRALAZINE HYDROCHLORIDE 25 MG: 25 TABLET, FILM COATED ORAL at 17:40

## 2021-12-23 RX ADMIN — HYDRALAZINE HYDROCHLORIDE 25 MG: 25 TABLET, FILM COATED ORAL at 09:02

## 2021-12-23 NOTE — PROGRESS NOTES
PHYSICAL THERAPY TREATMENT  Patient: Ba Gorman (89 y.o. male)  Date: 12/23/2021  Diagnosis: Encounter for rehabilitation [Z51.89] Encounter for rehabilitation       Precautions: Fall,Bed Alarm  Chart, physical therapy assessment, plan of care and goals were reviewed. ASSESSMENT  Patient continues with skilled PT services and is progressing towards goals. This is evidenced by decreasing amt of assistance needed for functional mobility. .     Current Level of Function Impacting Discharge (mobility/balance): supervision for amb and transfers    Other factors to consider for discharge: low endurance, new home oxygen user         PLAN :  Patient continues to benefit from skilled intervention to address the above impairments. Continue treatment per established plan of care. to address goals. Recommendation for discharge: (in order for the patient to meet his/her long term goals)  Physical therapy at least 2 days/week in the home AND ensure assist and/or supervision for safety with mobility    This discharge recommendation:  Has been made in collaboration with the attending provider and/or case management    IF patient discharges home will need the following DME: patient owns DME required for discharge       SUBJECTIVE:   Patient stated Keyana Castaneda intends to ride his three wheel motor bike for recreation when he gets home.     OBJECTIVE DATA SUMMARY:   Critical Behavior:  Neurologic State: Alert  Orientation Level: Oriented X4  Cognition: Follows commands  Safety/Judgement: Decreased awareness of need for safety,Decreased insight into deficits  Functional Mobility Training:  Bed Mobility:     Supine to Sit: Supervision  Sit to Supine: Supervision  Scooting: Supervision        Transfers:  Sit to Stand: Stand-by assistance  Stand to Sit: Stand-by assistance  Stand Pivot Transfers: Setup     Bed to Chair: Setup                    Balance:  Sitting: Intact  Sitting - Static: Good (unsupported)  Sitting - Dynamic: Good (unsupported)  Standing - Static: Good;Constant support  Standing - Dynamic : Fair;Constant support  Ambulation/Gait Training:  Distance (ft): 85 Feet (ft)  Assistive Device: Walker, rolling  Ambulation - Level of Assistance: Contact guard assistance        Gait Abnormalities:  (tends to stand with rolling walker not within his center of )        Base of Support: Narrowed     Speed/Joy: Slow  Step Length: Left shortened;Right shortened     Therapeutic Exercises: standing toe raises, mini squats and hip abduction x 10 reps  Therapeutic Activities:   Education and discussion about aversion of risk taking activities to avoid falls upon dc to home. Pain Rating:  Denies pain    Activity Tolerance:   Fair    After treatment patient left in no apparent distress:   Supine in bed, Call bell within reach, Bed / chair alarm activated, and Side rails x 3    COMMUNICATION/COLLABORATION:   The patients plan of care was discussed with: Case management and Rehabilitation technician.      Hermes Torre, PT   Time Calculation: 30 mins

## 2021-12-23 NOTE — PROGRESS NOTES
IDR Team; MD, Nursing, Care Manager,  Therapy Department, Nursing Supervisor, met to review patient's plan of care. Discussed goals, interventions, barriers and progress. RUR: 14%  LOW    Team will continue to monitor progress and report any concerns to the physician and care management as indicated. Transition of Care Plan:   Patient is all set for discharge tomorrow. Received a call from the [facility], sitter that was to be with the patient Friday, Sat, and Sunday but now is not available. Located another sitter to assist the patient. Jose Wahl: [phone]. She will come to meet the patient this afternoon. Will contact the oxygen company again to confirm that they received the email informing them of the discharge set for Friday, 12/24/21. Home Health with [facility] is all set.

## 2021-12-23 NOTE — SWING BED INTERDISCIPLINARY CARE PLAN DIETARY NOTE
Dietary:    Diet: regular,low fat/low chol/high fiber/JCARLOS           Admit wt. : 160#   Current weight/date: 161# 12/9  Type/Amount of Supplement taken: ensure   Significant wt. Loss: No  Significant wt.  Gain: NO  Intake: 25-75% poor-fair/adequate     Upon review of the patients current care plan, the following issues, problems, or needs remain: po intake-still poor sometimes, continue supplements pt states he likes them     Patient Vitals for the past 168 hrs:   % Diet Eaten   12/22/21 0900 51 - 75%   12/21/21 1230 1 - 25%   12/21/21 0830 51 - 75%   12/19/21 1700 0%   12/19/21 1200 1 - 25%   12/19/21 0900 1 - 25%   12/18/21 1700 1 - 25%   12/18/21 1200 26 - 50%   12/18/21 0900 51 - 75%   12/17/21 1745 26 - 50%   12/17/21 0830 26 - 50%       Karan Blizzard, RD

## 2021-12-23 NOTE — SWING BED INTERDISCIPLINARY CARE PLAN MASTER NOTE
Interdisciplinary Plan of Care Master Note:    Date: 12/23/21  Admit Date: 12/9/2021  Patient: Dm Oliveira    The Interdisciplinary Team met to review the patient's plan of care progress. Goals, inventions, barriers and progress were discussed. Team members disciplines in attendance were:  x__Physician  _x_Physical Therapist  _x_Occupational Therapist  __Speech Therapist  _x_Nurse  __Registered Dietician  __Respiratory Therapy  __Admission/Discharge Nurse  _x_Care Manager  _x_Social Worker  __Pharmacist  __Infection Control  __Chaplain  __Other:     Team will continue to monitor the patient's progress and report any concerns to the Physician and Care Management as indicated. Summary (To be shared with patient and family): Patient is scheduled for discharge tomorrow, 12/24/21. Son will be picking him up and will be at the hospital around [address] [address], Lg Mims(  will be present to follow the patient home and listen to discharge instructions. 1. [address] [place] [address], [address] has been contacted (Customer Service line: [phone] [facility]) and delivery will be made tomorrow per [facility]. 2. José Fang the number of the patient's regular aide: [phone] to call with the time of delivery so someone will be at the home to receive the oxygen. 3. [employer] has been contacted again today and start of service will be Sunday 12/26/21. Care team Notes:  Swing Bed Interdisciplinary Care Plan Nurse Note by Susana Lopez LPN at 50/68/74 8673  Version 1 of 1       Nursing:    Week #2: Date 12/23/2021  Medical status (changes from previous week): No change  Treatment changes this shift: none  Cognition: Present status: oriented          Is cueing needed?: No          Frequency of cueing needs: occasional           Type of cueing used: verbal  ADL (general):  Minimum assistance  Activity type: Reality awareness  Participation: Daily  Level of participation: Improving  Pain status: No c/o pain  Patient/Family Education needs: safety    Upon review of the patients current care plan, the following issues, problems, or needs remain: safety    Rey Lopez LPN           Interdisciplinary Care Plan RT Note    No notes of this type exist for this encounter. Interdisciplinary Care Plan Dietary Note      Swing Bed Interdisciplinary Care Plan Dietary Note by Jamaal Buckner RD at 12/16/21 1732  Version 1 of 1         Dietary:    Diet: regular,low fat/low chol/high fiber/JCARLOS       Admit wt. : 160#  Current weight/date: 161#  Type/Amount of Supplement taken: ensure   Significant wt. Loss: No  Significant wt. Gain: NO  Intake: <50% Inadequate    Upon review of the patients current care plan, the following issues, problems, or needs remain: pt po intake is poor, encourage supplement use and po intake     Patient Vitals for the past 168 hrs:   % Diet Eaten   12/15/21 1746 1 - 25%   12/14/21 1200 26 - 50%   12/14/21 0800 26 - 50%   12/13/21 1728 26 - 50%   12/13/21 1200 26 - 50%   12/13/21 0800 76 - 100%   12/12/21 1300 1 - 25%   12/12/21 0932 1 - 25%   12/11/21 1800 26 - 50%   12/11/21 1300 76 - 100%   12/11/21 0910 26 - 50%   12/10/21 1200 0%   12/10/21 0900 26 - 50%   12/10/21 0800 1 - 25%   12/09/21 1800 26 - 50%         Shade Marquez RD           Swing Bed Interdisciplinary Care Plan Nurse Note by Simi Lundberg LPN at 23/66/83 8833  Version 1 of 1       Nursing:    Week #2: Date 12/23/2021  Medical status (changes from previous week): No change  Treatment changes this shift: none  Cognition: Present status: oriented          Is cueing needed?: No          Frequency of cueing needs: occasional           Type of cueing used: verbal  ADL (general):  Minimum assistance  Activity type: Reality awareness  Participation: Daily  Level of participation: Improving  Pain status: No c/o pain  Patient/Family Education needs: safety    Upon review of the patients current care plan, the following issues, problems, or needs remain: safety    Dwight Lion LPN            Interdisciplinary Care Plan PT Note      Swing Bed Interdisciplinary Care Plan PT Note by Vikas Monroy PT at 12/19/21 1349  Version 1 of 1       Physical Therapy:    Progress: Progressing  Wt. Bearing: No WB restrictions  Subjective: I can walk  Assistive Device: RW  Bed Mobility: Limited assistance  Supine to sit: Limited assistance  Ambulation: # of feet 100  Supervision  Balance: Sitting static:good       Sitting dynamic:good       Standing static: good  W/C Mobility: Not tested  Sit to stand: Supervision  Transfers: Supervision  Surface: Even  Safety status: poor safety awareness, requires verbal cues, reminders  ADL Support- record highest level in each ADL activity over last 7 days   Bed mobility: supervision   Transfer: supervision   Eating: ind   Toilet use: min    Upon review of the patients current care plan, the following issues, problems, or needs remain: Patient requires assistance/supervision for mobility and daily activities, he has poor safety awarealfie Asencio, PT              Interdisciplinary Care Plan OT Note      Swing Bed Interdisciplinary Care Plan OT Note by Amena Calixto OT at 12/17/21 1129  Version 1 of 1         Occupational Therapy:    Progress: Progressing  Eating: Independent  Pt has better AROM in the LUE and is encouraged to use  Grooming (Gen): Independent seated in chair, often refuses to use L with toothbrush but should as has limited shoulder movement in R  Grooming (Standing at sink): Not tested  Bathing (Gen): Limited assistance  Bathing (Upper body):  Independent  Bathing (Lower body): Limited assistance  Toileting: Supervision  Upper body dressing: Supervision  Lower body dressing: Limited assistance  Transfers (BSC/Toilet): Supervision  Transfers Shower: Not tested  Homemaking: Not tested  Balance: SBA  Upper extremity function: old bilateral RTC issues with limited movement especially in R shoulder. Activity tolerated: fair to good most days  Safety status: supervision to SBA when up and moving    Upon review of the patients current care plan, the following issues, problems, or needs remain: needs to be able to use urinal sitting EOB. Krystin Vigil OT           Interdisciplinary Care Plan SLP Note    No notes of this type exist for this encounter. Interdisciplinary Care Plan D/C Planning Note      Swing Bed Interdisciplinary Care Plan Discharge Planning Note by Alan Fernandez at 12/09/21 9284  Version 1 of 1       Discharge Planning:    Psychosocial concerns/family concerns, status of previous week; discharge plan (place, DME, services): Patient will gain strength, mobility and endurance     Discharge Plan Update: Home w/family member/other  Further anticipated LOS: 1 week or less  DME to order: Undetermined  Anticipated D/C services: Home Health  Need for patient/family conference: NO   If yes, planned for when: n/a     Upon review of the patients current care plan, the following issues, problems, or needs remain: Progress w/PT/OT. Increase care givers.        Issa Glaser

## 2021-12-23 NOTE — SWING BED INTERDISCIPLINARY CARE PLAN NURSE NOTE
Nursing:    Week #2: Date 12/23/2021  Medical status (changes from previous week): No change  Treatment changes this shift: none  Cognition: Present status: oriented          Is cueing needed?: No          Frequency of cueing needs: occasional           Type of cueing used: verbal  ADL (general):  Minimum assistance  Activity type: Reality awareness  Participation: Daily  Level of participation: Improving  Pain status: No c/o pain  Patient/Family Education needs: safety    Upon review of the patients current care plan, the following issues, problems, or needs remain: safety    Nancie Mckinney LPN

## 2021-12-23 NOTE — PROGRESS NOTES
Pt. Asked to go back to bed. As this nurse reached for the walker the pt. stood up impulsively and dove for the bed and landed on his stomach on the bed with his knees on the floor. Skin tear obtained to left forearm. Area cleansed with NS and dressed with non adherent dressing.

## 2021-12-23 NOTE — PROGRESS NOTES
Bedside shift change report given to BRANDON Chandler RN (oncoming nurse) by [employer]. PURA Timmons (offgoing nurse). Report included the following information SBAR, Kardex, Intake/Output and Recent Results.

## 2021-12-23 NOTE — PROGRESS NOTES
Bedside shift change report given to JEAN CLAUDE Timmons LPN (oncoming nurse) by Yesenia Cortes RN (offgoing nurse). Report included the following information Kardex.

## 2021-12-23 NOTE — PROGRESS NOTES
Problem: Falls - Risk of  Goal: *Absence of Falls  Description: Document Emelyn Macias Fall Risk and appropriate interventions in the flowsheet.   Outcome: Progressing Towards Goal  Note: Fall Risk Interventions:  Mobility Interventions: Bed/chair exit alarm,Patient to call before getting OOB,Utilize walker, cane, or other assistive device    Mentation Interventions: Bed/chair exit alarm,Adequate sleep, hydration, pain control    Medication Interventions: Bed/chair exit alarm,Patient to call before getting OOB,Teach patient to arise slowly    Elimination Interventions: Bed/chair exit alarm,Call light in reach,Patient to call for help with toileting needs,Urinal in reach    History of Falls Interventions: Bed/chair exit alarm,Door open when patient unattended

## 2021-12-23 NOTE — PROGRESS NOTES
[facility] witnessed fall occurred on 12/23/2021 (Date) at 56 (Time). The answers to the following questions summarize the fall: In the patient's own words:  · What were you attempting to do when you fell? I wanted to go back to bed  · Do you know why you fell? Pt. Would not say. He said he did not know  · Do you have any pain/discomfort or any other complaints? no  · Which part of your body made contact with the floor or other object?  knees  Nurse:  [facility] Was this an assisted fall? yes   Was fall witnessed? Yes     By Whom? Amelia López RN   If witnessed, what part of the body made contact with the floor or other object? Knees and right forearm   Patients mental status after the fall/when found: Agitated   Any apparent injury:  Other skin tear left forearm   Immediate interventions for injury/suspected injury? Dressing   Patient assisted back to bed? Assist X2   Name of provider notified and time, any comments? Dr. Nicolas Pierre Name of family member notified and time: Attempted to call [facility] at [phone] with no answer. Document Immediate VS and physical assessment in flowsheets. Document Neuro assessment every hour x 4 (for potential head injury or unwitnessed fall) in flowsheets.         Sabas West, VIVIAN

## 2021-12-23 NOTE — PROGRESS NOTES
Pharmacy: Warfarin  Dosing    Indication: AFib  Goal INR: 2-3  Today's Dose: Warfarin 2.5mg  Labs:  Recent Labs     12/23/21  0555 12/22/21  0545 12/21/21  0529   INR 2.5* 2.4* 2.4*       Home Dose: Warfarin 3 mg on Thursday and 2.5 mg all other days  Drug Interactions: amiodarone (PTA)  Dietary Intake: regular with supplements    Impression/Plan: Continuing 2.5mg daily dose.         Thanks,  Stacie Banuelos, PHARMD

## 2021-12-23 NOTE — PROGRESS NOTES
PHYSICAL THERAPY TREATMENT  Patient: Chen Garnett (8 y.o. male)  Date: 12/23/2021  Diagnosis: Encounter for rehabilitation [Z51.89] Encounter for rehabilitation       Precautions: Fall,Bed Alarm  Chart, physical therapy assessment, plan of care and goals were reviewed. ASSESSMENT  Patient continues with skilled PT services and is progressing towards goals. As evidenced by less assist for gait, transfers and bed mobility, and improved balance during small area negotiation. Current Level of Function Impacting Discharge (mobility/balance): sba for transfers, supervision/cga for gait    Other factors to consider for discharge: solitary living status at night, low endurance, occasional impulsivity         PLAN :  Patient continues to benefit from skilled intervention to address the above impairments. Continue treatment per established plan of care. to address goals. Recommendation for discharge: (in order for the patient to meet his/her long term goals)  Physical therapy at least 2 days/week in the home AND ensure assist and/or supervision for safety with mobility    This discharge recommendation:  Has been made in collaboration with the attending provider and/or case management    IF patient discharges home will need the following DME: urinal, wheelchair       SUBJECTIVE:   Patient stated Hans Arzola was ready for activity.     OBJECTIVE DATA SUMMARY:   Critical Behavior:  Neurologic State: Alert  Orientation Level: Oriented X4  Cognition: Follows commands  Safety/Judgement: Lack of insight into deficits  Functional Mobility Training:  Bed Mobility:        Sit to Supine: Stand-by assistance  Scooting: Stand-by assistance        Transfers:  Sit to Stand: Stand-by assistance  Stand to Sit: Stand-by assistance  Stand Pivot Transfers: Setup     Bed to Chair: Setup       Balance:  Sitting: Intact  Sitting - Static: Good (unsupported)  Sitting - Dynamic: Good (unsupported)  Standing - Static: Fair;Constant support  Standing - Dynamic : Constant support; Fair  Ambulation/Gait Training:  Distance (ft): 80 Feet (ft)  Assistive Device: Walker, rolling  Ambulation - Level of Assistance: Contact guard assistance        Gait Abnormalities: Other (amb with walker far ahead)        Base of Support: Narrowed     Speed/Joy: Slow  Step Length: Right shortened;Left shortened        Therapeutic Activities    Pt was able to push rehab tech in  for 80ft. Therapeutic Exercises:   Standing toe raises, mini squats, and hip abduction  Pain Rating:  No pain reported    Activity Tolerance:   Fair    After treatment patient left in no apparent distress:   Sitting in chair and Call bell within reach    COMMUNICATION/COLLABORATION:   The patients plan of care was discussed with: Physician, Case management, and Rehabilitation technician.      Kishor German, PT   Time Calculation: 25 mins

## 2021-12-23 NOTE — PROGRESS NOTES
Problem: Self Care Deficits Care Plan (Adult)  Goal: *Acute Goals and Plan of Care (Insert Text)  Description: FUNCTIONAL STATUS PRIOR TO ADMISSION: Pt lived alone at home and had assistance 2 days per week. Used walker, help with bathing. Suspect he didn't change clothing on his own. HOME SUPPORT: The patient paid for help 2 x week    Occupational Therapy Goals  Initiated 12/10/2021 (reviewed 12/21/21 and continue to be appropriate)  1. Patient will perform bathing with supervision/set-up within 7 day(s). 2.  Patient will perform lower body dressing with minimal assistance/contact guard assist within 7 day(s). 3.  Patient will perform toilet transfer with SBA within 7 day(s). 4.  Patient will perform all aspects of toileting with modified independence within 7 day(s). 6.  Patient will participate in upper extremity therapeutic exercise/activities with supervision/set-up for 10 minutes within 7 day(s). 7.  Patient will utilize energy conservation techniques during functional activities with verbal cues within 7 day(s). Outcome: Progressing Towards Goal   OCCUPATIONAL THERAPY TREATMENT  Patient: Shawn Valentin (17 y.o. male)  Date: 12/23/2021  Diagnosis: Encounter for rehabilitation [Z51.89] Encounter for rehabilitation       Precautions: Fall,Bed Alarm  Chart, occupational therapy assessment, plan of care, and goals were reviewed. ASSESSMENT  Patient continues with skilled OT services and is progressing towards goals. Pt is up in chair. Willing to walk with walker to sink to groom. He is supervision for transfer and CGA for ambulation. Tried to turn quickly from the sink and did have to self correct balance. He brushed teeth and washed hands in standing at sink. He is adamant he had on clean pants and had washed his lower body already. He was agreeable to washing upper body and getting on shirt. Current Level of Function Impacting Discharge (ADLs): Pt needed set-up for upper body clothing. He has needed assist with socks and getting clothing over feet at times. He needs someone with him when he is up and walking as continues to be a fall risk. He attempts to put on his O2 and again needed instruction for proper placement of tubing behind ears instead of over head. Other factors to consider for discharge: pt is due to d/c tomorrow and will have sitters with him throughout the day and night initially. PLAN :  Patient continues to benefit from skilled intervention to address the above impairments. Continue treatment per established plan of care to address goals. Recommend with staff: continue to work on O2 don/doff nasal canula    Recommend next OT session: if pt does not discharge will continue to work on balance, ADL and O2 on/off    Recommendation for discharge: (in order for the patient to meet his/her long term goals)  Occupational therapy at least 2 days/week in the home AND ensure assist and/or supervision for safety with ambulation    This discharge recommendation:  Has been made in collaboration with the attending provider and/or case management    IF patient discharges home will need the following DME: none       SUBJECTIVE:   Patient stated I already did that, these are clean.     OBJECTIVE DATA SUMMARY:   Cognitive/Behavioral Status:  Neurologic State: Alert     Cognition: Follows commands        Safety/Judgement: Decreased insight into deficits; Decreased awareness of need for assistance    Functional Mobility and Transfers for ADLs:  Bed Mobility:  Sit to Supine: Stand-by assistance  Scooting: Stand-by assistance    Transfers:  Sit to Stand: Stand-by assistance  Functional Transfers  Bathroom Mobility: Contact guard assistance  Bed to Chair: Setup    Balance:  Sitting: Intact  Sitting - Static: Good (unsupported)  Sitting - Dynamic: Good (unsupported)  Standing - Static: Fair;Constant support  Standing - Dynamic : Constant support; Fair    ADL Intervention: Grooming  Grooming Assistance: Supervision  Position Performed: Standing  Washing Hands: Supervision  Brushing Teeth: Supervision  Brushing/Combing Hair:  (refused a hair wash)    Upper Body Bathing  Bathing Assistance: Set-up (refused a shower)  Position Performed: Seated in chair    Lower [address]:  (refused)    Upper Body Dressing Assistance  Pullover Shirt: Set-up    Lower Body Dressing Assistance  Dressing Assistance:  (refused)         Cognitive Retraining  Following Commands: Follows one step commands/directions  Safety/Judgement: Decreased insight into deficits; Decreased awareness of need for assistance        Pain:  No pain reported    Activity Tolerance:   Fair    After treatment patient left in no apparent distress:   Sitting in chair, Call bell within reach, and Bed / chair alarm activated    COMMUNICATION/COLLABORATION:   The patients plan of care was discussed with: Physical therapist and Case management.      Gene Fox OT  Time Calculation: 17 mins

## 2021-12-24 VITALS
TEMPERATURE: 97.4 F | DIASTOLIC BLOOD PRESSURE: 76 MMHG | OXYGEN SATURATION: 94 % | SYSTOLIC BLOOD PRESSURE: 144 MMHG | HEART RATE: 60 BPM | BODY MASS INDEX: 23.05 KG/M2 | RESPIRATION RATE: 20 BRPM | HEIGHT: 70 IN | WEIGHT: 161 LBS

## 2021-12-24 LAB
INR PPP: 2.4 (ref 0.9–1.1)
PROTHROMBIN TIME: 23.1 SEC (ref 9–11.1)

## 2021-12-24 PROCEDURE — 74011250637 HC RX REV CODE- 250/637: Performed by: INTERNAL MEDICINE

## 2021-12-24 PROCEDURE — 36415 COLL VENOUS BLD VENIPUNCTURE: CPT

## 2021-12-24 PROCEDURE — 94760 N-INVAS EAR/PLS OXIMETRY 1: CPT

## 2021-12-24 PROCEDURE — 77010033678 HC OXYGEN DAILY

## 2021-12-24 PROCEDURE — 85610 PROTHROMBIN TIME: CPT

## 2021-12-24 PROCEDURE — 74011250637 HC RX REV CODE- 250/637: Performed by: STUDENT IN AN ORGANIZED HEALTH CARE EDUCATION/TRAINING PROGRAM

## 2021-12-24 RX ORDER — LOSARTAN POTASSIUM 50 MG/1
50 TABLET ORAL DAILY
Qty: 90 TABLET | Refills: 1 | Status: SHIPPED | OUTPATIENT
Start: 2021-12-25 | End: 2022-08-01 | Stop reason: SDUPTHER

## 2021-12-24 RX ORDER — POLYETHYLENE GLYCOL 3350 17 G/17G
17 POWDER, FOR SOLUTION ORAL DAILY
Qty: 238 G | Refills: 1 | Status: SHIPPED | OUTPATIENT
Start: 2021-12-24

## 2021-12-24 RX ORDER — ACETAMINOPHEN 325 MG/1
650 TABLET ORAL
Qty: 100 TABLET | Refills: 1 | Status: SHIPPED | OUTPATIENT
Start: 2021-12-24

## 2021-12-24 RX ORDER — FUROSEMIDE 20 MG/1
20 TABLET ORAL DAILY
Qty: 90 TABLET | Refills: 1 | Status: SHIPPED | OUTPATIENT
Start: 2021-12-24 | End: 2022-05-23

## 2021-12-24 RX ORDER — AMIODARONE HYDROCHLORIDE 200 MG/1
200 TABLET ORAL DAILY
Qty: 90 TABLET | Refills: 1 | Status: SHIPPED | OUTPATIENT
Start: 2021-12-24

## 2021-12-24 RX ORDER — DILTIAZEM HYDROCHLORIDE 60 MG/1
60 TABLET, FILM COATED ORAL 2 TIMES DAILY
Qty: 180 TABLET | Refills: 1 | Status: SHIPPED | OUTPATIENT
Start: 2021-12-24 | End: 2022-03-21

## 2021-12-24 RX ORDER — WARFARIN 3 MG/1
TABLET ORAL
Qty: 90 TABLET | Refills: 0 | Status: SHIPPED | OUTPATIENT
Start: 2021-12-24 | End: 2022-06-13

## 2021-12-24 RX ORDER — ATORVASTATIN CALCIUM 20 MG/1
20 TABLET, FILM COATED ORAL DAILY
Qty: 90 TABLET | Refills: 1 | Status: SHIPPED | OUTPATIENT
Start: 2021-12-25 | End: 2022-08-10 | Stop reason: SDUPTHER

## 2021-12-24 RX ORDER — LEVOTHYROXINE SODIUM 100 UG/1
100 TABLET ORAL
Qty: 90 TABLET | Refills: 1 | Status: SHIPPED | OUTPATIENT
Start: 2021-12-24 | End: 2022-05-12

## 2021-12-24 RX ADMIN — LEVOTHYROXINE SODIUM 100 MCG: 0.1 TABLET ORAL at 07:16

## 2021-12-24 RX ADMIN — AMIODARONE HYDROCHLORIDE 200 MG: 200 TABLET ORAL at 08:14

## 2021-12-24 RX ADMIN — ATORVASTATIN CALCIUM 20 MG: 20 TABLET, FILM COATED ORAL at 08:13

## 2021-12-24 RX ADMIN — HYDRALAZINE HYDROCHLORIDE 25 MG: 25 TABLET, FILM COATED ORAL at 08:14

## 2021-12-24 NOTE — PROGRESS NOTES
Bedside shift change report given to KATHERINE Timmons RN (oncoming nurse) by Graham Leiva RN   (offgoing nurse). Report included the following information SBAR, Kardex, Intake/Output, MAR and Recent Results. Uribe score 4  Bed/chair alarm is in use. If in use it is set at the highest volume. Intravenous fluids were administered, none 0 ml/hr  Patient Vitals for the past 12 hrs:   Temp Pulse Resp BP SpO2   12/23/21 1753 -- 66 20 135/60 97 %   12/23/21 1737 97.4 °F (36.3 °C) 65 20 (!) 152/69 97 %   12/23/21 0752 97.4 °F (36.3 °C) 60 20 135/80 94 %     No flowsheet data found. Lab results reviewed. For significant abnormal values and values requiring intervention, see assessment and plan.

## 2021-12-24 NOTE — PROGRESS NOTES
Bedside shift change report given to BRANDON Smith (oncoming nurse) by Bc Simons. PURA Timmons (offgoing nurse). Report included the following information SBAR, Kardex and MAR.

## 2021-12-24 NOTE — SWING BED INTERDISCIPLINARY CARE PLAN NURSE NOTE
Nursing:    Week #2: Date 12/23/2021  Medical status (changes from previous week): No change  Treatment changes this shift: Neuro checks q hr x 4  Cognition: Present status: oriented          Is cueing needed?: No          Frequency of cueing needs: occasional           Type of cueing used: verbal  ADL (general): Minimum assistance  Activity type: Reality awareness  Participation: Daily  Level of participation: No change  Pain status: No c/o pain  Patient/Family Education needs: safety    Upon review of the patients current care plan, the following issues, problems, or needs remain: Pt had fall on day shift. Needs to be reminded to move slowing when transferring and use his walker.     Danielle Marianne, JOSSUEN

## 2021-12-24 NOTE — SWING BED INTERDISCIPLINARY CARE PLAN NURSE NOTE
Nursing:    Week #2: Date 12/24/2021  Medical status (changes from previous week): No change  Treatment changes this shift: preparing for discharge today  Cognition: Present status: oriented          Is cueing needed?: Yes          Frequency of cueing needs: occasional           Type of cueing used: verbal  ADL (general):  Minimum assistance  Activity type: Social leisure skills  Participation: Individual  Level of participation: No change  Pain status: No c/o pain  Patient/Family Education needs: safety    Upon review of the patients current care plan, the following issues, problems, or needs remain: safety    Marky Pinto RN

## 2021-12-24 NOTE — ROUTINE PROCESS
Discharge instructions reviewed with son and patient  Personal belongings returned: n/a  Home meds returned: n/a  To front entrance via wheelchair  Discharged home with  son @ [phone] home with 2 portable oxygen tanks and a BSC.

## 2021-12-24 NOTE — PROGRESS NOTES
Problem: Falls - Risk of  Goal: *Absence of Falls  Description: Document Neel King Fall Risk and appropriate interventions in the flowsheet. Outcome: Progressing Towards Goal  Note: Fall Risk Interventions:  Mobility Interventions: Utilize walker, cane, or other assistive device,Bed/chair exit alarm,Patient to call before getting OOB    Mentation Interventions: Adequate sleep, hydration, pain control,Bed/chair exit alarm,Door open when patient unattended    Medication Interventions: Bed/chair exit alarm,Patient to call before getting OOB,Teach patient to arise slowly    Elimination Interventions: Bed/chair exit alarm,Call light in reach,Patient to call for help with toileting needs,Urinal in reach    History of Falls Interventions: Bed/chair exit alarm,Door open when patient unattended         Problem: Patient Education: Go to Patient Education Activity  Goal: Patient/Family Education  Outcome: Progressing Towards Goal     Problem: Hypertension  Goal: *Blood pressure within specified parameters  Outcome: Progressing Towards Goal  Goal: *Labs within defined limits  Outcome: Progressing Towards Goal     Problem: Patient Education: Go to Patient Education Activity  Goal: Patient/Family Education  Outcome: Progressing Towards Goal     Problem: Pressure Injury - Risk of  Goal: *Prevention of pressure injury  Description: Document Alexander Scale and appropriate interventions in the flowsheet.   Outcome: Progressing Towards Goal  Note: Pressure Injury Interventions:  Sensory Interventions: Assess changes in LOC,Check visual cues for pain,Keep linens dry and wrinkle-free    Moisture Interventions: Absorbent underpads,Apply protective barrier, creams and emollients,Offer toileting Q_hr    Activity Interventions: Increase time out of bed    Mobility Interventions: Chair cushion,Float heels,HOB 30 degrees or less    Nutrition Interventions: Offer support with meals,snacks and hydration    Friction and Shear Interventions: Apply protective barrier, creams and emollients,Feet elevated on foot rest,HOB 30 degrees or less                Problem: Patient Education: Go to Patient Education Activity  Goal: Patient/Family Education  Outcome: Progressing Towards Goal

## 2021-12-24 NOTE — DISCHARGE SUMMARY
Hospitalist Discharge Summary     Patient ID:  Ba Gorman  [id number]  1 y.o.  1940 12/9/2021    PCP on record: Jalene Barthel, NP    Admit date: 12/9/2021  Discharge date and time: 12/24/2021    DISCHARGE DIAGNOSIS:  Traumatic rhabdomyolysis [facility]) Resolved    Loculated pleural effusion Resolved    LeukocytosisResolved    Fall (2/8/2021)Resolved     Generalized weakness (2/8/2021)Improved relatively compared to admission time    Anticoagulant long-term use (2/28/2017)    PAF (paroxysmal atrial fibrillation) ([address] (2/9/2021)    Stage 3 chronic kidney disease (8/5/2019)    Acute renal failure superimposed on stage 3 chronic kidney disease ([address] (12/3/2021)Renal function stabilized. 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      CONSULTATIONS:  None    Excerpted HPI from H&P of Adriana Alvarez MD:   [de-identified] y. o. male presenting for admission to [facility] for further evaluation and treatment for Traumatic rhabdomyolysis ([facility] [address]   He  has a past medical history of Arrhythmia, CKD (chronic kidney disease) stage 3, GFR 30-59 ml/min ([facility]), Hyperlipemia, Hypertension, Iron deficiency anemia due to chronic blood loss (2/9/2021), PAF (paroxysmal atrial fibrillation) ([address] (2/9/2021), PVD (peripheral vascular disease) ([address] and Thyroid disease.      Patient was found on the floor this morning by his son and was transported to the ED via EMS.   The patient reports he has been extremely weak over the past 7 days.  Was able to drive to a relatives home last Thursday for Thanksgiving.  He fell several days ago and was not able to get up due to extreme weakness.  He has had no access to nutrition or fluids.  He was found to be incontinent of urine.  He was noted to have increased tremor in both extremities but right greater than left.  He has no prior history of tremor or Parkinson disease.  He denied symptoms of chest pain, shortness of breath, nausea or vomiting.  He has fallen several times during the past week and he exam was noted for contusions to the right forehead and left chest.  Laboratory assessment was noted for leukocytosis with a white count of 21,000.  CK elevation was present consistent with rhabdomyolysis.  The patient has chronic renal insufficiency stage III which had progressed since previous testing. Brit Sifuentes was not found to be febrile, but he states he has had some chills and symptomatic fever during the past week.  His chest x-ray was remarkable for a loculated effusion in the left lateral chest wall with left chest wall indentation concerning for occult rib fracture.  CT of the head and neck was without evidence of injury.  The patient was treated empirically for possible pneumonia with Rocephin and azithromycin in the ED. Brit Sifuentes is on chronic anticoagulation with Coumadin for paroxysmal atrial fib with a recent INR therapeutic. Brit Sifuentes has been diagnosed with diffuse large B-cell lymphoma of the lymph nodes in the inguinal region in the past but deferred treatment, with follow-up evaluations stable.       ______________________________________________________________________  DISCHARGE SUMMARY/HOSPITAL COURSE:      The  Patient who was admitted after presenting with the above problem was at first managed for the rhabdomyolysis, Acute and chronic mari failure and for generalized weakness and observed and treated  For his oculated effucion and leucocytosis. The patients chronic medical conditions including the Fibrillation was followed and managed. The warfarin was monitored and managed  accordingly. Once his acute condition  Was managed and  has stabilized the  Patient have had inpatient rehabilitation OT and PT. The patient has benefited and has shown progress and finally a descison to discharge him home today with home HHA was made.  The  Patient was sen at the bedside in the room today, He was sitting stble on his , wake verbal and fully oriented, Vital signs were stable. He has no complaints . The bruise on his left forearm was dressed with clean dressing. The arrangements are already made for the  Patient. His home medications new prescriptions were sent to [facility] DR BRAXTON ALEXANDER. The patient is aware that he will be discharged today and agreed on a plan to make follow up visit with his PCP and cardiologist after discharge home.  _______________________________________________________________________  Patient seen and examined by me on discharge day. Pertinent Findings:  Gen:    Not in distress  Chest: Clear lungs  CVS:   Regular rhythm. No edema  Abd:  Soft, not distended, not tender  Neuro:  Alert, awake verbal and fully oriented. LABS:  Pertinent labs include:  No results for input(s): WBC, HGB, HCT, PLT, HGBEXT, HCTEXT, PLTEXT in the last 72 hours. Recent Labs     12/24/21  0442 12/23/21  0555 12/22/21  0545   INR 2.4* 2.5* 2.4*       IMAGING:  No results found.     EKG:   Component Ref Range & Units 2 wk ago 3 wk ago   Ventricular Rate BPM 67  86    Atrial Rate BPM 67  300    P-R Interval ms 194  238    QRS Duration ms 154  142    Q-T Interval ms 480  420    QTC Calculation (Bezet) ms 507  502    Calculated P Axis degrees 85  171    Calculated R Axis degrees 80  93    Calculated T Axis degrees 69  -17    Diagnosis  Normal sinus rhythm   Right bundle branch block   Abnormal ECG   When compared with ECG of 03-DEC-2021 13:27,   note clearing of inferior TWC   Confirmed by Isamar Zuniga MD, --- ([id number]) on 12/4/2021 7:10:10 AM  ** Poor data quality, interpretation may be adversely affected   Normal sinus rhythm   Right bundle branch block   T wave abnormality, consider inferior ischemia   Abnormal ECG   When compared with ECG of 15-FEB-2021 08:59,   Right bundle branch block has replaced Incomplete right bundle branch block   New inferior TW inversion   Progression on anterior  TW inversion   Confirmed by Isamar Zuniga MD, --- ([id number]) on 12/4/2021 7:02:08 AM               Specimen Collected: 12/04/21 05:58 Last Resulted: 12/04/21 07:10         Lab Flowsheet      Order Details      View Encounter      Lab and Collection Details      Routing      Result History             Result Care Coordination        Patient Communication            _______________________________________________________________________  DISCHARGE MEDICATIONS:   Current Discharge Medication List      CONTINUE these medications which have CHANGED    Details   furosemide (LASIX) 20 mg tablet Take 1 Tablet by mouth daily. Qty: 90 Tablet, Refills: 1  Start date: 12/24/2021      losartan (COZAAR) 50 mg tablet Take 1 Tablet by mouth daily. Qty: 90 Tablet, Refills: 1  Start date: 12/25/2021      warfarin (COUMADIN) 3 mg tablet TAKE 1 TABLET BY MOUTH EVERY THURSDAY AND 2.5 MG ON ALL OTHER DAYS  Qty: 90 Tablet, Refills: 0  Start date: 12/24/2021      atorvastatin (LIPITOR) 20 mg tablet Take 1 Tablet by mouth daily. Qty: 90 Tablet, Refills: 1  Start date: 12/25/2021      amiodarone (CORDARONE) 200 mg tablet Take 1 Tablet by mouth daily. Qty: 90 Tablet, Refills: 1  Start date: 12/24/2021      dilTIAZem IR (CARDIZEM) 60 mg tablet Take 1 Tablet by mouth two (2) times a day. Qty: 180 Tablet, Refills: 1  Start date: 12/24/2021      levothyroxine (SYNTHROID) 100 mcg tablet Take 1 Tablet by mouth Daily (before breakfast). Qty: 90 Tablet, Refills: 1  Start date: 12/24/2021      polyethylene glycol (Miralax) 17 gram/dose powder Take 17 g by mouth daily. 1 tablespoon with 8 oz of water daily  Qty: 238 g, Refills: 1  Start date: 12/24/2021      acetaminophen (TYLENOL) 325 mg tablet Take 2 Tablets by mouth every six (6) hours as needed for Pain. Qty: 100 Tablet, Refills: 1  Start date: 12/24/2021         CONTINUE these medications which have NOT CHANGED    Details   ciclopirox (PENLAC) 8 % solution Apply  to affected area nightly.  As directed      mupirocin (BACTROBAN) 2 % ointment Apply  to affected area daily.  Qty: 22 g, Refills: 0    Associated Diagnoses: Acute post-traumatic wound infection, initial encounter               Patient Follow Up Instructions: Activity: PT/OT per Home Health  Diet: Cardiac Diet  Wound Care: None needed and Needs wound care for the bruises on his left forearm.     Follow-up with PCP and cardiolgist  Follow-up tests/labs: CBC and CMP, As determined by his PCP and cardiologist.  Follow-up Information     Follow up With Specialties Details Why [address], [place], NP Nurse Practitioner   Danelle Segura  [place] [place] [address]  [phone]          ________________________________________________________________    Risk of deterioration: Low    Condition at Discharge:  Stable  __________________________________________________________________    Disposition  Home with family and home health services    ____________________________________________________________________    Code Status: Full Code  ___________________________________________________________________      Total time in minutes spent coordinating this discharge (includes going over instructions, follow-up, prescriptions, and preparing report for sign off to her PCP) : 30 minutes    Signed:  Gianna Pablo MD

## 2021-12-24 NOTE — PROGRESS NOTES
Problem: Hypertension  Goal: *Blood pressure within specified parameters  Outcome: Progressing Towards Goal  Goal: *Labs within defined limits  Outcome: Progressing Towards Goal

## 2021-12-24 NOTE — PROGRESS NOTES
Transition of Care (JEOVANY) Plan:  Patient is discharged home with home health with [facility] health, private sitters, and oxygen provided by [employer]yler Row[employer]. Referral made on 12/15/21    RUR: 14%  LOW    JEOVANY Transportation:       How is patient being transported at discharge? POV/ Son     When?  12/24/21     Is transport scheduled? NA    Follow-up appointment and transportation:     PCP? Tyrone Alarcon. Specialist?     Time/Date? Who is transporting to the follow-up appointment? Family    Is transport for follow up appointment scheduled? NA    Communication plan (with patient/family): Who is being called? Patient     What number(s) is to be used? [phone]    What service provider is calling for [facility] services? Home Health, PCP    When are they calling? Probably 24 - 48 hours prior to appointment      Click here to [address] including selection of the Healthcare Decision Maker Relationship (ie \"Primary\")  @healthcareagent    Patient's son is named [address] [facility] Management Interventions  PCP Verified by CM:  Collette Michael, NP)  Last Visit to PCP: 10/11/21  Palliative Care Criteria Met (RRAT>21 & CHF Dx)?:  (No MD order for Palliative Care)  Mode of Transport at Discharge:  Other (see comment) (Squad vs POV)  Transition of Care Consult (CM Consult): Home Health (along with private paid sitters for 12 hours a day.)  [facility] - INPATIENT: No  Reason Outside [place]: Out of service area  Discharge Durable Medical Equipment: No  Physical Therapy Consult: Yes  Occupational Therapy Consult: Yes  Speech Therapy Consult: No  Support Systems: Child(nanci),Caregiver/Home Care Staff (Jessica Smith II  [phone])  Confirm Follow Up Transport: Family  The Plan for Transition of Care is Related to the Following Treatment Goals : Swing--PT/OT  The Patient and/or Patient Representative was Provided with a Choice of Provider and Agrees with the Discharge Plan?: Yes  [place] Resource Information Provided?: No  Discharge Location  Discharge Placement: Home with home health ([address])      12/24/24    At 1:50PM Received a call from Booker Wright, Rn Charge Nurse for Med Surg informing me that the sitter and the patient's son was there to  the patient but has not received a call from the oxygen company regarding the delivery time. I called [phone] and spoke with Frank Brunner. He stated that the delivery date was changed to Monday per Andry(and he did not know who Jackie Carpenter was). I informed Donte that per [facility] yesterday, the delivery was to be made on today. She checked with her supervisor and assured me that the delivery was going to be made today. Gave her the number to the sitter so they could inform someone of the time the delivery is going to take place. After explaining that to Frank Brunner, he set it up with a delivery staff to have the oxygen delivered today. I gave him the patient's son telephone number and he assured me that the  was going to call Mr. Chelsea Cheng son in the next 20 minutes. 1530PM: Received a call from NICOLE stating that no one has called yet about the oxygen. I called the oxygen company and was put on hold for 15 minutes. Spoke with Tasia Rader who was the answering service informed her of the issue and she stated that she was going to put out an email to someone on call. I will call back for an update. 4:15PM Calling the company again. Found a different number for the company :[phone]. Put on hold again. On hold for   20 minutes and finally spoke to Eugenie Palmer. She took my name and number and assured me that someone would be getting back to me. It is now 4:45pm.    5:30PM Another phone call made to [facility]yler Rowe: [phone]. Spoke to [employer] again. She stated that \"they\" had to make some changes because they were really busy. Repeated the number to call which was the [employer] unit number.  I had just given her my number on the 4:45 pm call. She assured me someone will be getting back to me. According to BB, the sitter, Mr. Jer Archerr as bad reception so it may be difficult to get a call. I gave the agency my cell number. Awaiting a call from the .    0:50MG Another phone call being made. Have not received a call from delivery. Placed on hold. Ashley on the line. Explained to her that I was checking on the status of the oxygen delivery. Explained the above chain of events and stressed how urgent,important and emergent the situation is. She forwarded the message to [employer] that is on duty now. She stated that she stressed the importance of this matter. She stated that she can see that he received the message and she asked him to get back with me right away. 8:05PM  Used another number to contact the agency: [phone] and spoke with Clarice Sotelo. Explained to him the situation. He forwarded the information to Betsy Ibarra  to have her call me. Clarice Sotelo stated that he could see where Betsy Ibarra received the message. Waiting a call from Betsy Ibarra. Corporate Compliance team ([phone]) was called but could only leave a message)    Informed the Nursing Supervisor, Delisa Wu RN about the situation. 1130PM  Received a call from Kelly Curtis   that received the message at 7:21 pm from Raffaele about the patient not receiving his oxygen. He stated that after checking, the order went out at 2pm and he is going to try to get in touch with dispatcher to see why it has taken this long. He went on to say \"It is now 1130pm and I don't know if they were having trouble with the truck breaking down, etc\" He is going to call me back after further investigation. Contacted the Nursing Supervisor to give an update. 12/25/21    8:10AM AMNo return call from Justyna Guzmán. Called [phone] and spoke with Sara Nunez.  He took the information and stated that a  would be calling me back at 8:30AM    8:30AM Called the patient's home. His son stated that he was in the bathroom taking a bath and seemed to be doing ok  . 9:00AM: [address] and spoke with Dora Hortencia (since no one returned my call at 8:30AM). She took my information and will have a  call me back. 9:28 AM: No return call from [facility]. Contacted the patient's son and sitter. Also spoke with the patient. He states that he is not short of breath. Son states that his father's color is good and breathing without difficulty. Recommended that he purchase an oximetry to monitor the the patient's 02 sats. He stated he would, but if he could not find one today, Yolette Vale (/sitter) has one and will use it to monitor the patient. Recommended to the patient's son, that if the patient start to have breathing difficulty, and if the [employer] [place] has not delivered the oxygen, he would have to call 911 and have the patient return to the hospital. He verbalized understanding. [facility] AM: [address] and spoke with [facility]. She investigated the status and saw that it was going to be delivered but no time. Eliza Garza called me back. When he checked the documentation, he stated that what has happened was inexcusable, unprofessional and not accountable at all. He suggested that I call [employer] ([phone])FO which I did that last night and left a message. He stated that he was getting this out to the delivery department and asked that I call back in a half an hour to check on the status. I did call the Corporate Compliance number again and left a detailed message. [phone] AM:On Hold. Pastor Bee answered, checked the delivery day and returned to the phone saying family requested that the oxygen wants delivery after 2pm. I explained to him that was yesterday after the patient was discharged from the hospital. He put me on hold again to check again.   1148 AM: Pastor Bee returned to the phone after checking stating that dispatch has the delivery time set between 2pm and 4pm. When trying to find out if the oxygen could be delivered any sooner, I was told by Shruthi Sun that he was very busy and that he had 12 calls waiting to be answered. Will let the patient's son know that according to Shruthi Sun, the oxygen will be delivered between 2 and 4pm. Mr. Macias's son verbalized understanding. Gave him the number([phone]) to the company to check on status of delivery if the oxygen is not there between 2 - 4. Patient answered the phone. No complaints voiced. Email from [address]: [facility]  12/25 ? 10:56AM  updated expected delivery date to 12/25/21    7:15PM  Received a call from the sitter with the patient. They received a call from the oxygen company(after the family called them back) informing them that the delivery is coming from [place] and it will take 1 1/2 hour to arrive to the home. 8:25PM Received a phone call from the patient's sitter stating the oxygen was finally delivered to the patient's home.

## 2021-12-27 NOTE — PROGRESS NOTES
12/27/21 at 200    Made a f/u tony with patient for 1/3/2022 at 3:20pm. Called patient 3 times with no answer. Sounded like phone was off the hook. Will try again later.

## 2022-01-03 ENCOUNTER — TELEPHONE (OUTPATIENT)
Dept: FAMILY MEDICINE CLINIC | Age: 82
End: 2022-01-03

## 2022-01-05 ENCOUNTER — TELEPHONE (OUTPATIENT)
Dept: FAMILY MEDICINE CLINIC | Age: 82
End: 2022-01-05

## 2022-01-05 NOTE — TELEPHONE ENCOUNTER
----- Message from Suarez sent at 1/5/2022  1:22 PM EST -----  Subject: Message to Provider    QUESTIONS  Information for Provider? Clara Gil at [facility] needs an order for OT once per   week for 4 weeks [facility] [phone]   ---------------------------------------------------------------------------  --------------  CALL BACK INFO  What is the best way for the office to contact you? OK to leave message on   voicemail  Preferred Call Back Phone Number? [phone]  ---------------------------------------------------------------------------  --------------  SCRIPT ANSWERS  Relationship to Patient? Third Party  Representative Name?  Carmen Vieyra

## 2022-01-06 NOTE — TELEPHONE ENCOUNTER
BARBARA Baldwin, she was given Elif's verbal order, stated OK and thanks. Cayla Baldwin was called back, I stated to her, after re reading Elif's verbal order, I realized it was stating the opposite of what she was requesting, so I clarified Elif's verbal order with her. She stated OK and it is OK to do the OT the way Cayla Baldwin is requesting it. Cayla Baldwin stated OK and thanks again.

## 2022-01-19 ENCOUNTER — OFFICE VISIT (OUTPATIENT)
Dept: FAMILY MEDICINE CLINIC | Age: 82
End: 2022-01-19
Payer: MEDICARE

## 2022-01-19 VITALS
RESPIRATION RATE: 18 BRPM | HEART RATE: 77 BPM | DIASTOLIC BLOOD PRESSURE: 62 MMHG | HEIGHT: 70 IN | OXYGEN SATURATION: 97 % | TEMPERATURE: 97.3 F | SYSTOLIC BLOOD PRESSURE: 120 MMHG | BODY MASS INDEX: 21.05 KG/M2 | WEIGHT: 147 LBS

## 2022-01-19 DIAGNOSIS — C83.35 DIFFUSE LARGE B-CELL LYMPHOMA OF LYMPH NODES OF INGUINAL REGION (HCC): ICD-10-CM

## 2022-01-19 DIAGNOSIS — I10 ESSENTIAL HYPERTENSION: ICD-10-CM

## 2022-01-19 DIAGNOSIS — M65.351 TRIGGER LITTLE FINGER OF RIGHT HAND: ICD-10-CM

## 2022-01-19 DIAGNOSIS — I73.9 PVD (PERIPHERAL VASCULAR DISEASE) (HCC): ICD-10-CM

## 2022-01-19 DIAGNOSIS — I48.20 CHRONIC ATRIAL FIBRILLATION (HCC): ICD-10-CM

## 2022-01-19 DIAGNOSIS — D68.9 COAGULOPATHY (HCC): ICD-10-CM

## 2022-01-19 DIAGNOSIS — I50.32 DIASTOLIC CHF, CHRONIC (HCC): ICD-10-CM

## 2022-01-19 DIAGNOSIS — M65.352 TRIGGER LITTLE FINGER OF LEFT HAND: ICD-10-CM

## 2022-01-19 DIAGNOSIS — R54 FRAIL ELDERLY: Primary | ICD-10-CM

## 2022-01-19 DIAGNOSIS — I48.0 PAF (PAROXYSMAL ATRIAL FIBRILLATION) (HCC): ICD-10-CM

## 2022-01-19 DIAGNOSIS — N18.5 CKD (CHRONIC KIDNEY DISEASE) STAGE 5, GFR LESS THAN 15 ML/MIN (HCC): ICD-10-CM

## 2022-01-19 PROCEDURE — G9231 DOC ESRD DIA TRANS PREG: HCPCS | Performed by: NURSE PRACTITIONER

## 2022-01-19 PROCEDURE — G8432 DEP SCR NOT DOC, RNG: HCPCS | Performed by: NURSE PRACTITIONER

## 2022-01-19 PROCEDURE — 1101F PT FALLS ASSESS-DOCD LE1/YR: CPT | Performed by: NURSE PRACTITIONER

## 2022-01-19 PROCEDURE — G8536 NO DOC ELDER MAL SCRN: HCPCS | Performed by: NURSE PRACTITIONER

## 2022-01-19 PROCEDURE — 99214 OFFICE O/P EST MOD 30 MIN: CPT | Performed by: NURSE PRACTITIONER

## 2022-01-19 PROCEDURE — 1111F DSCHRG MED/CURRENT MED MERGE: CPT | Performed by: NURSE PRACTITIONER

## 2022-01-19 PROCEDURE — G8420 CALC BMI NORM PARAMETERS: HCPCS | Performed by: NURSE PRACTITIONER

## 2022-01-19 PROCEDURE — G8427 DOCREV CUR MEDS BY ELIG CLIN: HCPCS | Performed by: NURSE PRACTITIONER

## 2022-01-19 NOTE — PROGRESS NOTES
Subjective:      Jimmy Greenwood is a 81 y.o. male who presents today for wound check. He has a Deep tissue injury which is located on the right heel. Current symptoms: drainage: clear and brown. Interventions to date: none  dressing changed: today. Objective:     Visit Vitals  /62 (BP 1 Location: Left upper arm, BP Patient Position: Sitting, BP Cuff Size: Adult)   Pulse 77   Temp 97.3 °F (36.3 °C) (Temporal)   Resp 18   Ht 5' 10\" (1.778 m)   Wt 147 lb (66.7 kg) Comment: unsteady   SpO2 97%   BMI 21.09 kg/m²       Wound:    Exudate noted. Malodorous      Assessment:     Wound check. Right lateral aspect of the heel 6 mm. X 6 mm malodorous with brown exudate. Interventions today removal of existing dressing  visual inspection   CULTURE, WOUND W GRAM STAIN; Future  - CULTURE, WOUND W GRAM STAIN  cleansing with normal saline solution  application of clean dressing. Plan:     1. Spoke with Evelia at [facility]. Increased home health frequency to three times weekly. Education on offloading heel to alleviated pressure to wound. Home Health nurse has ordered Santyl. Current dressing-heel foam.  2. Patient instructions were given. 3. Follow up: 1 month.

## 2022-01-19 NOTE — PROGRESS NOTES
1. Have you been to the ER, urgent care clinic since your last visit? Hospitalized since your last visit? No    2. Have you seen or consulted any other health care providers outside of the [address] [place] [address] since your last visit? Include any pap smears or colon screening.  No      Chief Complaint   Patient presents with   [address]     f/u [facility] 12-    Pneumonia    Wound Check     rt heel ulcer    Other     right 5th  finger locked         Visit Vitals  /62 (BP 1 Location: Left upper arm, BP Patient Position: Sitting, BP Cuff Size: Adult)   Pulse 77   Temp 97.3 °F (36.3 °C) (Temporal)   Resp 18   Ht 5' 10\" (1.778 m)   Wt 147 lb (66.7 kg) Comment: unsteady   SpO2 97%   BMI 21.09 kg/m²       Pain Scale: 0 - No pain/10  Pain Location:

## 2022-01-19 NOTE — PATIENT INSTRUCTIONS
Preventing Falls: Care Instructions  Your Care Instructions     Getting around your home safely can be a challenge if you have injuries or health problems that make it easy for you to fall. Loose rugs and furniture in walkways are among the dangers for many older people who have problems walking or who have poor eyesight. People who have conditions such as arthritis, osteoporosis, or dementia also have to be careful not to fall. You can make your home safer with a few simple measures. Follow-up care is a key part of your treatment and safety. Be sure to make and go to all appointments, and call your doctor if you are having problems. It's also a good idea to know your test results and keep a list of the medicines you take. How can you care for yourself at home? Taking care of yourself  · You may get dizzy if you do not drink enough water. To prevent dehydration, drink plenty of fluids. Choose water and other clear liquids. If you have kidney, heart, or liver disease and have to limit fluids, talk with your doctor before you increase the amount of fluids you drink. · Exercise regularly to improve your strength, muscle tone, and balance. Walk if you can. Swimming may be a good choice if you cannot walk easily. · Have your vision and hearing checked each year or any time you notice a change. If you have trouble seeing and hearing, you might not be able to avoid objects and could lose your balance. · Know the side effects of the medicines you take. Ask your doctor or pharmacist whether the medicines you take can affect your balance. Sleeping pills or sedatives can affect your balance. · Limit the amount of alcohol you drink. Alcohol can impair your balance and other senses. · Ask your doctor whether calluses or corns on your feet need to be removed. If you wear loose-fitting shoes because of calluses or corns, you can lose your balance and fall.   · Talk to your doctor if you have numbness in your feet.  Preventing falls at home  · Remove raised doorway thresholds, throw rugs, and clutter. Repair loose carpet or raised areas in the floor. · Move furniture and electrical cords to keep them out of walking paths. · Use nonskid floor wax, and wipe up spills right away, especially on ceramic tile floors. · If you use a walker or cane, put rubber tips on it. If you use crutches, clean the bottoms of them regularly with an abrasive pad, such as steel wool. · Keep your house well lit, especially [place], and outside walkways. Use night-lights in areas such as hallways and bathrooms. Add extra light switches or use remote switches (such as switches that go on or off when you clap your hands) to make it easier to turn lights on if you have to get up during the night. · Install sturdy handrails on stairways. · Move items in your cabinets so that the things you use a lot are on the lower shelves (about waist level). · Keep a cordless phone and a flashlight with new batteries by your bed. If possible, put a phone in each of the main rooms of your house, or carry a cell phone in case you fall and cannot reach a phone. Or, you can wear a device around your neck or wrist. You push a button that sends a signal for help. · Wear low-heeled shoes that fit well and give your feet good support. Use footwear with nonskid soles. Check the heels and soles of your shoes for wear. Repair or replace worn heels or soles. · Do not wear socks without shoes on wood floors. · Walk on the grass when the sidewalks are slippery. If you live in an area that gets snow and ice in the winter, sprinkle salt on slippery steps and sidewalks. Preventing falls in the bath  · Install grab bars and nonskid mats inside and outside your shower or tub and near the toilet and sinks. · Use shower chairs and bath benches. · Use a hand-held shower head that will allow you to sit while showering.   · Get into a tub or shower by putting the weaker leg in first. Get out of a tub or shower with your strong side first.  · Repair loose toilet seats and consider installing a raised toilet seat to make getting on and off the toilet easier. · Keep your bathroom door unlocked while you are in the shower. Where can you learn more? Go to http://www.horta.com/  Enter G117 in the search box to learn more about \"Preventing Falls: Care Instructions. \"  Current as of: December 7, 2020               Content Version: 13.0  © 9509-5641 [employer]. Care instructions adapted under license by [employer] (which disclaims liability or warranty for this information). If you have questions about a medical condition or this instruction, always ask your healthcare professional. [address] any warranty or liability for your use of this information. Preventing Outdoor Falls: Care Instructions  Your Care Instructions     Worries about falls don't need to keep you indoors. Outdoor activities like walking have big benefits for your health. You will need to watch your step and learn a few safety measures. If you are worried about having a fall outdoors, ask your doctor about exercises, classes, or physical therapy that may help. You can learn ways to gain strength, flexibility, and balance. Ask if it might help to use a cane or walker. You can make your time outdoors safer with a few simple measures. Follow-up care is a key part of your treatment and safety. Be sure to make and go to all appointments, and call your doctor if you are having problems. It's also a good idea to know your test results and keep a list of the medicines you take. How can you prevent falls outdoors? · Wear shoes with firm soles and low heels. If you have to walk on an icy surface, use grippers that can be worn over your shoes in bad weather. · Be extra careful if weather is bad.  Walk on the grass when the sidewalks are slick. If you live in a place that gets snow and ice in the winter, sprinkle salt on slippery stairs and sidewalks. · Be careful getting on or off buses and trains or getting in and out of cars. If handrails are available, use them. · Be careful when you cross the street. Look for crosswalks or places where curb cuts or ramps are present. · Try not to hurry, especially if you are carrying something. · Be cautious in parking lots or garages. There may be curbs or changes in pavement, or the height of the pavement may vary. · Make sure to wear the correct eyeglasses, if you need them. Reading glasses or bifocals can make it harder to see hazards that might be in your way. · If you are walking outdoors for exercise, try to:  ? Walk in well-lighted, well-maintained areas. These include high school or college tracks, shopping malls, and public spaces. ? Walk with a partner. ? Watch out for cracked sidewalks, curbs, changes in the height of the pavement, exposed tree roots, and debris such as fallen leaves or branches. Where can you learn more? Go to http://www.Giftindia24x7.com.com/  Enter U018 in the search box to learn more about \"Preventing Outdoor Falls: Care Instructions. \"  Current as of: December 7, 2020               Content Version: 13.0  © 0331-7573 [employer]. Care instructions adapted under license by [employer] (which disclaims liability or warranty for this information). If you have questions about a medical condition or this instruction, always ask your healthcare professional. [place] [address] any warranty or liability for your use of this information.

## 2022-01-19 NOTE — PROGRESS NOTES
Subjective:     Caio Maxwell is a 81 y.o. male who presents today with the following:  Chief Complaint   Patient presents with   Lacy Ac Fall     f/u [facility] 12-    Pneumonia    Wound Check     rt heel ulcer    Other     right 5th  finger locked       Patient Active Problem List   Diagnosis Code    Thyroid disease E07.9    Arrhythmia I49.9    Hyperlipemia E78.5    PVD (peripheral vascular disease) ([facility]) I73.9    CKD (chronic kidney disease) stage 3, GFR 30-59 ml/min ([facility]) N18.30    Anticoagulant long-term use Z79.01    Pulmonary scarring J98.4    Spondylosis of lumbar region without myelopathy or radiculopathy M47.816    Chronic atrial fibrillation ([facility]) I48.20    CKD (chronic kidney disease) stage 4, GFR 15-29 ml/min ([facility]) N18.4    CKD (chronic kidney disease) stage 5, GFR less than 15 ml/min ([facility]) N18.5    Closed right humeral fracture S42.301A    Coumadin toxicity, accidental or unintentional, initial encounter T45.511A    History of anemia due to chronic kidney disease N18.9, Z86.2    Left rib fracture S22.32XA    Stage 3 chronic kidney disease N18.30    Closed fracture of right proximal humerus S42.201A    Closed fracture of proximal end of right humerus with routine healing S42.201D    Hypothyroid E03.9    Essential hypertension Q68    Diastolic CHF, chronic ([facility]) I50.32    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 Samule Shankar    Generalized weakness R53.1    STEVEN (acute kidney injury) ([address] N17.9    Iron deficiency anemia due to chronic blood loss D50.0    PAF (paroxysmal atrial fibrillation) ([facility]) I48.0    Hip fracture ([address] S72.009A    Encounter for rehabilitation Z51.89    CAP (community acquired pneumonia) J18.9    Acute renal failure superimposed on stage 3 chronic kidney disease (HCC) N17.9, N18.30    Traumatic rhabdomyolysis ([address] T79. 6XXA    Loculated pleural effusion J90    Leukocytosis D72.829    Rhabdomyolysis M62.82     HPI: Patient presents today with his caregiver for follow up after hospitalization for recurrent falls. He endorses feeling better since his discharge. He endorses his right 5th finger being \"locked\" for the past month. He denies pain to the area but it does inhibit use of the finger. There are no aggravating/alleviating factors and he has tried no treatments thus far. COMPLIANT WITH MEDICATION:   HTN; Denies chest pain, dyspnea, palpitations, headache and blurred vision. Blood pressure normotensive. depression screening addressed not at risk    abuse screening addressed denies    learning assessment addressed reviewed nurses notes    fall risk addressed not at risk    HM: addressed-Education given on heart healthy diet. ROS:  Gen: denies fever, chills, fatigue, weight loss, weight gain  HEENT:denies blurry vision, nasal congestion, sore throat  Resp: denies dypsnea, cough, wheezing  CV: denies chest pain radiating to the jaws or arms, palpitations, lower extremity edema  Abd: denies nausea, vomiting, diarrhea, constipation  Neuro: denies numbness/tingling  Endo: denies polyuria, polydipsia, heat/cold intolerance  Heme: no lymphadenopathy  Extremities: right 5th finger contracted. Left 5 th finger contracted  Allergies   Allergen Reactions    Ketamine Other (comments)     confusion         Current Outpatient Medications:     furosemide (LASIX) 20 mg tablet, Take 1 Tablet by mouth daily. , Disp: 90 Tablet, Rfl: 1    losartan (COZAAR) 50 mg tablet, Take 1 Tablet by mouth daily. , Disp: 90 Tablet, Rfl: 1    warfarin (COUMADIN) 3 mg tablet, TAKE 1 TABLET BY MOUTH EVERY THURSDAY AND 2.5 MG ON ALL OTHER DAYS, Disp: 90 Tablet, Rfl: 0    atorvastatin (LIPITOR) 20 mg tablet, Take 1 Tablet by mouth daily. , Disp: 90 Tablet, Rfl: 1    amiodarone (CORDARONE) 200 mg tablet, Take 1 Tablet by mouth daily. , Disp: 90 Tablet, Rfl: 1    dilTIAZem IR (CARDIZEM) 60 mg tablet, Take 1 Tablet by mouth two (2) times a day., Disp: 180 Tablet, Rfl: 1    levothyroxine (SYNTHROID) 100 mcg tablet, Take 1 Tablet by mouth Daily (before breakfast). , Disp: 90 Tablet, Rfl: 1    polyethylene glycol (Miralax) 17 gram/dose powder, Take 17 g by mouth daily. 1 tablespoon with 8 oz of water daily, Disp: 238 g, Rfl: 1    acetaminophen (TYLENOL) 325 mg tablet, Take 2 Tablets by mouth every six (6) hours as needed for Pain., Disp: 100 Tablet, Rfl: 1    ciclopirox (PENLAC) 8 % solution, Apply  to affected area nightly. As directed, Disp: , Rfl:     mupirocin (BACTROBAN) 2 % ointment, Apply  to affected area daily.  (Patient not taking: Reported on 2022), Disp: 22 g, Rfl: 0    Past Medical History:   Diagnosis Date    Arrhythmia     atrial fibrillation, cardioversion     CKD (chronic kidney disease) stage 3, GFR 30-59 ml/min ([facility])     Hyperlipemia     Hypertension     Iron deficiency anemia due to chronic blood loss 2021    [facility]) [facility] 2021    PVD [facility]peripheral vascular disease) [facility]     Thyroid disease        Past Surgical History:   Procedure Laterality Date    HX COLONOSCOPY      WNL SIS 10 y    HX HERNIA REPAIR Bilateral     Inguinal    HX ORTHOPAEDIC      right shoulder fracture repair    HX OTHER SURGICAL  2019    groin lymph gland excision    HX VASCULAR ACCESS      IR INSERT TUNL CVC W PORT OVER 5 YEARS  3/5/2020    WI CHEST SURGERY PROCEDURE UNLISTED Left     lung surgery age 17, lobectomy    VASCULAR SURGERY PROCEDURE UNLIST      bypass numerous surgeries both legs       Social History     Tobacco Use   Smoking Status Former Smoker    Packs/day: 1.00    Years: 54.00    Pack years: 54.00    Quit date: 3/15/2007    Years since quittin.8   Smokeless Tobacco Never Used       Social History     Socioeconomic History    Marital status:    Tobacco Use    Smoking status: Former Smoker     Packs/day: 1.00     Years: 54.00     Pack years: 54.00 Quit date: 3/15/2007     Years since quittin.8    Smokeless tobacco: Never Used   Vaping Use    Vaping Use: Never used   Substance and Sexual Activity    Alcohol use: Yes     Comment: former drinker- beer   social    Drug use: Never   Other Topics Concern     Service No    Blood Transfusions Yes    Caffeine Concern No    Occupational Exposure No    Hobby Hazards No    Sleep Concern Yes    Stress Concern No    Weight Concern No    Special Diet No    Back Care Yes    Exercise Yes    Seat Belt No    Self-Exams Yes       Family History   Problem Relation Age of Onset    Cancer Mother         lung    Cancer Brother 66        lung    Cancer Maternal Uncle         cancer unknown         Objective:     Visit Vitals  /62 (BP 1 Location: Left upper arm, BP Patient Position: Sitting, BP Cuff Size: Adult)   Pulse 77   Temp 97.3 °F (36.3 °C) (Temporal)   Resp 18   Ht 5' 10\" (1.778 m)   Wt 147 lb (66.7 kg) Comment: unsteady   SpO2 97%   BMI 21.09 kg/m²     Body mass index is 21.09 kg/m². General: Alert and oriented. No acute distress. Frail sitting in a wheelchair. (uses a walker at home per aide)   HEENT :  Ears:TMs are normal. Canals are clear. Eyes: pupils equal, round, react to light and accommodation. Extra ocular movements intact. Nose: patent. Mouth and throat is clear. Neck:supple full range of motion no thyromegaly. Trachea midline, No carotid bruits. No significant lymphadenopathy  Lungs[de-identified] clear to auscultation without wheezes, rales, or rhonchi. Heart :RRR, S1 & S2 are normal intensity. No murmur; no gallop  Abdomen: bowel sounds active. No tenderness, guarding, rebound, masses, hepatic or spleen enlargement  Back: no CVA tenderness. Extremities: without clubbing, cyanosis, or edema, contractures on both 5 th fingers   Pulses: radial and femoral pulses are normal  Neuro: HMF intact.  Cranial nerves II through XII grossly normal.  Motor: is 5 over 5 and symmetrical.   Deep tendon reflexes: +2 equal  Psych:appropriate behavior, mood, affect and judgement. Results for orders placed or performed during the hospital encounter of 12/09/21   PROTHROMBIN TIME + INR   Result Value Ref Range    INR 2.7 (H) 0.9 - 1.1      Prothrombin time 26.1 (H) 9.0 - 11.1 sec   SAMPLES BEING HELD   Result Value Ref Range    SAMPLES BEING HELD 1GREEN, 1LAV     COMMENT        Add-on orders for these samples will be processed based on acceptable specimen integrity and analyte stability, which may vary by analyte. PROTHROMBIN TIME + INR   Result Value Ref Range    INR 2.1 (H) 0.9 - 1.1      Prothrombin time 20.2 (H) 9.0 - 11.1 sec   PROTHROMBIN TIME + INR   Result Value Ref Range    INR 1.8 (H) 0.9 - 1.1      Prothrombin time 17.4 (H) 9.0 - 11.1 sec   SAMPLES BEING HELD   Result Value Ref Range    SAMPLES BEING HELD  1 PST, 1 LAV     COMMENT        Add-on orders for these samples will be processed based on acceptable specimen integrity and analyte stability, which may vary by analyte. PROTHROMBIN TIME + INR   Result Value Ref Range    INR 1.7 (H) 0.9 - 1.1      Prothrombin time 16.8 (H) 9.0 - 11.1 sec   SAMPLES BEING HELD   Result Value Ref Range    SAMPLES BEING HELD 1LAV     COMMENT        Add-on orders for these samples will be processed based on acceptable specimen integrity and analyte stability, which may vary by analyte. PROTHROMBIN TIME + INR   Result Value Ref Range    INR 1.8 (H) 0.9 - 1.1      Prothrombin time 18.1 (H) 9.0 - 11.1 sec   SAMPLES BEING HELD   Result Value Ref Range    SAMPLES BEING HELD 1LAV, 1GREEN, 1SST     COMMENT        Add-on orders for these samples will be processed based on acceptable specimen integrity and analyte stability, which may vary by analyte.    PROTHROMBIN TIME + INR   Result Value Ref Range    INR 2.1 (H) 0.9 - 1.1      Prothrombin time 20.6 (H) 9.0 - 11.1 sec   SAMPLES BEING HELD   Result Value Ref Range    SAMPLES BEING HELD 1SST, 1GREEN, 1LAV     COMMENT Add-on orders for these samples will be processed based on acceptable specimen integrity and analyte stability, which may vary by analyte. PROTHROMBIN TIME + INR   Result Value Ref Range    INR 2.3 (H) 0.9 - 1.1      Prothrombin time 22.6 (H) 9.0 - 11.1 sec   SAMPLES BEING HELD   Result Value Ref Range    SAMPLES BEING HELD 1GREEN, 1LAV     COMMENT        Add-on orders for these samples will be processed based on acceptable specimen integrity and analyte stability, which may vary by analyte. PROTHROMBIN TIME + INR   Result Value Ref Range    INR 2.4 (H) 0.9 - 1.1      Prothrombin time 23.5 (H) 9.0 - 11.1 sec   PROTHROMBIN TIME + INR   Result Value Ref Range    INR 2.7 (H) 0.9 - 1.1      Prothrombin time 26.1 (H) 9.0 - 11.1 sec   PROTHROMBIN TIME + INR   Result Value Ref Range    INR 2.7 (H) 0.9 - 1.1      Prothrombin time 26.4 (H) 9.0 - 66.8 sec   METABOLIC PANEL, COMPREHENSIVE   Result Value Ref Range    Sodium 138 136 - 145 mmol/L    Potassium 6.0 (H) 3.5 - 5.1 mmol/L    Chloride 103 97 - 108 mmol/L    CO2 29 21 - 32 mmol/L    Anion gap 6 5 - 15 mmol/L    Glucose 75 65 - 100 mg/dL    BUN 30 (H) 6 - 20 MG/DL    Creatinine 3.00 (H) 0.70 - 1.30 MG/DL    BUN/Creatinine ratio 10 (L) 12 - 20      GFR est AA 25 (L) >60 ml/min/1.73m2    GFR est non-AA 20 (L) >60 ml/min/1.73m2    Calcium 8.3 (L) 8.5 - 10.1 MG/DL    Bilirubin, total 0.3 0.2 - 1.0 MG/DL    ALT (SGPT) 53 12 - 78 U/L    AST (SGOT) 44 (H) 15 - 37 U/L    Alk.  phosphatase 83 45 - 117 U/L    Protein, total 5.2 (L) 6.4 - 8.2 g/dL    Albumin 2.3 (L) 3.5 - 5.0 g/dL    Globulin 2.9 2.0 - 4.0 g/dL    A-G Ratio 0.8 (L) 1.1 - 2.2     CBC WITH AUTOMATED DIFF   Result Value Ref Range    WBC 5.3 4.1 - 11.1 K/uL    RBC 2.60 (L) 4.10 - 5.70 M/uL    HGB 8.1 (L) 12.1 - 17.0 g/dL    HCT 26.1 (L) 36.6 - 50.3 %    .4 (H) 80.0 - 99.0 FL    MCH 31.2 26.0 - 34.0 PG    MCHC 31.0 30.0 - 36.5 g/dL    RDW 14.8 (H) 11.5 - 14.5 %    PLATELET 348 519 - 811 K/uL    MPV 11.0 8.9 - 12.9 FL    NRBC 0.0 0  WBC    ABSOLUTE NRBC 0.00 0.00 - 0.01 K/uL    NEUTROPHILS 61 32 - 75 %    LYMPHOCYTES 22 12 - 49 %    MONOCYTES 14 (H) 5 - 13 %    EOSINOPHILS 2 0 - 7 %    BASOPHILS 1 0 - 1 %    IMMATURE GRANULOCYTES 0 0.0 - 0.5 %    ABS. NEUTROPHILS 3.3 1.8 - 8.0 K/UL    ABS. LYMPHOCYTES 1.2 0.8 - 3.5 K/UL    ABS. MONOCYTES 0.7 0.0 - 1.0 K/UL    ABS. EOSINOPHILS 0.1 0.0 - 0.4 K/UL    ABS. BASOPHILS 0.1 0.0 - 0.1 K/UL    ABS. IMM. GRANS. 0.0 0.00 - 0.04 K/UL    DF AUTOMATED     INTRINSIC FACTOR AB   Result Value Ref Range    Intrinsic factor Abs 1.0 0.0 - 1.1 AU/mL   ERYTHROPOIETIN   Result Value Ref Range    Erythropoietin 8.1 2.6 - 18.5 mIU/mL   PROTHROMBIN TIME + INR   Result Value Ref Range    INR 2.5 (H) 0.9 - 1.1      Prothrombin time 24.9 (H) 9.0 - 94.0 sec   METABOLIC PANEL, COMPREHENSIVE   Result Value Ref Range    Sodium 138 136 - 145 mmol/L    Potassium 5.3 (H) 3.5 - 5.1 mmol/L    Chloride 104 97 - 108 mmol/L    CO2 29 21 - 32 mmol/L    Anion gap 5 5 - 15 mmol/L    Glucose 75 65 - 100 mg/dL    BUN 27 (H) 6 - 20 MG/DL    Creatinine 2.81 (H) 0.70 - 1.30 MG/DL    BUN/Creatinine ratio 10 (L) 12 - 20      GFR est AA 26 (L) >60 ml/min/1.73m2    GFR est non-AA 22 (L) >60 ml/min/1.73m2    Calcium 7.9 (L) 8.5 - 10.1 MG/DL    Bilirubin, total 0.3 0.2 - 1.0 MG/DL    ALT (SGPT) 47 12 - 78 U/L    AST (SGOT) 45 (H) 15 - 37 U/L    Alk. phosphatase 84 45 - 117 U/L    Protein, total 5.2 (L) 6.4 - 8.2 g/dL    Albumin 2.4 (L) 3.5 - 5.0 g/dL    Globulin 2.8 2.0 - 4.0 g/dL    A-G Ratio 0.9 (L) 1.1 - 2.2     PROTHROMBIN TIME + INR   Result Value Ref Range    INR 2.4 (H) 0.9 - 1.1      Prothrombin time 23.1 (H) 9.0 - 11.1 sec   SAMPLES BEING HELD   Result Value Ref Range    SAMPLES BEING HELD 1SST, 1RED, 1GREEN     COMMENT        Add-on orders for these samples will be processed based on acceptable specimen integrity and analyte stability, which may vary by analyte.    PROTHROMBIN TIME + INR   Result Value Ref Range    INR 2.4 (H) 0.9 - 1.1      Prothrombin time 24.0 (H) 9.0 - 11.1 sec   SAMPLES BEING HELD   Result Value Ref Range    SAMPLES BEING HELD 1RED, 1GREEN, 1LAV     COMMENT        Add-on orders for these samples will be processed based on acceptable specimen integrity and analyte stability, which may vary by analyte. PROTHROMBIN TIME + INR   Result Value Ref Range    INR 2.5 (H) 0.9 - 1.1      Prothrombin time 24.2 (H) 9.0 - 11.1 sec   SAMPLES BEING HELD   Result Value Ref Range    SAMPLES BEING HELD 1SST, 1GREEN, 1LAV     COMMENT        Add-on orders for these samples will be processed based on acceptable specimen integrity and analyte stability, which may vary by analyte. PROTHROMBIN TIME + INR   Result Value Ref Range    INR 2.4 (H) 0.9 - 1.1      Prothrombin time 23.1 (H) 9.0 - 11.1 sec       No results found for this visit on 01/19/22. Assessment/ Plan:     1. 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 Diastolic CHF, chronic (HCC)  Followed by specialist   - CBC WITH AUTOMATED DIFF; Future  - METABOLIC PANEL, COMPREHENSIVE; Future  - LIPID PANEL; Future  - CULTURE, WOUND W GRAM STAIN; Future  - CULTURE, WOUND W GRAM STAIN  - LIPID PANEL  - METABOLIC PANEL, COMPREHENSIVE  - CBC WITH AUTOMATED DIFF    3. CKD (chronic kidney disease) stage 5, GFR less than 15 ml/min (HCC)  Stable  Continue medical management and medications as ordered  - CBC WITH AUTOMATED DIFF; Future  - METABOLIC PANEL, COMPREHENSIVE; Future  - LIPID PANEL;  Future  - CULTURE, WOUND W GRAM STAIN; Future  - CULTURE, WOUND W GRAM STAIN  - LIPID PANEL  - METABOLIC PANEL, COMPREHENSIVE  - CBC WITH AUTOMATED DIFF    4. PVD (peripheral vascular disease) ([facility]  Stable  No change in medical management   - CBC WITH AUTOMATED DIFF; Future  - METABOLIC PANEL, COMPREHENSIVE; Future  - LIPID PANEL; Future  - CULTURE, WOUND W GRAM STAIN; Future  - CULTURE, WOUND W GRAM STAIN  - LIPID PANEL  - METABOLIC PANEL, COMPREHENSIVE  - CBC WITH AUTOMATED DIFF    5. PAF (paroxysmal atrial fibrillation) (HCC)  Stable no change in medical management   - CBC WITH AUTOMATED DIFF; Future  - METABOLIC PANEL, COMPREHENSIVE; Future  - LIPID PANEL; Future  - CULTURE, WOUND W GRAM STAIN; Future  - CULTURE, WOUND W GRAM STAIN  - LIPID PANEL  - METABOLIC PANEL, COMPREHENSIVE  - CBC WITH AUTOMATED DIFF    6. Coagulopathy ([facility]  Checks INR weekly with home monitoring system. - CBC WITH AUTOMATED DIFF; Future  - METABOLIC PANEL, COMPREHENSIVE; Future  - LIPID PANEL; Future  - CULTURE, WOUND W GRAM STAIN; Future  - CULTURE, WOUND W GRAM STAIN  - LIPID PANEL  - METABOLIC PANEL, COMPREHENSIVE  - CBC WITH AUTOMATED DIFF    7. Frail elderly  Encourage nutrients , hydration and safety. Higher risk for falls. - CBC WITH AUTOMATED DIFF; Future  - METABOLIC PANEL, COMPREHENSIVE; Future  - LIPID PANEL; Future  - CULTURE, WOUND W GRAM STAIN; Future  - CULTURE, WOUND W GRAM STAIN  - LIPID PANEL  - METABOLIC PANEL, COMPREHENSIVE  - CBC WITH AUTOMATED DIFF    8. Essential hypertension    - CBC WITH AUTOMATED DIFF; Future  - METABOLIC PANEL, COMPREHENSIVE; Future  - LIPID PANEL; Future  - CULTURE, WOUND W GRAM STAIN; Future  - CULTURE, WOUND W GRAM STAIN  - LIPID PANEL  - METABOLIC PANEL, COMPREHENSIVE  - CBC WITH AUTOMATED DIFF    9. Trigger little finger of left hand    - REFERRAL TO ORTHOPEDICS    10. Trigger little finger of right hand    - REFERRAL TO ORTHOPEDICS    11.  Chronic atrial fibrillation (HCC)  Continue medical management   - COLLECTION CAPILLARY BLOOD SPECIMEN      Orders Placed This Encounter    CULTURE, WOUND W GRAM STAIN ([employer] Only)     Right heel wound     Standing Status:   Future Number of Occurrences:   1     Standing Expiration Date:   2/19/2022    CBC WITH AUTOMATED DIFF     Standing Status:   Future     Number of Occurrences:   1     Standing Expiration Date:   0/13/8803    METABOLIC PANEL, COMPREHENSIVE     Standing Status:   Future     Number of Occurrences:   1     Standing Expiration Date:   2/19/2022    LIPID PANEL     Standing Status:   Future     Number of Occurrences:   1     Standing Expiration Date:   2/19/2022    REFERRAL TO ORTHOPEDICS     Referral Priority:   Routine     Referral Type:   Consultation     Referral Reason:   Specialty Services Required     Referred to Provider:   Mann Coles MD     Requested Specialty:   Orthopedic Surgery     Number of Visits Requested:   1       Patient will follow up in one month or sooner if needed. Verbal and written instructions (see AVS) provided. Patient expresses understanding of diagnosis and treatment plan.     Health Maintenance Due   Topic Date Due    Shingrix Vaccine Age 49> (1 of 2) Never done    COVID-19 Vaccine (3 - Moderna risk 4-dose series) 10/12/2021    Lipid Screen  01/04/2022               JOAN UgaldeP-C

## 2022-01-20 LAB
ALBUMIN SERPL-MCNC: 3.4 G/DL (ref 3.5–5)
ALBUMIN/GLOB SERPL: 1.3 {RATIO} (ref 1.1–2.2)
ALP SERPL-CCNC: 88 U/L (ref 45–117)
ALT SERPL-CCNC: 24 U/L (ref 12–78)
ANION GAP SERPL CALC-SCNC: 2 MMOL/L (ref 5–15)
AST SERPL-CCNC: 19 U/L (ref 15–37)
BASOPHILS # BLD: 0 K/UL (ref 0–0.1)
BASOPHILS NFR BLD: 1 % (ref 0–1)
BILIRUB SERPL-MCNC: 0.3 MG/DL (ref 0.2–1)
BUN SERPL-MCNC: 38 MG/DL (ref 6–20)
BUN/CREAT SERPL: 15 (ref 12–20)
CALCIUM SERPL-MCNC: 8.8 MG/DL (ref 8.5–10.1)
CHLORIDE SERPL-SCNC: 109 MMOL/L (ref 97–108)
CHOLEST SERPL-MCNC: 143 MG/DL
CO2 SERPL-SCNC: 28 MMOL/L (ref 21–32)
CREAT SERPL-MCNC: 2.47 MG/DL (ref 0.7–1.3)
DIFFERENTIAL METHOD BLD: ABNORMAL
EOSINOPHIL # BLD: 0.1 K/UL (ref 0–0.4)
EOSINOPHIL NFR BLD: 1 % (ref 0–7)
ERYTHROCYTE [DISTWIDTH] IN BLOOD BY AUTOMATED COUNT: 15.1 % (ref 11.5–14.5)
GLOBULIN SER CALC-MCNC: 2.7 G/DL (ref 2–4)
GLUCOSE SERPL-MCNC: 98 MG/DL (ref 65–100)
HCT VFR BLD AUTO: 30.7 % (ref 36.6–50.3)
HDLC SERPL-MCNC: 48 MG/DL
HDLC SERPL: 3 {RATIO} (ref 0–5)
HGB BLD-MCNC: 9.3 G/DL (ref 12.1–17)
IMM GRANULOCYTES # BLD AUTO: 0 K/UL (ref 0–0.04)
IMM GRANULOCYTES NFR BLD AUTO: 1 % (ref 0–0.5)
LDLC SERPL CALC-MCNC: 74.8 MG/DL (ref 0–100)
LYMPHOCYTES # BLD: 1 K/UL (ref 0.8–3.5)
LYMPHOCYTES NFR BLD: 16 % (ref 12–49)
MCH RBC QN AUTO: 31.5 PG (ref 26–34)
MCHC RBC AUTO-ENTMCNC: 30.3 G/DL (ref 30–36.5)
MCV RBC AUTO: 104.1 FL (ref 80–99)
MONOCYTES # BLD: 0.6 K/UL (ref 0–1)
MONOCYTES NFR BLD: 10 % (ref 5–13)
NEUTS SEG # BLD: 4.6 K/UL (ref 1.8–8)
NEUTS SEG NFR BLD: 71 % (ref 32–75)
NRBC # BLD: 0 K/UL (ref 0–0.01)
NRBC BLD-RTO: 0 PER 100 WBC
PLATELET # BLD AUTO: 216 K/UL (ref 150–400)
PMV BLD AUTO: 12.3 FL (ref 8.9–12.9)
POTASSIUM SERPL-SCNC: 4.7 MMOL/L (ref 3.5–5.1)
PROT SERPL-MCNC: 6.1 G/DL (ref 6.4–8.2)
RBC # BLD AUTO: 2.95 M/UL (ref 4.1–5.7)
SODIUM SERPL-SCNC: 139 MMOL/L (ref 136–145)
TRIGL SERPL-MCNC: 101 MG/DL (ref ?–150)
VLDLC SERPL CALC-MCNC: 20.2 MG/DL
WBC # BLD AUTO: 6.4 K/UL (ref 4.1–11.1)

## 2022-01-20 RX ORDER — TETRACYCLINE HYDROCHLORIDE 250 MG/1
250 CAPSULE ORAL
Qty: 40 CAPSULE | Refills: 0 | Status: SHIPPED | OUTPATIENT
Start: 2022-01-20 | End: 2022-01-21

## 2022-01-21 ENCOUNTER — TELEPHONE (OUTPATIENT)
Dept: FAMILY MEDICINE CLINIC | Age: 82
End: 2022-01-21

## 2022-01-21 RX ORDER — DOXYCYCLINE 100 MG/1
100 CAPSULE ORAL 2 TIMES DAILY
Qty: 20 CAPSULE | Refills: 0 | Status: SHIPPED | OUTPATIENT
Start: 2022-01-21 | End: 2022-01-31

## 2022-01-21 NOTE — TELEPHONE ENCOUNTER
Sp/w Carlos Mcgrath, daughter, advised of cx results and recommendations and needs iron supplement.  KT

## 2022-01-21 NOTE — TELEPHONE ENCOUNTER
Pts daughter called and stated that they went to  prescription of tetracycline and it was too much money even with insurance. She is wondering if there is another med that can be prescribed that would be less expensive.   Call back # [phone]

## 2022-01-21 NOTE — PROGRESS NOTES
Sp/w pt, advised of wound cx results, antibx at [address] He will have someone pick that up for him. Advised of recommendation to see a kidney specialist, he states that \"he isn't going to see any new doctors\". Discussed other results. Called Lab, sp/w Nickolas Howard, she states they will run sensitivities.  KT

## 2022-01-21 NOTE — PROGRESS NOTES
Wound culture positive for both gram positive and negative bacteria. Start on tetracycline which was sent in to [address] [facility] in [facility]. Lower dose due to kidney function. Please contact lab to provide a sensitivity . Consider referral to nephrologist . Concern for malnutrition   CBC iron deficiency anemia. Consider iron supplement after completing antibiotic for wound infection. Cholesterol level is excellent.

## 2022-01-22 LAB
BACTERIA SPEC CULT: ABNORMAL
BACTERIA SPEC CULT: ABNORMAL
GRAM STN SPEC: ABNORMAL
SERVICE CMNT-IMP: ABNORMAL

## 2022-01-24 ENCOUNTER — TELEPHONE (OUTPATIENT)
Dept: FAMILY MEDICINE CLINIC | Age: 82
End: 2022-01-24

## 2022-01-24 NOTE — TELEPHONE ENCOUNTER
Patient's caregiver Audrey Nielsen is calling stating the patient's prescription Santyl is needing to be sent to [facility], she can be reached at [phone]

## 2022-01-28 ENCOUNTER — TELEPHONE (OUTPATIENT)
Dept: FAMILY MEDICINE CLINIC | Age: 82
End: 2022-01-28

## 2022-01-28 NOTE — TELEPHONE ENCOUNTER
Pt home health nurse called and stated they went to  the collagenase and it was over $200.  Is there another prescription that could be prescribed that would be more affordable for pt?

## 2022-01-28 NOTE — TELEPHONE ENCOUNTER
Patient advised that he will need to go to the ER for further testing,due to the symptoms in which he is having.

## 2022-01-28 NOTE — TELEPHONE ENCOUNTER
Pt caregiver is calling stating that patient may have had a stroke on yesterday, he is leaning to left side and squinting on eye left side and is blood shot red.  Please call Caregiver Robert at [phone]

## 2022-01-31 NOTE — TELEPHONE ENCOUNTER
Wilfr[address] called regarding collagenase prescription and would like a call back on what could be used instead. States she didn't receive call back on Friday.   Call back # [phone]

## 2022-01-31 NOTE — TELEPHONE ENCOUNTER
Ms Yadiel Root was called, informed of the update message with Bebe Ngo / [facility]. She was informed as well, I will give her a call back once I have given the verbal or faxed order from Enrique Stern to [facility]. She stated OK and thanks so much.

## 2022-01-31 NOTE — TELEPHONE ENCOUNTER
BARBARA, was transferred to the Clinical  Sylvia, the other nurse it could have been that called is on vacation. Sylvia was informed of Elif's advice, stated they could go ahead and try the Medi Honey. It is something they are able to get at an very affordable price without a Rx, but will need an order from Rosy to use. It does not work as fast as the original prescribed, but does eventually work well. I stated to her, I will send the note to Rosy for a response to call back with a verbal or fax order. She stated OK and thanks. The fax # is [phone].

## 2022-02-01 ENCOUNTER — TELEPHONE (OUTPATIENT)
Dept: FAMILY MEDICINE CLINIC | Age: 82
End: 2022-02-01

## 2022-02-01 NOTE — TELEPHONE ENCOUNTER
----- Message from Terrance Herzog sent at 2/1/2022  1:39 PM EST -----  Subject: Message to Provider    QUESTIONS  Information for Provider? King Alejandro, occupational therapist, [facility], SENDS MESSAGE TO PCP? PATIENT TAJ THOMPSON USING WALKER,   DISCHARGED TODAY, 02/01/2022 FOR NON COMPLIANCE   ---------------------------------------------------------------------------  --------------  CALL BACK INFO  What is the best way for the office to contact you? OK to leave message on   voicemail  Preferred Call Back Phone Number? [phone]  ---------------------------------------------------------------------------  --------------  SCRIPT ANSWERS  Relationship to Patient? Other  Representative Name? gabriel Franklin, Ryan [facility]   care,   Is the Representative on the appropriate HIPAA document in Epic?  Yes

## 2022-02-01 NOTE — TELEPHONE ENCOUNTER
PC GRICELDA Philippe / Blas Mcburney, she was informed verbally per Silas Saenz, it is perfectly OK to go ahead with using the Medi Honey. She stated OK, thanks so much, they will go ahead get the supplies and get started. I stated to her OK thanks, I will give his care giver Ms Bri Camilo the update for FYI. She stated OK and thanks once again.

## 2022-02-01 NOTE — TELEPHONE ENCOUNTER
PC SW Ms Leni Alonso, she was informed of the update PC to [facility] this morning. She stated OK, thanks, a nurse did come by about 2:30 on yesterday, but stated she was not aware of the PC into the office on yesterday, but will follow up with her office.

## 2022-02-02 NOTE — TELEPHONE ENCOUNTER
S/w patient he states he has no injury from his fall and he will use his walker for ambulation, LETHA

## 2022-02-02 NOTE — TELEPHONE ENCOUNTER
OT reported patient fell not using his walker. Please contact to Mr. Vikki Wiley to check on condition. Concern for injury from fall. OT discharged for noncompliance. Home safety needs to be assessed. Thanks!  DL

## 2022-02-16 ENCOUNTER — TELEPHONE (OUTPATIENT)
Dept: FAMILY MEDICINE CLINIC | Age: 82
End: 2022-02-16

## 2022-02-16 NOTE — TELEPHONE ENCOUNTER
The patient's caregiver Ms Risa Lopez is calling stating that the patient should be taking iron pills but not sure how much or how often he should be taking, please call and advise, the call back # is [phone]

## 2022-02-16 NOTE — TELEPHONE ENCOUNTER
Patient and c/g informed to take his 65 mg iron he has daily with Orange Juice and Miralax to help with potential constipation

## 2022-02-21 ENCOUNTER — TELEPHONE (OUTPATIENT)
Dept: FAMILY MEDICINE CLINIC | Age: 82
End: 2022-02-21

## 2022-02-21 NOTE — TELEPHONE ENCOUNTER
The [address] [place] [address] called stating that they have re-certified the patient for [address] [place] [address] for another 60 days and to also make aware that the patient had a fall and there was a skin tear, the nurse did clean and applied dressing on the wound.  Jesse's call back # is [phone] if any questions

## 2022-02-23 ENCOUNTER — OFFICE VISIT (OUTPATIENT)
Dept: FAMILY MEDICINE CLINIC | Age: 82
End: 2022-02-23
Payer: MEDICARE

## 2022-02-23 VITALS
DIASTOLIC BLOOD PRESSURE: 70 MMHG | SYSTOLIC BLOOD PRESSURE: 130 MMHG | HEIGHT: 70 IN | OXYGEN SATURATION: 84 % | TEMPERATURE: 97.2 F | BODY MASS INDEX: 21.09 KG/M2 | RESPIRATION RATE: 22 BRPM | HEART RATE: 54 BPM

## 2022-02-23 DIAGNOSIS — Z51.89 ENCOUNTER FOR WOUND RE-CHECK: Primary | ICD-10-CM

## 2022-02-23 PROCEDURE — G8432 DEP SCR NOT DOC, RNG: HCPCS | Performed by: NURSE PRACTITIONER

## 2022-02-23 PROCEDURE — G9231 DOC ESRD DIA TRANS PREG: HCPCS | Performed by: NURSE PRACTITIONER

## 2022-02-23 PROCEDURE — G8536 NO DOC ELDER MAL SCRN: HCPCS | Performed by: NURSE PRACTITIONER

## 2022-02-23 PROCEDURE — 1101F PT FALLS ASSESS-DOCD LE1/YR: CPT | Performed by: NURSE PRACTITIONER

## 2022-02-23 PROCEDURE — G8427 DOCREV CUR MEDS BY ELIG CLIN: HCPCS | Performed by: NURSE PRACTITIONER

## 2022-02-23 PROCEDURE — G8420 CALC BMI NORM PARAMETERS: HCPCS | Performed by: NURSE PRACTITIONER

## 2022-02-23 PROCEDURE — 99212 OFFICE O/P EST SF 10 MIN: CPT | Performed by: NURSE PRACTITIONER

## 2022-02-23 NOTE — ACP (ADVANCE CARE PLANNING)
Discussed importance of advanced medical directives with patient. Patient is capable of making decisions.   Ludmila Smart NP-C

## 2022-02-23 NOTE — PROGRESS NOTES
1. Have you been to the ER, urgent care clinic since your last visit? Hospitalized since your last visit? No    2. Have you seen or consulted any other health care providers outside of the [address] [place] [address] since your last visit? Include any pap smears or colon screening.  No      Chief Complaint   Patient presents with    Wound Check     right  heel         Visit Vitals  /70 (BP 1 Location: Right upper arm, BP Patient Position: Sitting, BP Cuff Size: Adult)   Pulse (!) 54   Temp 97.2 °F (36.2 °C) (Temporal)   Resp 22   Ht 5' 10\" (1.778 m)   SpO2 (!) 84% Comment: refuses O2   BMI 21.09 kg/m²       Pain Scale: 0 - No pain/10  Pain Location:

## 2022-02-23 NOTE — PROGRESS NOTES
Subjective:      Zach White is a 81 y.o. male who presents today for wound check. He has a three  wounds which is located ;  right heel dime size wound . Right Shin and elbow on right side abrasions  Right Upper arm skin tears  4x4 cm  Current symptoms: wound healing as expected all wounds noted above are clean and dry no exudate,     Interventions to date: dressings changed  This morning. Objective:     Visit Vitals  /70 (BP 1 Location: Right upper arm, BP Patient Position: Sitting, BP Cuff Size: Adult)   Pulse (!) 54   Temp 97.2 °F (36.2 °C) (Temporal)   Resp 22   Ht 5' 10\" (1.778 m)   SpO2 (!) 84% Comment: refuses O2   BMI 21.09 kg/m²       Wound:   wound margins intact and healing well. No signs of infection. Assessment:     Wound check. Right heel dime size wound . Right Shin and elbow on right side abrasions. both approximately 3 x 3 cm   Right Upper arm skin tears  4x4 cm    Interventions today removal of existing dressing  visual inspection  cleansing with sterile saline solution  Heel redressed with medi honey  And foam guaze. Shin and elbow redressed with a Band-Aid non adhesive over wound    Upper arm Vaseline guaze  Covered wrapped with [employer]. application of clean dressing. Plan:     1. Discussed appropriate home care of this wound. , Wound redressed. 2. Patient instructions were given. 3. Follow up: with home health in office in 1 month .     Severiano Guthrie NP-C

## 2022-03-03 ENCOUNTER — TELEPHONE (OUTPATIENT)
Dept: FAMILY MEDICINE CLINIC | Age: 82
End: 2022-03-03

## 2022-03-03 NOTE — TELEPHONE ENCOUNTER
S/w BB his C/G said he has not missed any doses of coumadin. She also wants to know when he needs a f/u visit and his iron level check?

## 2022-03-18 PROBLEM — T45.511A COUMADIN TOXICITY, ACCIDENTAL OR UNINTENTIONAL, INITIAL ENCOUNTER: Status: ACTIVE | Noted: 2019-07-29

## 2022-03-18 PROBLEM — N18.30 ACUTE RENAL FAILURE SUPERIMPOSED ON STAGE 3 CHRONIC KIDNEY DISEASE (HCC): Status: ACTIVE | Noted: 2021-12-03

## 2022-03-18 PROBLEM — N17.9 ACUTE RENAL FAILURE SUPERIMPOSED ON STAGE 3 CHRONIC KIDNEY DISEASE (HCC): Status: ACTIVE | Noted: 2021-12-03
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
## 2022-03-18 PROBLEM — N17.9 AKI (ACUTE KIDNEY INJURY) (HCC): Status: ACTIVE | Noted: 2021-02-08

## 2022-03-18 PROBLEM — I48.0 PAF (PAROXYSMAL ATRIAL FIBRILLATION) (HCC): Status: ACTIVE | Noted: 2021-02-09

## 2022-03-19 PROBLEM — Z79.01 ANTICOAGULANT LONG-TERM USE: Status: ACTIVE | Noted: 2017-02-28

## 2022-03-19 PROBLEM — S42.301A CLOSED RIGHT HUMERAL FRACTURE: Status: ACTIVE | Noted: 2019-07-29

## 2022-03-19 PROBLEM — N18.5 CKD (CHRONIC KIDNEY DISEASE) STAGE 5, GFR LESS THAN 15 ML/MIN (HCC): Status: ACTIVE | Noted: 2019-03-05

## 2022-03-19 PROBLEM — S42.201A CLOSED FRACTURE OF RIGHT PROXIMAL HUMERUS: Status: ACTIVE | Noted: 2019-07-31

## 2022-03-19 PROBLEM — Z51.89 ENCOUNTER FOR REHABILITATION: Status: ACTIVE | Noted: 2021-02-13

## 2022-03-19 PROBLEM — N18.4 CKD (CHRONIC KIDNEY DISEASE) STAGE 4, GFR 15-29 ML/MIN (HCC): Status: ACTIVE | Noted: 2019-03-05

## 2022-03-19 PROBLEM — D50.0 IRON DEFICIENCY ANEMIA DUE TO CHRONIC BLOOD LOSS: Status: ACTIVE | Noted: 2021-02-09

## 2022-03-19 PROBLEM — S42.201D CLOSED FRACTURE OF PROXIMAL END OF RIGHT HUMERUS WITH ROUTINE HEALING: Status: ACTIVE | Noted: 2019-08-07

## 2022-03-19 PROBLEM — M47.816 SPONDYLOSIS OF LUMBAR REGION WITHOUT MYELOPATHY OR RADICULOPATHY: Status: ACTIVE | Noted: 2017-10-11

## 2022-03-19 PROBLEM — T79.6XXA TRAUMATIC RHABDOMYOLYSIS (HCC): Status: ACTIVE | Noted: 2021-12-03

## 2022-03-19 PROBLEM — I50.32 DIASTOLIC CHF, CHRONIC (HCC): Status: ACTIVE | Noted: 2019-10-11

## 2022-03-19 PROBLEM — J18.9 CAP (COMMUNITY ACQUIRED PNEUMONIA): Status: ACTIVE | Noted: 2021-12-03

## 2022-03-19 PROBLEM — R53.1 GENERALIZED WEAKNESS: Status: ACTIVE | Noted: 2021-02-08

## 2022-03-19 PROBLEM — S22.32XA LEFT RIB FRACTURE: Status: ACTIVE | Noted: 2019-07-29

## 2022-03-19 PROBLEM — M62.82 RHABDOMYOLYSIS: Status: ACTIVE | Noted: 2021-12-03

## 2022-03-19 PROBLEM — S72.009A HIP FRACTURE (HCC): Status: ACTIVE | Noted: 2021-02-11

## 2022-03-19 PROBLEM — N18.30 STAGE 3 CHRONIC KIDNEY DISEASE (HCC): Status: ACTIVE | Noted: 2019-08-05

## 2022-03-19 PROBLEM — E03.9 HYPOTHYROID: Status: ACTIVE | Noted: 2019-08-05

## 2022-03-19 PROBLEM — J98.4 PULMONARY SCARRING: Status: ACTIVE | Noted: 2017-03-28

## 2022-03-20 PROBLEM — J90 LOCULATED PLEURAL EFFUSION: Status: ACTIVE | Noted: 2021-12-03

## 2022-03-20 PROBLEM — I48.20 CHRONIC ATRIAL FIBRILLATION (HCC): Status: ACTIVE | Noted: 2018-09-21

## 2022-03-20 PROBLEM — N18.9 HISTORY OF ANEMIA DUE TO CHRONIC KIDNEY DISEASE: Status: ACTIVE | Noted: 2019-07-29

## 2022-03-20 PROBLEM — I10 ESSENTIAL HYPERTENSION: Status: ACTIVE | Noted: 2019-08-05

## 2022-03-20 PROBLEM — Z86.2 HISTORY OF ANEMIA DUE TO CHRONIC KIDNEY DISEASE: Status: ACTIVE | Noted: 2019-07-29

## 2022-03-20 PROBLEM — C83.35 DIFFUSE LARGE B-CELL LYMPHOMA OF LYMPH NODES OF INGUINAL REGION (HCC): Status: ACTIVE | Noted: 2020-02-11

## 2022-03-20 PROBLEM — D72.829 LEUKOCYTOSIS: Status: ACTIVE | Noted: 2021-12-03

## 2022-03-23 ENCOUNTER — OFFICE VISIT (OUTPATIENT)
Dept: FAMILY MEDICINE CLINIC | Age: 82
End: 2022-03-23
Payer: MEDICARE

## 2022-03-23 VITALS
WEIGHT: 146 LBS | TEMPERATURE: 97.7 F | SYSTOLIC BLOOD PRESSURE: 140 MMHG | HEART RATE: 63 BPM | OXYGEN SATURATION: 94 % | HEIGHT: 70 IN | DIASTOLIC BLOOD PRESSURE: 72 MMHG | RESPIRATION RATE: 24 BRPM | BODY MASS INDEX: 20.9 KG/M2

## 2022-03-23 DIAGNOSIS — Z51.89 VISIT FOR WOUND CHECK: ICD-10-CM

## 2022-03-23 DIAGNOSIS — D50.8 IRON DEFICIENCY ANEMIA SECONDARY TO INADEQUATE DIETARY IRON INTAKE: ICD-10-CM

## 2022-03-23 DIAGNOSIS — T14.8XXD WOUND HEALING, DELAYED: ICD-10-CM

## 2022-03-23 DIAGNOSIS — E55.9 VITAMIN D DEFICIENCY: ICD-10-CM

## 2022-03-23 DIAGNOSIS — R53.83 FATIGUE, UNSPECIFIED TYPE: Primary | ICD-10-CM

## 2022-03-23 PROCEDURE — G9231 DOC ESRD DIA TRANS PREG: HCPCS | Performed by: NURSE PRACTITIONER

## 2022-03-23 PROCEDURE — G8432 DEP SCR NOT DOC, RNG: HCPCS | Performed by: NURSE PRACTITIONER

## 2022-03-23 PROCEDURE — 99213 OFFICE O/P EST LOW 20 MIN: CPT | Performed by: NURSE PRACTITIONER

## 2022-03-23 PROCEDURE — 1101F PT FALLS ASSESS-DOCD LE1/YR: CPT | Performed by: NURSE PRACTITIONER

## 2022-03-23 PROCEDURE — G8420 CALC BMI NORM PARAMETERS: HCPCS | Performed by: NURSE PRACTITIONER

## 2022-03-23 PROCEDURE — G8427 DOCREV CUR MEDS BY ELIG CLIN: HCPCS | Performed by: NURSE PRACTITIONER

## 2022-03-23 PROCEDURE — G8536 NO DOC ELDER MAL SCRN: HCPCS | Performed by: NURSE PRACTITIONER

## 2022-03-23 NOTE — PROGRESS NOTES
1. \"Have you been to the ER, urgent care clinic since your last visit? Hospitalized since your last visit? \" No    2. \"Have you seen or consulted any other health care providers outside of the [address] [place] [address] since your last visit? \" No     3. For patients aged 39-70: Has the patient had a colonoscopy / FIT/ Cologuard? NA - based on age      If the patient is female:    4. For patients aged 41-77: Has the patient had a mammogram within the past 2 years? NA - based on age or sex      11. For patients aged 21-65: Has the patient had a pap smear?  NA - based on age or sex

## 2022-03-25 LAB
25(OH)D3+25(OH)D2 SERPL-MCNC: 29.1 NG/ML (ref 30–100)
BASOPHILS # BLD AUTO: 0 X10E3/UL (ref 0–0.2)
BASOPHILS NFR BLD AUTO: 1 %
EOSINOPHIL # BLD AUTO: 0.1 X10E3/UL (ref 0–0.4)
EOSINOPHIL NFR BLD AUTO: 1 %
ERYTHROCYTE [DISTWIDTH] IN BLOOD BY AUTOMATED COUNT: 13.7 % (ref 11.6–15.4)
HCT VFR BLD AUTO: 30.3 % (ref 37.5–51)
HGB BLD-MCNC: 9.7 G/DL (ref 13–17.7)
IMM GRANULOCYTES # BLD AUTO: 0 X10E3/UL (ref 0–0.1)
IMM GRANULOCYTES NFR BLD AUTO: 0 %
IRON SATN MFR SERPL: 29 % (ref 15–55)
IRON SERPL-MCNC: 77 UG/DL (ref 38–169)
LYMPHOCYTES # BLD AUTO: 1.1 X10E3/UL (ref 0.7–3.1)
LYMPHOCYTES NFR BLD AUTO: 16 %
MCH RBC QN AUTO: 31.8 PG (ref 26.6–33)
MCHC RBC AUTO-ENTMCNC: 32 G/DL (ref 31.5–35.7)
MCV RBC AUTO: 99 FL (ref 79–97)
MONOCYTES # BLD AUTO: 0.6 X10E3/UL (ref 0.1–0.9)
MONOCYTES NFR BLD AUTO: 9 %
NEUTROPHILS # BLD AUTO: 4.9 X10E3/UL (ref 1.4–7)
NEUTROPHILS NFR BLD AUTO: 73 %
PLATELET # BLD AUTO: 197 X10E3/UL (ref 150–450)
RBC # BLD AUTO: 3.05 X10E6/UL (ref 4.14–5.8)
TIBC SERPL-MCNC: 265 UG/DL (ref 250–450)
UIBC SERPL-MCNC: 188 UG/DL (ref 111–343)
WBC # BLD AUTO: 6.8 X10E3/UL (ref 3.4–10.8)

## 2022-03-27 NOTE — PROGRESS NOTES
Anemia improving . Vit D level near normal   Iron levels are within normal limits   Good for healing.

## 2022-03-27 NOTE — PROGRESS NOTES
Subjective:      Guera Pérez is a 81 y.o. male who presents today for wound check. He has a ulceration due to PVD wound which is located on the left heel. Approximate size of a nickel. Current symptoms: no s/s of infection . No change in size since last visit   Interventions to date: dressing changed 1 day ago. Objective:     Visit Vitals  BP (!) 140/72 (BP 1 Location: Left upper arm, BP Patient Position: Sitting, BP Cuff Size: Adult)   Pulse 63   Temp 97.7 °F (36.5 °C) (Temporal)   Resp 24   Ht 5' 10\" (1.778 m)   Wt 146 lb (66.2 kg)   SpO2 94%   BMI 20.95 kg/m²       Wound:   wound margins intact . No signs of infection. appearance is similar to last recheck. Concern for delay in healing . No progress noted since last visit. Concern for impaired vascular status. Wound healing delayed approximate  nickel wound 2.1 cm      Assessment:     Wound check. Interventions today removal of existing dressing  visual inspection  cleansing with sterile saline solution  application of sterile dressing. Plan:     1. Discussed appropriate home care of this wound. , continue home health  Concern for high risk of infection. Orders Placed This Encounter    CBC WITH AUTOMATED DIFF     Standing Status:   Future     Number of Occurrences:   1     Standing Expiration Date:   4/23/2022    IRON PROFILE     Standing Status:   Future     Number of Occurrences:   1     Standing Expiration Date:   4/23/2022    VITAMIN D, 25 HYDROXY     Standing Status:   Future     Number of Occurrences:   1     Standing Expiration Date:   3/23/2023    VITAMIN D, 25 HYDROXY    COLLECTION VENOUS BLOOD,VENIPUNCTURE     Standing Status:   Future     Standing Expiration Date:   4/23/2022    DUPLEX LOWER EXT VENOUS BILAT     Standing Status:   Future     Standing Expiration Date:   9/23/2022    DUPLEX LOWER EXT ARTERY BILAT     Standing Status:   Future     Standing Expiration Date:   9/23/2022     2. Patient instructions were given.   3. Follow up: 1 month or sooner as needed continue home health wound care. Evaluation pending vascular study.     John FIERRO

## 2022-03-28 ENCOUNTER — TELEPHONE (OUTPATIENT)
Dept: FAMILY MEDICINE CLINIC | Age: 82
End: 2022-03-28

## 2022-03-28 NOTE — PROGRESS NOTES
Sp/w caregiver BB, advised of results and recommendations, caregiver expressed understanding. KT      She has the following questions:  1. Should he take Vitamin D OTC if so how much a day? 2. What can she do with his diet to increase his Hgb?

## 2022-03-29 NOTE — PROGRESS NOTES
Patient verified stating name and date of birth. Patients C/G BB informed of lab results and states understanding.

## 2022-04-04 ENCOUNTER — TELEPHONE (OUTPATIENT)
Dept: FAMILY MEDICINE CLINIC | Age: 82
End: 2022-04-04

## 2022-04-04 NOTE — TELEPHONE ENCOUNTER
Sp/w BB, caregiver, she wanted to know if she should change both layers of dressing, per [place] yes, change them both, may need to wet them down with saline or water if they are stuck to the wound. With the doppler referrals, she wants to schedule those herself. I gave her the number to central scheduling.  KT

## 2022-04-11 ENCOUNTER — TELEPHONE (OUTPATIENT)
Dept: FAMILY MEDICINE CLINIC | Age: 82
End: 2022-04-11

## 2022-04-11 NOTE — TELEPHONE ENCOUNTER
Pt and aid needs to clearify tests for pt that needs to be set up the hosp is asking them if both tests need to be done or one was put in by mistake because they cant be done on same day if so. . please advise radiology and pt / aid

## 2022-04-15 ENCOUNTER — TELEPHONE (OUTPATIENT)
Dept: FAMILY MEDICINE CLINIC | Age: 82
End: 2022-04-15

## 2022-04-15 NOTE — TELEPHONE ENCOUNTER
Ms Bella Sorensen is calling to get information about pt wound care and states it looks yellow to her has few questions

## 2022-04-18 ENCOUNTER — TELEPHONE (OUTPATIENT)
Dept: FAMILY MEDICINE CLINIC | Age: 82
End: 2022-04-18

## 2022-04-18 NOTE — TELEPHONE ENCOUNTER
BARBARA Ward / Samantha for clarification on their end concerning Mr. Castro. She stated, the order they were originally trying to fulfill regarding  the wound care for Mr. Castro was denied by his insurance company. So, on their last visit with him, they were able to show his care taker how to take care of the wound and left also a couple of the supplies to use. I stated to Sylvia, Ms Doris Ellison did make me aware of that, but did state, they were waiting on [facility] to do the evaluation letter, so probably that is what she was speaking of the a letter needing to go to his insurance company and not to [facility]. She stated yes, because once they receive a denial letter from a patient's insurance company, it drops off of them until it gets approved, but then is between the doctor's office and [employer]. Message routed to Littlejohn Donnelly for an updated FYI.

## 2022-04-18 NOTE — TELEPHONE ENCOUNTER
PC SW Ms Michael Mcdonough, she was informed of Elif's advice of trying to come back in the office to be seen to be re-evaluated for the wound care and probably talk about a possible different route to take concerning his care. She stated, she is a little disappointed with all of this because she is being told a couple of different things, but not what was originally told to her. She was only speaking of the letter because to her understanding, that was something that could had been done to help out with Mr. Neris Fermin situation. Also, he does not like to do a lot of going out caring his oxygen tank, he is on 3 liters,so it  makes him very exhausted with SOB when he returns home and sometimes even falls and then becomes very frustrated. So, she was just trying to do what she could to help out the situation and she is doing the best she can that is in her scope of practice to help, but seems as though it is not working. That is why she was hoping for the [place] / RN for the weekly  wound care and observation. I stated to her, once again I will send the update message to [facility]. She stated OK and thanks so much.

## 2022-04-18 NOTE — TELEPHONE ENCOUNTER
BARBARA SW Ms Anatoly Cordero to inform her of the update from [facility] regarding having [facility] to see if they can do a wound culture phone visit. She stated,  is not seeing Mr. Debbie Fisher at this time, but is waiting on the evaluation letter from [facility]  confirming the need for [facility] to do the wound care visits. I stated to her, I will send the message to [facility] for FYI to update if have not already been done.

## 2022-04-18 NOTE — TELEPHONE ENCOUNTER
Ms Robbin Mccann returned the [facility], she was informed of my PC to Samanhta and Elif's advice. She stated She was told by Samantha, they would reach back out to the insurance company once they received the letter of coordination to expedite the appeal of denial from the insurance company. She also stated, she had already explained this to Blanca Gibson as well and was told that Mr. Win Villarreal would need to be seen, so that is what some of his visit was about on 03/23/2022. Blanca Gibson did state to her at that [address], she would be more then glad to do a letter to expedite the appeal.  She was also questioning if there are other related test that could be done in place of the original ordered test? The cost for each one is $300. 00 co-pay, so will have to cancel the one that he had scheduled for today? I stated to her, I will send the updated message to Blanca Gibson for her to review. She stated OK and thanks and hopes to hear something back today, so she won't have to keep explaining the situation to different people.

## 2022-04-18 NOTE — TELEPHONE ENCOUNTER
Sp/w [facility], rep states that insurance has denied any additional visits because he has an aid. The aid is supposed to be taking care of the wound now. The aid thinks sending a letter to the insurance company would help. Samantha disagrees. Once the insurance has decided not to pay that's the end of the conversation. It has nothing to do with being out of network. They had to discharge him due to that denial letter.  KT

## 2022-04-18 NOTE — TELEPHONE ENCOUNTER
PC to [place], MB full, so was unable to leave a VM. PC to home number, no answer and was not able to leave a VM.

## 2022-04-20 ENCOUNTER — DOCUMENTATION ONLY (OUTPATIENT)
Dept: FAMILY MEDICINE CLINIC | Age: 82
End: 2022-04-20

## 2022-04-20 ENCOUNTER — HOSPITAL ENCOUNTER (EMERGENCY)
Age: 82
Discharge: HOME | End: 2022-04-20
Attending: EMERGENCY MEDICINE
Payer: MEDICARE

## 2022-04-20 VITALS
HEART RATE: 56 BPM | SYSTOLIC BLOOD PRESSURE: 191 MMHG | RESPIRATION RATE: 18 BRPM | TEMPERATURE: 98.6 F | WEIGHT: 146 LBS | OXYGEN SATURATION: 99 % | DIASTOLIC BLOOD PRESSURE: 77 MMHG | BODY MASS INDEX: 20.9 KG/M2 | HEIGHT: 70 IN

## 2022-04-20 DIAGNOSIS — S41.111A SKIN TEAR OF RIGHT UPPER ARM WITHOUT COMPLICATION, INITIAL ENCOUNTER: Primary | ICD-10-CM

## 2022-04-20 PROCEDURE — 99283 EMERGENCY DEPT VISIT LOW MDM: CPT

## 2022-04-20 PROCEDURE — 75810000293 HC SIMP/SUPERF WND  RPR

## 2022-04-20 PROCEDURE — 74011000250 HC RX REV CODE- 250: Performed by: EMERGENCY MEDICINE

## 2022-04-20 RX ORDER — BACITRACIN ZINC 500 UNIT/G
OINTMENT (GRAM) TOPICAL ONCE
Status: DISCONTINUED | OUTPATIENT
Start: 2022-04-20 | End: 2022-04-20 | Stop reason: CLARIF

## 2022-04-20 RX ORDER — BACITRACIN 500 [USP'U]/G
OINTMENT TOPICAL 2 TIMES DAILY
Qty: 28 G | Refills: 0 | Status: SHIPPED | OUTPATIENT
Start: 2022-04-20

## 2022-04-20 RX ORDER — LIDOCAINE AND PRILOCAINE 25; 25 MG/G; MG/G
CREAM TOPICAL
Status: COMPLETED | OUTPATIENT
Start: 2022-04-20 | End: 2022-04-20

## 2022-04-20 RX ORDER — BACITRACIN ZINC 500 [USP'U]/G
1 CREAM TOPICAL ONCE
Status: COMPLETED | OUTPATIENT
Start: 2022-04-20 | End: 2022-04-20

## 2022-04-20 RX ADMIN — BACITRACIN ZINC 1 PACKET: 500 OINTMENT TOPICAL at 12:14

## 2022-04-20 RX ADMIN — LIDOCAINE AND PRILOCAINE: 25; 25 CREAM TOPICAL at 12:14

## 2022-04-20 NOTE — ED PROVIDER NOTES
EMERGENCY DEPARTMENT HISTORY AND PHYSICAL EXAM          Date: 4/20/2022  Patient Name: Justin Dick    History of Presenting Illness     Chief Complaint   Patient presents with    Laceration       History Provided By: Patient    HPI: Justin Dick is a 81 y.o. male, pmhx listed below, who presents to the ED c/o arm injury. Patient fell last night and scraped her arm on door. When aide arrived today she noted arm injury. Patient denies pain in arm. Denies other injuries. Patient had placed a Band-Aid over arm wound last night, no other treatments or evaluations for arm prior to arrival.        PCP: Marisol Gil NP    There are no other complaints, changes, or physical findings at this time.          Past History       Past Medical History:  Past Medical History:   Diagnosis Date    Arrhythmia     atrial fibrillation, cardioversion 2010    CKD (chronic kidney disease) stage 3, GFR 30-59 ml/min ([facility]     Hyperlipemia     Hypertension     Iron deficiency anemia due to chronic blood loss 2/9/2021    PAF (paroxysmal atrial fibrillation) ([facility] 2/9/2021    PVD (peripheral vascular disease) ([facility]     Thyroid disease        Past Surgical History:  Past Surgical History:   Procedure Laterality Date    HX COLONOSCOPY  2010    WNL SIS 10 y    HX HERNIA REPAIR Bilateral     Inguinal    HX ORTHOPAEDIC      right shoulder fracture repair    HX OTHER SURGICAL  03/09/2019    groin lymph gland excision    HX VASCULAR ACCESS      IR INSERT TUNL CVC W PORT OVER 5 YEARS  3/5/2020    ND CHEST SURGERY PROCEDURE UNLISTED Left     lung surgery age 17, lobectomy    VASCULAR SURGERY PROCEDURE UNLIST      bypass numerous surgeries both legs       Family History:  Family History   Problem Relation Age of Onset    Cancer Mother         lung    Cancer Brother 66        lung    Cancer Maternal Uncle         cancer unknown       Social History:  Social History     Tobacco Use    Smoking status: Former Smoker Packs/day: 1.00     Years: 54.00     Pack years: 54.00     Quit date: 3/15/2007     Years since quitting: 15.1    Smokeless tobacco: Never Used   Vaping Use    Vaping Use: Never used   Substance Use Topics    Alcohol use: Yes     Comment: former drinker- beer   social    Drug use: Never       Current Outpatient Medications   Medication Sig Dispense Refill    bacitracin (BACITRACIN) 500 unit/gram oint Apply  to affected area two (2) times a day. Apply to affected area 28 g 0    dilTIAZem IR (CARDIZEM) 60 mg tablet Take 1 tablet by mouth twice daily 180 Tablet 0    collagenase (SANTYL) 250 unit/gram ointment Apply  to affected area daily. (Patient not taking: Reported on 2/23/2022) 30 g 4    furosemide (LASIX) 20 mg tablet Take 1 Tablet by mouth daily. 90 Tablet 1    losartan (COZAAR) 50 mg tablet Take 1 Tablet by mouth daily. 90 Tablet 1    warfarin (COUMADIN) 3 mg tablet TAKE 1 TABLET BY MOUTH EVERY THURSDAY AND 2.5 MG ON ALL OTHER DAYS (Patient taking differently: TAKE 1 TABLET Daily) 90 Tablet 0    atorvastatin (LIPITOR) 20 mg tablet Take 1 Tablet by mouth daily. 90 Tablet 1    amiodarone (CORDARONE) 200 mg tablet Take 1 Tablet by mouth daily. 90 Tablet 1    levothyroxine (SYNTHROID) 100 mcg tablet Take 1 Tablet by mouth Daily (before breakfast). 90 Tablet 1    polyethylene glycol (Miralax) 17 gram/dose powder Take 17 g by mouth daily. 1 tablespoon with 8 oz of water daily 238 g 1    acetaminophen (TYLENOL) 325 mg tablet Take 2 Tablets by mouth every six (6) hours as needed for Pain. 100 Tablet 1    ciclopirox (PENLAC) 8 % solution Apply  to affected area nightly. As directed         Allergies: Allergies   Allergen Reactions    Ketamine Other (comments)     confusion         Review of Systems   Review of Systems   Constitutional: Negative for chills and fever. HENT: Negative for ear pain. Eyes: Negative for pain. Respiratory: Negative for shortness of breath.     Cardiovascular: Negative for chest pain. Gastrointestinal: Negative for abdominal pain. Genitourinary: Negative for flank pain. Musculoskeletal: Negative for back pain. Skin: Positive for wound. Negative for rash. Neurological: Negative for headaches. Psychiatric/Behavioral: Negative for agitation. Physical Exam     Vital Signs-Reviewed the patient's vital signs. Patient Vitals for the past 12 hrs:   Temp Pulse Resp BP SpO2   04/20/22 1326 98.6 °F (37 °C) (!) 56 18 (!) 191/77 99 %   04/20/22 1146 98.6 °F (37 °C) (!) 53 16 (!) 181/78 99 %       Physical Exam  Vitals reviewed. HENT:      Head: Normocephalic and atraumatic. Mouth/Throat:      Mouth: Mucous membranes are moist.   Cardiovascular:      Rate and Rhythm: Normal rate and regular rhythm. Pulmonary:      Effort: Pulmonary effort is normal.      Breath sounds: Normal breath sounds. Abdominal:      Palpations: Abdomen is soft. Tenderness: There is no abdominal tenderness. Musculoskeletal:         General: Normal range of motion. Cervical back: Normal range of motion. Comments: Right upper extremity with a large skin tear approximately 10 cm x 10 cm with some soft tissue involvement. Normal range of motion of arm. Normal distal radial pulses. Dried blood adherent to edges of wound. Skin:     General: Skin is warm and dry. Neurological:      Mental Status: He is alert and oriented to person, place, and time. Psychiatric:         Mood and Affect: Mood normal.         Diagnostic Study Results     Labs -   No results found for this or any previous visit (from the past 12 hour(s)). Radiologic Studies -   No orders to display     CT Results  (Last 48 hours)    None        CXR Results  (Last 48 hours)    None            Medical Decision Making   I am the first provider for this patient. I reviewed the vital signs, available nursing notes, past medical history, past surgical history, family history and social history.     Records Reviewed: Nursing Notes and Old Medical Records    Provider Notes (Medical Decision Making):   MDM: 16-year-old male with skin tear. Large injury but mostly superficial, unable to perform laceration repair. We will plan to clean wound well and dressed with antibiotic cream and wrap. Will need regular wound care follow-up with PCP to monitor for healing. Initial assessment performed. The patients presenting problems have been discussed, and they are in agreement with the care plan formulated and outlined with them. I have encouraged them to ask questions as they arise throughout their visit. PROGRESS NOTE:    Wound cleaned and wrapped by technician. Patient tolerated procedure well. Plan for discharge home. Discharge note:  Pt re-evaluated and noted to be feeling better, ready for discharge. Updated pt on all final results. Will follow up as instructed. All questions have been answered, pt voiced understanding and agreement with plan. Specific return precautions provided as well as instructions to return to the ED should sx worsen at any time. Vital signs stable for discharge. Diagnosis     Clinical Impression:   1. Skin tear of right upper arm without complication, initial encounter            Disposition:  Discharged    Discharge Medication List as of 4/20/2022 12:30 PM      START taking these medications    Details   bacitracin (BACITRACIN) 500 unit/gram oint Apply  to affected area two (2) times a day. Apply to affected area, Normal, Disp-28 g, R-0         CONTINUE these medications which have NOT CHANGED    Details   dilTIAZem IR (CARDIZEM) 60 mg tablet Take 1 tablet by mouth twice daily, Normal, Disp-180 Tablet, R-0      collagenase (SANTYL) 250 unit/gram ointment Apply  to affected area daily. , Normal, Disp-30 g, R-4      furosemide (LASIX) 20 mg tablet Take 1 Tablet by mouth daily. , Normal, Disp-90 Tablet, R-1      losartan (COZAAR) 50 mg tablet Take 1 Tablet by mouth daily. , Normal, Disp-90 Tablet, R-1      warfarin (COUMADIN) 3 mg tablet TAKE 1 TABLET BY MOUTH EVERY THURSDAY AND 2.5 MG ON ALL OTHER DAYS, Normal, Disp-90 Tablet, R-0      atorvastatin (LIPITOR) 20 mg tablet Take 1 Tablet by mouth daily. , Normal, Disp-90 Tablet, R-1      amiodarone (CORDARONE) 200 mg tablet Take 1 Tablet by mouth daily. , Normal, Disp-90 Tablet, R-1      levothyroxine (SYNTHROID) 100 mcg tablet Take 1 Tablet by mouth Daily (before breakfast). , Normal, Disp-90 Tablet, R-1      polyethylene glycol (Miralax) 17 gram/dose powder Take 17 g by mouth daily. 1 tablespoon with 8 oz of water daily, Normal, Disp-238 g, R-1      acetaminophen (TYLENOL) 325 mg tablet Take 2 Tablets by mouth every six (6) hours as needed for Pain., Normal, Disp-100 Tablet, R-1      ciclopirox (PENLAC) 8 % solution Apply  to affected area nightly. As directed, Historical Med         STOP taking these medications       mupirocin (BACTROBAN) 2 % ointment Comments:   Reason for Stopping:                 Please note, this dictation was completed with [employer], the [employer] voice recognition software. Quite often unanticipated grammatical, syntax, homophones, and other interpretive errors are inadvertently transcribed by the computer software. Please disregard these errors. Please excuse any errors that have escaped final proof reading.

## 2022-04-20 NOTE — DISCHARGE INSTRUCTIONS
You have a large skin tear on your arm. This is probably going to take months to heal.  Clean the wound regularly with gentle soap and water, do not scrub. Place a thin layer of bacitracin on the wound and put a large Band-Aid or gauze over it to keep it clean.

## 2022-04-20 NOTE — ED TRIAGE NOTES
Pt arrives via EMS after suffering a fall in home. Pt had mechanical fall and was found by caretaker. Pt arrives with large skin tear on upper right arm.  Bleeding controlled

## 2022-04-21 ENCOUNTER — TELEPHONE (OUTPATIENT)
Dept: FAMILY MEDICINE CLINIC | Age: 82
End: 2022-04-21

## 2022-04-22 NOTE — TELEPHONE ENCOUNTER
Sp/w caregiver, NICOLE Ryan, she states that he has a new wound that needs attention. He feel a couple of days ago and went to the ER. She states that there is \"meat hanging off the wound\". I called and sp/w Lisbet Brandermill at [place]. She states that since it is a new wound they can open a new case for him. She states that sending the ER notes will be enough to get him registered. He will need, per insurance guidelines, a Face-to-face visit to address the medical necessity of the wound care for the NEW wound. Faxed 9 pages of ER visit notes and the registration face sheet to [facility] at [place].  KT

## 2022-04-22 NOTE — TELEPHONE ENCOUNTER
Pts caregiver called to see if anyone would be able to fit him in today. Wants him seen sooner. States he has deep wound and would like it looked at so he can get wound care. States it is taken care off, was just seen at ER on 4/20. Has ER f/u scheduled for 4/27 with Cheryl Segundo.

## 2022-04-22 NOTE — TELEPHONE ENCOUNTER
Sp/w MsTorrey Hugo Walker again, advised that I have sent ER notes to [facility], they will be in touch to set up wound care for the new wound. We are going to keep the face-to-face appt on Wednesday.  KT

## 2022-04-27 ENCOUNTER — OFFICE VISIT (OUTPATIENT)
Dept: FAMILY MEDICINE CLINIC | Age: 82
End: 2022-04-27
Payer: MEDICARE

## 2022-04-27 VITALS
HEIGHT: 70 IN | BODY MASS INDEX: 21.56 KG/M2 | DIASTOLIC BLOOD PRESSURE: 70 MMHG | WEIGHT: 150.6 LBS | SYSTOLIC BLOOD PRESSURE: 146 MMHG | OXYGEN SATURATION: 95 % | TEMPERATURE: 97.3 F | HEART RATE: 56 BPM

## 2022-04-27 DIAGNOSIS — S51.011D SKIN TEAR OF RIGHT ELBOW WITHOUT COMPLICATION, SUBSEQUENT ENCOUNTER: Primary | ICD-10-CM

## 2022-04-27 PROCEDURE — G8420 CALC BMI NORM PARAMETERS: HCPCS | Performed by: NURSE PRACTITIONER

## 2022-04-27 PROCEDURE — G8536 NO DOC ELDER MAL SCRN: HCPCS | Performed by: NURSE PRACTITIONER

## 2022-04-27 PROCEDURE — 99213 OFFICE O/P EST LOW 20 MIN: CPT | Performed by: NURSE PRACTITIONER

## 2022-04-27 PROCEDURE — G8432 DEP SCR NOT DOC, RNG: HCPCS | Performed by: NURSE PRACTITIONER

## 2022-04-27 PROCEDURE — G9231 DOC ESRD DIA TRANS PREG: HCPCS | Performed by: NURSE PRACTITIONER

## 2022-04-27 PROCEDURE — G8427 DOCREV CUR MEDS BY ELIG CLIN: HCPCS | Performed by: NURSE PRACTITIONER

## 2022-04-27 PROCEDURE — 1101F PT FALLS ASSESS-DOCD LE1/YR: CPT | Performed by: NURSE PRACTITIONER

## 2022-04-27 NOTE — PROGRESS NOTES
21. \"Have you been to the ER, urgent care clinic since your last visit? Hospitalized since your last visit? \"  Yes [facility] ER 4- for fall and skin tear    2. \"Have you seen or consulted any other health care providers outside of the [address] [place] [address] since your last visit? \"  no     3. For patients aged 39-70: Has the patient had a colonoscopy / FIT/ Cologuard? NA aged out      If the patient is female:    4. For patients aged 41-77: Has the patient had a mammogram within the past 2 years? NA      5. For patients aged 21-65: Has the patient had a pap smear?  NA

## 2022-04-27 NOTE — PROGRESS NOTES
Subjective:      Fernandez Quiroz is a 81 y.o. male who presents today for wound check. He has a skin tear  wound which is located on right upper arm and heel wound  the left heel. Home Health doing wound care. Current symptoms: wound healing as expected  Right arm wound protected with transparent dressing. Heel wound down to approx 1 mm. No sign of infection or bleeding  Interventions to date: noted on home health notes. .    Objective:     Visit Vitals  BP (!) 146/70 (BP 1 Location: Left upper arm, BP Patient Position: Sitting, BP Cuff Size: Adult)   Pulse (!) 56   Temp 97.3 °F (36.3 °C) (Temporal)   Ht 5' 10\" (1.778 m)   Wt 150 lb 9.6 oz (68.3 kg)   SpO2 95%   BMI 21.61 kg/m²       Wound:   wound margins intact and healing well. No signs of infection. Right upper arm 4 x4 cm irregular shape. appears improved compared to last recheck  no exudate     Assessment:     Wound check. Interventions today removal of existing dressing  visual inspection  cleansing with saline solution and redressed  solution. Plan:     1. Discussed appropriate home care of this wound. , home health to follow   2. Patient instructions were given. 3. Follow up: 3 to 6 weeks as long as [place] following and providing care.     Ricarda FIERRO

## 2022-04-30 NOTE — ACP (ADVANCE CARE PLANNING)
Discussed importance of advanced medical directives with patient. Patient is capable of making decisions.  Regine Garsia NP-C

## 2022-05-06 ENCOUNTER — VIRTUAL VISIT (OUTPATIENT)
Dept: FAMILY MEDICINE CLINIC | Age: 82
End: 2022-05-06
Payer: MEDICARE

## 2022-05-06 DIAGNOSIS — M79.89 ARM SWELLING: Primary | ICD-10-CM

## 2022-05-06 DIAGNOSIS — S41.101S OPEN WOUND OF RIGHT UPPER ARM, SEQUELA: ICD-10-CM

## 2022-05-06 PROCEDURE — 99443 PR PHYS/QHP TELEPHONE EVALUATION 21-30 MIN: CPT

## 2022-05-06 NOTE — PATIENT INSTRUCTIONS
Wound Check: Care Instructions  Your Care Instructions  People have wounds that need care for many reasons. You may have a cut that needs care after surgery. You may have a cut or puncture wound from an accident. Or you may have a wound because of a condition like diabetes. Whatever the cause of your wound, there are things you can do to care for it at home. Your doctor may also want you to come back for a wound check. The wound check lets the doctor know how your wound is healing and if you need more treatment. Follow-up care is a key part of your treatment and safety. Be sure to make and go to all appointments, and call your doctor if you are having problems. It's also a good idea to know your test results and keep a list of the medicines you take. How can you care for yourself at home? · If your doctor told you how to care for your wound, follow your doctor's instructions. If you did not get instructions, follow this general advice:  ? You may cover the wound with a thin layer of petroleum jelly, such as Vaseline, and a nonstick bandage. ? Apply more petroleum jelly and replace the bandage as needed. · Keep the wound dry for the first 24 to 48 hours. After this, you can shower if your doctor okays it. Pat the wound dry. · Be safe with medicines. Read and follow all instructions on the label. ? If the doctor gave you a prescription medicine for pain, take it as prescribed. ? If you are not taking a prescription pain medicine, ask your doctor if you can take an over-the-counter medicine. · If your doctor prescribed antibiotics, take them as directed. Do not stop taking them just because you feel better. You need to take the full course of antibiotics. · If you have stitches, do not remove them on your own. Your doctor will tell you when to come back to have them removed. · If you have Steri-Strips, leave them on until they fall off.   · If possible, prop up the injured area on a pillow anytime you sit or lie down during the next 3 days. Try to keep it above the level of your heart. This will help reduce swelling. When should you call for help? Call your doctor now or seek immediate medical care if:    · You have new pain, or the pain gets worse.     · The skin near the wound is cold or pale or changes color.     · You have tingling, weakness, or numbness near the wound.     · The wound starts to bleed, and blood soaks through the bandage. Oozing small amounts of blood is normal.     · You have symptoms of infection, such as:  ? Increased pain, swelling, warmth, or redness. ? Red streaks leading from the wound. ? Pus draining from the wound. ? A fever. Watch closely for changes in your health, and be sure to contact your doctor if:    · You do not get better as expected. Where can you learn more? Go to http://www.gray.com/  Enter P342 in the search box to learn more about \"Wound Check: Care Instructions. \"  Current as of: July 1, 2021               Content Version: 13.2  © 2006-2022 [employer]. Care instructions adapted under license by [employer] (which disclaims liability or warranty for this information). If you have questions about a medical condition or this instruction, always ask your healthcare professional. [place] [address] any warranty or liability for your use of this information.

## 2022-05-06 NOTE — PROGRESS NOTES
Jesusita Suarez (: 1940) is a 81 y.o. male, established patient, here for evaluation of the following chief complaint(s):   Arm swelling       ASSESSMENT/PLAN:  Below is the assessment and plan developed based on review of pertinent history, labs, studies, and medications. 1. Arm swelling  2. Open wound of right upper arm, sequela    Recommend venous doppler study, but patient refuses for financial reasons. Recommend he keep arm elevated above heart level, continue wound care per home health, cool compresses. Needs to come to office for in-person evaluation with PCP. Return in about 1 week (around 2022). SUBJECTIVE/OBJECTIVE:  Jesusita Suarez presents for an acute visit; his caretaker Sedrick Bui provides the HPI. He suffered an extensive skin tear to his right upper arm on  following a fall; [facility] has been providing wound care 3x/week since and he saw his PCP on  who noted wound healing. His caretaker notes increased swelling in his lower arm, including hand and forearm, gradual onset over the last week; the home health nurse was at the home today to provide wound care and did not express concern. Leny notes pitting edema to elbow; describes wound healing as \"great, look so much better\". Mr. Gurdeep Root denies pain in the arm; there is a palpable a radial pulse per his caretaker. Review of Systems   Constitutional: Negative for chills, fatigue and fever. Respiratory: Negative. Cardiovascular: Negative. Skin: Positive for wound. Neurological: Negative. Negative for dizziness and tremors. Hematological: Bruises/bleeds easily. On this date 2022 I have spent 30 minutes reviewing previous notes, test results and face to face (virtual) with the patient discussing the diagnosis and importance of compliance with the treatment plan as well as documenting on the day of the visit.     Jesusita Suarez, was evaluated through a synchronous (real-time) audio only encounter. The patient (or guardian if applicable) is aware that this is a billable service, which includes applicable co-pays. Verbal consent to proceed has been obtained. The visit was conducted pursuant to the emergency declaration under the [facility], [address] [place] [address] authority and the [employer] Act. Patient identification was verified, and a caregiver was present when appropriate. The patient was located at home in a state where the provider was licensed to provide care. An electronic signature was used to authenticate this note.   -- Sherice Matthew NP

## 2022-05-12 RX ORDER — LEVOTHYROXINE SODIUM 100 UG/1
TABLET ORAL
Qty: 90 TABLET | Refills: 0 | Status: SHIPPED | OUTPATIENT
Start: 2022-05-12 | End: 2022-08-18

## 2022-05-23 RX ORDER — FUROSEMIDE 20 MG/1
TABLET ORAL
Qty: 90 TABLET | Refills: 0 | Status: SHIPPED | OUTPATIENT
Start: 2022-05-23 | End: 2022-08-19

## 2022-06-13 ENCOUNTER — TELEPHONE (OUTPATIENT)
Dept: FAMILY MEDICINE CLINIC | Age: 82
End: 2022-06-13

## 2022-06-13 RX ORDER — WARFARIN 3 MG/1
TABLET ORAL
Qty: 90 TABLET | Refills: 0 | Status: SHIPPED | OUTPATIENT
Start: 2022-06-13 | End: 2022-09-16

## 2022-06-13 NOTE — TELEPHONE ENCOUNTER
LMOM, per Ms. Jaime Small, for caregiver, Ms. Joss Welch, advising no change to Warfarin dose, repeat in 1 wk. Call if she has questions.  KRISTIE

## 2022-06-22 ENCOUNTER — TELEPHONE (OUTPATIENT)
Dept: FAMILY MEDICINE CLINIC | Age: 82
End: 2022-06-22

## 2022-08-01 RX ORDER — LOSARTAN POTASSIUM 50 MG/1
50 TABLET ORAL DAILY
Qty: 90 TABLET | Refills: 1 | Status: SHIPPED | OUTPATIENT
Start: 2022-08-01

## 2022-08-11 RX ORDER — ATORVASTATIN CALCIUM 20 MG/1
20 TABLET, FILM COATED ORAL DAILY
Qty: 90 TABLET | Refills: 1 | Status: SHIPPED | OUTPATIENT
Start: 2022-08-11

## 2022-08-18 RX ORDER — LEVOTHYROXINE SODIUM 100 UG/1
TABLET ORAL
Qty: 90 TABLET | Refills: 0 | Status: SHIPPED | OUTPATIENT
Start: 2022-08-18

## 2022-08-19 RX ORDER — FUROSEMIDE 20 MG/1
TABLET ORAL
Qty: 90 TABLET | Refills: 0 | Status: SHIPPED | OUTPATIENT
Start: 2022-08-19

## 2022-08-29 ENCOUNTER — OFFICE VISIT (OUTPATIENT)
Dept: FAMILY MEDICINE CLINIC | Age: 82
End: 2022-08-29
Payer: MEDICARE

## 2022-08-29 VITALS
WEIGHT: 146 LBS | BODY MASS INDEX: 20.9 KG/M2 | OXYGEN SATURATION: 98 % | SYSTOLIC BLOOD PRESSURE: 140 MMHG | HEART RATE: 56 BPM | TEMPERATURE: 97.3 F | HEIGHT: 70 IN | RESPIRATION RATE: 18 BRPM | DIASTOLIC BLOOD PRESSURE: 68 MMHG

## 2022-08-29 DIAGNOSIS — I10 ESSENTIAL HYPERTENSION: ICD-10-CM

## 2022-08-29 DIAGNOSIS — E03.9 ACQUIRED HYPOTHYROIDISM: ICD-10-CM

## 2022-08-29 DIAGNOSIS — I48.0 PAF (PAROXYSMAL ATRIAL FIBRILLATION) (HCC): ICD-10-CM

## 2022-08-29 DIAGNOSIS — Z00.00 MEDICARE ANNUAL WELLNESS VISIT, SUBSEQUENT: Primary | ICD-10-CM

## 2022-08-29 DIAGNOSIS — R53.83 FATIGUE, UNSPECIFIED TYPE: ICD-10-CM

## 2022-08-29 PROCEDURE — G8427 DOCREV CUR MEDS BY ELIG CLIN: HCPCS | Performed by: NURSE PRACTITIONER

## 2022-08-29 PROCEDURE — G8420 CALC BMI NORM PARAMETERS: HCPCS | Performed by: NURSE PRACTITIONER

## 2022-08-29 PROCEDURE — G0439 PPPS, SUBSEQ VISIT: HCPCS | Performed by: NURSE PRACTITIONER

## 2022-08-29 PROCEDURE — G8432 DEP SCR NOT DOC, RNG: HCPCS | Performed by: NURSE PRACTITIONER

## 2022-08-29 PROCEDURE — 36415 COLL VENOUS BLD VENIPUNCTURE: CPT | Performed by: NURSE PRACTITIONER

## 2022-08-29 PROCEDURE — 1101F PT FALLS ASSESS-DOCD LE1/YR: CPT | Performed by: NURSE PRACTITIONER

## 2022-08-29 PROCEDURE — 1123F ACP DISCUSS/DSCN MKR DOCD: CPT | Performed by: NURSE PRACTITIONER

## 2022-08-29 PROCEDURE — G8536 NO DOC ELDER MAL SCRN: HCPCS | Performed by: NURSE PRACTITIONER

## 2022-08-29 PROCEDURE — G9231 DOC ESRD DIA TRANS PREG: HCPCS | Performed by: NURSE PRACTITIONER

## 2022-08-29 RX ORDER — LANOLIN ALCOHOL/MO/W.PET/CERES
CREAM (GRAM) TOPICAL
COMMUNITY

## 2022-08-29 NOTE — PROGRESS NOTES
Patient here for labs drawn from left antecubital without pain or bruising. 1. \"Have you been to the ER, urgent care clinic since your last visit? Hospitalized since your last visit? \" No    2. \"Have you seen or consulted any other health care providers outside of the [address] [place] [address] since your last visit? \" No     3. For patients aged 39-70: Has the patient had a colonoscopy / FIT/ Cologuard? NA - based on age      If the patient is female:    4. For patients aged 41-77: Has the patient had a mammogram within the past 2 years? NA - based on age or sex      11. For patients aged 21-65: Has the patient had a pap smear?  NA - based on age or sex

## 2022-08-29 NOTE — PATIENT INSTRUCTIONS
Medicare Wellness Visit, Male    The best way to live healthy is to have a lifestyle where you eat a well-balanced diet, exercise regularly, limit alcohol use, and quit all forms of tobacco/nicotine, if applicable. Regular preventive services are another way to keep healthy. Preventive services (vaccines, screening tests, monitoring & exams) can help personalize your care plan, which helps you manage your own care. Screening tests can find health problems at the earliest stages, when they are easiest to treat. [facility] follows the current, evidence-based guidelines published by the [facility] ([facility]) when recommending preventive services for our patients. Because we follow these guidelines, sometimes recommendations change over time as research supports it. (For example, a prostate screening blood test is no longer routinely recommended for men with no symptoms). Of course, you and your doctor may decide to screen more often for some diseases, based on your risk and co-morbidities (chronic disease you are already diagnosed with). Preventive services for you include:  - Medicare offers their members a free annual wellness visit, which is time for you and your primary care provider to discuss and plan for your preventive service needs. Take advantage of this benefit every year!  -All adults over age 72 should receive the recommended pneumonia vaccines. Current USPSTF guidelines recommend a series of two vaccines for the best pneumonia protection.   -All adults should have a flu vaccine yearly and tetanus vaccine every 10 years.  -All adults age 48 and older should receive the shingles vaccines (series of two vaccines).        -All adults age 38-68 who are overweight should have a diabetes screening test once every three years.   -Other screening tests & preventive services for persons with diabetes include: an eye exam to screen for diabetic retinopathy, a kidney function test, a foot exam, and stricter control over your cholesterol.   -Cardiovascular screening for adults with routine risk involves an electrocardiogram (ECG) at intervals determined by the provider.   -Colorectal cancer screening should be done for adults age 54-65 with no increased risk factors for colorectal cancer. There are a number of acceptable methods of screening for this type of cancer. Each test has its own benefits and drawbacks. Discuss with your provider what is most appropriate for you during your annual wellness visit. The different tests include: colonoscopy (considered the best screening method), a fecal occult blood test, a fecal DNA test, and sigmoidoscopy.  -All adults born between [facility] should be screened once for Hepatitis C.  -An Abdominal Aortic Aneurysm (AAA) Screening is recommended for men age 73-68 who has ever smoked in their lifetime.      Here is a list of your current Health Maintenance items (your personalized list of preventive services) with a due date:  Health Maintenance Due   Topic Date Due    Pneumococcal Vaccine (1 - PCV) Never done    Shingles Vaccine (1 of 2) Never done    COVID-19 Vaccine (3 - Moderna risk series) 10/12/2021

## 2022-08-29 NOTE — PROGRESS NOTES
This is the Subsequent Medicare Annual Wellness Exam, performed 12 months or more after the Initial AWV or the last Subsequent AWV    I have reviewed the patient's medical history in detail and updated the computerized patient record. Assessment/Plan   Education and counseling provided:  Are appropriate based on today's review and evaluation    1. Medicare annual wellness visit, subsequent     Depression Risk Factor Screening     3 most recent PHQ Screens 8/29/2022   PHQ Not Done -   Little interest or pleasure in doing things Not at all   Feeling down, depressed, irritable, or hopeless Not at all   Total Score PHQ 2 0   Trouble falling or staying asleep, or sleeping too much -   Feeling tired or having little energy -   Poor appetite, weight loss, or overeating -   Feeling bad about yourself - or that you are a failure or have let yourself or your family down -   Trouble concentrating on things such as school, work, reading, or watching TV -   Moving or speaking so slowly that other people could have noticed; or the opposite being so fidgety that others notice -   Thoughts of being better off dead, or hurting yourself in some way -   PHQ 9 Score -   How difficult have these problems made it for you to do your work, take care of your home and get along with others -       Alcohol & Drug Abuse Risk Screen    Do you average more than 1 drink per night or more than 7 drinks a week: No    In the past three months have you have had more than 4 drinks containing alcohol on one occasion: No          Functional Ability and Level of Safety    Hearing: Hearing is good. Activities of Daily Living: The home contains: handrails  Patient needs help with:  transportation, shopping, preparing meals, laundry, housework, managing medications, managing money, dressing, bathing, and walking      Ambulation: wheelchair bound     Fall Risk:  Fall Risk Assessment, last 12 mths 8/29/2022   Able to walk?  Yes   Fall in past 15 months? 1   Do you feel unsteady? 1   Are you worried about falling 1   Is TUG test greater than 12 seconds? 0   Is the gait abnormal? 1   Number of falls in past 12 months 2   Fall with injury?  0      Abuse Screen:  Patient is not abused       Cognitive Screening    Has your family/caregiver stated any concerns about your memory: no     Cognitive Screening: Normal - MMSE (Mini Mental Status Exam)    Health Maintenance Due     Health Maintenance Due   Topic Date Due    Pneumococcal 65+ years (1 - PCV) Never done    Shingrix Vaccine Age 50> (1 of 2) Never done    COVID-19 Vaccine (3 - Moderna risk series) 10/12/2021       Patient Care Team   Patient Care Team:  Sapna Bates NP as PCP - General (Nurse Practitioner)  Sapna Bates NP as PCP - [facility]  Hemalatha Mejia MD (Surgery Physician)    History     Patient Active Problem List   Diagnosis Code    Thyroid disease E07.9    Arrhythmia I49.9    Hyperlipemia E78.5    PVD (peripheral vascular disease) ([facility]) I73.9    CKD (chronic kidney disease) stage 3, GFR 30-59 ml/min ([facility]) N18.30    Anticoagulant long-term use Z79.01    Pulmonary scarring J98.4    Spondylosis of lumbar region without myelopathy or radiculopathy M47.816    Chronic atrial fibrillation ([facility]) I48.20    CKD (chronic kidney disease) stage 4, GFR 15-29 ml/min ([facility]) N18.4    CKD (chronic kidney disease) stage 5, GFR less than 15 ml/min ([facility]) N18.5    Closed right humeral fracture S42.301A    Coumadin toxicity, accidental or unintentional, initial encounter T45.511A    History of anemia due to chronic kidney disease N18.9, Z86.2    Left rib fracture S22.32XA    Stage 3 chronic kidney disease N18.30    Closed fracture of right proximal humerus S42.201A    Closed fracture of proximal end of right humerus with routine healing S42.201D    Hypothyroid E03.9    Essential hypertension E29    Diastolic CHF, chronic ([facility]) I50.32    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 XXXA    Generalized weakness R53.1    STEVEN (acute kidney injury) ([facility] N17.9    Iron deficiency anemia due to chronic blood loss D50.0    PAF (paroxysmal atrial fibrillation) ([facility]) I48.0    Hip fracture ([facility] S72.009A    Encounter for rehabilitation Z51.89    CAP (community acquired pneumonia) J18.9    Acute renal failure superimposed on stage 3 chronic kidney disease ([facility]) N17.9, N18.30    Traumatic rhabdomyolysis ([facility]) T79. 6XXA    Loculated pleural effusion J90    Leukocytosis D72.829    Rhabdomyolysis M62.82     Past Medical History:   Diagnosis Date    Arrhythmia     atrial fibrillation, cardioversion 2010    CKD (chronic kidney disease) stage 3, GFR 30-59 ml/min ([facility])     Hyperlipemia     Hypertension     Iron deficiency anemia due to chronic blood loss 2/9/2021    PAF (paroxysmal atrial fibrillation) ([facility] 2/9/2021    PVD (peripheral vascular disease) ([facility] [facility]     Thyroid disease       Past Surgical History:   Procedure Laterality Date    HX COLONOSCOPY  2010    WNL SIS 10 y    HX HERNIA REPAIR Bilateral     Inguinal    HX ORTHOPAEDIC      right shoulder fracture repair    HX OTHER SURGICAL  03/09/2019    groin lymph gland excision    HX VASCULAR ACCESS      IR INSERT TUNL CVC W PORT OVER 5 YEARS  3/5/2020    ME CHEST SURGERY PROCEDURE UNLISTED Left     lung surgery age 17, lobectomy    VASCULAR SURGERY PROCEDURE UNLIST      bypass numerous surgeries both legs     Current Outpatient Medications   Medication Sig Dispense Refill    furosemide (LASIX) 20 mg tablet Take 1 tablet by mouth once daily 90 Tablet 0    Euthyrox 100 mcg tablet TAKE 1 TABLET BY MOUTH ONCE DAILY BEFORE BREAKFAST 90 Tablet 0    atorvastatin (LIPITOR) 20 mg tablet Take 1 Tablet by mouth in the morning. 90 Tablet 1    losartan (COZAAR) 50 mg tablet Take 1 Tablet by mouth in the morning.  90 Tablet 1    dilTIAZem IR (CARDIZEM) 60 mg tablet Take 1 tablet by mouth twice daily 180 Tablet 0    warfarin (COUMADIN) 3 mg tablet TAKE 1 TABLET Daily 90 Tablet 0    bacitracin (BACITRACIN) 500 unit/gram oint Apply  to affected area two (2) times a day. Apply to affected area 28 g 0    collagenase (SANTYL) 250 unit/gram ointment Apply  to affected area daily. (Patient not taking: Reported on 2/23/2022) 30 g 4    amiodarone (CORDARONE) 200 mg tablet Take 1 Tablet by mouth daily. 90 Tablet 1    polyethylene glycol (Miralax) 17 gram/dose powder Take 17 g by mouth daily. 1 tablespoon with 8 oz of water daily 238 g 1    acetaminophen (TYLENOL) 325 mg tablet Take 2 Tablets by mouth every six (6) hours as needed for Pain. 100 Tablet 1    ciclopirox (PENLAC) 8 % solution Apply  to affected area nightly.  As directed       Allergies   Allergen Reactions    Ketamine Other (comments)     confusion       Family History   Problem Relation Age of Onset    Cancer Mother         lung    Cancer Brother 66        lung    Cancer Maternal Uncle         cancer unknown     Social History     Tobacco Use    Smoking status: Former     Packs/day: 1.00     Years: 54.00     Pack years: 54.00     Types: Cigarettes     Quit date: 3/15/2007     Years since quitting: 15.4    Smokeless tobacco: Never   Substance Use Topics    Alcohol use: Yes     Comment: former drinker- beer   social         Skylar Chin RN

## 2022-08-30 LAB
ALBUMIN SERPL-MCNC: 3.8 G/DL (ref 3.6–4.6)
ALBUMIN/GLOB SERPL: 2.2 {RATIO} (ref 1.2–2.2)
ALP SERPL-CCNC: 87 IU/L (ref 44–121)
ALT SERPL-CCNC: 10 IU/L (ref 0–44)
AST SERPL-CCNC: 13 IU/L (ref 0–40)
BASOPHILS # BLD AUTO: 0 X10E3/UL (ref 0–0.2)
BASOPHILS NFR BLD AUTO: 1 %
BILIRUB SERPL-MCNC: <0.2 MG/DL (ref 0–1.2)
BUN SERPL-MCNC: 39 MG/DL (ref 8–27)
BUN/CREAT SERPL: 14 (ref 10–24)
CALCIUM SERPL-MCNC: 8.8 MG/DL (ref 8.6–10.2)
CHLORIDE SERPL-SCNC: 106 MMOL/L (ref 96–106)
CHOLEST SERPL-MCNC: 118 MG/DL (ref 100–199)
CO2 SERPL-SCNC: 21 MMOL/L (ref 20–29)
CREAT SERPL-MCNC: 2.69 MG/DL (ref 0.76–1.27)
EGFR: 23 ML/MIN/1.73
EOSINOPHIL # BLD AUTO: 0.1 X10E3/UL (ref 0–0.4)
EOSINOPHIL NFR BLD AUTO: 2 %
ERYTHROCYTE [DISTWIDTH] IN BLOOD BY AUTOMATED COUNT: 13.5 % (ref 11.6–15.4)
GLOBULIN SER CALC-MCNC: 1.7 G/DL (ref 1.5–4.5)
GLUCOSE SERPL-MCNC: 99 MG/DL (ref 65–99)
HCT VFR BLD AUTO: 31.7 % (ref 37.5–51)
HDLC SERPL-MCNC: 46 MG/DL
HGB BLD-MCNC: 10.3 G/DL (ref 13–17.7)
IMM GRANULOCYTES # BLD AUTO: 0 X10E3/UL (ref 0–0.1)
IMM GRANULOCYTES NFR BLD AUTO: 0 %
INTERPRETATION: NORMAL
LDLC SERPL CALC-MCNC: 59 MG/DL (ref 0–99)
LYMPHOCYTES # BLD AUTO: 1.1 X10E3/UL (ref 0.7–3.1)
LYMPHOCYTES NFR BLD AUTO: 15 %
MCH RBC QN AUTO: 32.4 PG (ref 26.6–33)
MCHC RBC AUTO-ENTMCNC: 32.5 G/DL (ref 31.5–35.7)
MCV RBC AUTO: 100 FL (ref 79–97)
MONOCYTES # BLD AUTO: 0.6 X10E3/UL (ref 0.1–0.9)
MONOCYTES NFR BLD AUTO: 8 %
NEUTROPHILS # BLD AUTO: 5.8 X10E3/UL (ref 1.4–7)
NEUTROPHILS NFR BLD AUTO: 74 %
PLATELET # BLD AUTO: 198 X10E3/UL (ref 150–450)
POTASSIUM SERPL-SCNC: 5 MMOL/L (ref 3.5–5.2)
PROT SERPL-MCNC: 5.5 G/DL (ref 6–8.5)
RBC # BLD AUTO: 3.18 X10E6/UL (ref 4.14–5.8)
SODIUM SERPL-SCNC: 144 MMOL/L (ref 134–144)
TRIGL SERPL-MCNC: 58 MG/DL (ref 0–149)
TSH SERPL DL<=0.005 MIU/L-ACNC: 0.57 UIU/ML (ref 0.45–4.5)
VLDLC SERPL CALC-MCNC: 13 MG/DL (ref 5–40)
WBC # BLD AUTO: 7.7 X10E3/UL (ref 3.4–10.8)

## 2022-08-30 NOTE — ACP (ADVANCE CARE PLANNING)
Discussed importance of advanced medical directives with patient. Patient is capable of making decisions.  Marni Loo NP-C

## 2022-09-02 NOTE — PROGRESS NOTES
I called and spoke to Ms. Smithjuan francisco Everton, she is very upset and said that no one has ever mentioned this to them before. She want a referral sent ASAP and she wants a call back from Ms. [employer] she has a lot of question.   She also wants the number to the kidney specialist so she can call then too

## 2022-09-06 ENCOUNTER — TELEPHONE (OUTPATIENT)
Dept: FAMILY MEDICINE CLINIC | Age: 82
End: 2022-09-06

## 2022-09-06 DIAGNOSIS — N18.4 CKD (CHRONIC KIDNEY DISEASE) STAGE 4, GFR 15-29 ML/MIN (HCC): Primary | ICD-10-CM

## 2022-09-06 NOTE — TELEPHONE ENCOUNTER
ML for daughter Junie Leader to call back about kidney function. Per Benjamin Levine request. Decline in kidney function not a new issue .

## 2022-09-06 NOTE — TELEPHONE ENCOUNTER
Please give the family a call. There was a message for Aram Schwab about his kidney function:    \"I called and spoke to Ms. Burr Cogan, she is very upset and said that no one has ever mentioned this to them before. She want a referral sent ASAP and she wants a call back from Ms. Ted Myles she has a lot of question. She also wants the number to the kidney specialist so she can call then too \"    Upon reviewing the chart, Mr. Karolynn Mcardle kidney function is definitely impaired but is unchanged from 2019. This is not anything new. Please let the family know that Aram Schwab is out on vacation and I will leave this message for her review. Referral to nephrology placed.

## 2022-09-16 RX ORDER — WARFARIN 3 MG/1
TABLET ORAL
Qty: 90 TABLET | Refills: 0 | Status: SHIPPED | OUTPATIENT
Start: 2022-09-16

## 2022-10-06 DIAGNOSIS — R53.83 FATIGUE, UNSPECIFIED TYPE: ICD-10-CM

## 2022-10-06 DIAGNOSIS — N18.4 CKD (CHRONIC KIDNEY DISEASE) STAGE 4, GFR 15-29 ML/MIN (HCC): Primary | ICD-10-CM

## 2022-10-06 DIAGNOSIS — D50.8 IRON DEFICIENCY ANEMIA SECONDARY TO INADEQUATE DIETARY IRON INTAKE: ICD-10-CM

## 2022-10-06 DIAGNOSIS — Z13.39 SCREENING FOR ALCOHOLISM: ICD-10-CM

## 2022-10-10 ENCOUNTER — DOCUMENTATION ONLY (OUTPATIENT)
Dept: FAMILY MEDICINE CLINIC | Age: 82
End: 2022-10-10

## 2022-10-10 NOTE — PROGRESS NOTES
Called patient's insurance company for prior authorization needed on [id number]   It has been Denied . Reference Number for case [id number]. Vonnie Gleason has been advised .

## 2022-10-28 ENCOUNTER — TELEPHONE ANTICOAG (OUTPATIENT)
Dept: FAMILY MEDICINE CLINIC | Age: 82
End: 2022-10-28

## 2022-10-28 LAB — INR, EXTERNAL: 2 (ref 2–3)

## 2022-10-31 ENCOUNTER — VIRTUAL VISIT (OUTPATIENT)
Dept: FAMILY MEDICINE CLINIC | Age: 82
End: 2022-10-31
Payer: MEDICARE

## 2022-10-31 DIAGNOSIS — R09.89 CHEST CONGESTION: ICD-10-CM

## 2022-10-31 DIAGNOSIS — S51.811A SKIN TEAR OF RIGHT FOREARM WITHOUT COMPLICATION, INITIAL ENCOUNTER: ICD-10-CM

## 2022-10-31 DIAGNOSIS — R06.02 SHORTNESS OF BREATH: Primary | ICD-10-CM

## 2022-10-31 DIAGNOSIS — R29.6 FREQUENT FALLS: ICD-10-CM

## 2022-10-31 DIAGNOSIS — R53.1 WEAKNESS: ICD-10-CM

## 2022-10-31 DIAGNOSIS — H91.90 HEARING LOSS, UNSPECIFIED HEARING LOSS TYPE, UNSPECIFIED LATERALITY: ICD-10-CM

## 2022-10-31 PROCEDURE — G8432 DEP SCR NOT DOC, RNG: HCPCS | Performed by: NURSE PRACTITIONER

## 2022-10-31 PROCEDURE — G8427 DOCREV CUR MEDS BY ELIG CLIN: HCPCS | Performed by: NURSE PRACTITIONER

## 2022-10-31 PROCEDURE — G9231 DOC ESRD DIA TRANS PREG: HCPCS | Performed by: NURSE PRACTITIONER

## 2022-10-31 PROCEDURE — 1123F ACP DISCUSS/DSCN MKR DOCD: CPT | Performed by: NURSE PRACTITIONER

## 2022-10-31 PROCEDURE — G8420 CALC BMI NORM PARAMETERS: HCPCS | Performed by: NURSE PRACTITIONER

## 2022-10-31 PROCEDURE — 1101F PT FALLS ASSESS-DOCD LE1/YR: CPT | Performed by: NURSE PRACTITIONER

## 2022-10-31 PROCEDURE — G8536 NO DOC ELDER MAL SCRN: HCPCS | Performed by: NURSE PRACTITIONER

## 2022-10-31 PROCEDURE — 99214 OFFICE O/P EST MOD 30 MIN: CPT | Performed by: NURSE PRACTITIONER

## 2022-10-31 RX ORDER — GUAIFENESIN 600 MG/1
600 TABLET, EXTENDED RELEASE ORAL 2 TIMES DAILY
Qty: 60 TABLET | Refills: 1 | Status: SHIPPED | OUTPATIENT
Start: 2022-10-31

## 2022-10-31 NOTE — PROGRESS NOTES
Shana Durán is a 81 y.o. male who was seen by synchronous (real-time) audio-video technology on 10/31/2022 for Cough and Fatigue      Patient presents with caregiver for weakness, cough and fatigue. Patient and caregiver endorses an episode of weakness while toileting. He had to have EMS come to get him off of the toilet. His weakness per report was on the right side. Caregiver endorses several falls recently due to weakness. He has a right arm  2 inch  skin tear during to a fall this past Saturday. Caregiver reports daily dressing changes to the skin tear, cleansing with normal saline and covering with gauze and zeroform. She also reports she is unsure of if the patient had taken his medications the day of this event as when she came in for her night shift, she found pills in his chair that she felt were his day time medicines. Caregiver endorses patient having decreased hearing, noting the television is much louder than usual and he is asking her to repeat things often. Hearing is decreased on the right side. Cough present for more than a week. Denies fever, chills. Caregiver reports he often swallows his sputum when he coughs it up. Fatigue is worsened, caregiver noted he was falling asleep during one of his meals. No known sick contacts. He is not taking any medications for the cough. He is on supplemental oxygen, 2L at rest and 3L when needed for exertion. Assessment & Plan:   Diagnoses and all orders for this visit:    1. Shortness of breath  -     XR CHEST PA LAT; Future  -     REFERRAL TO HOME HEALTH    2. Chest congestion  -     XR CHEST PA LAT; Future    3. Frequent falls  -     [address] [facility]    4. Weakness  -     REFERRAL TO HOME HEALTH    5. Skin tear of right forearm without complication, initial encounter  -     [facility]    Other orders  -     guaiFENesin ER (MUCINEX) 600 mg ER tablet; Take 1 Tablet by mouth two (2) times a day.     The complexity of medical decision making for this visit is high       I spent at least 30 minutes on this visit with this established patient. Subjective:       Prior to Admission medications    Medication Sig Start Date End Date Taking? Authorizing Provider   vit B Cmplx 3-FA-Vit C-Biotin (NEPHRO MARY RX) 1- mg-mg-mcg tablet Take 1 Tablet by mouth daily. 10/6/22  Yes Yonny Pena NP   dilTIAZem IR (CARDIZEM) 60 mg tablet Take 1 tablet by mouth twice daily 9/20/22  Yes Yonny Pena NP   warfarin (COUMADIN) 3 mg tablet Take 1 tablet by mouth once daily 9/16/22  Yes Yonny Pena NP   ferrous sulfate 325 mg (65 mg iron) tablet Take  by mouth Daily (before breakfast). Yes Provider, Historical   furosemide (LASIX) 20 mg tablet Take 1 tablet by mouth once daily 8/19/22  Yes Yonny Pena NP   Euthyrox 100 mcg tablet TAKE 1 TABLET BY MOUTH ONCE DAILY BEFORE BREAKFAST 8/18/22  Yes Yonny Pena NP   atorvastatin (LIPITOR) 20 mg tablet Take 1 Tablet by mouth in the morning. 8/11/22  Yes Yonny Pena NP   losartan (COZAAR) 50 mg tablet Take 1 Tablet by mouth in the morning. 8/1/22  Yes Yonny Pena NP   bacitracin (BACITRACIN) 500 unit/gram oint Apply  to affected area two (2) times a day. Apply to affected area 4/20/22  Yes Monique Cohen MD   amiodarone (CORDARONE) 200 mg tablet Take 1 Tablet by mouth daily. 12/24/21  Yes Ha Coe MD   polyethylene glycol (Miralax) 17 gram/dose powder Take 17 g by mouth daily. 1 tablespoon with 8 oz of water daily 12/24/21  Yes Ha Coe MD   acetaminophen (TYLENOL) 325 mg tablet Take 2 Tablets by mouth every six (6) hours as needed for Pain. 12/24/21  Yes Lionel Coe MD   ciclopirox (PENLAC) 8 % solution Apply  to affected area nightly. As directed   Yes Provider, Historical   collagenase (SANTYL) 250 unit/gram ointment Apply  to affected area daily.   Patient not taking: No sig reported 1/24/22   Yonny Pena NP     Patient Active Problem List Diagnosis Code    Thyroid disease E07.9    Arrhythmia I49.9    Hyperlipemia E78.5    PVD (peripheral vascular disease) ([facility]) I73.9    CKD (chronic kidney disease) stage 3, GFR 30-59 ml/min ([facility]) N18.30    Anticoagulant long-term use Z79.01    Pulmonary scarring J98.4    Spondylosis of lumbar region without myelopathy or radiculopathy M47.816    Chronic atrial fibrillation ([facility]) I48.20    CKD (chronic kidney disease) stage 4, GFR 15-29 ml/min ([facility]) N18.4    CKD (chronic kidney disease) stage 5, GFR less than 15 ml/min ([facility]) N18.5    Closed right humeral fracture S42.301A    Coumadin toxicity, accidental or unintentional, initial encounter T45.511A    History of anemia due to chronic kidney disease N18.9, Z86.2    Left rib fracture S22.32XA    Stage 3 chronic kidney disease N18.30    Closed fracture of right proximal humerus S42.201A    Closed fracture of proximal end of right humerus with routine healing S42.201D    Hypothyroid E03.9    Essential hypertension J88    Diastolic CHF, chronic ([facility]) I50.32    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 XXXA    Generalized weakness R53.1    STEVEN (acute kidney injury) ([facility] N17.9    Iron deficiency anemia due to chronic blood loss D50.0    PAF (paroxysmal atrial fibrillation) ([facility]) I48.0    Hip fracture ([facility] S72.009A    Encounter for rehabilitation Z51.89    CAP (community acquired pneumonia) J18.9    Acute renal failure superimposed on stage 3 chronic kidney disease ([facility]) N17.9, N18.30    Traumatic rhabdomyolysis ([facility]) T79. 6XXA    Loculated pleural effusion J90    Leukocytosis D72.829    Rhabdomyolysis M62.82     Current Outpatient Medications   Medication Sig Dispense Refill    vit B Cmplx 3-FA-Vit C-Biotin (NEPHRO MARY RX) 1- mg-mg-mcg tablet Take 1 Tablet by mouth daily.  90 Tablet 0    dilTIAZem IR (CARDIZEM) 60 mg tablet Take 1 tablet by mouth twice daily 180 Tablet 0    warfarin (COUMADIN) 3 mg tablet Take 1 tablet by mouth once daily 90 Tablet 0    ferrous sulfate 325 mg (65 mg iron) tablet Take  by mouth Daily (before breakfast). furosemide (LASIX) 20 mg tablet Take 1 tablet by mouth once daily 90 Tablet 0    Euthyrox 100 mcg tablet TAKE 1 TABLET BY MOUTH ONCE DAILY BEFORE BREAKFAST 90 Tablet 0    atorvastatin (LIPITOR) 20 mg tablet Take 1 Tablet by mouth in the morning. 90 Tablet 1    losartan (COZAAR) 50 mg tablet Take 1 Tablet by mouth in the morning. 90 Tablet 1    bacitracin (BACITRACIN) 500 unit/gram oint Apply  to affected area two (2) times a day. Apply to affected area 28 g 0    amiodarone (CORDARONE) 200 mg tablet Take 1 Tablet by mouth daily. 90 Tablet 1    polyethylene glycol (Miralax) 17 gram/dose powder Take 17 g by mouth daily. 1 tablespoon with 8 oz of water daily 238 g 1    acetaminophen (TYLENOL) 325 mg tablet Take 2 Tablets by mouth every six (6) hours as needed for Pain. 100 Tablet 1    ciclopirox (PENLAC) 8 % solution Apply  to affected area nightly. As directed      collagenase (SANTYL) 250 unit/gram ointment Apply  to affected area daily. (Patient not taking: No sig reported) 30 g 4     Allergies   Allergen Reactions    Ketamine Other (comments)     confusion       ROS-All pertinent findings in HPI    Objective:   No flowsheet data found.          Constitutional: [x] Appears well-developed and well-nourished [x] No apparent distress      [x] Abnormal - appears fatigued    Mental status: [x] Alert and awake  [x] Oriented to person/place/time [x] Able to follow commands    [] Abnormal -     Eyes:   EOM    [x]  Normal    [] Abnormal -   Sclera  [x]  Normal    [] Abnormal -          Discharge [x]  None visible   [] Abnormal -     HENT: [x] Normocephalic, atraumatic  [] Abnormal -   [x] Mouth/Throat: Mucous membranes are moist    External Ears [x] Normal  [] Abnormal -    Neck: [x] No visualized mass [] Abnormal -     Pulmonary/Chest: [x] Respiratory effort normal   [x] No visualized signs of difficulty breathing or respiratory distress        [x] Abnormal - coughing throughout visit       Neurological:        [x] No Facial Asymmetry (Cranial nerve 7 motor function) (limited exam due to video visit)          [x] No gaze palsy        [] Abnormal -          Skin:        [x] No significant exanthematous lesions or discoloration noted on facial skin         [x] Abnormal - skin tear 4x4 cm right forearm. Psychiatric:       [x] Normal Affect [] Abnormal -        [x] No Hallucinations    Other pertinent observable physical exam findings:-        We discussed the expected course, resolution and complications of the diagnosis(es) in detail. Medication risks, benefits, costs, interactions, and alternatives were discussed as indicated. I advised him to contact the office if his condition worsens, changes or fails to improve as anticipated. He expressed understanding with the diagnosis(es) and plan. Richrd Gosselin, was evaluated through a synchronous (real-time) audio-video encounter. The patient (or guardian if applicable) is aware that this is a billable service, which includes applicable co-pays. This Virtual Visit was conducted with patient's (and/or legal guardian's) consent. The visit was conducted pursuant to the emergency declaration under the [address] [place] [address], [address] [place] [address] authority and the [employer] Act. Patient identification was verified, and a caregiver was present when appropriate. The patient was located at: Home: [address] [place] [id number]  The provider was located at: [facility] [address] [id number]          Home Care  Face to Face Encounter  Richrd Gosselin is a 81 y.o. male presenting for/with:    Primary Diagnosis: Right arm wound , weakness and shortness of breath.   Date of Face to Face:  10/31/2022                           Face to Face Encounter findings are related to primary reason for home care:   yes. 1. I certify that the patient needs intermittent care as follows: skilled nursing care:  skilled observation/assessment, patient education and wound care  physical therapy: stretching/ROM and gait/stair training    2. I certify that this patient is homebound, that is:   1) patient requires the use of a walker and wheelchair device, special transportation, or assistance of another to leave the home;     or 2) patient's condition makes leaving the home medically contraindicated;    3) patient has a normal inability to leave the home and leaving the home requires considerable and taxing effort. Patient may leave the home for infrequent and short duration for medical reasons, and occasional absences for non-medical reasons. Homebound status is due to the following functional limitations: Patient's ambulation limited secondary to severe pain and requires the use of an assistive device and the assistance of a caregiver for safe completion. Patient with strength and ROM deficits limiting ambulation endurance requiring the use of an assistive device and the assistance of a caregiver. Patient deemed temporarily homebound secondary to increased risk for infection when leaving home and going out into the community. Patient with increased shortness of breath and elevated heart rate with ambulation greater than 20 feet limiting patient's ability to ambulate safely within the community. Patient with strength deficits limiting the performance of all ADL's without caregiver assistance or the use of an assistive device. Patient with increased shortness of breath with all exertional activity limiting ambulation outside the home. Patient with strength deficits limiting the ability to carry portable O2 for distances outside the home without the assistance of a caregiver.   Patient with rapid oxygen desaturation with all exertional activity and requires frequent seated rest breaks to allow for oxygen recovery. Patient with increased shortness of breath with ambulation greater than 3 feet limiting their ability to ambulate safely in the community. 3. I certify that this patient is under my care and that I, or a nurse practitioner or [address] [place] [address], or clinical nurse specialist, or certified nurse midwife, working with me, had a Face-to-Face Encounter that meets the physician Face-to-Face Encounter requirements.   The following are the clinical findings from the [address] [place] [address] encounter that support the need for skilled services and is a summary of the encounter: See attached progess note      Autumn Sicard NP-C

## 2022-10-31 NOTE — PROGRESS NOTES
Chief Complaint   Patient presents with    Cough    Fatigue         1. \"Have you been to the ER, urgent care clinic since your last visit? Hospitalized since your last visit? \" No    2. \"Have you seen or consulted any other health care providers outside of the [address] [place] [address] since your last visit? \" No     3. For patients aged 39-70: Has the patient had a colonoscopy / FIT/ Cologuard? NA - based on age      If the patient is female:    4. For patients aged 41-77: Has the patient had a mammogram within the past 2 years? NA - based on age or sex      11. For patients aged 21-65: Has the patient had a pap smear?  NA - based on age or sex

## 2022-10-31 NOTE — PROGRESS NOTES
Patient's aid BB verified stating name and date of birth. BB informed of lab results and states understanding.

## 2022-11-02 ENCOUNTER — APPOINTMENT (OUTPATIENT)
Dept: GENERAL RADIOLOGY | Age: 82
End: 2022-11-02
Attending: EMERGENCY MEDICINE
Payer: MEDICARE

## 2022-11-02 ENCOUNTER — HOSPITAL ENCOUNTER (EMERGENCY)
Age: 82
Discharge: HOME | End: 2022-11-02
Attending: EMERGENCY MEDICINE
Payer: MEDICARE

## 2022-11-02 ENCOUNTER — APPOINTMENT (OUTPATIENT)
Dept: CT IMAGING | Age: 82
End: 2022-11-02
Attending: EMERGENCY MEDICINE
Payer: MEDICARE

## 2022-11-02 VITALS
HEIGHT: 69 IN | SYSTOLIC BLOOD PRESSURE: 167 MMHG | TEMPERATURE: 98.3 F | RESPIRATION RATE: 20 BRPM | BODY MASS INDEX: 26.66 KG/M2 | WEIGHT: 180 LBS | DIASTOLIC BLOOD PRESSURE: 75 MMHG | OXYGEN SATURATION: 95 % | HEART RATE: 69 BPM

## 2022-11-02 DIAGNOSIS — R79.1 SUPRATHERAPEUTIC INR: ICD-10-CM

## 2022-11-02 DIAGNOSIS — J20.8 ACUTE BRONCHITIS DUE TO OTHER SPECIFIED ORGANISMS: ICD-10-CM

## 2022-11-02 DIAGNOSIS — U07.1 ACUTE COVID-19: Primary | ICD-10-CM

## 2022-11-02 DIAGNOSIS — J01.40 ACUTE PANSINUSITIS, RECURRENCE NOT SPECIFIED: ICD-10-CM

## 2022-11-02 LAB
ALBUMIN SERPL-MCNC: 2.7 G/DL (ref 3.5–5)
ALBUMIN/GLOB SERPL: 0.8 {RATIO} (ref 1.1–2.2)
ALP SERPL-CCNC: 77 U/L (ref 45–117)
ALT SERPL-CCNC: 132 U/L (ref 12–78)
ANION GAP SERPL CALC-SCNC: 6 MMOL/L (ref 5–15)
AST SERPL-CCNC: 36 U/L (ref 15–37)
BASOPHILS # BLD: 0.1 K/UL (ref 0–0.1)
BASOPHILS NFR BLD: 1 % (ref 0–1)
BILIRUB SERPL-MCNC: 0.5 MG/DL (ref 0.2–1)
BUN SERPL-MCNC: 43 MG/DL (ref 6–20)
BUN/CREAT SERPL: 13 (ref 12–20)
CALCIUM SERPL-MCNC: 8.7 MG/DL (ref 8.5–10.1)
CHLORIDE SERPL-SCNC: 105 MMOL/L (ref 97–108)
CO2 SERPL-SCNC: 28 MMOL/L (ref 21–32)
CREAT SERPL-MCNC: 3.29 MG/DL (ref 0.7–1.3)
DIFFERENTIAL METHOD BLD: ABNORMAL
EOSINOPHIL # BLD: 0 K/UL (ref 0–0.4)
EOSINOPHIL NFR BLD: 0 % (ref 0–7)
ERYTHROCYTE [DISTWIDTH] IN BLOOD BY AUTOMATED COUNT: 14 % (ref 11.5–14.5)
FLUAV RNA SPEC QL NAA+PROBE: NOT DETECTED
FLUBV RNA SPEC QL NAA+PROBE: NOT DETECTED
GLOBULIN SER CALC-MCNC: 3.2 G/DL (ref 2–4)
GLUCOSE SERPL-MCNC: 120 MG/DL (ref 65–100)
HCT VFR BLD AUTO: 30.1 % (ref 36.6–50.3)
HGB BLD-MCNC: 9.5 G/DL (ref 12.1–17)
IMM GRANULOCYTES # BLD AUTO: 0 K/UL (ref 0–0.04)
IMM GRANULOCYTES NFR BLD AUTO: 0 % (ref 0–0.5)
INR PPP: 4.4 (ref 0.9–1.1)
LACTATE SERPL-SCNC: 0.9 MMOL/L (ref 0.4–2)
LYMPHOCYTES # BLD: 0.5 K/UL (ref 0.8–3.5)
LYMPHOCYTES NFR BLD: 6 % (ref 12–49)
MCH RBC QN AUTO: 32.5 PG (ref 26–34)
MCHC RBC AUTO-ENTMCNC: 31.6 G/DL (ref 30–36.5)
MCV RBC AUTO: 103.1 FL (ref 80–99)
MONOCYTES # BLD: 0.2 K/UL (ref 0–1)
MONOCYTES NFR BLD: 2 % (ref 5–13)
NEUTS BAND NFR BLD MANUAL: 4 %
NEUTS SEG # BLD: 8.2 K/UL (ref 1.8–8)
NEUTS SEG NFR BLD: 87 % (ref 32–75)
NRBC # BLD: 0 K/UL (ref 0–0.01)
NRBC BLD-RTO: 0 PER 100 WBC
PLATELET # BLD AUTO: 207 K/UL (ref 150–400)
PLATELET COMMENTS,PCOM: ABNORMAL
PMV BLD AUTO: 11.6 FL (ref 8.9–12.9)
POTASSIUM SERPL-SCNC: 5.3 MMOL/L (ref 3.5–5.1)
PROT SERPL-MCNC: 5.9 G/DL (ref 6.4–8.2)
PROTHROMBIN TIME: 41.2 SEC (ref 9–11.1)
RBC # BLD AUTO: 2.92 M/UL (ref 4.1–5.7)
RBC MORPH BLD: ABNORMAL
SARS-COV-2, COV2: DETECTED
SODIUM SERPL-SCNC: 139 MMOL/L (ref 136–145)
WBC # BLD AUTO: 9 K/UL (ref 4.1–11.1)

## 2022-11-02 PROCEDURE — 87040 BLOOD CULTURE FOR BACTERIA: CPT

## 2022-11-02 PROCEDURE — 85025 COMPLETE CBC W/AUTO DIFF WBC: CPT

## 2022-11-02 PROCEDURE — 80053 COMPREHEN METABOLIC PANEL: CPT

## 2022-11-02 PROCEDURE — 36415 COLL VENOUS BLD VENIPUNCTURE: CPT

## 2022-11-02 PROCEDURE — 74011250637 HC RX REV CODE- 250/637: Performed by: EMERGENCY MEDICINE

## 2022-11-02 PROCEDURE — 70450 CT HEAD/BRAIN W/O DYE: CPT

## 2022-11-02 PROCEDURE — 74011000250 HC RX REV CODE- 250: Performed by: EMERGENCY MEDICINE

## 2022-11-02 PROCEDURE — 99284 EMERGENCY DEPT VISIT MOD MDM: CPT

## 2022-11-02 PROCEDURE — 83605 ASSAY OF LACTIC ACID: CPT

## 2022-11-02 PROCEDURE — 74011250636 HC RX REV CODE- 250/636: Performed by: EMERGENCY MEDICINE

## 2022-11-02 PROCEDURE — 71046 X-RAY EXAM CHEST 2 VIEWS: CPT

## 2022-11-02 PROCEDURE — 85610 PROTHROMBIN TIME: CPT

## 2022-11-02 PROCEDURE — 87636 SARSCOV2 & INF A&B AMP PRB: CPT

## 2022-11-02 RX ORDER — SODIUM CHLORIDE 0.9 % (FLUSH) 0.9 %
5-10 SYRINGE (ML) INJECTION ONCE
Status: COMPLETED | OUTPATIENT
Start: 2022-11-02 | End: 2022-11-02

## 2022-11-02 RX ORDER — PREDNISONE 10 MG/1
30 TABLET ORAL
Qty: 9 TABLET | Refills: 0 | Status: SHIPPED | OUTPATIENT
Start: 2022-11-02 | End: 2022-11-05

## 2022-11-02 RX ORDER — IPRATROPIUM BROMIDE AND ALBUTEROL SULFATE 2.5; .5 MG/3ML; MG/3ML
3 SOLUTION RESPIRATORY (INHALATION) ONCE
Status: COMPLETED | OUTPATIENT
Start: 2022-11-02 | End: 2022-11-02

## 2022-11-02 RX ORDER — BENZONATATE 100 MG/1
100 CAPSULE ORAL
Qty: 30 CAPSULE | Refills: 0 | Status: SHIPPED | OUTPATIENT
Start: 2022-11-02 | End: 2022-11-09

## 2022-11-02 RX ORDER — ALBUTEROL SULFATE 90 UG/1
2 AEROSOL, METERED RESPIRATORY (INHALATION)
Qty: 1 EACH | Refills: 0 | Status: SHIPPED | OUTPATIENT
Start: 2022-11-02 | End: 2022-11-21

## 2022-11-02 RX ORDER — ACETAMINOPHEN 325 MG/1
975 TABLET ORAL
Status: COMPLETED | OUTPATIENT
Start: 2022-11-02 | End: 2022-11-02

## 2022-11-02 RX ORDER — ALBUTEROL SULFATE 0.83 MG/ML
2.5 SOLUTION RESPIRATORY (INHALATION)
Qty: 1 EACH | Refills: 0 | Status: SHIPPED | OUTPATIENT
Start: 2022-11-02 | End: 2022-11-21

## 2022-11-02 RX ORDER — NEBULIZER AND COMPRESSOR
1 EACH MISCELLANEOUS
Qty: 1 EACH | Refills: 0 | Status: SHIPPED | OUTPATIENT
Start: 2022-11-02

## 2022-11-02 RX ORDER — AMOXICILLIN AND CLAVULANATE POTASSIUM 875; 125 MG/1; MG/1
1 TABLET, FILM COATED ORAL 2 TIMES DAILY
Qty: 14 TABLET | Refills: 0 | Status: SHIPPED | OUTPATIENT
Start: 2022-11-02

## 2022-11-02 RX ADMIN — SODIUM CHLORIDE 1000 ML: 9 INJECTION, SOLUTION INTRAVENOUS at 16:03

## 2022-11-02 RX ADMIN — IPRATROPIUM BROMIDE AND ALBUTEROL SULFATE 3 ML: 2.5; .5 SOLUTION RESPIRATORY (INHALATION) at 16:11

## 2022-11-02 RX ADMIN — SODIUM CHLORIDE, PRESERVATIVE FREE 10 ML: 5 INJECTION INTRAVENOUS at 16:15

## 2022-11-02 RX ADMIN — ACETAMINOPHEN 975 MG: 325 TABLET ORAL at 15:35

## 2022-11-02 NOTE — ED PROVIDER NOTES
EMERGENCY DEPARTMENT HISTORY AND PHYSICAL EXAM          Date: 11/2/2022  Patient Name: Amilcar Palmer    History of Presenting Illness     Chief Complaint   Patient presents with    Cough       History Provided By: Patient    HPI: Amilcar Palmer is a 81 y.o. male, pmhx hypertension, atrial fibrillation on anticoagulant, CKD, who presents with his caregiver to the ED c/o cough    Patient's caregiver states he has been sick for about 1 to 2 weeks. Patient states he has felt poorly for the past 3 days. He states he has a cough lots of phlegm but his caregiver states he swallows it down as they do not know what color it is. He has not had any fevers, chills, nausea or vomiting or rash but patient states he feels poorly with all over body aches. His caregiver notes that she did have COVID 2 weeks ago and she would like him tested. She also notes that she tried calling the ambulance for him 2 weeks ago but he refused to come. She states at that time he was having some loss of hearing in his right ear and she feels like when he walks his right leg is turned funny and she is concerned he is had a stroke. His primary care doctor is already set him up with an audiologist.    PCP: Josh Agrawal NP    Allergies: ketamine    There are no other complaints, changes, or physical findings at this time. Current Outpatient Medications   Medication Sig Dispense Refill    predniSONE (DELTASONE) 10 mg tablet Take 30 mg by mouth daily (with breakfast) for 3 days. 9 Tablet 0    albuterol (PROVENTIL HFA, VENTOLIN HFA, PROAIR HFA) 90 mcg/actuation inhaler Take 2 Puffs by inhalation every four (4) hours as needed for Wheezing. 1 Each 0    benzonatate (Tessalon Perles) 100 mg capsule Take 1 Capsule by mouth three (3) times daily as needed for Cough for up to 7 days. 30 Capsule 0    amoxicillin-clavulanate (Augmentin) 875-125 mg per tablet Take 1 Tablet by mouth two (2) times a day.  14 Tablet 0    Nebulizer & Compressor machine 1 Each by Does Not Apply route every four (4) hours as needed for Wheezing, Shortness of Breath or Cough. As directed 1 Each 0    albuterol (PROVENTIL VENTOLIN) 2.5 mg /3 mL (0.083 %) nebu 3 mL by Nebulization route every four (4) hours as needed for Wheezing. 1 Each 0    guaiFENesin ER (MUCINEX) 600 mg ER tablet Take 1 Tablet by mouth two (2) times a day. 60 Tablet 1    vit B Cmplx 3-FA-Vit C-Biotin (NEPHRO MARY RX) 1- mg-mg-mcg tablet Take 1 Tablet by mouth daily. 90 Tablet 0    dilTIAZem IR (CARDIZEM) 60 mg tablet Take 1 tablet by mouth twice daily 180 Tablet 0    warfarin (COUMADIN) 3 mg tablet Take 1 tablet by mouth once daily 90 Tablet 0    ferrous sulfate 325 mg (65 mg iron) tablet Take  by mouth Daily (before breakfast). furosemide (LASIX) 20 mg tablet Take 1 tablet by mouth once daily 90 Tablet 0    Euthyrox 100 mcg tablet TAKE 1 TABLET BY MOUTH ONCE DAILY BEFORE BREAKFAST 90 Tablet 0    atorvastatin (LIPITOR) 20 mg tablet Take 1 Tablet by mouth in the morning. 90 Tablet 1    losartan (COZAAR) 50 mg tablet Take 1 Tablet by mouth in the morning. 90 Tablet 1    bacitracin (BACITRACIN) 500 unit/gram oint Apply  to affected area two (2) times a day. Apply to affected area 28 g 0    collagenase (SANTYL) 250 unit/gram ointment Apply  to affected area daily. (Patient not taking: No sig reported) 30 g 4    amiodarone (CORDARONE) 200 mg tablet Take 1 Tablet by mouth daily. 90 Tablet 1    polyethylene glycol (Miralax) 17 gram/dose powder Take 17 g by mouth daily. 1 tablespoon with 8 oz of water daily 238 g 1    acetaminophen (TYLENOL) 325 mg tablet Take 2 Tablets by mouth every six (6) hours as needed for Pain. 100 Tablet 1    ciclopirox (PENLAC) 8 % solution Apply  to affected area nightly.  As directed         Past History     Past Medical History:  Past Medical History:   Diagnosis Date    Arrhythmia     atrial fibrillation, cardioversion 2010    CKD (chronic kidney disease) stage 3, GFR 30-59 ml/min ([facility]) Hyperlipemia     Hypertension     Iron deficiency anemia due to chronic blood loss 2/9/2021    PAF (paroxysmal atrial fibrillation) ([facility] 2/9/2021    PVD (peripheral vascular disease) ([facility]     Thyroid disease        Past Surgical History:  Past Surgical History:   Procedure Laterality Date    HX COLONOSCOPY  2010    WNL SIS 10 y    HX HERNIA REPAIR Bilateral     Inguinal    HX ORTHOPAEDIC      right shoulder fracture repair    HX OTHER SURGICAL  03/09/2019    groin lymph gland excision    HX VASCULAR ACCESS      IR INSERT TUNL CVC W PORT OVER 5 YEARS  3/5/2020    SC CHEST SURGERY PROCEDURE UNLISTED Left     lung surgery age 17, lobectomy    VASCULAR SURGERY PROCEDURE UNLIST      bypass numerous surgeries both legs       Family History:  Family History   Problem Relation Age of Onset    Cancer Mother         lung    Cancer Brother 66        lung    Cancer Maternal Uncle         cancer unknown       Social History:  Social History     Tobacco Use    Smoking status: Former     Packs/day: 1.00     Years: 54.00     Pack years: 54.00     Types: Cigarettes     Quit date: 3/15/2007     Years since quitting: 15.6    Smokeless tobacco: Never   Vaping Use    Vaping Use: Never used   Substance Use Topics    Alcohol use: Yes     Comment: former drinker- beer   social    Drug use: Never       Allergies: Allergies   Allergen Reactions    Ketamine Other (comments)     confusion         Review of Systems   Review of Systems   Constitutional:  Positive for fatigue. Negative for activity change, appetite change, chills, fever and unexpected weight change. HENT:  Positive for congestion and sneezing. Eyes:  Negative for pain and visual disturbance. Respiratory:  Positive for cough and shortness of breath. Cardiovascular:  Negative for chest pain. Gastrointestinal:  Negative for abdominal pain, diarrhea, nausea and vomiting. Genitourinary:  Negative for dysuria. Musculoskeletal:  Positive for myalgias.  Negative for back pain. Skin:  Negative for rash. Neurological:  Negative for headaches. Physical Exam   Physical Exam  Vitals and nursing note reviewed. Constitutional:       Appearance: He is well-developed. He is not diaphoretic. Comments: Elderly male appearing diffusely weak sitting in the triage chair with slightly elevated blood pressure but otherwise hemodynamically stable   HENT:      Head: Normocephalic and atraumatic. Eyes:      General:         Right eye: No discharge. Left eye: No discharge. Conjunctiva/sclera: Conjunctivae normal.      Pupils: Pupils are equal, round, and reactive to light. Cardiovascular:      Rate and Rhythm: Normal rate and regular rhythm. Heart sounds: Normal heart sounds. No murmur heard. Pulmonary:      Effort: Pulmonary effort is normal. No respiratory distress. Breath sounds: Wheezing (Diffuse wheezes) present. No rhonchi or rales. Abdominal:      General: Bowel sounds are normal. There is no distension. Palpations: Abdomen is soft. Tenderness: There is no abdominal tenderness. There is no guarding or rebound. Musculoskeletal:         General: Normal range of motion. Cervical back: Normal range of motion and neck supple. Right lower leg: No edema. Left lower leg: No edema. Skin:     General: Skin is warm and dry. Coloration: Skin is pale. Findings: No rash. Neurological:      Mental Status: He is alert and oriented to person, place, and time. Cranial Nerves: No cranial nerve deficit. Motor: No abnormal muscle tone. Diagnostic Study Results     Labs -   No results found for this or any previous visit (from the past 12 hour(s)). Radiologic Studies -   CT HEAD WO CONT   Final Result   Chronic sinus disease. No acute intracranial abnormality. XR CHEST PA LAT   Final Result   1. Hyperinflated lungs, enlarged cardiac silhouette.  No focal consolidation           CT Results  (Last 48 hours)                 11/02/22 1754  CT HEAD WO CONT Final result    Impression:  Chronic sinus disease. No acute intracranial abnormality. Narrative:  EXAM: CT HEAD WO CONT       INDICATION: right leg weakness x 2 weeks       COMPARISON: 12/3/2021. CONTRAST: None. TECHNIQUE: Unenhanced CT of the head was performed using 5 mm images. Brain and   bone windows were generated. Coronal and sagittal reformats. CT dose reduction   was achieved through use of a standardized protocol tailored for this   examination and automatic exposure control for dose modulation. FINDINGS:   The ventricles and sulci are normal in size, shape and configuration. . There is   no significant white matter disease. There is no intracranial hemorrhage,   extra-axial collection, or mass effect. The basilar cisterns are open. No CT   evidence of acute infarct. The bone windows demonstrate no abnormalities. Mucoperiosteal thickening is   noted in the maxillary sinuses and sphenoid sinuses bilaterally as well as   scattered ethmoidal air cells. Vascular calcification is noted. CXR Results  (Last 48 hours)                 11/02/22 1522  XR CHEST PA LAT Final result    Impression:  1. Hyperinflated lungs, enlarged cardiac silhouette. No focal consolidation           Narrative:  INDICATION:  cough, weak        Exam: Chest 2 views. Comparison: none. FINDINGS: Lung volumes remain hyperinflated. . Cardiac silhouette is enlarged   port unchanged. . Pulmonary vasculature is not engorged. No focal parenchymal   opacities, effusions, or pneumothorax. Postoperative changes to the right   proximal humerus. Medical Decision Making   I am the first provider for this patient. I reviewed the vital signs, available nursing notes, past medical history, past surgical history, family history and social history. Vital Signs-Reviewed the patient's vital signs.   Patient Vitals for the past 24 hrs:   Temp Pulse Resp BP SpO2   11/02/22 1806 -- 69 20 (!) 167/75 95 %   11/02/22 1728 -- 65 20 130/67 95 %   11/02/22 1508 98.3 °F (36.8 °C) 61 20 136/62 94 %     Pulse Oximetry Analysis - 95% on RA    Cardiac Monitor:   Rate: 65bpm  Rhythm: Normal Sinus Rhythm      Records Reviewed: Nursing Notes, Old Medical Records, Previous Radiology Studies, and Previous Laboratory Studies    Provider Notes (Medical Decision Making):   MDM: Elderly male with viral type syndrome presenting with wheeze and mild respiratory distress. Given history and chronic oxygen use we will start him on DuoNeb therapy while awaiting results. Caregiver also complains that he has lost his hearing in his right ear but he is already been set up with audiology referral.  I do not see any overt evidence of stroke on exam.    ED Course:   Initial assessment performed. The patients presenting problems have been discussed, and they are in agreement with the care plan formulated and outlined with them. I have encouraged them to ask questions as they arise throughout their visit. PROGRESS NOTE:  4:45  Pt continues in the hallway but states he feels better after the albuterol treatment. He still has a little bit of wheeze but he states he does not feel that he needs another treatment. We discussed his lab results and finding for COVID positive. Patient appears appropriate for discharge as he remains hemodynamically stable, afebrile, without pneumonia and has oxygen as well as full caregivers at home. The caregiver here refuses to take him home as she wants him evaluated for stroke. Patient did have a virtual visit with his primary care doctor on October 31 where she addressed these issues. I have ordered a CT head for her to be completed before patient leaves the emergency room.   Old stroke not initiated as his symptoms started 2 weeks ago          Discharge note:  Pt re-evaluated and noted to be feeling better, ready for discharge. Updated pt on all final lab and CT findings. Will follow up as instructed. All questions have been answered, pt voiced understanding and agreement with plan. Abx were prescribed, pt advised that diarrhea and rash are possible side effects of the medications. Specific return precautions provided as well as instructions to return to the ED should sx worsen at any time. Vital signs stable for discharge. Critical Care Time:   0      Diagnosis     Clinical Impression:   1. Acute COVID-19    2. Acute bronchitis due to other specified organisms    3. Supratherapeutic INR    4. Acute pansinusitis, recurrence not specified        PLAN:  1. Discharge Medication List as of 11/2/2022  4:44 PM        START taking these medications    Details   predniSONE (DELTASONE) 10 mg tablet Take 30 mg by mouth daily (with breakfast) for 3 days. , Normal, Disp-9 Tablet, R-0      albuterol (PROVENTIL HFA, VENTOLIN HFA, PROAIR HFA) 90 mcg/actuation inhaler Take 2 Puffs by inhalation every four (4) hours as needed for Wheezing., Normal, Disp-1 Each, R-0      benzonatate (Tessalon Perles) 100 mg capsule Take 1 Capsule by mouth three (3) times daily as needed for Cough for up to 7 days. , Normal, Disp-30 Capsule, R-0           CONTINUE these medications which have NOT CHANGED    Details   guaiFENesin ER (MUCINEX) 600 mg ER tablet Take 1 Tablet by mouth two (2) times a day., Normal, Disp-60 Tablet, R-1      vit B Cmplx 3-FA-Vit C-Biotin (NEPHRO MARY RX) 1- mg-mg-mcg tablet Take 1 Tablet by mouth daily. , Normal, Disp-90 Tablet, R-0      dilTIAZem IR (CARDIZEM) 60 mg tablet Take 1 tablet by mouth twice daily, Normal, Disp-180 Tablet, R-0      warfarin (COUMADIN) 3 mg tablet Take 1 tablet by mouth once daily, Normal, Disp-90 Tablet, R-0      ferrous sulfate 325 mg (65 mg iron) tablet Take  by mouth Daily (before breakfast). , Historical Med      furosemide (LASIX) 20 mg tablet Take 1 tablet by mouth once daily, Normal, Disp-90 Tablet, R-0      Euthyrox 100 mcg tablet TAKE 1 TABLET BY MOUTH ONCE DAILY BEFORE BREAKFAST, Normal, Disp-90 Tablet, R-0      atorvastatin (LIPITOR) 20 mg tablet Take 1 Tablet by mouth in the morning., Normal, Disp-90 Tablet, R-1      losartan (COZAAR) 50 mg tablet Take 1 Tablet by mouth in the morning., Normal, Disp-90 Tablet, R-1      bacitracin (BACITRACIN) 500 unit/gram oint Apply  to affected area two (2) times a day. Apply to affected area, Normal, Disp-28 g, R-0      collagenase (SANTYL) 250 unit/gram ointment Apply  to affected area daily. , Normal, Disp-30 g, R-4      amiodarone (CORDARONE) 200 mg tablet Take 1 Tablet by mouth daily. , Normal, Disp-90 Tablet, R-1      polyethylene glycol (Miralax) 17 gram/dose powder Take 17 g by mouth daily. 1 tablespoon with 8 oz of water daily, Normal, Disp-238 g, R-1      acetaminophen (TYLENOL) 325 mg tablet Take 2 Tablets by mouth every six (6) hours as needed for Pain., Normal, Disp-100 Tablet, R-1      ciclopirox (PENLAC) 8 % solution Apply  to affected area nightly. As directed, Historical Med           2. Follow-up Information       Follow up With Specialties Details Why Contact Natasha Quispe NP Nurse Practitioner Schedule an appointment as soon as possible for a visit  For INR recheck on Friday [facility] [address]  [address] [facility] [phone]      [address] [place] [address] Emergency Medicine  If symptoms worsen trouble breathing or low oxygen levels less than 88% [address] [place] [address] [id number]          Return to ED if worse     Disposition:  Home       Please note, this dictation was completed with tic, the [employer] voice recognition software. Quite often unanticipated grammatical, syntax, homophones, and other interpretive errors are inadvertently transcribed by the computer software. Please disregard these errors. Please excuse any errors that have escaped final proof reading.

## 2022-11-02 NOTE — DISCHARGE INSTRUCTIONS
You were seen in the ER for cough and cold symptoms with weakness. Your COVID test is positive and you are quite dehydrated. Your x-ray does not show any evidence of pneumonia. Sure you are drinking plenty of fluids and use the medications as prescribed to help you with your shortness of breath and cough. Your Coumadin level is also elevated with an INR greater than 4. Hold your medicine today and tomorrow and then you will need a recheck of your INR on Friday before resuming your medications.

## 2022-11-02 NOTE — ED NOTES
Caregiver has signed an equipment borrowed form for the portable oxygen tank and the caregiver and patient agree to return the oxygen tank to the ER.

## 2022-11-02 NOTE — ED TRIAGE NOTES
Patient complains of cough that started two weeks ago. Denies SOB, N, V.  Per the patient he feels bad all over. Complains of body aches.

## 2022-11-03 ENCOUNTER — TELEPHONE (OUTPATIENT)
Dept: FAMILY MEDICINE CLINIC | Age: 82
End: 2022-11-03

## 2022-11-03 NOTE — TELEPHONE ENCOUNTER
[facility] admitied for 1 week for 3 weeks for wound care please advise wound care instructions

## 2022-11-03 NOTE — TELEPHONE ENCOUNTER
Robert his caregiver said his INR was high and should she give Mayito Liz his warfarin. The ER also told her to give him Powerade or Gatorade and not Pedialyte due to the potassium in it. Robert  said it looks like all of the mhave potassium in them so which one should she give Mayito Agusto?

## 2022-11-07 RX ORDER — AMIODARONE HYDROCHLORIDE 200 MG/1
TABLET ORAL
Qty: 90 TABLET | Refills: 0 | Status: SHIPPED | OUTPATIENT
Start: 2022-11-07

## 2022-11-07 NOTE — TELEPHONE ENCOUNTER
Phone call made to requested call back number no answer ,so a message was left asking patient to call office back at [phone] at their convenience.

## 2022-11-08 NOTE — TELEPHONE ENCOUNTER
Phone call made to [phone] as requested  no answer ,so a message was left asking patient to call office back at [phone] at their convenience.

## 2022-11-09 LAB
BACTERIA SPEC CULT: NORMAL
SERVICE CMNT-IMP: NORMAL

## 2022-11-10 ENCOUNTER — TELEPHONE (OUTPATIENT)
Dept: FAMILY MEDICINE CLINIC | Age: 82
End: 2022-11-10

## 2022-11-10 NOTE — TELEPHONE ENCOUNTER
PC for lab orders for the [facility] nurse to obtain while she visiting the pt today. I stated to her I have searched pt's chart, for orders, but unable to find. Also, will check with Dedra for assistant, Paul Hunt is out, but will also send to her. If not able to find, will have to wait for Elif's rt on Monday. Please call BB / Caregiver back [phone], if no answer please call the nurse at his home today.

## 2022-11-10 NOTE — TELEPHONE ENCOUNTER
PC to inform pt's caregiver BB, per Dedra, she does not see any future labs to be done, so will have to wait on Elif's rt on Monday. She was informed the message was also sent to [facility]. She stated OK of understanding and thanks.

## 2022-11-14 ENCOUNTER — TELEPHONE (OUTPATIENT)
Dept: FAMILY MEDICINE CLINIC | Age: 82
End: 2022-11-14

## 2022-11-14 ENCOUNTER — TELEPHONE ANTICOAG (OUTPATIENT)
Dept: FAMILY MEDICINE CLINIC | Age: 82
End: 2022-11-14

## 2022-11-14 LAB — INR, EXTERNAL: 3.6 (ref 2–3)

## 2022-11-14 NOTE — TELEPHONE ENCOUNTER
Per aid they need orders faxed to home health for any lab orders you need. Also needs referral for audiology due to loss of hearing . [employer] [phone] Fax. Also per aid they have stopped neb treatment and inhaler because patient reports that he feels worse after taking med. Aid notes that patient has rash on back that appeared recently it has not gotten any better or worse since stopping neb treatments.

## 2022-11-21 ENCOUNTER — VIRTUAL VISIT (OUTPATIENT)
Dept: FAMILY MEDICINE CLINIC | Age: 82
End: 2022-11-21
Payer: MEDICARE

## 2022-11-21 DIAGNOSIS — J42 CHRONIC BRONCHITIS, UNSPECIFIED CHRONIC BRONCHITIS TYPE (HCC): ICD-10-CM

## 2022-11-21 DIAGNOSIS — I50.32 DIASTOLIC CHF, CHRONIC (HCC): ICD-10-CM

## 2022-11-21 DIAGNOSIS — J98.4 PULMONARY SCARRING: Primary | ICD-10-CM

## 2022-11-21 PROCEDURE — 99443 PR PHYS/QHP TELEPHONE EVALUATION 21-30 MIN: CPT | Performed by: NURSE PRACTITIONER

## 2022-11-21 RX ORDER — FUROSEMIDE 20 MG/1
TABLET ORAL
Qty: 90 TABLET | Refills: 0 | Status: SHIPPED | OUTPATIENT
Start: 2022-11-21

## 2022-11-21 RX ORDER — LEVOTHYROXINE SODIUM 100 UG/1
TABLET ORAL
Qty: 90 TABLET | Refills: 0 | Status: SHIPPED | OUTPATIENT
Start: 2022-11-21

## 2022-11-21 RX ORDER — LEVALBUTEROL INHALATION SOLUTION 0.63 MG/3ML
0.63 SOLUTION RESPIRATORY (INHALATION)
Qty: 90 EACH | Refills: 3 | Status: SHIPPED | OUTPATIENT
Start: 2022-11-21 | End: 2023-02-19

## 2022-11-21 RX ORDER — LEVALBUTEROL TARTRATE 45 UG/1
1 AEROSOL, METERED ORAL
Qty: 15 G | Refills: 3 | Status: SHIPPED | OUTPATIENT
Start: 2022-11-21

## 2022-11-21 NOTE — PROGRESS NOTES
Anastacia Lynch is a 81 y.o. male, evaluated via audio-only technology on 11/21/2022 for Hospital Follow Up (ER, [facility] 11/02/2022)  . Mr. Osbaldo Calzada and care taker MORGAN MORA historian He is recovering from COVID, 19  Bronchitis and sinusitis . He is feeling better and remains oxygen dependent 2 liters via NC when sedentary and 3 liters at night and with activity. Assessment & Plan:   Diagnoses and all orders for this visit:    1. Pulmonary scarring    2. Diastolic CHF, chronic ([address]    3. Chronic bronchitis, unspecified chronic bronchitis type ([address]    Other orders  -     levalbuterol tartrate (XOPENEX) 45 mcg/actuation inhaler; Take 1 Puff by inhalation every four (4) hours as needed for Wheezing or Shortness of Breath. -     levalbuterol (XOPENEX) 0.63 mg/3 mL nebu; 3 mL by Nebulization route every six (6) hours as needed (wheezing) for up to 90 days. Indications: bronchospasm prevention    The complexity of medical decision making for this visit is high       12  Subjective:       Prior to Admission medications    Medication Sig Start Date End Date Taking? Authorizing Provider   furosemide (LASIX) 20 mg tablet Take 1 tablet by mouth once daily 11/21/22  Yes Giuseppe Monreal NP   levothyroxine (SYNTHROID) 100 mcg tablet TAKE 1 TABLET BY MOUTH ONCE DAILY BEFORE BREAKFAST 11/21/22  Yes Giuseppe Monreal NP   amiodarone (CORDARONE) 200 mg tablet Take 1 tablet by mouth once daily 11/7/22  Yes Giuseppe Monreal NP   guaiFENesin ER (MUCINEX) 600 mg ER tablet Take 1 Tablet by mouth two (2) times a day. Patient taking differently: Take 600 mg by mouth two (2) times a day. PRN 10/31/22  Yes Giuseppe Monreal NP   vit B Cmplx 3-FA-Vit C-Biotin (NEPHRO MARY RX) 1- mg-mg-mcg tablet Take 1 Tablet by mouth daily.  10/6/22  Yes Giuseppe Monreal NP   dilTIAZem IR (CARDIZEM) 60 mg tablet Take 1 tablet by mouth twice daily 9/20/22  Yes Giuseppe Monreal NP   warfarin (COUMADIN) 3 mg tablet Take 1 tablet by mouth once daily 9/16/22 Yes Bryanna Jackson NP   ferrous sulfate 325 mg (65 mg iron) tablet Take  by mouth Daily (before breakfast). Yes Provider, Historical   atorvastatin (LIPITOR) 20 mg tablet Take 1 Tablet by mouth in the morning. 8/11/22  Yes Bryanna Jackson NP   losartan (COZAAR) 50 mg tablet Take 1 Tablet by mouth in the morning. 8/1/22  Yes Bryanna Jackson NP   polyethylene glycol (Miralax) 17 gram/dose powder Take 17 g by mouth daily. 1 tablespoon with 8 oz of water daily 12/24/21  Yes Holly Coe MD   acetaminophen (TYLENOL) 325 mg tablet Take 2 Tablets by mouth every six (6) hours as needed for Pain. 12/24/21  Yes Lionel Coe MD   albuterol (PROVENTIL HFA, VENTOLIN HFA, PROAIR HFA) 90 mcg/actuation inhaler Take 2 Puffs by inhalation every four (4) hours as needed for Wheezing. Patient not taking: Reported on 11/21/2022 11/2/22   Alex Dougherty MD   amoxicillin-clavulanate (Augmentin) 875-125 mg per tablet Take 1 Tablet by mouth two (2) times a day. Patient not taking: Reported on 11/21/2022 11/2/22   Alex Dougherty MD   Nebulizer & Compressor machine 1 Each by Does Not Apply route every four (4) hours as needed for Wheezing, Shortness of Breath or Cough. As directed  Patient not taking: Reported on 11/21/2022 11/2/22   Alex Dougherty MD   albuterol (PROVENTIL VENTOLIN) 2.5 mg /3 mL (0.083 %) nebu 3 mL by Nebulization route every four (4) hours as needed for Wheezing. Patient not taking: Reported on 11/21/2022 11/2/22   Alex Dougherty MD   furosemide (LASIX) 20 mg tablet Take 1 tablet by mouth once daily 8/19/22 11/21/22  Bryanna Jackson NP   Euthyrox 100 mcg tablet TAKE 1 TABLET BY MOUTH ONCE DAILY BEFORE BREAKFAST 8/18/22 11/21/22  Bryanna Crutch, NP   bacitracin (BACITRACIN) 500 unit/gram oint Apply  to affected area two (2) times a day.  Apply to affected area  Patient not taking: Reported on 11/21/2022 4/20/22   Seth Leonardo MD   collagenase [facility]) 250 unit/gram ointment Apply  to affected area daily. Patient not taking: No sig reported 1/24/22   Margoth Camp NP   ciclopirox [facility]) 8 % solution Apply  to affected area nightly. As directed  Patient not taking: Reported on 11/21/2022    Provider, Historical     Patient Active Problem List   Diagnosis Code    Thyroid disease E07.9    Arrhythmia I49.9    Hyperlipemia E78.5    PVD (peripheral vascular disease) ([facility]) I73.9    CKD (chronic kidney disease) stage 3, GFR 30-59 ml/min ([facility]) N18.30    Anticoagulant long-term use Z79.01    Pulmonary scarring J98.4    Spondylosis of lumbar region without myelopathy or radiculopathy M47.816    Chronic atrial fibrillation ([facility]) I48.20    CKD (chronic kidney disease) stage 4, GFR 15-29 ml/min ([facility]) N18.4    CKD (chronic kidney disease) stage 5, GFR less than 15 ml/min ([facility]) N18.5    Closed right humeral fracture S42.301A    Coumadin toxicity, accidental or unintentional, initial encounter T45.511A    History of anemia due to chronic kidney disease N18.9, Z86.2    Left rib fracture S22.32XA    Stage 3 chronic kidney disease N18.30    Closed fracture of right proximal humerus S42.201A    Closed fracture of proximal end of right humerus with routine healing S42.201D    Hypothyroid E03.9    Essential hypertension B90    Diastolic CHF, chronic ([facility]) I50.32    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 XXXA    Generalized weakness R53.1    STEVEN (acute kidney injury) ([address] N17.9    Iron deficiency anemia due to chronic blood loss D50.0    PAF (paroxysmal atrial fibrillation) ([facility]) I48.0    Hip fracture ([facility] S72.009A    Encounter for rehabilitation Z51.89    CAP (community acquired pneumonia) J18.9    Acute renal failure superimposed on stage 3 chronic kidney disease ([facility]) N17.9, N18.30    Traumatic rhabdomyolysis ([facility]) T79. 6XXA    Loculated pleural effusion J90    Leukocytosis D72.829    Rhabdomyolysis M62.82     Patient Active Problem List Diagnosis Date Noted    CAP (community acquired pneumonia) 12/03/2021    Acute renal failure superimposed on stage 3 chronic kidney disease ([facility] 12/03/2021    Traumatic rhabdomyolysis ([facility] 12/03/2021    Loculated pleural effusion 12/03/2021    Leukocytosis 12/03/2021    Rhabdomyolysis 12/03/2021    Encounter for rehabilitation 02/13/2021    Hip fracture ([facility] 02/11/2021    Iron deficiency anemia due to chronic blood loss 02/09/2021    PAF (paroxysmal atrial fibrillation) ([facility] 02/09/2021    Fall 02/08/2021    Generalized weakness 02/08/2021    STEVEN (acute kidney injury) ([facility] 02/08/2021    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 ([facility] 37/65/0917    Diastolic CHF, chronic ([facility] 10/11/2019    Closed fracture of proximal end of right humerus with routine healing 08/07/2019    Stage 3 chronic kidney disease 08/05/2019    Hypothyroid 08/05/2019    Essential hypertension 08/05/2019    Closed fracture of right proximal humerus 07/31/2019    Closed right humeral fracture 07/29/2019    Coumadin toxicity, accidental or unintentional, initial encounter 07/29/2019    History of anemia due to chronic kidney disease 07/29/2019    Left rib fracture 07/29/2019    CKD (chronic kidney disease) stage 4, GFR 15-29 ml/min ([facility]) 03/05/2019    CKD (chronic kidney disease) stage 5, GFR less than 15 ml/min ([facility]) 03/05/2019    Chronic atrial fibrillation ([facility]) 09/21/2018    Spondylosis of lumbar region without myelopathy or radiculopathy 10/11/2017    Pulmonary scarring 03/28/2017    Anticoagulant long-term use 02/28/2017    CKD (chronic kidney disease) stage 3, GFR 30-59 ml/min ([facility])     Thyroid disease     Arrhythmia     Hyperlipemia     PVD (peripheral vascular disease) ([facility])      Current Outpatient Medications   Medication Sig Dispense Refill    furosemide (LASIX) 20 mg tablet Take 1 tablet by mouth once daily 90 Tablet 0    levothyroxine (SYNTHROID) 100 mcg tablet TAKE 1 TABLET BY MOUTH ONCE DAILY BEFORE BREAKFAST 90 Tablet 0    levalbuterol tartrate (XOPENEX) 45 mcg/actuation inhaler Take 1 Puff by inhalation every four (4) hours as needed for Wheezing or Shortness of Breath. 15 g 3    levalbuterol (XOPENEX) 0.63 mg/3 mL nebu 3 mL by Nebulization route every six (6) hours as needed (wheezing) for up to 90 days. Indications: bronchospasm prevention 90 Each 3    amiodarone (CORDARONE) 200 mg tablet Take 1 tablet by mouth once daily 90 Tablet 0    guaiFENesin ER (MUCINEX) 600 mg ER tablet Take 1 Tablet by mouth two (2) times a day. (Patient taking differently: Take 600 mg by mouth two (2) times a day. PRN) 60 Tablet 1    vit B Cmplx 3-FA-Vit C-Biotin (NEPHRO MARY RX) 1- mg-mg-mcg tablet Take 1 Tablet by mouth daily. 90 Tablet 0    dilTIAZem IR (CARDIZEM) 60 mg tablet Take 1 tablet by mouth twice daily 180 Tablet 0    warfarin (COUMADIN) 3 mg tablet Take 1 tablet by mouth once daily 90 Tablet 0    ferrous sulfate 325 mg (65 mg iron) tablet Take  by mouth Daily (before breakfast). atorvastatin (LIPITOR) 20 mg tablet Take 1 Tablet by mouth in the morning. 90 Tablet 1    losartan (COZAAR) 50 mg tablet Take 1 Tablet by mouth in the morning. 90 Tablet 1    polyethylene glycol (Miralax) 17 gram/dose powder Take 17 g by mouth daily. 1 tablespoon with 8 oz of water daily 238 g 1    acetaminophen (TYLENOL) 325 mg tablet Take 2 Tablets by mouth every six (6) hours as needed for Pain. 100 Tablet 1    amoxicillin-clavulanate (Augmentin) 875-125 mg per tablet Take 1 Tablet by mouth two (2) times a day. (Patient not taking: Reported on 11/21/2022) 14 Tablet 0    Nebulizer & Compressor machine 1 Each by Does Not Apply route every four (4) hours as needed for Wheezing, Shortness of Breath or Cough. As directed (Patient not taking: Reported on 11/21/2022) 1 Each 0    bacitracin (BACITRACIN) 500 unit/gram oint Apply  to affected area two (2) times a day.  Apply to affected area (Patient not taking: Reported on 11/21/2022) 28 g 0    collagenase (SANTYL) 250 unit/gram ointment Apply  to affected area daily. (Patient not taking: No sig reported) 30 g 4    ciclopirox (PENLAC) 8 % solution Apply  to affected area nightly. As directed (Patient not taking: Reported on 11/21/2022)       Allergies   Allergen Reactions    Ketamine Other (comments)     confusion     Past Medical History:   Diagnosis Date    Arrhythmia     atrial fibrillation, cardioversion 2010    CKD (chronic kidney disease) stage 3, GFR 30-59 ml/min ([facility])     Hyperlipemia     Hypertension     Iron deficiency anemia due to chronic blood loss 2/9/2021    PAF (paroxysmal atrial fibrillation) ([facility] 2/9/2021    PVD (peripheral vascular disease) ([facility]     Thyroid disease      Past Surgical History:   Procedure Laterality Date    HX COLONOSCOPY  2010    WNL SIS 11 y    HX HERNIA REPAIR Bilateral     Inguinal    HX ORTHOPAEDIC      right shoulder fracture repair    HX OTHER SURGICAL  03/09/2019    groin lymph gland excision    HX VASCULAR ACCESS      IR INSERT TUNL CVC W PORT OVER 5 YEARS  3/5/2020    UT CHEST SURGERY PROCEDURE UNLISTED Left     lung surgery age 17, lobectomy    VASCULAR SURGERY PROCEDURE UNLIST      bypass numerous surgeries both legs     Family History   Problem Relation Age of Onset    Cancer Mother         lung    Cancer Brother 66        lung    Cancer Maternal Uncle         cancer unknown     Social History     Tobacco Use    Smoking status: Former     Packs/day: 1.00     Years: 54.00     Pack years: 54.00     Types: Cigarettes     Quit date: 3/15/2007     Years since quitting: 15.6    Smokeless tobacco: Never   Substance Use Topics    Alcohol use: Yes     Comment: former drinker- beer   social       ROS    No data recorded     Shana Durán was evaluated through a patient-initiated, synchronous (real-time) audio only encounter.  He (or guardian if applicable) is aware that it is a billable service, which includes applicable co-pays, with coverage as determined by his insurance carrier. This visit was conducted with the patient's (and/or Cristian Dumont guardian's) verbal consent. He has not had a related appointment within my department in the past 7 days or scheduled within the next 24 hours. The patient was located in a state where the provider was licensed to provide care. The patient was located at: Home: [address] [place], [place] [id number]  The provider was located at:  Facility (Appt Department): [id number] [id number]    Total Time: minutes: 21-30 minutes    Giovanny Mancia NP

## 2022-11-21 NOTE — PROGRESS NOTES
Chief Complaint   Patient presents with    Hospital Follow Up     ER, [facility] 11/02/2022     1. \"Have you been to the ER, urgent care clinic since your last visit? Yes, Hospital / ER  Hospitalized since your last visit? \" No    2. \"Have you seen or consulted any other health care providers outside of the [address] [place] [address] since your last visit? \" No     3. For patients aged 39-70: Has the patient had a colonoscopy / FIT/ Cologuard? NA - based on age      If the patient is female:    4. For patients aged 41-77: Has the patient had a mammogram within the past 2 years? NA - based on age or sex      11. For patients aged 21-65: Has the patient had a pap smear?  NA - based on age or sex

## 2022-11-22 NOTE — ACP (ADVANCE CARE PLANNING)
Discussed importance of advanced medical directives with patient. Patient is capable of making decisions.  Luke Murillo NP-C

## 2022-11-25 ENCOUNTER — TELEPHONE (OUTPATIENT)
Dept: FAMILY MEDICINE CLINIC | Age: 82
End: 2022-11-25

## 2022-11-25 NOTE — TELEPHONE ENCOUNTER
Pt caregiver called stating that pts INR is 3.8. She would like to know if there is anything they should be doing, not normally that high.

## 2022-12-05 ENCOUNTER — VIRTUAL VISIT (OUTPATIENT)
Dept: FAMILY MEDICINE CLINIC | Age: 82
End: 2022-12-05
Payer: MEDICARE

## 2022-12-05 DIAGNOSIS — R29.6 FREQUENT FALLS: Primary | ICD-10-CM

## 2022-12-05 DIAGNOSIS — J98.4 PULMONARY SCARRING: ICD-10-CM

## 2022-12-05 DIAGNOSIS — Z99.81 OXYGEN DEPENDENT: ICD-10-CM

## 2022-12-05 DIAGNOSIS — S51.011A SKIN TEAR OF RIGHT ELBOW WITHOUT COMPLICATION, INITIAL ENCOUNTER: ICD-10-CM

## 2022-12-05 PROCEDURE — 1123F ACP DISCUSS/DSCN MKR DOCD: CPT | Performed by: NURSE PRACTITIONER

## 2022-12-05 PROCEDURE — 99214 OFFICE O/P EST MOD 30 MIN: CPT | Performed by: NURSE PRACTITIONER

## 2022-12-05 PROCEDURE — G9231 DOC ESRD DIA TRANS PREG: HCPCS | Performed by: NURSE PRACTITIONER

## 2022-12-05 PROCEDURE — G8432 DEP SCR NOT DOC, RNG: HCPCS | Performed by: NURSE PRACTITIONER

## 2022-12-05 PROCEDURE — 1101F PT FALLS ASSESS-DOCD LE1/YR: CPT | Performed by: NURSE PRACTITIONER

## 2022-12-05 PROCEDURE — G8427 DOCREV CUR MEDS BY ELIG CLIN: HCPCS | Performed by: NURSE PRACTITIONER

## 2022-12-05 NOTE — Clinical Note
Follow up in 1 month or less for wound care check  2 weeks if home health unable to start . Caregiver BB doing dressing . Wound starting to open.

## 2022-12-05 NOTE — PROGRESS NOTES
Guera Pérez is a 81 y.o. male who was seen by synchronous (real-time) audio-video technology on 12/5/2022 for Chronic Kidney Disease  Historian patient and BB caregiver  Mr. Jacquie Stiles endorses he has had several falls since last visit. He has a skin tear on his right forearm which is bandaged . Smll open area 1.5 inches by 1,5 inches. Oxygen dependent -hx of pulmonary scarring- 2 liters via nasal cannula when sedentary and 3 liters with exertion. Assessment & Plan:   Diagnoses and all orders for this visit:    1. Frequent falls  -     [address]    2. Skin tear of right elbow without complication, initial encounter  -     [address]    3. Oxygen dependent  -     REFERRAL TO HOME HEALTH  - continue oxygen as noted above   4. Pulmonary scarring  -     REFERRAL TO HOME HEALTH  Continue oxygen as noted above. The complexity of medical decision making for this visit is high       I spent at least 30 minutes on this visit with this established patient. Subjective:       Prior to Admission medications    Medication Sig Start Date End Date Taking? Authorizing Provider   furosemide (LASIX) 20 mg tablet Take 1 tablet by mouth once daily 11/21/22  Yes Torito Silva NP   levothyroxine (SYNTHROID) 100 mcg tablet TAKE 1 TABLET BY MOUTH ONCE DAILY BEFORE BREAKFAST 11/21/22  Yes Torito Silva NP   levalbuterol tartrate (XOPENEX) 45 mcg/actuation inhaler Take 1 Puff by inhalation every four (4) hours as needed for Wheezing or Shortness of Breath. 11/21/22  Yes Torito Silva NP   levalbuterol (XOPENEX) 0.63 mg/3 mL nebu 3 mL by Nebulization route every six (6) hours as needed (wheezing) for up to 90 days. Indications: bronchospasm prevention 11/21/22 2/19/23 Yes Torito Silva NP   amiodarone (CORDARONE) 200 mg tablet Take 1 tablet by mouth once daily 11/7/22  Yes Torito Silva NP   guaiFENesin ER (MUCINEX) 600 mg ER tablet Take 1 Tablet by mouth two (2) times a day.   Patient taking differently: Take 600 mg by mouth two (2) times a day. PRN 10/31/22  Yes Delfina Duque NP   vit B Cmplx 3-FA-Vit C-Biotin (NEPHRO MARY RX) 1- mg-mg-mcg tablet Take 1 Tablet by mouth daily. 10/6/22  Yes Delfina Duque NP   dilTIAZem IR (CARDIZEM) 60 mg tablet Take 1 tablet by mouth twice daily 9/20/22  Yes Delfina Duque NP   warfarin (COUMADIN) 3 mg tablet Take 1 tablet by mouth once daily 9/16/22  Yes Delfina Duque NP   ferrous sulfate 325 mg (65 mg iron) tablet Take  by mouth Daily (before breakfast). Yes Provider, Historical   atorvastatin (LIPITOR) 20 mg tablet Take 1 Tablet by mouth in the morning. 8/11/22  Yes Delfina Duque NP   losartan (COZAAR) 50 mg tablet Take 1 Tablet by mouth in the morning. 8/1/22  Yes Delfina Duque NP   polyethylene glycol (Miralax) 17 gram/dose powder Take 17 g by mouth daily. 1 tablespoon with 8 oz of water daily 12/24/21  Yes Sha Coe MD   acetaminophen (TYLENOL) 325 mg tablet Take 2 Tablets by mouth every six (6) hours as needed for Pain. 12/24/21  Yes Adis Coulter MD   amoxicillin-clavulanate (Augmentin) 875-125 mg per tablet Take 1 Tablet by mouth two (2) times a day. Patient not taking: No sig reported 11/2/22   Linden Dougherty MD   Nebulizer & Compressor machine 1 Each by Does Not Apply route every four (4) hours as needed for Wheezing, Shortness of Breath or Cough. As directed  Patient not taking: No sig reported 11/2/22   Linden Dougherty MD   bacitracin (BACITRACIN) 500 unit/gram oint Apply  to affected area two (2) times a day. Apply to affected area  Patient not taking: No sig reported 4/20/22   Hank Mary MD   collagenase [facility]) 250 unit/gram ointment Apply  to affected area daily. Patient not taking: No sig reported 1/24/22   Delfina Duque NP   ciclopirox [facility]) 8 % solution Apply  to affected area nightly.  As directed  Patient not taking: No sig reported    Provider, Historical     Patient Active Problem List   Diagnosis Code    Thyroid disease E07.9    Arrhythmia I49.9    Hyperlipemia E78.5    PVD (peripheral vascular disease) ([facility]) I73.9    CKD (chronic kidney disease) stage 3, GFR 30-59 ml/min ([facility]) N18.30    Anticoagulant long-term use Z79.01    Pulmonary scarring J98.4    Spondylosis of lumbar region without myelopathy or radiculopathy M47.816    Chronic atrial fibrillation ([facility]) I48.20    CKD (chronic kidney disease) stage 4, GFR 15-29 ml/min ([facility]) N18.4    CKD (chronic kidney disease) stage 5, GFR less than 15 ml/min ([facility]) N18.5    Closed right humeral fracture S42.301A    Coumadin toxicity, accidental or unintentional, initial encounter T45.511A    History of anemia due to chronic kidney disease N18.9, Z86.2    Left rib fracture S22.32XA    Stage 3 chronic kidney disease N18.30    Closed fracture of right proximal humerus S42.201A    Closed fracture of proximal end of right humerus with routine healing S42.201D    Hypothyroid E03.9    Essential hypertension M92    Diastolic CHF, chronic ([facility]) I50.32    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 XXXA    Generalized weakness R53.1    STEVEN (acute kidney injury) ([facility] [address] N17.9    Iron deficiency anemia due to chronic blood loss D50.0    PAF (paroxysmal atrial fibrillation) ([facility]) I48.0    Hip fracture ([facility] [place] [address] S72.009A    Encounter for rehabilitation Z51.89    CAP (community acquired pneumonia) J18.9    Acute renal failure superimposed on stage 3 chronic kidney disease ([facility]) N17.9, N18.30    Traumatic rhabdomyolysis ([facility]) T79. 6XXA    Loculated pleural effusion J90    Leukocytosis D72.829    Rhabdomyolysis M62.82     Patient Active Problem List    Diagnosis Date Noted    CAP (community acquired pneumonia) 12/03/2021    Acute renal failure superimposed on stage 3 chronic kidney disease ([address] 12/03/2021    Traumatic rhabdomyolysis ([facility] [place] [address] 12/03/2021    Loculated pleural effusion 12/03/2021    Leukocytosis 12/03/2021 Rhabdomyolysis 12/03/2021    Encounter for rehabilitation 02/13/2021    Hip fracture ([facility] 02/11/2021    Iron deficiency anemia due to chronic blood loss 02/09/2021    PAF (paroxysmal atrial fibrillation) ([facility] 02/09/2021    Fall 02/08/2021    Generalized weakness 02/08/2021    STEVEN (acute kidney injury) ([facility] 02/08/2021    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 ([facility] 22/82/9909    Diastolic CHF, chronic ([facility] 10/11/2019    Closed fracture of proximal end of right humerus with routine healing 08/07/2019    Stage 3 chronic kidney disease 08/05/2019    Hypothyroid 08/05/2019    Essential hypertension 08/05/2019    Closed fracture of right proximal humerus 07/31/2019    Closed right humeral fracture 07/29/2019    Coumadin toxicity, accidental or unintentional, initial encounter 07/29/2019    History of anemia due to chronic kidney disease 07/29/2019    Left rib fracture 07/29/2019    CKD (chronic kidney disease) stage 4, GFR 15-29 ml/min ([facility]) 03/05/2019    CKD (chronic kidney disease) stage 5, GFR less than 15 ml/min ([facility]) 03/05/2019    Chronic atrial fibrillation ([facility]) 09/21/2018    Spondylosis of lumbar region without myelopathy or radiculopathy 10/11/2017    Pulmonary scarring 03/28/2017    Anticoagulant long-term use 02/28/2017    CKD (chronic kidney disease) stage 3, GFR 30-59 ml/min ([facility])     Thyroid disease     Arrhythmia     Hyperlipemia     PVD (peripheral vascular disease) ([facility])      Current Outpatient Medications   Medication Sig Dispense Refill    furosemide (LASIX) 20 mg tablet Take 1 tablet by mouth once daily 90 Tablet 0    levothyroxine (SYNTHROID) 100 mcg tablet TAKE 1 TABLET BY MOUTH ONCE DAILY BEFORE BREAKFAST 90 Tablet 0    levalbuterol tartrate (XOPENEX) 45 mcg/actuation inhaler Take 1 Puff by inhalation every four (4) hours as needed for Wheezing or Shortness of Breath.  15 g 3    levalbuterol (XOPENEX) 0.63 mg/3 mL nebu 3 mL by Nebulization route every six (6) hours as needed (wheezing) for up to 90 days. Indications: bronchospasm prevention 90 Each 3    amiodarone (CORDARONE) 200 mg tablet Take 1 tablet by mouth once daily 90 Tablet 0    guaiFENesin ER (MUCINEX) 600 mg ER tablet Take 1 Tablet by mouth two (2) times a day. (Patient taking differently: Take 600 mg by mouth two (2) times a day. PRN) 60 Tablet 1    vit B Cmplx 3-FA-Vit C-Biotin (NEPHRO MARY RX) 1- mg-mg-mcg tablet Take 1 Tablet by mouth daily. 90 Tablet 0    dilTIAZem IR (CARDIZEM) 60 mg tablet Take 1 tablet by mouth twice daily 180 Tablet 0    warfarin (COUMADIN) 3 mg tablet Take 1 tablet by mouth once daily 90 Tablet 0    ferrous sulfate 325 mg (65 mg iron) tablet Take  by mouth Daily (before breakfast). atorvastatin (LIPITOR) 20 mg tablet Take 1 Tablet by mouth in the morning. 90 Tablet 1    losartan (COZAAR) 50 mg tablet Take 1 Tablet by mouth in the morning. 90 Tablet 1    polyethylene glycol (Miralax) 17 gram/dose powder Take 17 g by mouth daily. 1 tablespoon with 8 oz of water daily 238 g 1    acetaminophen (TYLENOL) 325 mg tablet Take 2 Tablets by mouth every six (6) hours as needed for Pain. 100 Tablet 1    amoxicillin-clavulanate (Augmentin) 875-125 mg per tablet Take 1 Tablet by mouth two (2) times a day. (Patient not taking: No sig reported) 14 Tablet 0    Nebulizer & Compressor machine 1 Each by Does Not Apply route every four (4) hours as needed for Wheezing, Shortness of Breath or Cough. As directed (Patient not taking: No sig reported) 1 Each 0    bacitracin (BACITRACIN) 500 unit/gram oint Apply  to affected area two (2) times a day. Apply to affected area (Patient not taking: No sig reported) 28 g 0    collagenase (SANTYL) 250 unit/gram ointment Apply  to affected area daily. (Patient not taking: No sig reported) 30 g 4    ciclopirox (PENLAC) 8 % solution Apply  to affected area nightly.  As directed (Patient not taking: No sig reported)       Allergies   Allergen Reactions    Ketamine Other (comments)     confusion     Past Medical History:   Diagnosis Date    Arrhythmia     atrial fibrillation, cardioversion 2010    CKD (chronic kidney disease) stage 3, GFR 30-59 ml/min ([facility])     Hyperlipemia     Hypertension     Iron deficiency anemia due to chronic blood loss 2/9/2021    PAF (paroxysmal atrial fibrillation) ([facility] 2/9/2021    PVD (peripheral vascular disease) ([facility]     Thyroid disease      Past Surgical History:   Procedure Laterality Date    HX COLONOSCOPY  2010    WNL SIS 10 y    HX HERNIA REPAIR Bilateral     Inguinal    HX ORTHOPAEDIC      right shoulder fracture repair    HX OTHER SURGICAL  03/09/2019    groin lymph gland excision    HX VASCULAR ACCESS      IR INSERT TUNL CVC W PORT OVER 5 YEARS  3/5/2020    [place] CHEST SURGERY PROCEDURE UNLISTED Left     lung surgery age 17, lobectomy    VASCULAR SURGERY PROCEDURE UNLIST      bypass numerous surgeries both legs     Family History   Problem Relation Age of Onset    Cancer Mother         lung    Cancer Brother 66        lung    Cancer Maternal Uncle         cancer unknown     Social History     Tobacco Use    Smoking status: Former     Packs/day: 1.00     Years: 54.00     Pack years: 54.00     Types: Cigarettes     Quit date: 3/15/2007     Years since quitting: 15.7    Smokeless tobacco: Never   Substance Use Topics    Alcohol use: Yes     Comment: former drinker- beer   social       ROS  Pertinent items noted in the HPI  Objective:     Patient-Reported Vitals 12/5/2022   Patient-Reported Pulse 53   Patient-Reported SpO2 93%   Patient-Reported Systolic  915   Patient-Reported Diastolic 73            Constitutional: [] Appears well-developed and well-nourished [] No apparent distress      [x] Abnormal - frail ,thin ,pale oxygen 2 liters via NC    Mental status: [x] Alert and awake  [x] Oriented to person/place/time [x] Able to follow commands    [] Abnormal -     Eyes:   EOM    [x]  Normal    [] Abnormal -   Sclera  [x]  Normal    [] Abnormal -          Discharge [x]  None visible   [] Abnormal -     HENT: [x] Normocephalic, atraumatic  [] Abnormal -   [x] Mouth/Throat: Mucous membranes are moist    External Ears [x] Normal  [] Abnormal -    Neck: [x] No visualized mass [] Abnormal -     Pulmonary/Chest: [x] Respiratory effort normal   [x] No visualized signs of difficulty breathing or respiratory distress        [] Abnormal -      Musculoskeletal:   [] Normal gait with no signs of ataxia npt observed        [x] Normal range of motion of neck        [] Abnormal -     Neurological:        [x] No Facial Asymmetry (Cranial nerve 7 motor function) (limited exam due to video visit)          [x] No gaze palsy        [] Abnormal -          Skin:        [] No significant exanthematous lesions or discoloration noted on facial skin         [x] Abnormal -    Skin tear right forearm bandage removed 1.5 x1.5 inches  with opening in the center . No exudates  or bleeding noted. Psychiatric:       [x] Normal Affect [] Abnormal -        [x] No Hallucinations    Other pertinent observable physical exam findings:-        We discussed the expected course, resolution and complications of the diagnosis(es) in detail. Medication risks, benefits, costs, interactions, and alternatives were discussed as indicated. I advised him to contact the office if his condition worsens, changes or fails to improve as anticipated. He expressed understanding with the diagnosis(es) and plan. Azra Leos, was evaluated through a synchronous (real-time) audio-video encounter. The patient (or guardian if applicable) is aware that this is a billable service, which includes applicable co-pays. This Virtual Visit was conducted with patient's (and/or legal guardian's) consent.  The visit was conducted pursuant to the emergency declaration under the [facility] [place] waiver authority and the [employer] Appropriations Act. Patient identification was verified, and a caregiver was present when appropriate. The patient was located at: Home: [address] [place] [phone]  The provider was located at:  Facility ([facility] Department): [address] [place] [address] [address] Ron Anand NP

## 2022-12-05 NOTE — PROGRESS NOTES
Home Care  Face to Face Encounter  Ness Santana is a 81 y.o. male presenting for/with:    Primary Diagnosis: frequent falls, skin tear/wound right forearm  Date of Face to Face:  12/05/2022                          Face to Face Encounter findings are related to primary reason for home care:   yes. 1. I certify that the patient needs intermittent care as follows: skilled nursing care:  complex care plan management, wound care, and rehabilitative nursing  physical therapy: strengthening, stretching/ROM, balance training, and pt/caregiver education    2. I certify that this patient is homebound, that is:  1) patient requires the use of a wheelchair and or walker, or other supportive   device, special transportation, or assistance of another to leave the home;        3) patient has a normal inability to leave the home and leaving the home requires considerable and taxing effort. Patient may leave the home for infrequent and short duration for medical reasons, and occasional absences for non-medical reasons. Homebound status is due to the following functional limitations: Limited ambulation secondary to pulmonary scarring/oxygen dependent . Ambulation limited to 10 feet with the use of a care giver /wheelchair or walker (assistive device). Patient with increased shortness of breath and elevated heart rate with ambulation greater than 20 feet limiting patient's ability to ambulate safely within the community. Patient with strength deficits limiting the performance of all ADL's without caregiver assistance or the use of an assistive device. Patient with poor safety awareness and is at risk for falls without assistance of another person and the use of an assistive device. Patient with poor ambulation endurance limiting their safe ability to ascend/descend the required number of steps to leave the home.   Patient with increased shortness of breath with all exertional activity limiting ambulation outside the home. Patient with strength deficits limiting the ability to carry portable O2 for distances outside the home without the assistance of a caregiver. Patient with rapid oxygen desaturation with all exertional activity and requires frequent seated rest breaks to allow for oxygen recovery. Patient with increased shortness of breath with ambulation greater than 10 feet limiting their ability to ambulate safely in the community. 3. I certify that this patient is under my care and that I, or a nurse practitioner or [address] [place] [address], or clinical nurse specialist, or certified nurse midwife, working with me, had a Face-to-Face Encounter that meets the physician Face-to-Face Encounter requirements.   The following are the clinical findings from the [address] [place] [address] encounter that support the need for skilled services and is a summary of the encounter: See attached progess note   Simonne Baumgarten NP-C

## 2022-12-05 NOTE — PROGRESS NOTES
Chief Complaint   Patient presents with    Chronic Kidney Disease     1. \"Have you been to the ER, urgent care clinic since your last visit? Hospitalized since your last visit? \" No    2. \"Have you seen or consulted any other health care providers outside of the [address] [place] [address] since your last visit? \" No     3. For patients aged 39-70: Has the patient had a colonoscopy / FIT/ Cologuard? NA - based on age      If the patient is female:    4. For patients aged 41-77: Has the patient had a mammogram within the past 2 years? NA - based on age or sex      11. For patients aged 21-65: Has the patient had a pap smear?  NA - based on age or sex

## 2022-12-05 NOTE — ACP (ADVANCE CARE PLANNING)
Discussed importance of advanced medical directives with patient. Patient is capable of making decisions. Zachery Gearing NP-C

## 2022-12-06 ENCOUNTER — TELEPHONE (OUTPATIENT)
Dept: FAMILY MEDICINE CLINIC | Age: 82
End: 2022-12-06

## 2023-01-01 ENCOUNTER — TELEPHONE (OUTPATIENT)
Dept: FAMILY MEDICINE CLINIC | Age: 83
End: 2023-01-01

## 2023-01-03 RX ORDER — WARFARIN 3 MG/1
3 TABLET ORAL DAILY
Qty: 90 TABLET | Refills: 0 | Status: SHIPPED | OUTPATIENT
Start: 2023-01-03

## 2023-01-03 RX ORDER — DILTIAZEM HYDROCHLORIDE 60 MG/1
60 TABLET, FILM COATED ORAL 2 TIMES DAILY
Qty: 180 TABLET | Refills: 0 | Status: SHIPPED | OUTPATIENT
Start: 2023-01-03

## 2023-01-03 RX ORDER — LOSARTAN POTASSIUM 50 MG/1
50 TABLET ORAL DAILY
Qty: 90 TABLET | Refills: 1 | Status: SHIPPED | OUTPATIENT
Start: 2023-01-03

## 2023-01-03 RX ORDER — LEVOTHYROXINE SODIUM 100 UG/1
100 TABLET ORAL
Qty: 90 TABLET | Refills: 0 | Status: SHIPPED | OUTPATIENT
Start: 2023-01-03

## 2023-01-03 RX ORDER — FUROSEMIDE 20 MG/1
20 TABLET ORAL DAILY
Qty: 90 TABLET | Refills: 0 | Status: SHIPPED | OUTPATIENT
Start: 2023-01-03

## 2023-01-03 NOTE — TELEPHONE ENCOUNTER
Pt is requesting refills of atorvastatin, diltiazem, levothyroxine, , furosemide, losartan, warfarin, dialyvite tab-if this is OTC he doesn't want it since he will pay out of pocket, will insurance pay for something else. Pt will now be using [facility] home [facility]. Also wanting to know if pt was supposed to be referred to ENT.

## 2023-01-19 NOTE — TELEPHONE ENCOUNTER
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 Taking? Insulin Pen Needle 29G X 12MM MISC Not Taking  No No   Sig: by Does not apply route daily at bedtime   Patient not taking: Reported on 4/1/2022   acetaminophen (TYLENOL) 650 mg CR tablet Not Taking  No No   Sig: Take 1 tablet (650 mg total) by mouth every 8 (eight) hours as needed for mild pain   Patient not taking: Reported on 1/19/2023   atorvastatin (LIPITOR) 20 mg tablet   No No   Sig: Take 1 tablet (20 mg total) by mouth daily with dinner   atorvastatin (LIPITOR) 40 mg tablet Not Taking  No No   Sig: Take 1 tablet (40 mg total) by mouth daily with dinner   Patient not taking: Reported on 1/19/2023   clopidogrel (PLAVIX) 75 mg tablet Not Taking  No No   Sig: Take 1 tablet (75 mg total) by mouth daily Do not start before November 27, 2022  Patient not taking: Reported on 1/19/2023   furosemide (LASIX) 20 mg tablet Not Taking  No No   Sig: Take 1 tablet (20 mg total) by mouth daily Do not start before November 27, 2022     Patient not taking: Reported on 1/19/2023   furosemide (LASIX) 20 mg tablet Not Taking  No No   Sig: Take 1 tablet (20 mg total) by mouth daily   Patient not taking: Reported on 1/19/2023   ipratropium-albuterol (DUO-NEB) 0 5-2 5 mg/3 mL nebulizer solution Not Taking  No No   Sig: Take 3 mL by nebulization 3 (three) times a day   Patient not taking: Reported on 1/19/2023   lisinopril (ZESTRIL) 5 mg tablet Not Taking  No No Rec'd call from [facility] [address] whom is the patient's caregiver, stating the patient was just given his lab results, but wanting to know if the results can be relayed to patient's daughter in law Sujit Hill.  A message  can be left at her home number [phone] Sig: Take 1 tablet (5 mg total) by mouth daily Do not start before November 27, 2022  Patient not taking: Reported on 1/19/2023   methadone (DOLOPHINE) 10 MG/5ML solution 1/19/2023  Yes Yes   Sig: Take 30 mg by mouth in the morning [facility]   metoprolol succinate (TOPROL-XL) 50 mg 24 hr tablet Not Taking  No No   Sig: Take 1 tablet (50 mg total) by mouth daily Do not start before November 29, 2022  Patient not taking: Reported on 1/19/2023   multivitamin (THERAGRAN) TABS   No No   Sig: Take 1 tablet by mouth daily   nicotine (NICODERM CQ) 21 mg/24 hr TD 24 hr patch Not Taking  No No   Sig: Place 1 patch on the skin daily Do not start before November 27, 2022  Patient not taking: Reported on 1/19/2023   spironolactone (ALDACTONE) 25 mg tablet   No No   Sig: Take 0 5 tablets (12 5 mg total) by mouth daily Do not start before November 29, 2022  Facility-Administered Medications: None       Past Medical History:   Diagnosis Date   • Alcohol abuse    • Arthritis    • Asthma    • Bipolar disorder ([facility] )    • Diabetes mellitus ([facility] )    • [facility]        Past Surgical History:   Procedure Laterality Date   • ABDOMINAL SURGERY      stomach uler with perforation       History reviewed  No pertinent family history  I have reviewed and agree with the history as documented  E-Cigarette/Vaping   • E-Cigarette Use Never User      E-Cigarette/Vaping Substances   • Nicotine No    • THC No    • CBD No    • Flavoring No    • Other No    • Unknown No      Social History     Tobacco Use   • Smoking status: Every Day     Packs/day: 2 00     Types: Cigarettes   • Smokeless tobacco: Never   Vaping Use   • Vaping Use: Never used   Substance Use Topics   • Alcohol use: Yes     Comment: 1 pint of vodka daily   • Drug use: Yes     Comment: on methadone       Review of Systems   Constitutional: Negative for fever  Psychiatric/Behavioral: Negative for dysphoric mood and hallucinations  Physical Exam  Physical Exam  Vitals reviewed  Constitutional:       Appearance: She is well-developed  HENT:      Head: Normocephalic  Eyes:      Conjunctiva/sclera: Conjunctivae normal    Cardiovascular:      Rate and Rhythm: Normal rate and regular rhythm  Pulmonary:      Effort: Pulmonary effort is normal       Breath sounds: Normal breath sounds  Abdominal:      General: There is no distension  Musculoskeletal:         General: Normal range of motion  Skin:     General: Skin is warm and dry  Neurological:      Mental Status: She is alert and oriented to person, place, and time  Vital Signs  ED Triage Vitals [01/19/23 1232]   Temperature Pulse Respirations Blood Pressure SpO2   98 6 °F (37 °C) (!) 109 20 153/95 95 %      Temp Source Heart Rate Source Patient Position - Orthostatic VS BP Location FiO2 (%)   Tympanic Monitor Sitting Left arm --      Pain Score       5           Vitals:    01/19/23 1232   BP: 153/95   Pulse: (!) 109   Patient Position - Orthostatic VS: Sitting         Visual Acuity      ED Medications  Medications - No data to display    Diagnostic Studies  Results Reviewed     None                 No orders to display              Procedures  Procedures         ED Course                               SBIRT 20yo+    Flowsheet Row Most Recent Value   SBIRT (23 yo +)    In order to provide better care to our patients, we are screening all of our patients for alcohol and drug use  Would it be okay to ask you these screening questions? No Filed at: 01/19/2023 1237                    Medical Decision Making  Pt is medically and psychiatrically cleared for dc  Contracts for safety             Disposition  Final diagnoses:   Suicidal ideation     Time reflects when diagnosis was documented in both MDM as applicable and the Disposition within this note     Time User Action Codes Description Comment    1/19/2023  1:21 PM Chinyere Gil Add [D69 667] Suicidal ideation       ED Disposition     ED Disposition   Discharge    Condition   Stable    Date/Time   Thu Jan 19, 2023  1:21 PM    Comment   Luis Eduardo Keller discharge to home/self care  Follow-up Information     Follow up With Specialties Details Why Contact Info    PCP 1 week              Patient's Medications   Discharge Prescriptions    No medications on file       No discharge procedures on file      PDMP Review     None          ED Provider  Electronically Signed by           Francesco Rosa DO  01/19/23 6287

## 2023-01-26 ENCOUNTER — TELEPHONE (OUTPATIENT)
Dept: FAMILY MEDICINE CLINIC | Age: 83
End: 2023-01-26

## 2023-01-26 NOTE — TELEPHONE ENCOUNTER
S/w CG knows to hold Warfarin for 2 days then resume.  Also needs referral for ENT for WAX in ears per audiologist. Carolyn Carr this is her third request, please advise

## 2023-02-07 RX ORDER — AMIODARONE HYDROCHLORIDE 200 MG/1
TABLET ORAL
Qty: 90 TABLET | Refills: 0 | Status: SHIPPED | OUTPATIENT
Start: 2023-02-07

## 2023-02-14 ENCOUNTER — TELEPHONE (OUTPATIENT)
Dept: FAMILY MEDICINE CLINIC | Age: 83
End: 2023-02-14

## 2023-02-15 RX ORDER — ATORVASTATIN CALCIUM 20 MG/1
20 TABLET, FILM COATED ORAL DAILY
Qty: 90 TABLET | Refills: 0 | Status: SHIPPED | OUTPATIENT
Start: 2023-02-15

## 2023-02-16 ENCOUNTER — VIRTUAL VISIT (OUTPATIENT)
Dept: FAMILY MEDICINE CLINIC | Age: 83
End: 2023-02-16
Payer: MEDICARE

## 2023-02-16 DIAGNOSIS — N18.5 CKD (CHRONIC KIDNEY DISEASE) STAGE 5, GFR LESS THAN 15 ML/MIN (HCC): ICD-10-CM
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
## 2023-02-16 DIAGNOSIS — D68.9 COAGULOPATHY (HCC): ICD-10-CM

## 2023-02-16 DIAGNOSIS — I48.0 PAF (PAROXYSMAL ATRIAL FIBRILLATION) (HCC): ICD-10-CM

## 2023-02-16 DIAGNOSIS — I73.9 PVD (PERIPHERAL VASCULAR DISEASE) (HCC): ICD-10-CM

## 2023-02-16 DIAGNOSIS — J42 CHRONIC BRONCHITIS, UNSPECIFIED CHRONIC BRONCHITIS TYPE (HCC): Primary | ICD-10-CM

## 2023-02-16 DIAGNOSIS — C83.35 DIFFUSE LARGE B-CELL LYMPHOMA OF LYMPH NODES OF INGUINAL REGION (HCC): ICD-10-CM

## 2023-02-16 DIAGNOSIS — I50.32 DIASTOLIC CHF, CHRONIC (HCC): ICD-10-CM

## 2023-02-16 PROCEDURE — 1101F PT FALLS ASSESS-DOCD LE1/YR: CPT | Performed by: NURSE PRACTITIONER

## 2023-02-16 PROCEDURE — 1123F ACP DISCUSS/DSCN MKR DOCD: CPT | Performed by: NURSE PRACTITIONER

## 2023-02-16 PROCEDURE — 99214 OFFICE O/P EST MOD 30 MIN: CPT | Performed by: NURSE PRACTITIONER

## 2023-02-16 PROCEDURE — G8427 DOCREV CUR MEDS BY ELIG CLIN: HCPCS | Performed by: NURSE PRACTITIONER

## 2023-02-16 PROCEDURE — G8432 DEP SCR NOT DOC, RNG: HCPCS | Performed by: NURSE PRACTITIONER

## 2023-02-16 NOTE — PROGRESS NOTES
Chief Complaint   Patient presents with    Fall    [id number]       recert       YES Answers must have Comments  1. \"Have you been to the ER, urgent care clinic since your last visit? Hospitalized since your last visit? \"    [] YES   [x] NO       2. Have you seen or consulted any other health care providers outside of [address] [place] [address] since your last visit?     [] YES   [x] NO       3. For patients aged 39-70: Have you had a colorectal cancer screening such as a colonoscopy/FIT/Cologuard? Nurse/CMA to request records if not in chart   [] YES   [] NO   [x] NA, based on age    If the patient is female:      4. For female patients aged 41-77: Joon Lyons you had a mammogram in the last two years?  Nurse/CMA to request records if not in chart   [] YES   [] NO   [x] NA, based on age    11. For female patients aged 21-65: Joon Lyons you had a pap smear?   Nurse/CMA to request records if not in chart   [] YES   [] NO  [x] NA, based on age

## 2023-02-17 NOTE — PROGRESS NOTES
Home Care  Face to Face Encounter  Mckenzie Mora is a 81 y.o. male presenting for/with:    Primary Diagnosis: Diastolic CHF with generalized weakness and frequent falls  Date of Face to Face:  02/16/2023. Face to Face Encounter findings are related to primary reason for home care:   yes. 1. I certify that the patient needs intermittent care as follows: skilled nursing care:  skilled observation/assessment, patient education, complex care plan management, and rehabilitative nursing  physical therapy: strengthening, stretching/ROM, gait/stair training, balance training, and pt/caregiver education  occupational therapy:  ADL safety (ie. cooking, bathing, dressing), ROM, and pt/caregiver education    2. I certify that this patient is homebound, that is:   1) patient requires the use of a walker device, special transportation, or assistance of another to leave the home;        3) patient has a normal inability to leave the home and leaving the home requires considerable and taxing effort. Patient may leave the home for infrequent and short duration for medical reasons, and occasional absences for non-medical reasons. Homebound status is due to the following functional limitations: Patient with increased shortness of breath and elevated heart rate with ambulation greater than 20 feet limiting patient's ability to ambulate safely within the community. Patient with strength deficits limiting the performance of all ADL's without caregiver assistance or the use of an assistive device. Patient with poor safety awareness and is at risk for falls without assistance of another person and the use of an assistive device. Patient with poor ambulation endurance limiting their safe ability to ascend/descend the required number of steps to leave the home. Patient with increased shortness of breath with all exertional activity limiting ambulation outside the home.  Patient with strength deficits limiting the ability to carry portable O2 for distances outside the home without the assistance of a caregiver. Patient with rapid oxygen desaturation with all exertional activity and requires frequent seated rest breaks to allow for oxygen recovery. Patient with increased shortness of breath with ambulation greater than 10 feet limiting their ability to ambulate safely in the community. 3. I certify that this patient is under my care and that I, or a nurse practitioner or [address] [place] [address], or clinical nurse specialist, or certified nurse midwife, working with me, had a Face-to-Face Encounter that meets the physician Face-to-Face Encounter requirements. The following are the clinical findings from the [address] [place] [address] encounter that support the need for skilled services and is a summary of the encounter: See attached progess note     Tez Mckinney is a 81 y.o. male who was seen by synchronous (real-time) audio-video technology on 2/16/2023 for Fall and E7/LEUSSY (/recert/)  Mr. Castro and caregiver BB. She endorses Mr. Castro fell yesterday coming back from the bathroom and hit his head. She notes the small cut on the back of his head was bleeding. 911 was called . EMS spent 30 minutes evaluating  Mr. Castro and trying to encourage him to go to the ER for evaluation . He declined to go to the ER for evaluation. At present he is oxygen dependent 2 liters  during the day and 3 liters when sleeping and when ambulating. Assessment & Plan:   Diagnoses and all orders for this visit:    1. Chronic bronchitis, unspecified chronic bronchitis type ([address]  Assessment & Plan:   monitored by specialist. No acute findings meriting change in the plan      2. Coagulopathy ([address]  Assessment & Plan:   monitored by specialist. No acute findings meriting change in the plan      3.  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 No acute findings meriting change in the plan      4. Diastolic CHF, chronic ([facility]  Assessment & Plan:   monitored by specialist. No acute findings meriting change in the plan      5. CKD (chronic kidney disease) stage 5, GFR less than 15 ml/min ([facility])  Assessment & Plan:         6. PVD (peripheral vascular disease) ([facility]  Assessment & Plan:   monitored by specialist. No acute findings meriting change in the plan      7. PAF (paroxysmal atrial fibrillation) ([facility]    8. Fall, subsequent encounter  Unsteady gait      The complexity of medical decision making for this visit is high       I spent at least 30 minutes on this visit with this established patient. Subjective:       Prior to Admission medications    Medication Sig Start Date End Date Taking? Authorizing Provider   atorvastatin (LIPITOR) 20 mg tablet TAKE 1 TABLET BY MOUTH IN THE MORNING 2/15/23   Stacia Mckeon NP   amiodarone (CORDARONE) 200 mg tablet Take 1 tablet by mouth once daily 2/7/23   Stacia Mckeon NP   dilTIAZem IR (CARDIZEM) 60 mg tablet Take 1 Tablet by mouth two (2) times a day. 1/3/23   Stacia Mckeon NP   furosemide (LASIX) 20 mg tablet Take 1 Tablet by mouth daily. 1/3/23   Stacia Mckeon NP   levothyroxine (SYNTHROID) 100 mcg tablet Take 1 Tablet by mouth Daily (before breakfast). 1/3/23   Stacia Mckeon NP   losartan (COZAAR) 50 mg tablet Take 1 Tablet by mouth daily. 1/3/23   Stacia Mckeon NP   warfarin (COUMADIN) 3 mg tablet Take 1 Tablet by mouth daily. 1/3/23   Stacia Mckeon NP   levalbuterol tartrate (XOPENEX) 45 mcg/actuation inhaler Take 1 Puff by inhalation every four (4) hours as needed for Wheezing or Shortness of Breath. 11/21/22   Stacia Mckeon NP   levalbuterol (XOPENEX) 0.63 mg/3 mL nebu 3 mL by Nebulization route every six (6) hours as needed (wheezing) for up to 90 days.  Indications: bronchospasm prevention 11/21/22 2/19/23  Stacia Mckeon NP   amoxicillin-clavulanate (Augmentin) 875-125 mg per tablet Take 1 Tablet by mouth two (2) times a day. Patient not taking: No sig reported 11/2/22   Anant Dougherty MD   Nebulizer & Compressor machine 1 Each by Does Not Apply route every four (4) hours as needed for Wheezing, Shortness of Breath or Cough. As directed  Patient not taking: No sig reported 11/2/22   Anant Dougherty MD   guaiFENesin ER (MUCINEX) 600 mg ER tablet Take 1 Tablet by mouth two (2) times a day. Patient taking differently: Take 600 mg by mouth two (2) times a day. PRN 10/31/22   Rima Cole NP   vit B Cmplx 3-FA-Vit C-Biotin (NEPHRO MARY RX) 1- mg-mg-mcg tablet Take 1 Tablet by mouth daily. 10/6/22   Rima Cole NP   ferrous sulfate 325 mg (65 mg iron) tablet Take  by mouth Daily (before breakfast). Provider, Historical   bacitracin (BACITRACIN) 500 unit/gram oint Apply  to affected area two (2) times a day. Apply to affected area  Patient not taking: No sig reported 4/20/22   Adrián Mei MD   collagenase [facility]) 250 unit/gram ointment Apply  to affected area daily. Patient not taking: No sig reported 1/24/22   Rima Cole NP   polyethylene glycol (Miralax) 17 gram/dose powder Take 17 g by mouth daily. 1 tablespoon with 8 oz of water daily 12/24/21   Carmen Mahan MD   acetaminophen (TYLENOL) 325 mg tablet Take 2 Tablets by mouth every six (6) hours as needed for Pain. 12/24/21   Carmen Mahan MD   ciclopirox (PENLAC) 8 % solution Apply  to affected area nightly.  As directed  Patient not taking: No sig reported    Provider, Historical     Patient Active Problem List   Diagnosis Code    Thyroid disease E07.9    Arrhythmia I49.9    Hyperlipemia E78.5    PVD (peripheral vascular disease) ([facility]) I73.9    CKD (chronic kidney disease) stage 3, GFR 30-59 ml/min ([facility]) N18.30    Anticoagulant long-term use Z79.01    Pulmonary scarring J98.4    Spondylosis of lumbar region without myelopathy or radiculopathy M47.816    Chronic atrial fibrillation (HCC) I48.20    CKD (chronic kidney disease) stage 4, GFR 15-29 ml/min ([facility]) N18.4    CKD (chronic kidney disease) stage 5, GFR less than 15 ml/min ([facility]) N18.5    Closed right humeral fracture S42.301A    Coumadin toxicity, accidental or unintentional, initial encounter T45.511A    History of anemia due to chronic kidney disease N18.9, Z86.2    Left rib fracture S22.32XA    Stage 3 chronic kidney disease N18.30    Closed fracture of right proximal humerus S42.201A    Closed fracture of proximal end of right humerus with routine healing S42.201D    Hypothyroid E03.9    Essential hypertension E73    Diastolic CHF, chronic ([facility]) I50.32    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) 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 XXXA    Generalized weakness R53.1    STEVEN (acute kidney injury) ([address] N17.9    Iron deficiency anemia due to chronic blood loss D50.0    PAF (paroxysmal atrial fibrillation) ([facility]) I48.0    Hip fracture ([address] S72.009A    Encounter for rehabilitation Z51.89    CAP (community acquired pneumonia) J18.9    Acute renal failure superimposed on stage 3 chronic kidney disease (HCC) N17.9, N18.30    Traumatic rhabdomyolysis ([facility]) T79. 6XXA    Loculated pleural effusion J90    Leukocytosis D72.829    Rhabdomyolysis M62.82    Chronic bronchitis, unspecified chronic bronchitis type ([facility]) J42    Coagulopathy ([facility]) D68.9     Patient Active Problem List    Diagnosis Date Noted    Chronic bronchitis, unspecified chronic bronchitis type ([address] 02/16/2023    Coagulopathy ([address] 02/16/2023    CAP (community acquired pneumonia) 12/03/2021    Acute renal failure superimposed on stage 3 chronic kidney disease ([address] 12/03/2021    Traumatic rhabdomyolysis ([address] 12/03/2021    Loculated pleural effusion 12/03/2021    Leukocytosis 12/03/2021    Rhabdomyolysis 12/03/2021    Encounter for rehabilitation 02/13/2021    Hip fracture ([address] 02/11/2021    Iron deficiency anemia due to chronic blood loss 02/09/2021    PAF (paroxysmal atrial fibrillation) ([facility] 02/09/2021    Fall 02/08/2021    Generalized weakness 02/08/2021    STEVEN (acute kidney injury) ([facility] 02/08/2021    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 ([facility] 88/79/4915    Diastolic CHF, chronic ([facility] 10/11/2019    Closed fracture of proximal end of right humerus with routine healing 08/07/2019    Stage 3 chronic kidney disease 08/05/2019    Hypothyroid 08/05/2019    Essential hypertension 08/05/2019    Closed fracture of right proximal humerus 07/31/2019    Closed right humeral fracture 07/29/2019    Coumadin toxicity, accidental or unintentional, initial encounter 07/29/2019    History of anemia due to chronic kidney disease 07/29/2019    Left rib fracture 07/29/2019    CKD (chronic kidney disease) stage 4, GFR 15-29 ml/min ([facility]) 03/05/2019    CKD (chronic kidney disease) stage 5, GFR less than 15 ml/min ([facility]) 03/05/2019    Chronic atrial fibrillation ([facility] 09/21/2018    Spondylosis of lumbar region without myelopathy or radiculopathy 10/11/2017    Pulmonary scarring 03/28/2017    Anticoagulant long-term use 02/28/2017    CKD (chronic kidney disease) stage 3, GFR 30-59 ml/min ([facility])     Thyroid disease     Arrhythmia     Hyperlipemia     PVD (peripheral vascular disease) ([facility])      Current Outpatient Medications   Medication Sig Dispense Refill    atorvastatin (LIPITOR) 20 mg tablet TAKE 1 TABLET BY MOUTH IN THE MORNING 90 Tablet 0    amiodarone (CORDARONE) 200 mg tablet Take 1 tablet by mouth once daily 90 Tablet 0    dilTIAZem IR (CARDIZEM) 60 mg tablet Take 1 Tablet by mouth two (2) times a day. 180 Tablet 0    furosemide (LASIX) 20 mg tablet Take 1 Tablet by mouth daily. 90 Tablet 0    levothyroxine (SYNTHROID) 100 mcg tablet Take 1 Tablet by mouth Daily (before breakfast). 90 Tablet 0    losartan (COZAAR) 50 mg tablet Take 1 Tablet by mouth daily. 90 Tablet 1    warfarin (COUMADIN) 3 mg tablet Take 1 Tablet by mouth daily.  90 Tablet 0    levalbuterol tartrate (XOPENEX) 45 mcg/actuation inhaler Take 1 Puff by inhalation every four (4) hours as needed for Wheezing or Shortness of Breath. 15 g 3    levalbuterol (XOPENEX) 0.63 mg/3 mL nebu 3 mL by Nebulization route every six (6) hours as needed (wheezing) for up to 90 days. Indications: bronchospasm prevention 90 Each 3    amoxicillin-clavulanate (Augmentin) 875-125 mg per tablet Take 1 Tablet by mouth two (2) times a day. (Patient not taking: No sig reported) 14 Tablet 0    Nebulizer & Compressor machine 1 Each by Does Not Apply route every four (4) hours as needed for Wheezing, Shortness of Breath or Cough. As directed (Patient not taking: No sig reported) 1 Each 0    guaiFENesin ER (MUCINEX) 600 mg ER tablet Take 1 Tablet by mouth two (2) times a day. (Patient taking differently: Take 600 mg by mouth two (2) times a day. PRN) 60 Tablet 1    vit B Cmplx 3-FA-Vit C-Biotin (NEPHRO MARY RX) 1- mg-mg-mcg tablet Take 1 Tablet by mouth daily. 90 Tablet 0    ferrous sulfate 325 mg (65 mg iron) tablet Take  by mouth Daily (before breakfast). bacitracin (BACITRACIN) 500 unit/gram oint Apply  to affected area two (2) times a day. Apply to affected area (Patient not taking: No sig reported) 28 g 0    collagenase (SANTYL) 250 unit/gram ointment Apply  to affected area daily. (Patient not taking: No sig reported) 30 g 4    polyethylene glycol (Miralax) 17 gram/dose powder Take 17 g by mouth daily. 1 tablespoon with 8 oz of water daily 238 g 1    acetaminophen (TYLENOL) 325 mg tablet Take 2 Tablets by mouth every six (6) hours as needed for Pain. 100 Tablet 1    ciclopirox (PENLAC) 8 % solution Apply  to affected area nightly.  As directed (Patient not taking: No sig reported)       Allergies   Allergen Reactions    Ketamine Other (comments)     confusion     Past Medical History:   Diagnosis Date    Arrhythmia     atrial fibrillation, cardioversion 2010    CKD (chronic kidney disease) stage 3, GFR 30-59 ml/min ([facility])     Hyperlipemia Hypertension     Iron deficiency anemia due to chronic blood loss 2/9/2021    PAF (paroxysmal atrial fibrillation) ([facility] 2/9/2021    PVD (peripheral vascular disease) ([facility]     Thyroid disease      Past Surgical History:   Procedure Laterality Date    HX COLONOSCOPY  2010    WNL SIS 10 y    HX HERNIA REPAIR Bilateral     Inguinal    HX ORTHOPAEDIC      right shoulder fracture repair    HX OTHER SURGICAL  03/09/2019    groin lymph gland excision    HX VASCULAR ACCESS      IR INSERT TUNL CVC W PORT OVER 5 YEARS  3/5/2020    [facility] UNLISTED PROCEDURE LUNGS & PLEURA Left     lung surgery age 17, lobectomy    [facility] UNLISTED PROCEDURE VASCULAR SURGERY      bypass numerous surgeries both legs     Family History   Problem Relation Age of Onset    Cancer Mother         lung    Cancer Brother 66        lung    Cancer Maternal Uncle         cancer unknown     Social History     Tobacco Use    Smoking status: Former     Packs/day: 1.00     Years: 54.00     Pack years: 54.00     Types: Cigarettes     Quit date: 3/15/2007     Years since quitting: 15.9    Smokeless tobacco: Never   Substance Use Topics    Alcohol use: Yes     Comment: former drinker- beer   social       ROS  Pertinent items are noted in the HPI  Objective:     Patient-Reported Vitals 12/5/2022   Patient-Reported Pulse 53   Patient-Reported SpO2 93%   Patient-Reported Systolic  849   Patient-Reported Diastolic 73            Constitutional: [x] Appears frail O2 via nasal canula.  well-nourished []  Thin      [] Abnormal -     Mental status: [x] Alert and awake  [x] Oriented to person/place/time [x] Able to follow commands    [] Abnormal -     Eyes:   EOM    [x]  Normal    [] Abnormal -   Sclera  [x]  Normal    [] Abnormal -          Discharge [x]  None visible   [] Abnormal -     HENT: [x] Normocephalic, atraumatic  [] Abnormal -   [x] Mouth/Throat: Mucous membranes are moist    External Ears [x] Normal  [] Abnormal -    Neck: [x] No visualized mass [] Abnormal - Pulmonary/Chest: [x] Respiratory effort normal   [x] No visualized signs of difficulty breathing or respiratory distress        [] Abnormal -      Musculoskeletal:   [] Unable to ambulate without assistance  with no signs of ataxia sitting in a chair . [x] Normal range of motion of neck        [] Abnormal -     Neurological:        [x] No Facial Asymmetry (Cranial nerve 7 motor function) (limited exam due to video visit)          [x] No gaze palsy        [] Abnormal -          Skin:        [x] No significant exanthematous lesions or discoloration noted on facial skin  bruising , and ecchymosis noted poor reception. [] Abnormal -            Psychiatric:       [x] Normal Affect [] Abnormal -        [x] No Hallucinations    Other pertinent observable physical exam findings:-        We discussed the expected course, resolution and complications of the diagnosis(es) in detail. Medication risks, benefits, costs, interactions, and alternatives were discussed as indicated. I advised him to contact the office if his condition worsens, changes or fails to improve as anticipated. He expressed understanding with the diagnosis(es) and plan. Carmelina Olguin, was evaluated through a synchronous (real-time) audio-video encounter. The patient (or guardian if applicable) is aware that this is a billable service, which includes applicable co-pays. This Virtual Visit was conducted with patient's (and/or legal guardian's) consent. The visit was conducted pursuant to the emergency declaration under the [facility], [address] [place] [address] [facility] authority and the [employer] General Act. Patient identification was verified, and a caregiver was present when appropriate. The patient was located at: Home: [address] [place] [phone]  The provider was located at:  Facility (Appt Department): [id number] [facility]  [address] [place] [address] Omar Garnett, SHARRON

## 2023-02-23 ENCOUNTER — APPOINTMENT (OUTPATIENT)
Dept: CT IMAGING | Age: 83
End: 2023-02-23
Attending: EMERGENCY MEDICINE
Payer: MEDICARE

## 2023-02-23 ENCOUNTER — VIRTUAL VISIT (OUTPATIENT)
Dept: FAMILY MEDICINE CLINIC | Age: 83
End: 2023-02-23
Payer: MEDICARE

## 2023-02-23 ENCOUNTER — HOSPITAL ENCOUNTER (EMERGENCY)
Age: 83
Discharge: ANOTHER HOSPITAL | End: 2023-02-23
Attending: EMERGENCY MEDICINE
Payer: MEDICARE

## 2023-02-23 ENCOUNTER — APPOINTMENT (OUTPATIENT)
Dept: GENERAL RADIOLOGY | Age: 83
End: 2023-02-23
Attending: EMERGENCY MEDICINE
Payer: MEDICARE

## 2023-02-23 VITALS
SYSTOLIC BLOOD PRESSURE: 155 MMHG | TEMPERATURE: 97.9 F | HEART RATE: 62 BPM | OXYGEN SATURATION: 100 % | DIASTOLIC BLOOD PRESSURE: 59 MMHG | RESPIRATION RATE: 29 BRPM

## 2023-02-23 DIAGNOSIS — S22.41XA CLOSED FRACTURE OF MULTIPLE RIBS OF RIGHT SIDE, INITIAL ENCOUNTER: ICD-10-CM

## 2023-02-23 DIAGNOSIS — Z74.09 IMMOBILITY: ICD-10-CM

## 2023-02-23 DIAGNOSIS — I48.20 CHRONIC ATRIAL FIBRILLATION (HCC): ICD-10-CM

## 2023-02-23 DIAGNOSIS — S06.34AA: Primary | ICD-10-CM

## 2023-02-23 DIAGNOSIS — S42.031A CLOSED DISPLACED FRACTURE OF ACROMIAL END OF RIGHT CLAVICLE, INITIAL ENCOUNTER: ICD-10-CM

## 2023-02-23 DIAGNOSIS — I50.32 DIASTOLIC CHF, CHRONIC (HCC): Primary | ICD-10-CM

## 2023-02-23 LAB
ALBUMIN SERPL-MCNC: 2.9 G/DL (ref 3.5–5)
ALBUMIN/GLOB SERPL: 0.8 (ref 1.1–2.2)
ALP SERPL-CCNC: 86 U/L (ref 45–117)
ALT SERPL-CCNC: 26 U/L (ref 12–78)
ANION GAP SERPL CALC-SCNC: 5 MMOL/L (ref 5–15)
AST SERPL-CCNC: 19 U/L (ref 15–37)
ATRIAL RATE: 62 BPM
BASOPHILS # BLD: 0 K/UL (ref 0–0.1)
BASOPHILS NFR BLD: 0 % (ref 0–1)
BILIRUB SERPL-MCNC: 0.5 MG/DL (ref 0.2–1)
BUN SERPL-MCNC: 55 MG/DL (ref 6–20)
BUN/CREAT SERPL: 15 (ref 12–20)
CALCIUM SERPL-MCNC: 9.2 MG/DL (ref 8.5–10.1)
CALCULATED P AXIS, ECG09: 90 DEGREES
CALCULATED R AXIS, ECG10: 81 DEGREES
CALCULATED T AXIS, ECG11: 69 DEGREES
CHLORIDE SERPL-SCNC: 104 MMOL/L (ref 97–108)
CO2 SERPL-SCNC: 30 MMOL/L (ref 21–32)
CREAT SERPL-MCNC: 3.7 MG/DL (ref 0.7–1.3)
DIAGNOSIS, 93000: NORMAL
DIFFERENTIAL METHOD BLD: ABNORMAL
EOSINOPHIL # BLD: 0 K/UL (ref 0–0.4)
EOSINOPHIL NFR BLD: 0 % (ref 0–7)
ERYTHROCYTE [DISTWIDTH] IN BLOOD BY AUTOMATED COUNT: 13.9 % (ref 11.5–14.5)
GLOBULIN SER CALC-MCNC: 3.6 G/DL (ref 2–4)
GLUCOSE SERPL-MCNC: 115 MG/DL (ref 65–100)
HCT VFR BLD AUTO: 26.2 % (ref 36.6–50.3)
HGB BLD-MCNC: 8.3 G/DL (ref 12.1–17)
IMM GRANULOCYTES # BLD AUTO: 0 K/UL (ref 0–0.04)
IMM GRANULOCYTES NFR BLD AUTO: 0 % (ref 0–0.5)
INR PPP: 5.2 (ref 0.9–1.1)
LYMPHOCYTES # BLD: 0.6 K/UL (ref 0.8–3.5)
LYMPHOCYTES NFR BLD: 6 % (ref 12–49)
MCH RBC QN AUTO: 31.4 PG (ref 26–34)
MCHC RBC AUTO-ENTMCNC: 31.7 G/DL (ref 30–36.5)
MCV RBC AUTO: 99.2 FL (ref 80–99)
MONOCYTES # BLD: 1.5 K/UL (ref 0–1)
MONOCYTES NFR BLD: 14 % (ref 5–13)
NEUTS BAND NFR BLD MANUAL: 1 %
NEUTS SEG # BLD: 8.5 K/UL (ref 1.8–8)
NEUTS SEG NFR BLD: 79 % (ref 32–75)
NRBC # BLD: 0 K/UL (ref 0–0.01)
NRBC BLD-RTO: 0 PER 100 WBC
P-R INTERVAL, ECG05: 220 MS
PLATELET # BLD AUTO: 228 K/UL (ref 150–400)
PLATELET COMMENTS,PCOM: ABNORMAL
PMV BLD AUTO: 10.8 FL (ref 8.9–12.9)
POTASSIUM SERPL-SCNC: 4.8 MMOL/L (ref 3.5–5.1)
PROT SERPL-MCNC: 6.5 G/DL (ref 6.4–8.2)
PROTHROMBIN TIME: 48.6 SEC (ref 9–11.1)
Q-T INTERVAL, ECG07: 494 MS
QRS DURATION, ECG06: 150 MS
QTC CALCULATION (BEZET), ECG08: 501 MS
RBC # BLD AUTO: 2.64 M/UL (ref 4.1–5.7)
RBC MORPH BLD: ABNORMAL
SODIUM SERPL-SCNC: 139 MMOL/L (ref 136–145)
VENTRICULAR RATE, ECG03: 62 BPM
WBC # BLD AUTO: 10.6 K/UL (ref 4.1–11.1)

## 2023-02-23 PROCEDURE — 85025 COMPLETE CBC W/AUTO DIFF WBC: CPT

## 2023-02-23 PROCEDURE — 1123F ACP DISCUSS/DSCN MKR DOCD: CPT | Performed by: NURSE PRACTITIONER

## 2023-02-23 PROCEDURE — 71250 CT THORAX DX C-: CPT

## 2023-02-23 PROCEDURE — 93005 ELECTROCARDIOGRAM TRACING: CPT

## 2023-02-23 PROCEDURE — 74011250636 HC RX REV CODE- 250/636: Performed by: EMERGENCY MEDICINE

## 2023-02-23 PROCEDURE — 70450 CT HEAD/BRAIN W/O DYE: CPT

## 2023-02-23 PROCEDURE — 96365 THER/PROPH/DIAG IV INF INIT: CPT

## 2023-02-23 PROCEDURE — 99285 EMERGENCY DEPT VISIT HI MDM: CPT

## 2023-02-23 PROCEDURE — 74011000636 HC RX REV CODE- 636: Performed by: EMERGENCY MEDICINE

## 2023-02-23 PROCEDURE — 85610 PROTHROMBIN TIME: CPT

## 2023-02-23 PROCEDURE — 80053 COMPREHEN METABOLIC PANEL: CPT

## 2023-02-23 PROCEDURE — 72125 CT NECK SPINE W/O DYE: CPT

## 2023-02-23 PROCEDURE — 36415 COLL VENOUS BLD VENIPUNCTURE: CPT

## 2023-02-23 PROCEDURE — G8427 DOCREV CUR MEDS BY ELIG CLIN: HCPCS | Performed by: NURSE PRACTITIONER

## 2023-02-23 PROCEDURE — G8432 DEP SCR NOT DOC, RNG: HCPCS | Performed by: NURSE PRACTITIONER

## 2023-02-23 PROCEDURE — G8417 CALC BMI ABV UP PARAM F/U: HCPCS | Performed by: NURSE PRACTITIONER

## 2023-02-23 PROCEDURE — 96372 THER/PROPH/DIAG INJ SC/IM: CPT

## 2023-02-23 PROCEDURE — 1101F PT FALLS ASSESS-DOCD LE1/YR: CPT | Performed by: NURSE PRACTITIONER

## 2023-02-23 PROCEDURE — 99214 OFFICE O/P EST MOD 30 MIN: CPT | Performed by: NURSE PRACTITIONER

## 2023-02-23 PROCEDURE — G8536 NO DOC ELDER MAL SCRN: HCPCS | Performed by: NURSE PRACTITIONER

## 2023-02-23 PROCEDURE — 73030 X-RAY EXAM OF SHOULDER: CPT

## 2023-02-23 RX ORDER — PHYTONADIONE 10 MG/ML
10 INJECTION, EMULSION INTRAMUSCULAR; INTRAVENOUS; SUBCUTANEOUS
Status: COMPLETED | OUTPATIENT
Start: 2023-02-23 | End: 2023-02-23

## 2023-02-23 RX ORDER — GLUCOSAMINE SULFATE 1500 MG
POWDER IN PACKET (EA) ORAL DAILY
COMMUNITY

## 2023-02-23 RX ORDER — IBUPROFEN 100 MG/5ML
SUSPENSION, ORAL (FINAL DOSE FORM) ORAL
COMMUNITY

## 2023-02-23 RX ADMIN — Medication 2000 INT'L UNITS: at 18:33

## 2023-02-23 RX ADMIN — PHYTONADIONE 10 MG: 10 INJECTION, EMULSION INTRAMUSCULAR; INTRAVENOUS; SUBCUTANEOUS at 18:33

## 2023-02-23 NOTE — ED TRIAGE NOTES
Pt arrived by EMS after a fall. Per EMS pt fell last Wednesday and home health noted pt had bruising to his right shoulder and right temple. Pt complains of generalizes pain and weakness. Pt is on oxygen at baseline (3L [place]). Pt arrived awake and alert, pt educated on ER flow. Please note EMS was notified via HEAR that pt will be waiting in hallway with pt on stretcher. Patient and/or Family notified of acuity of the unit and on going construction. This writer apologized for any delay that may occur.

## 2023-02-23 NOTE — PROGRESS NOTES
Chief Complaint   Patient presents with    Labs     Paper work for labs for [facility] and re certification for his oxygen     YES Answers must have Comments  1. \"Have you been to the ER, urgent care clinic since your last visit? Hospitalized since your last visit? \"    [] YES   [x] NO       2. Have you seen or consulted any other health care providers outside of [address] [place] [address] since your last visit?     [] YES   [x] NO       3. For patients aged 39-70: Have you had a colorectal cancer screening such as a colonoscopy/FIT/Cologuard? Nurse/CMA to request records if not in chart   [] YES   [] NO   [x] NA, based on age    If the patient is female:      4. For female patients aged 41-77: George Lackey you had a mammogram in the last two years?  Nurse/CMA to request records if not in chart   [] YES   [] NO   [x] NA, based on age    11. For female patients aged 21-65: George Lackey you had a pap smear?   Nurse/CMA to request records if not in chart   [] YES   [] NO  [x] NA, based on age

## 2023-02-24 ENCOUNTER — TELEPHONE (OUTPATIENT)
Dept: FAMILY MEDICINE CLINIC | Age: 83
End: 2023-02-24

## 2023-02-24 NOTE — ACP (ADVANCE CARE PLANNING)
Discussed importance of advanced medical directives with patient. Patient is capable of making decisions. King Ethel FIERRO

## 2023-02-24 NOTE — ED NOTES
TRANSFER - OUT REPORT:    Verbal report given to Villa Fuller RN(name) on Silvio Mello  being transferred to [facility] ER(unit) for routine progression of care       Report consisted of patients Situation, Background, Assessment and   Recommendations(SBAR). Information from the following report(s) SBAR, ED Summary, Procedure Summary, Intake/Output, MAR and Recent Results was reviewed with the receiving nurse. Lines:   Venous Access Device power port 03/05/20 Upper chest (subclavicular area, right (Active)       Peripheral IV 02/23/23 Left Antecubital (Active)   Site Assessment Clean, dry, & intact 02/23/23 1745   Phlebitis Assessment 0 02/23/23 1745   Infiltration Assessment 0 02/23/23 1745   Dressing Status Clean, dry, & intact 02/23/23 1745        Opportunity for questions and clarification was provided.       Patient transported with:   O2 @ 3 liters

## 2023-02-24 NOTE — PROGRESS NOTES
Charu Pepe is a 81 y.o. male, evaluated via audio-only technology on 2/23/2023 for Labs (Paper work for labs for [facility] and re certification for his oxygen)  . Discussed with BB his care taker Mr. Armen Huston is lying in a fetal position . Recommend evaluation post fall last week. Last week he declined EMS help last week and refused to go to the hospital. Reported Mentation changed. Assessment & Plan:   Diagnoses and all orders for this visit:    1. Diastolic CHF, chronic ([address]  Encouraged to seek emergent care at hospital   2. Chronic atrial fibrillation ([address]  Encouraged to seek emergent care at hospital   3. Immobility  Encouraged to seek emergent care at hospital   The complexity of medical decision making for this visit is high       12  Subjective:       Prior to Admission medications    Medication Sig Start Date End Date Taking? Authorizing Provider   cholecalciferol (Vitamin D3) 25 mcg (1,000 unit) cap Take  by mouth daily. Yes Provider, Historical   ascorbic acid, vitamin C, (Vitamin C) 1,000 mg tablet Take  by mouth. Yes Provider, Historical   atorvastatin (LIPITOR) 20 mg tablet TAKE 1 TABLET BY MOUTH IN THE MORNING 2/15/23  Yes Shankar Blanco NP   amiodarone (CORDARONE) 200 mg tablet Take 1 tablet by mouth once daily 2/7/23  Yes Shankar Blanco NP   dilTIAZem IR (CARDIZEM) 60 mg tablet Take 1 Tablet by mouth two (2) times a day. 1/3/23  Yes Shankar Blanco NP   furosemide (LASIX) 20 mg tablet Take 1 Tablet by mouth daily. 1/3/23  Yes Shankar Blanco NP   levothyroxine (SYNTHROID) 100 mcg tablet Take 1 Tablet by mouth Daily (before breakfast). 1/3/23  Yes Shankar Blanco NP   losartan (COZAAR) 50 mg tablet Take 1 Tablet by mouth daily. 1/3/23  Yes Shankar Blanco NP   warfarin (COUMADIN) 3 mg tablet Take 1 Tablet by mouth daily.  1/3/23  Yes Shankar Blanco NP   levalbuterol tartrate (XOPENEX) 45 mcg/actuation inhaler Take 1 Puff by inhalation every four (4) hours as needed for Wheezing or Shortness of Breath. 11/21/22  Yes Mayra May NP   amoxicillin-clavulanate (Augmentin) 875-125 mg per tablet Take 1 Tablet by mouth two (2) times a day. 11/2/22  Yes Georgina Lew MD   Nebulizer & Compressor machine 1 Each by Does Not Apply route every four (4) hours as needed for Wheezing, Shortness of Breath or Cough. As directed 11/2/22  Yes Georgina Lew MD   guaiFENesin ER (MUCINEX) 600 mg ER tablet Take 1 Tablet by mouth two (2) times a day. Patient taking differently: Take 600 mg by mouth two (2) times a day. PRN 10/31/22  Yes Mayra May NP   vit B Cmplx 3-FA-Vit C-Biotin (NEPHRO MARY RX) 1- mg-mg-mcg tablet Take 1 Tablet by mouth daily. 10/6/22  Yes Mayra May NP   ferrous sulfate 325 mg (65 mg iron) tablet Take  by mouth Daily (before breakfast). Yes Provider, Historical   bacitracin (BACITRACIN) 500 unit/gram oint Apply  to affected area two (2) times a day. Apply to affected area 4/20/22  Yes Jatinder Tenorio MD   collagenase [facility]) 250 unit/gram ointment Apply  to affected area daily. 1/24/22  Yes Mayra May NP   polyethylene glycol (Miralax) 17 gram/dose powder Take 17 g by mouth daily. 1 tablespoon with 8 oz of water daily 12/24/21  Yes Etienne Coe MD   acetaminophen (TYLENOL) 325 mg tablet Take 2 Tablets by mouth every six (6) hours as needed for Pain. 12/24/21  Yes Lionel Coe MD   ciclopirox (PENLAC) 8 % solution Apply  to affected area nightly.  As directed   Yes Provider, Historical     Patient Active Problem List   Diagnosis Code    Thyroid disease E07.9    Arrhythmia I49.9    Hyperlipemia E78.5    PVD (peripheral vascular disease) ([facility]) I73.9    CKD (chronic kidney disease) stage 3, GFR 30-59 ml/min ([facility]) N18.30    Anticoagulant long-term use Z79.01    Pulmonary scarring J98.4    Spondylosis of lumbar region without myelopathy or radiculopathy M47.816    Chronic atrial fibrillation (HCC) I48.20    CKD (chronic kidney disease) stage 4, GFR 15-29 ml/min (HCC) N18.4    CKD (chronic kidney disease) stage 5, GFR less than 15 ml/min ([facility]) N18.5    Closed right humeral fracture S42.301A    Coumadin toxicity, accidental or unintentional, initial encounter T45.511A    History of anemia due to chronic kidney disease N18.9, Z86.2    Left rib fracture S22.32XA    Stage 3 chronic kidney disease N18.30    Closed fracture of right proximal humerus S42.201A    Closed fracture of proximal end of right humerus with routine healing S42.201D    Hypothyroid E03.9    Essential hypertension O90    Diastolic CHF, chronic ([facility]) I50.32    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 XXXA    Generalized weakness R53.1    STEVEN (acute kidney injury) ([address] N17.9    Iron deficiency anemia due to chronic blood loss D50.0    PAF (paroxysmal atrial fibrillation) ([facility]) I48.0    Hip fracture ([address] S72.009A    Encounter for rehabilitation Z51.89    CAP (community acquired pneumonia) J18.9    Acute renal failure superimposed on stage 3 chronic kidney disease (HCC) N17.9, N18.30    Traumatic rhabdomyolysis ([facility]) T79. 6XXA    Loculated pleural effusion J90    Leukocytosis D72.829    Rhabdomyolysis M62.82    Chronic bronchitis, unspecified chronic bronchitis type ([facility]) J42    Coagulopathy ([facility]) D68.9     Patient Active Problem List    Diagnosis Date Noted    Chronic bronchitis, unspecified chronic bronchitis type ([address] 02/16/2023    Coagulopathy ([address] 02/16/2023    CAP (community acquired pneumonia) 12/03/2021    Acute renal failure superimposed on stage 3 chronic kidney disease ([address] 12/03/2021    Traumatic rhabdomyolysis ([address] 12/03/2021    Loculated pleural effusion 12/03/2021    Leukocytosis 12/03/2021    Rhabdomyolysis 12/03/2021    Encounter for rehabilitation 02/13/2021    Hip fracture ([address] 02/11/2021    Iron deficiency anemia due to chronic blood loss 02/09/2021    PAF (paroxysmal atrial fibrillation) ([address] 02/09/2021    Fall 02/08/2021 Generalized weakness 02/08/2021    STEVEN (acute kidney injury) ([facility] 02/08/2021    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 ([facility] 87/50/1028    Diastolic CHF, chronic ([facility] 10/11/2019    Closed fracture of proximal end of right humerus with routine healing 08/07/2019    Stage 3 chronic kidney disease 08/05/2019    Hypothyroid 08/05/2019    Essential hypertension 08/05/2019    Closed fracture of right proximal humerus 07/31/2019    Closed right humeral fracture 07/29/2019    Coumadin toxicity, accidental or unintentional, initial encounter 07/29/2019    History of anemia due to chronic kidney disease 07/29/2019    Left rib fracture 07/29/2019    CKD (chronic kidney disease) stage 4, GFR 15-29 ml/min ([facility]) 03/05/2019    CKD (chronic kidney disease) stage 5, GFR less than 15 ml/min ([facility]) 03/05/2019    Chronic atrial fibrillation ([facility]) 09/21/2018    Spondylosis of lumbar region without myelopathy or radiculopathy 10/11/2017    Pulmonary scarring 03/28/2017    Anticoagulant long-term use 02/28/2017    CKD (chronic kidney disease) stage 3, GFR 30-59 ml/min ([facility])     Thyroid disease     Arrhythmia     Hyperlipemia     PVD (peripheral vascular disease) ([facility])      Current Outpatient Medications   Medication Sig Dispense Refill    cholecalciferol (Vitamin D3) 25 mcg (1,000 unit) cap Take  by mouth daily. ascorbic acid, vitamin C, (Vitamin C) 1,000 mg tablet Take  by mouth. atorvastatin (LIPITOR) 20 mg tablet TAKE 1 TABLET BY MOUTH IN THE MORNING 90 Tablet 0    amiodarone (CORDARONE) 200 mg tablet Take 1 tablet by mouth once daily 90 Tablet 0    dilTIAZem IR (CARDIZEM) 60 mg tablet Take 1 Tablet by mouth two (2) times a day. 180 Tablet 0    furosemide (LASIX) 20 mg tablet Take 1 Tablet by mouth daily. 90 Tablet 0    levothyroxine (SYNTHROID) 100 mcg tablet Take 1 Tablet by mouth Daily (before breakfast). 90 Tablet 0    losartan (COZAAR) 50 mg tablet Take 1 Tablet by mouth daily.  90 Tablet 1 warfarin (COUMADIN) 3 mg tablet Take 1 Tablet by mouth daily. 90 Tablet 0    levalbuterol tartrate (XOPENEX) 45 mcg/actuation inhaler Take 1 Puff by inhalation every four (4) hours as needed for Wheezing or Shortness of Breath. 15 g 3    amoxicillin-clavulanate (Augmentin) 875-125 mg per tablet Take 1 Tablet by mouth two (2) times a day. 14 Tablet 0    Nebulizer & Compressor machine 1 Each by Does Not Apply route every four (4) hours as needed for Wheezing, Shortness of Breath or Cough. As directed 1 Each 0    guaiFENesin ER (MUCINEX) 600 mg ER tablet Take 1 Tablet by mouth two (2) times a day. (Patient taking differently: Take 600 mg by mouth two (2) times a day. PRN) 60 Tablet 1    vit B Cmplx 3-FA-Vit C-Biotin (NEPHRO MARY RX) 1- mg-mg-mcg tablet Take 1 Tablet by mouth daily. 90 Tablet 0    ferrous sulfate 325 mg (65 mg iron) tablet Take  by mouth Daily (before breakfast). bacitracin (BACITRACIN) 500 unit/gram oint Apply  to affected area two (2) times a day. Apply to affected area 28 g 0    collagenase (SANTYL) 250 unit/gram ointment Apply  to affected area daily. 30 g 4    polyethylene glycol (Miralax) 17 gram/dose powder Take 17 g by mouth daily. 1 tablespoon with 8 oz of water daily 238 g 1    acetaminophen (TYLENOL) 325 mg tablet Take 2 Tablets by mouth every six (6) hours as needed for Pain. 100 Tablet 1    ciclopirox (PENLAC) 8 % solution Apply  to affected area nightly.  As directed       Allergies   Allergen Reactions    Ketamine Other (comments)     confusion     Past Medical History:   Diagnosis Date    Arrhythmia     atrial fibrillation, cardioversion 2010    CKD (chronic kidney disease) stage 3, GFR 30-59 ml/min ([facility])     Hyperlipemia     Hypertension     Iron deficiency anemia due to chronic blood loss 2/9/2021    PAF (paroxysmal atrial fibrillation) ([facility] 2/9/2021    PVD (peripheral vascular disease) ([facility]     Thyroid disease      Past Surgical History:   Procedure Laterality Date    HX COLONOSCOPY  2010    WNL SIS 10 y    HX HERNIA REPAIR Bilateral     Inguinal    HX ORTHOPAEDIC      right shoulder fracture repair    HX OTHER SURGICAL  03/09/2019    groin lymph gland excision    HX VASCULAR ACCESS      IR INSERT TUNL CVC W PORT OVER 5 YEARS  3/5/2020    AK UNLISTED PROCEDURE LUNGS & PLEURA Left     lung surgery age 17, lobectomy    AK UNLISTED PROCEDURE VASCULAR SURGERY      bypass numerous surgeries both legs     Family History   Problem Relation Age of Onset    Cancer Mother         lung    Cancer Brother 66        lung    Cancer Maternal Uncle         cancer unknown     Social History     Tobacco Use    Smoking status: Former     Packs/day: 1.00     Years: 54.00     Pack years: 54.00     Types: Cigarettes     Quit date: 3/15/2007     Years since quitting: 15.9    Smokeless tobacco: Never   Substance Use Topics    Alcohol use: Yes     Comment: former drinker- beer   social       ROS  Pertinent items are noted in the HPI  Patient-Reported Pulse: 60  Patient-Reported Pulse Oximetry: 100 (3 liters)       Kristal Ng was evaluated through a patient-initiated, synchronous (real-time) audio only encounter. He (or guardian if applicable) is aware that it is a billable service, which includes applicable co-pays, with coverage as determined by his insurance carrier. This visit was conducted with the patient's (and/or Anyi Conti guardian's) verbal consent. He has not had a related appointment within my department in the past 7 days or scheduled within the next 24 hours. The patient was located in a state where the provider was licensed to provide care. The patient was located at: Home: [place] [id number]  The provider was located at:  Facility (Appt Department): [address] [phone]    Total Time: minutes: 21-30 minutes    Kevan Palacios NP

## 2023-02-24 NOTE — ED PROVIDER NOTES
EMERGENCY DEPARTMENT HISTORY AND PHYSICAL EXAM          Date: 2/23/2023  Patient Name: David Coffey    History of Presenting Illness     Chief Complaint   Patient presents with    Fall     Last wednesday       History Provided By: Patient and Caregiver    HPI: David Coffey is a 81 y.o. male, pmhx listed below, who presents to the ED c/o fall. According to EMS and caregiver, patient fell approximately 5 days ago. Refused EMS transport at that time. Caregiver noted that patient was increasingly confused and lethargic. Today when patient was unable to get up into a wheelchair (patient can usually walk), caregiver insisted that they call 911. EMS arrived and transported patient to the emergency department. Patient is on Coumadin for atrial fibrillation. Patient reports he is not complaining of any pain, remembers fall, cannot give further details.         PCP: Katlyn Guidry NP        Past History       Past Medical History:  Past Medical History:   Diagnosis Date    Arrhythmia     atrial fibrillation, cardioversion 2010    CKD (chronic kidney disease) stage 3, GFR 30-59 ml/min ([facility])     Hyperlipemia     Hypertension     Iron deficiency anemia due to chronic blood loss 2/9/2021    PAF (paroxysmal atrial fibrillation) ([facility] [address] 2/9/2021    PVD (peripheral vascular disease) ([facility] [address]     Thyroid disease        Past Surgical History:  Past Surgical History:   Procedure Laterality Date    HX COLONOSCOPY  2010    WNL SIS 10 y    HX HERNIA REPAIR Bilateral     Inguinal    HX ORTHOPAEDIC      right shoulder fracture repair    HX OTHER SURGICAL  03/09/2019    groin lymph gland excision    HX VASCULAR ACCESS      IR INSERT TUNL CVC W PORT OVER 5 YEARS  3/5/2020    TX UNLISTED PROCEDURE LUNGS & PLEURA Left     lung surgery age 17, lobectomy    TX UNLISTED PROCEDURE VASCULAR SURGERY      bypass numerous surgeries both legs       Family History:  Family History   Problem Relation Age of Onset    Cancer Mother         lung Cancer Brother 66        lung    Cancer Maternal Uncle         cancer unknown       Social History:  Social History     Tobacco Use    Smoking status: Former     Packs/day: 1.00     Years: 54.00     Pack years: 54.00     Types: Cigarettes     Quit date: 3/15/2007     Years since quitting: 15.9    Smokeless tobacco: Never   Vaping Use    Vaping Use: Never used   Substance Use Topics    Alcohol use: Yes     Comment: former drinker- beer   social    Drug use: Never       Physical Exam     Vital Signs-Reviewed the patient's vital signs. Patient Vitals for the past 12 hrs:   Temp Pulse Resp BP SpO2   02/23/23 1917 -- 61 (!) 31 (!) 162/54 100 %   02/23/23 1841 -- (!) 55 24 (!) 128/51 100 %   02/23/23 1838 -- (!) 55 24 -- 100 %   02/23/23 1828 -- (!) 58 25 -- 100 %   02/23/23 1825 -- (!) 59 24 -- 100 %   02/23/23 1823 -- (!) 59 25 (!) 144/51 100 %   02/23/23 1810 -- -- -- (!) 166/63 --   02/23/23 1627 97.9 °F (36.6 °C) 61 18 (!) 141/61 100 %       Physical Exam  Constitutional:       Comments: Weak appearing   HENT:      Head:      Comments: Right sided facial contusion  Cardiovascular:      Rate and Rhythm: Normal rate and regular rhythm. Pulmonary:      Effort: Pulmonary effort is normal.      Breath sounds: Normal breath sounds. Abdominal:      Palpations: Abdomen is soft. Tenderness: There is no abdominal tenderness. Musculoskeletal:      Comments: Large ecchymosis on right shoulder with tenderness of right clavicle. Bilateral lower extremities nontender, pelvis stable. Bilateral EXTR upper extremities otherwise nontender. Skin:     General: Skin is warm. Neurological:      Comments: Oriented to self and location. Did not know year or month. Moves all extremities on command. Speech is garbled, caregiver reports this is not a big change from baseline.        Diagnostic Study Results     Labs -     Recent Results (from the past 12 hour(s))   CBC WITH AUTOMATED DIFF    Collection Time: 02/23/23  5:42 PM   Result Value Ref Range    WBC 10.6 4.1 - 11.1 K/uL    RBC 2.64 (L) 4.10 - 5.70 M/uL    HGB 8.3 (L) 12.1 - 17.0 g/dL    HCT 26.2 (L) 36.6 - 50.3 %    MCV 99.2 (H) 80.0 - 99.0 FL    MCH 31.4 26.0 - 34.0 PG    MCHC 31.7 30.0 - 36.5 g/dL    RDW 13.9 11.5 - 14.5 %    PLATELET 940 061 - 600 K/uL    MPV 10.8 8.9 - 12.9 FL    NRBC 0.0 0  WBC    ABSOLUTE NRBC 0.00 0.00 - 0.01 K/uL    NEUTROPHILS 79 (H) 32 - 75 %    BAND NEUTROPHILS 1 %    LYMPHOCYTES 6 (L) 12 - 49 %    MONOCYTES 14 (H) 5 - 13 %    EOSINOPHILS 0 0 - 7 %    BASOPHILS 0 0 - 1 %    IMMATURE GRANULOCYTES 0 0.0 - 0.5 %    ABS. NEUTROPHILS 8.5 (H) 1.8 - 8.0 K/UL    ABS. LYMPHOCYTES 0.6 (L) 0.8 - 3.5 K/UL    ABS. MONOCYTES 1.5 (H) 0.0 - 1.0 K/UL    ABS. EOSINOPHILS 0.0 0.0 - 0.4 K/UL    ABS. BASOPHILS 0.0 0.0 - 0.1 K/UL    ABS. IMM. GRANS. 0.0 0.00 - 0.04 K/UL    DF MANUAL      PLATELET COMMENTS ADEQUATE PLATELETS      RBC COMMENTS ANISOCYTOSIS  1+       METABOLIC PANEL, COMPREHENSIVE    Collection Time: 02/23/23  5:42 PM   Result Value Ref Range    Sodium 139 136 - 145 mmol/L    Potassium 4.8 3.5 - 5.1 mmol/L    Chloride 104 97 - 108 mmol/L    CO2 30 21 - 32 mmol/L    Anion gap 5 5 - 15 mmol/L    Glucose 115 (H) 65 - 100 mg/dL    BUN 55 (H) 6 - 20 MG/DL    Creatinine 3.70 (H) 0.70 - 1.30 MG/DL    BUN/Creatinine ratio 15 12 - 20      eGFR 16 (L) >60 ml/min/1.73m2    Calcium 9.2 8.5 - 10.1 MG/DL    Bilirubin, total 0.5 0.2 - 1.0 MG/DL    ALT (SGPT) 26 12 - 78 U/L    AST (SGOT) 19 15 - 37 U/L    Alk.  phosphatase 86 45 - 117 U/L    Protein, total 6.5 6.4 - 8.2 g/dL    Albumin 2.9 (L) 3.5 - 5.0 g/dL    Globulin 3.6 2.0 - 4.0 g/dL    A-G Ratio 0.8 (L) 1.1 - 2.2     PROTHROMBIN TIME + INR    Collection Time: 02/23/23  5:42 PM   Result Value Ref Range    INR 5.2 (HH) 0.9 - 1.1      Prothrombin time 48.6 (H) 9.0 - 11.1 sec   EKG, 12 LEAD, INITIAL    Collection Time: 02/23/23  5:46 PM   Result Value Ref Range    Ventricular Rate 62 BPM    Atrial Rate 62 BPM P-R Interval 220 ms    QRS Duration 150 ms    Q-T Interval 494 ms    QTC Calculation (Bezet) 501 ms    Calculated P Axis 90 degrees    Calculated R Axis 81 degrees    Calculated T Axis 69 degrees    Diagnosis       Sinus rhythm with 1st degree AV block  Right bundle branch block  Abnormal ECG  When compared with ECG of 04-DEC-2021 05:58,  No significant change was found         Radiologic Studies -   CT CHEST ABD PELV WO CONT   Final Result   Distal right clavicle fracture and right scapular fracture. The right posterior   rib fractures are more conspicuous on the cervical spine CT. Small bilateral   pleural effusions with right lower lobe atelectasis and left lower lobe   infiltrate. No acute intra-abdominal abnormality. Small gallstones. CT SPINE CERV WO CONT   Final Result   Fractures of the right posterior first and third ribs and the right T2   transverse process. XR SHOULDER RT AP/LAT MIN 2 V   Final Result      Acute right distal clavicle fracture   Healed right proximal humeral fracture with hardware in place      CT HEAD WO CONT   Final Result      1. Acute intraparenchymal hemorrhage in the right cerebral hemisphere in 2   locations as described above. 2. Intraventricular hemorrhage on the left, in the left lateral ventricle. 3. No mass effect or shift of midline structures and no extra-axial hemorrhage. 4. Stable moderately severe microvascular ischemic change. The findings were called to Dr. Lenin Smith on 2/23/2023 at 1846 hours by Dr. Julio Teixeira. [phone]              CT Results  (Last 48 hours)                 02/23/23 1858  CT CHEST ABD PELV WO CONT Final result    Impression:  Distal right clavicle fracture and right scapular fracture. The right posterior   rib fractures are more conspicuous on the cervical spine CT. Small bilateral   pleural effusions with right lower lobe atelectasis and left lower lobe   infiltrate. No acute intra-abdominal abnormality. Small gallstones. Narrative:  EXAM: CT CHEST ABD PELV WO CONT       INDICATION: fall, rib fractures       COMPARISON: 12/4/2021       IV CONTRAST: None. ORAL CONTRAST: None       TECHNIQUE:    Noncontrast axial images were obtained through the chest, abdomen and pelvis. Coronal and sagittal reformats were generated. CT dose reduction was achieved   through use of a standardized protocol tailored for this examination and   automatic exposure control for dose modulation. FINDINGS:        CHEST WALL: No mass or axillary lymphadenopathy. THYROID: No nodule. MEDIASTINUM: No mass or lymphadenopathy. NNEKA: No mass or lymphadenopathy. THORACIC AORTA: Atherosclerotic without evidence of aneurysm. MAIN PULMONARY ARTERY: Normal in caliber. TRACHEA/BRONCHI: Patent. ESOPHAGUS: No wall thickening or dilatation. HEART: Normal in size. Coronary artery calcium: present   PLEURA: Small bilateral pleural effusions left greater than right. LUNGS: Focal left lower lobe infiltrate is noted. Otherwise scarring in the left   lung is stable. Right basilar atelectasis is noted. LIVER: The liver is hyperdense compatible with amiodarone use. BILIARY TREE: Tiny gallstones. CBD is not dilated. SPLEEN: within normal limits. PANCREAS: No mass or ductal dilatation. ADRENALS: Unremarkable. KIDNEYS: Bilateral renal cysts, no follow-up required. STOMACH: Unremarkable. SMALL BOWEL: No dilatation or wall thickening. COLON: No dilatation or wall thickening. APPENDIX: Within normal limits. PERITONEUM: No ascites or pneumoperitoneum. RETROPERITONEUM: No lymphadenopathy or aortic aneurysm. REPRODUCTIVE ORGANS: Unremarkable. URINARY BLADDER: No mass or calculus. BONES: Angulated fracture of the distal right clavicle is noted. There is also   an acute fracture of the right scapula. Right T1 transverse process fracture is   noted. The right posterior rib fractures are more conspicuous on the cervical   spine CT. Old left rib fractures are noted. ABDOMINAL WALL: No mass or hernia. ADDITIONAL COMMENTS: N/A           02/23/23 1733  CT SPINE CERV WO CONT Final result    Impression:  Fractures of the right posterior first and third ribs and the right T2   transverse process. Narrative:  EXAM:  CT CERVICAL SPINE WITHOUT CONTRAST       INDICATION: s/p fall. COMPARISON: 12/3/2021       CONTRAST:  None. TECHNIQUE: Multislice helical CT of the cervical spine was performed without   intravenous contrast administration. Sagittal and coronal reformats were   generated. CT dose reduction was achieved through use of a standardized   protocol tailored for this examination and automatic exposure control for dose   modulation. FINDINGS:       The alignment is within normal limits. There is no cervical fracture or   compression deformity. The odontoid process is intact. The craniocervical   junction is within normal limits. Emphysematous changes are noted in the   visualized lung fields. There are nondisplaced fractures of the right posterior   first and third ribs and right T2 transverse process. Multilevel facet   degenerative changes are seen throughout the cervical spine. 02/23/23 1715  CT HEAD WO CONT Final result    Impression:      1. Acute intraparenchymal hemorrhage in the right cerebral hemisphere in 2   locations as described above. 2. Intraventricular hemorrhage on the left, in the left lateral ventricle. 3. No mass effect or shift of midline structures and no extra-axial hemorrhage. 4. Stable moderately severe microvascular ischemic change. The findings were called to Dr. Megan Hernández on 2/23/2023 at 1846 hours by Dr. Miesha Sparks. [phone]               Narrative:  EXAM: CT HEAD WO CONT       INDICATION: s/p fall       COMPARISON: 11/2/2022. CONTRAST: None. TECHNIQUE: Unenhanced CT of the head was performed using 5 mm images. Brain and   bone windows were generated. Coronal and sagittal reformats. CT dose reduction   was achieved through use of a standardized protocol tailored for this   examination and automatic exposure control for dose modulation. FINDINGS:   Generalized prominence of cerebral sulci and ventricles is moderately severely   increased but stable and commensurate with advanced age. . Periventricular white   matter low-density is also moderately severe, diffuse and stable. . Since the   prior study, there is an acute high density in the right frontal lobe, in the   anterior centrum semiovale, measuring 4.6 x 1.6 x 2.7 centimeters (19.9 cc   volume). Additionally, there is a second focal high density collection in the   right temporal lobe 2.2 x 1.4 x 1.1 centimeters (3.4 cc volume) (. Associated   intraventricular hemorrhage is noted on the left, layering in the occipital   horn. There is no parenchymal hemorrhage in the left cerebral hemisphere. Mild   effacement of sulci in the right frontal lobe and in the right temporal lobe but   no significant mass effect or shift of midline structures. . The basilar cisterns   are open. No CT evidence of acute infarct. The bone windows demonstrate no abnormalities. The visualized portions of the   paranasal sinuses and mastoid air cells are clear, with significant improvement   since the prior study. . Soft tissue swelling is incidentally noted of the right   frontal bone. CXR Results  (Last 48 hours)      None                Medical Decision Making   I am the first provider for this patient. I reviewed the vital signs, available nursing notes, past medical history, past surgical history, family history and social history. Records Reviewed: Prior medical records    Provider Notes (Medical Decision Making):   MDM: 81-year-old male with fall approximately 5 to 7 days ago. Now with multiple injuries evident on exam, ecchymosis of right face and right shoulder.   Will need CT head, C-spine, right shoulder x-ray. Patient is on Coumadin, concern for hemorrhage. Initial assessment performed. The patients presenting problems have been discussed, and they are in agreement with the care plan formulated and outlined with them. I have encouraged them to ask questions as they arise throughout their visit. PROGRESS NOTE:  ED Course as of 02/23/23 1933   u Feb 23, 2023   1846 Call from Radiology call from Dr. Helena Clifford. Two parenchymal hemorrhages in R cerebral hemisphere, no mass effect, no shift. [PV]   [id number] Case discussed with Juju Montoya RN at [facility] to arrange transfer. Will contact [facility] for trauma assessment. [PV]   1903 Dr. Carmen Fernandes, [facility] accepts patient for trauma transfer. [PV]      ED Course User Index  [PV] Janae Walton MD        Critical Care  Performed by: Janae Walton MD  Authorized by: Janae Walton MD     Critical care provider statement:     Critical care time (minutes):  60    Critical care time was exclusive of:  Separately billable procedures and treating other patients and teaching time    Critical care was necessary to treat or prevent imminent or life-threatening deterioration of the following conditions:  Trauma    Critical care was time spent personally by me on the following activities:  Development of treatment plan with patient or surrogate, evaluation of patient's response to treatment, examination of patient, ordering and review of laboratory studies, ordering and review of radiographic studies, re-evaluation of patient's condition and review of old charts    Diagnosis     Clinical Impression:   1. Traumatic intracerebral hemorrhage, right, with unknown loss of consciousness status, initial encounter    2. Closed displaced fracture of acromial end of right clavicle, initial encounter    3.  Closed fracture of multiple ribs of right side, initial encounter            Disposition:  Transferred to Another Facility    Current Discharge Medication List            Please note, this dictation was completed with [employer], the computer voice recognition software. Quite often unanticipated grammatical, syntax, homophones, and other interpretive errors are inadvertently transcribed by the computer software. Please disregard these errors. Please excuse any errors that have escaped final proof reading.

## 2023-02-24 NOTE — ED NOTES
Charge nurse to [facility] nurse  report given:  Report included pt to go to [facility] ER, and calling [facility] now